# Patient Record
Sex: FEMALE | Race: WHITE | NOT HISPANIC OR LATINO | Employment: FULL TIME | URBAN - METROPOLITAN AREA
[De-identification: names, ages, dates, MRNs, and addresses within clinical notes are randomized per-mention and may not be internally consistent; named-entity substitution may affect disease eponyms.]

---

## 2016-11-10 LAB
ALBUMIN/GLOB SERPL: 1.5 {RATIO}
ALBUMIN: 4
ALP ISOS SERPL LEV INH-CCNC: 60 U/L
ALT SERPL-CCNC: 28 UNIT/L
ANION GAP SERPL CALC-SCNC: NORMAL MMOL/L
AST: 28
BILIRUB SERPL-MCNC: 0.3 MG/DL (ref 0.1–1.4)
BILIRUBIN DIRECT+TOT PNL SERPL-MCNC: NORMAL
BUN SERPL-MCNC: NORMAL MG/DL
BUN SERPL-MCNC: NORMAL MG/DL
BUN/CREAT SERPL: NORMAL
CALCIUM SERPL-MCNC: NORMAL MG/DL
CHLORIDE SERPL-SCNC: NORMAL MMOL/L
CK-BB: NORMAL
CO2 SERPL-SCNC: NORMAL MMOL/L
CO2 SERPL-SCNC: NORMAL MMOL/L
CREAT SERPL-MCNC: NORMAL MG/DL
EGFR: 103
GLOBULIN SER-MCNC: NORMAL G/DL
GLUCOSE SERPL-MCNC: NORMAL MG/DL
IRON: NORMAL
MAGNESIUM SERPL-MCNC: NORMAL MG/DL
PHOSPHATE FLD-MCNC: NORMAL MG/DL
POTASSIUM SERPL-SCNC: NORMAL MMOL/L
PROTEIN TOTAL: 6.6
SODIUM BLD-SCNC: NORMAL MMOL/L
URATE SERPL-MCNC: NORMAL MG/DL

## 2017-11-16 DIAGNOSIS — N63.0 LUMP OR MASS IN BREAST: Primary | ICD-10-CM

## 2017-11-20 ENCOUNTER — HOSPITAL ENCOUNTER (OUTPATIENT)
Dept: RADIOLOGY | Facility: HOSPITAL | Age: 27
Discharge: HOME | End: 2017-11-20
Attending: SURGERY
Payer: COMMERCIAL

## 2017-11-20 VITALS — BODY MASS INDEX: 26.58 KG/M2 | WEIGHT: 150 LBS | HEIGHT: 63 IN

## 2017-11-20 DIAGNOSIS — N63.0 LUMP OR MASS IN BREAST: ICD-10-CM

## 2017-11-20 PROCEDURE — 76641 ULTRASOUND BREAST COMPLETE: CPT | Mod: 26,LT,, | Performed by: RADIOLOGY

## 2017-11-20 PROCEDURE — 76641 ULTRASOUND BREAST COMPLETE: CPT | Mod: TC,50

## 2017-11-20 PROCEDURE — 76641 ULTRASOUND BREAST COMPLETE: CPT | Mod: 26,RT,, | Performed by: RADIOLOGY

## 2018-01-19 ENCOUNTER — OFFICE VISIT (OUTPATIENT)
Dept: DERMATOLOGY | Facility: CLINIC | Age: 28
End: 2018-01-19
Payer: COMMERCIAL

## 2018-01-19 VITALS — WEIGHT: 150 LBS | BODY MASS INDEX: 26.57 KG/M2

## 2018-01-19 DIAGNOSIS — D22.9 NEVUS OF MULTIPLE SITES: Primary | ICD-10-CM

## 2018-01-19 PROCEDURE — 99202 OFFICE O/P NEW SF 15 MIN: CPT | Mod: S$GLB,,, | Performed by: DERMATOLOGY

## 2018-01-19 PROCEDURE — 99999 PR PBB SHADOW E&M-EST. PATIENT-LVL II: CPT | Mod: PBBFAC,,, | Performed by: DERMATOLOGY

## 2018-01-19 NOTE — PROGRESS NOTES
Subjective:       Patient ID:  Bob Ortiz is a 27 y.o. female who presents for   Chief Complaint   Patient presents with    Mole     chest     History of Present Illness: The patient presents with chief complaint of mole.  Location: chest  Duration: years  Signs/Symptoms: none    Prior treatments: none          Review of Systems   Constitutional: Negative for fever.   Skin: Negative for itching and rash.   Hematologic/Lymphatic: Does not bruise/bleed easily.        Objective:    Physical Exam   Constitutional: She appears well-developed and well-nourished. No distress.   Neurological: She is alert and oriented to person, place, and time. She is not disoriented.   Psychiatric: She has a normal mood and affect.   Skin:   Areas Examined (abnormalities noted in diagram):   Head / Face Inspection Performed  Neck Inspection Performed  Chest / Axilla Inspection Performed  Back Inspection Performed  RUE Inspected  LUE Inspection Performed              Diagram Legend     Erythematous scaling macule/papule c/w actinic keratosis       Vascular papule c/w angioma      Pigmented verrucoid papule/plaque c/w seborrheic keratosis      Yellow umbilicated papule c/w sebaceous hyperplasia      Irregularly shaped tan macule c/w lentigo     1-2 mm smooth white papules consistent with Milia      Movable subcutaneous cyst with punctum c/w epidermal inclusion cyst      Subcutaneous movable cyst c/w pilar cyst      Firm pink to brown papule c/w dermatofibroma      Pedunculated fleshy papule(s) c/w skin tag(s)      Evenly pigmented macule c/w junctional nevus     Mildly variegated pigmented, slightly irregular-bordered macule c/w mildly atypical nevus      Flesh colored to evenly pigmented papule c/w intradermal nevus       Pink pearly papule/plaque c/w basal cell carcinoma      Erythematous hyperkeratotic cursted plaque c/w SCC      Surgical scar with no sign of skin cancer recurrence      Open and closed comedones      Inflammatory  "papules and pustules      Verrucoid papule consistent consistent with wart     Erythematous eczematous patches and plaques     Dystrophic onycholytic nail with subungual debris c/w onychomycosis     Umbilicated papule    Erythematous-base heme-crusted tan verrucoid plaque consistent with inflamed seborrheic keratosis     Erythematous Silvery Scaling Plaque c/w Psoriasis     See annotation      Assessment / Plan:        Nevus of multiple sites  The "ABCD" rules to observe pigmented lesions were reviewed.      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  "

## 2018-01-22 ENCOUNTER — TELEPHONE (OUTPATIENT)
Dept: DERMATOLOGY | Facility: CLINIC | Age: 28
End: 2018-01-22

## 2018-01-23 ENCOUNTER — TELEPHONE (OUTPATIENT)
Dept: DERMATOLOGY | Facility: CLINIC | Age: 28
End: 2018-01-23

## 2018-01-23 NOTE — TELEPHONE ENCOUNTER
----- Message from Carla Correa LPN sent at 1/19/2018  2:46 PM CST -----  Schedule pt for surgery per MCR 1cm x 6mm mole chest

## 2018-06-26 ENCOUNTER — TELEPHONE (OUTPATIENT)
Dept: SURGERY | Facility: CLINIC | Age: 28
End: 2018-06-26

## 2018-06-26 RX ORDER — PANTOPRAZOLE SODIUM 40 MG/1
TABLET, DELAYED RELEASE ORAL
COMMUNITY
End: 2018-06-28 | Stop reason: SDUPTHER

## 2018-06-26 NOTE — TELEPHONE ENCOUNTER
LVM for pt to return call concerning Rx for pantoprazole. Pt last seen in office on 01/25/18. Rx discontinued on 01/25/18.

## 2018-06-26 NOTE — TELEPHONE ENCOUNTER
----- Message from Courtney Terry sent at 6/26/2018  8:41 AM CDT -----  Pt request a refill for Pantoprazole ... Change to [facility] on [address] , [place]  [phone]  / call pt at  [phone]

## 2018-06-28 DIAGNOSIS — K21.9 GASTROESOPHAGEAL REFLUX DISEASE WITHOUT ESOPHAGITIS: ICD-10-CM

## 2018-06-28 RX ORDER — PANTOPRAZOLE SODIUM 40 MG/1
40 TABLET, DELAYED RELEASE ORAL DAILY
Qty: 90 TABLET | Refills: 3 | Status: SHIPPED | OUTPATIENT
Start: 2018-06-28 | End: 2019-06-24 | Stop reason: SDUPTHER

## 2018-06-29 ENCOUNTER — TELEPHONE (OUTPATIENT)
Dept: SURGERY | Facility: CLINIC | Age: 28
End: 2018-06-29

## 2018-06-29 NOTE — TELEPHONE ENCOUNTER
----- Message from Jim Prabhakar MD sent at 6/28/2018  7:16 PM CDT -----  Done    ----- Message -----  From: Liz Donovan LPN  Sent: 6/26/2018  10:09 AM  To: MD Dr Aki Michele  Pt called for a refill on her Rx Pantoprazole 40 mg. Rx last filled on 08/21/17, last o/v for chronic pancreatitis on 01/25/18. Pt requesting #30 refill and schedule o/v if needed. Please advise.

## 2018-08-17 ENCOUNTER — HOSPITAL ENCOUNTER (EMERGENCY)
Facility: HOSPITAL | Age: 28
Discharge: HOME | End: 2018-08-17
Attending: INTERNAL MEDICINE

## 2018-08-17 VITALS
BODY MASS INDEX: 32.6 KG/M2 | OXYGEN SATURATION: 99 % | RESPIRATION RATE: 20 BRPM | HEART RATE: 78 BPM | WEIGHT: 184 LBS | SYSTOLIC BLOOD PRESSURE: 122 MMHG | DIASTOLIC BLOOD PRESSURE: 79 MMHG | TEMPERATURE: 98 F | HEIGHT: 63 IN

## 2018-08-17 DIAGNOSIS — S29.019A THORACIC MYOFASCIAL STRAIN, INITIAL ENCOUNTER: ICD-10-CM

## 2018-08-17 DIAGNOSIS — S16.1XXA CERVICAL STRAIN, ACUTE, INITIAL ENCOUNTER: Primary | ICD-10-CM

## 2018-08-17 PROCEDURE — 99282 EMERGENCY DEPT VISIT SF MDM: CPT
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
## 2018-08-22 ENCOUNTER — HOSPITAL ENCOUNTER (EMERGENCY)
Facility: HOSPITAL | Age: 28
Discharge: HOME | End: 2018-08-22
Attending: EMERGENCY MEDICINE
Payer: COMMERCIAL

## 2018-08-22 ENCOUNTER — TELEPHONE (OUTPATIENT)
Dept: SURGERY | Facility: CLINIC | Age: 28
End: 2018-08-22

## 2018-08-22 VITALS
BODY MASS INDEX: 32.77 KG/M2 | WEIGHT: 185 LBS | SYSTOLIC BLOOD PRESSURE: 122 MMHG | TEMPERATURE: 98 F | RESPIRATION RATE: 18 BRPM | HEART RATE: 75 BPM | OXYGEN SATURATION: 100 % | DIASTOLIC BLOOD PRESSURE: 65 MMHG

## 2018-08-22 DIAGNOSIS — S39.012A BACK STRAIN, INITIAL ENCOUNTER: Primary | ICD-10-CM

## 2018-08-22 DIAGNOSIS — M54.9 BACK PAIN: ICD-10-CM

## 2018-08-22 LAB
B-HCG UR QL: NEGATIVE
CTP QC/QA: YES

## 2018-08-22 PROCEDURE — 81025 URINE PREGNANCY TEST: CPT | Performed by: NURSE PRACTITIONER

## 2018-08-22 PROCEDURE — 99284 EMERGENCY DEPT VISIT MOD MDM: CPT

## 2018-08-22 RX ORDER — METHOCARBAMOL 750 MG/1
1500 TABLET, FILM COATED ORAL EVERY 8 HOURS PRN
Qty: 30 TABLET | Refills: 0 | Status: SHIPPED | OUTPATIENT
Start: 2018-08-22 | End: 2018-08-28

## 2018-08-22 RX ORDER — DICLOFENAC SODIUM 50 MG/1
50 TABLET, DELAYED RELEASE ORAL 3 TIMES DAILY PRN
Qty: 30 TABLET | Refills: 0 | Status: SHIPPED | OUTPATIENT
Start: 2018-08-22 | End: 2018-08-28

## 2018-08-22 NOTE — DISCHARGE INSTRUCTIONS
Follow up with your primary care doctor if back pain continues. You may need a MRI in the future if your pain does not improve.
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
## 2018-08-28 ENCOUNTER — OFFICE VISIT (OUTPATIENT)
Dept: SURGERY | Facility: CLINIC | Age: 28
End: 2018-08-28

## 2018-08-28 VITALS
OXYGEN SATURATION: 97 % | WEIGHT: 187 LBS | SYSTOLIC BLOOD PRESSURE: 118 MMHG | HEIGHT: 63 IN | BODY MASS INDEX: 33.13 KG/M2 | HEART RATE: 84 BPM | TEMPERATURE: 97 F | DIASTOLIC BLOOD PRESSURE: 58 MMHG

## 2018-08-28 DIAGNOSIS — S20.229A CONTUSION OF BACK, UNSPECIFIED LATERALITY, INITIAL ENCOUNTER: Primary | ICD-10-CM

## 2018-08-28 PROCEDURE — 99203 OFFICE O/P NEW LOW 30 MIN: CPT | Mod: S$GLB,,, | Performed by: SURGERY

## 2018-08-28 RX ORDER — ESTRADIOL 1 MG/1
2 TABLET ORAL DAILY
COMMUNITY
End: 2019-10-15 | Stop reason: SDUPTHER
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
## 2018-09-19 ENCOUNTER — TELEPHONE (OUTPATIENT)
Dept: DERMATOLOGY | Facility: CLINIC | Age: 28
End: 2018-09-19

## 2018-11-28 ENCOUNTER — OFFICE VISIT (OUTPATIENT)
Dept: FAMILY MEDICINE | Facility: CLINIC | Age: 28
End: 2018-11-28
Payer: COMMERCIAL

## 2018-11-28 VITALS
WEIGHT: 198 LBS | HEART RATE: 73 BPM | SYSTOLIC BLOOD PRESSURE: 126 MMHG | BODY MASS INDEX: 32.99 KG/M2 | HEIGHT: 65 IN | RESPIRATION RATE: 18 BRPM | DIASTOLIC BLOOD PRESSURE: 65 MMHG | OXYGEN SATURATION: 98 %

## 2018-11-28 DIAGNOSIS — Z23 NEED FOR VACCINATION: ICD-10-CM

## 2018-11-28 DIAGNOSIS — S13.4XXA WHIPLASH INJURY TO NECK, INITIAL ENCOUNTER: Primary | ICD-10-CM

## 2018-11-28 PROCEDURE — 90471 IMMUNIZATION ADMIN: CPT | Mod: S$GLB,,, | Performed by: FAMILY MEDICINE

## 2018-11-28 PROCEDURE — 99999 PR PBB SHADOW E&M-EST. PATIENT-LVL III: CPT | Mod: PBBFAC,,, | Performed by: FAMILY MEDICINE

## 2018-11-28 PROCEDURE — 99203 OFFICE O/P NEW LOW 30 MIN: CPT | Mod: 25,S$GLB,, | Performed by: FAMILY MEDICINE

## 2018-11-28 PROCEDURE — 90686 IIV4 VACC NO PRSV 0.5 ML IM: CPT | Mod: S$GLB,,, | Performed by: FAMILY MEDICINE

## 2018-11-28 PROCEDURE — 3008F BODY MASS INDEX DOCD: CPT | Mod: S$GLB,,, | Performed by: FAMILY MEDICINE

## 2018-11-28 RX ORDER — TIZANIDINE 4 MG/1
4 TABLET ORAL EVERY 8 HOURS
Qty: 90 TABLET | Refills: 2 | Status: SHIPPED | OUTPATIENT
Start: 2018-11-28 | End: 2019-03-14

## 2018-11-28 RX ORDER — CELECOXIB 200 MG/1
200 CAPSULE ORAL 2 TIMES DAILY
Qty: 60 CAPSULE | Refills: 11 | Status: SHIPPED | OUTPATIENT
Start: 2018-11-28 | End: 2019-03-14
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
## 2019-03-14 ENCOUNTER — LAB VISIT (OUTPATIENT)
Dept: LAB | Facility: HOSPITAL | Age: 29
End: 2019-03-14
Attending: SURGERY
Payer: COMMERCIAL

## 2019-03-14 ENCOUNTER — OFFICE VISIT (OUTPATIENT)
Dept: SURGERY | Facility: CLINIC | Age: 29
End: 2019-03-14
Payer: COMMERCIAL

## 2019-03-14 VITALS
TEMPERATURE: 97 F | HEIGHT: 63 IN | SYSTOLIC BLOOD PRESSURE: 113 MMHG | OXYGEN SATURATION: 100 % | RESPIRATION RATE: 20 BRPM | BODY MASS INDEX: 35.08 KG/M2 | DIASTOLIC BLOOD PRESSURE: 83 MMHG | WEIGHT: 198 LBS | HEART RATE: 101 BPM

## 2019-03-14 DIAGNOSIS — K86.1 CHRONIC BILIARY PANCREATITIS: Primary | ICD-10-CM

## 2019-03-14 DIAGNOSIS — K86.1 CHRONIC BILIARY PANCREATITIS: ICD-10-CM

## 2019-03-14 LAB
ALBUMIN SERPL BCP-MCNC: 4.1 G/DL
ALP SERPL-CCNC: 82 U/L
ALT SERPL W/O P-5'-P-CCNC: 35 U/L
AMYLASE SERPL-CCNC: 74 U/L
ANION GAP SERPL CALC-SCNC: 9 MMOL/L
AST SERPL-CCNC: 37 U/L
BASOPHILS # BLD AUTO: 0.05 K/UL
BASOPHILS NFR BLD: 0.5 %
BILIRUB SERPL-MCNC: 0.5 MG/DL
BUN SERPL-MCNC: 7 MG/DL
CALCIUM SERPL-MCNC: 9.7 MG/DL
CHLORIDE SERPL-SCNC: 103 MMOL/L
CO2 SERPL-SCNC: 25 MMOL/L
CREAT SERPL-MCNC: 0.7 MG/DL
DIFFERENTIAL METHOD: ABNORMAL
EOSINOPHIL # BLD AUTO: 0.6 K/UL
EOSINOPHIL NFR BLD: 5.9 %
ERYTHROCYTE [DISTWIDTH] IN BLOOD BY AUTOMATED COUNT: 12.6 %
EST. GFR  (AFRICAN AMERICAN): >60 ML/MIN/1.73 M^2
EST. GFR  (NON AFRICAN AMERICAN): >60 ML/MIN/1.73 M^2
GLUCOSE SERPL-MCNC: 112 MG/DL
HCT VFR BLD AUTO: 41 %
HGB BLD-MCNC: 13.3 G/DL
IMM GRANULOCYTES # BLD AUTO: 0.02 K/UL
IMM GRANULOCYTES NFR BLD AUTO: 0.2 %
LIPASE SERPL-CCNC: 85 U/L
LYMPHOCYTES # BLD AUTO: 3.7 K/UL
LYMPHOCYTES NFR BLD: 36.4 %
MCH RBC QN AUTO: 26.6 PG
MCHC RBC AUTO-ENTMCNC: 32.4 G/DL
MCV RBC AUTO: 82 FL
MONOCYTES # BLD AUTO: 0.5 K/UL
MONOCYTES NFR BLD: 5 %
NEUTROPHILS # BLD AUTO: 5.3 K/UL
NEUTROPHILS NFR BLD: 52 %
NRBC BLD-RTO: 0 /100 WBC
PLATELET # BLD AUTO: 329 K/UL
PMV BLD AUTO: 9.7 FL
POTASSIUM SERPL-SCNC: 4.3 MMOL/L
PROT SERPL-MCNC: 8 G/DL
RBC # BLD AUTO: 5 M/UL
SODIUM SERPL-SCNC: 137 MMOL/L
WBC # BLD AUTO: 10.16 K/UL

## 2019-03-14 PROCEDURE — 85025 COMPLETE CBC W/AUTO DIFF WBC: CPT

## 2019-03-14 PROCEDURE — 82150 ASSAY OF AMYLASE: CPT

## 2019-03-14 PROCEDURE — 3008F PR BODY MASS INDEX (BMI) DOCUMENTED: ICD-10-PCS | Mod: S$GLB,,, | Performed by: SURGERY

## 2019-03-14 PROCEDURE — 36415 COLL VENOUS BLD VENIPUNCTURE: CPT

## 2019-03-14 PROCEDURE — 83690 ASSAY OF LIPASE: CPT

## 2019-03-14 PROCEDURE — 80053 COMPREHEN METABOLIC PANEL: CPT

## 2019-03-14 PROCEDURE — 3008F BODY MASS INDEX DOCD: CPT | Mod: S$GLB,,, | Performed by: SURGERY

## 2019-03-14 PROCEDURE — 99213 PR OFFICE/OUTPT VISIT, EST, LEVL III, 20-29 MIN: ICD-10-PCS | Mod: S$GLB,,, | Performed by: SURGERY

## 2019-03-14 PROCEDURE — 99213 OFFICE O/P EST LOW 20 MIN: CPT | Mod: S$GLB,,, | Performed by: SURGERY

## 2019-03-14 RX ORDER — TRAMADOL HYDROCHLORIDE 50 MG/1
100 TABLET ORAL EVERY 6 HOURS PRN
Qty: 50 TABLET | Refills: 0 | Status: SHIPPED | OUTPATIENT
Start: 2019-03-14 | End: 2020-10-20

## 2019-03-14 RX ORDER — DIAZEPAM 5 MG/1
5 TABLET ORAL EVERY 12 HOURS PRN
Qty: 50 TABLET | Refills: 0 | Status: SHIPPED | OUTPATIENT
Start: 2019-03-14 | End: 2021-01-04 | Stop reason: SDUPTHER

## 2019-03-14 NOTE — PROGRESS NOTES
Subjective:       Patient ID: Bob Jean Baptiste is a 28 y.o. female.    Chief Complaint: Consult and Pancreatitis      HPI:  Ms. Jean Baptiste presents today with acute onset of epigastric abdominal pain and steatorrhea.  Symptoms began this morning.  Pain is described as a severe sharp stabbing nonradiating pain. Nausea present but no vomiting.  No constipation.  No melena, hematochezia, hematemesis, fevers, chills, jaundice, biliuria, acholia, etc.  She has a history of chronic biliary induced pancreatitis.  She has had a cholecystectomy.  Her last episode of symptomatic pancreatitis was approximately 8 months ago.        Allergies & Meds:  Review of patient's allergies indicates:   Allergen Reactions    Ceftin [cefuroxime axetil]     Iodine and iodide containing products     Pcn [penicillins]        Current Outpatient Medications   Medication Sig Dispense Refill    estradiol (ESTRACE) 1 MG tablet Take 1 mg by mouth once daily.      pantoprazole (PROTONIX) 40 MG tablet Take 1 tablet (40 mg total) by mouth once daily. 90 tablet 3    PROGESTERONE IU       diazePAM (VALIUM) 5 MG tablet Take 1 tablet (5 mg total) by mouth every 12 (twelve) hours as needed (abdominal spasms). 50 tablet 0    traMADol (ULTRAM) 50 mg tablet Take 2 tablets (100 mg total) by mouth every 6 (six) hours as needed for Pain. 50 tablet 0     No current facility-administered medications for this visit.        PMFSHx:  Past Medical History:   Diagnosis Date    Abdominal pain, epigastric     GERD (gastroesophageal reflux disease)     Nausea alone     Pancreatitis     Pancreatitis, acute        Past Surgical History:   Procedure Laterality Date    CHOLECYSTECTOMY      ESOPHAGOGASTRODUODENOSCOPY      EYE SURGERY      ULTRASOUND-ENDOSCOPIC-UPPER N/A 5/29/2015    Performed by Rosendo Barnes MD at [facility] (2ND FLR)       Family History   Problem Relation Age of Onset    Diabetes Maternal Grandmother        Social History     Tobacco Use     Smoking status: Never Smoker    Smokeless tobacco: Never Used   Substance Use Topics    Alcohol use: No     Frequency: Never    Drug use: No       Review of Systems   Constitutional: Negative for appetite change, chills, fatigue, fever and unexpected weight change.   HENT: Negative for congestion, dental problem, ear pain, mouth sores, postnasal drip, rhinorrhea, sore throat, tinnitus, trouble swallowing and voice change.    Eyes: Negative for photophobia, pain, discharge and visual disturbance.   Respiratory: Negative for cough, chest tightness, shortness of breath and wheezing.    Cardiovascular: Negative for chest pain, palpitations and leg swelling.   Gastrointestinal: Negative for abdominal pain, blood in stool, constipation, diarrhea, nausea and vomiting.   Endocrine: Negative for cold intolerance, heat intolerance, polydipsia, polyphagia and polyuria.   Genitourinary: Negative for difficulty urinating, dysuria, flank pain, frequency, hematuria and urgency.   Musculoskeletal: Negative for arthralgias, joint swelling and myalgias.   Skin: Negative for color change and rash.   Allergic/Immunologic: Negative for immunocompromised state.   Neurological: Negative for dizziness, tremors, seizures, syncope, speech difficulty, weakness, numbness and headaches.   Hematological: Negative for adenopathy. Does not bruise/bleed easily.   Psychiatric/Behavioral: Negative for agitation, confusion, hallucinations, self-injury and suicidal ideas. The patient is not nervous/anxious.        Objective:      Physical Exam   Constitutional: She is oriented to person, place, and time. She appears well-developed and well-nourished. She is active.  Non-toxic appearance. No distress.   Body mass index is 35.07 kg/m².     HENT:   Head: Normocephalic and atraumatic.   Right Ear: Hearing and external ear normal. No drainage or tenderness.   Left Ear: Hearing and external ear normal. No drainage or tenderness.   Nose: Nose normal. No  rhinorrhea. No epistaxis.   Mouth/Throat: Uvula is midline, oropharynx is clear and moist and mucous membranes are normal. Mucous membranes are not pale, not dry and not cyanotic. No oropharyngeal exudate.   Eyes: Conjunctivae and lids are normal. Pupils are equal, round, and reactive to light. Right eye exhibits no discharge and no exudate. Left eye exhibits no discharge and no exudate. Right conjunctiva is not injected. Right eye exhibits no nystagmus. Left eye exhibits no nystagmus.   Neck: Trachea normal, full passive range of motion without pain and phonation normal. No JVD present. Carotid bruit is not present. No tracheal deviation present. No thyroid mass and no thyromegaly present.   Cardiovascular: Normal rate, regular rhythm, S1 normal, S2 normal, normal heart sounds and intact distal pulses. PMI is not displaced. Exam reveals no gallop and no friction rub.   No murmur heard.  Pulmonary/Chest: Effort normal and breath sounds normal. No accessory muscle usage. No respiratory distress. She exhibits no mass, no tenderness and no crepitus. Right breast exhibits no inverted nipple, no mass, no nipple discharge, no skin change and no tenderness. Left breast exhibits no inverted nipple, no mass, no nipple discharge, no skin change and no tenderness. Breasts are symmetrical.   Abdominal: Soft. Normal appearance and bowel sounds are normal. She exhibits no distension, no fluid wave, no abdominal bruit and no mass. There is no hepatosplenomegaly. There is no tenderness. There is no rebound, no guarding and negative Lepe's sign. No hernia. Hernia confirmed negative in the right inguinal area and confirmed negative in the left inguinal area.   Genitourinary: Rectum normal and vagina normal. There is no rash or lesion on the right labia. There is no rash or lesion on the left labia. Right adnexum displays no mass. Left adnexum displays no mass. No vaginal discharge found.   Musculoskeletal:        Cervical back:  Normal.        Thoracic back: Normal.        Lumbar back: Normal.        Right upper arm: Normal.        Left upper arm: Normal.        Right forearm: Normal.        Left forearm: Normal.        Right hand: Normal.        Left hand: Normal.        Right upper leg: Normal.        Left upper leg: Normal.        Right lower leg: Normal.        Left lower leg: Normal.        Right foot: Normal.        Left foot: Normal.   Lymphadenopathy:        Head (right side): No submental, no submandibular and no posterior auricular adenopathy present.        Head (left side): No submental, no submandibular and no posterior auricular adenopathy present.     She has no cervical adenopathy.     She has no axillary adenopathy.        Right: No inguinal and no supraclavicular adenopathy present.        Left: No inguinal and no supraclavicular adenopathy present.   Neurological: She is alert and oriented to person, place, and time. She has normal strength. No cranial nerve deficit or sensory deficit. Coordination and gait normal. GCS eye subscore is 4. GCS verbal subscore is 5. GCS motor subscore is 6.   Skin: Skin is warm, dry and intact. No rash noted. No cyanosis. Nails show no clubbing.   Psychiatric: She has a normal mood and affect. Her speech is normal. Judgment and thought content normal. Her mood appears not anxious. Her affect is not inappropriate. She is not actively hallucinating. She does not exhibit a depressed mood.         Test Results:  Pending    Assessment:         1. Chronic biliary pancreatitis        Plan:   Chronic biliary pancreatitis  -     CBC auto differential; Future; Expected date: 03/14/2019  -     Comprehensive metabolic panel; Future; Expected date: 03/14/2019  -     Amylase; Future; Expected date: 03/14/2019  -     Lipase; Future; Expected date: 03/14/2019    Other orders  -     traMADol (ULTRAM) 50 mg tablet; Take 2 tablets (100 mg total) by mouth every 6 (six) hours as needed for Pain.  Dispense: 50  tablet; Refill: 0  -     diazePAM (VALIUM) 5 MG tablet; Take 1 tablet (5 mg total) by mouth every 12 (twelve) hours as needed (abdominal spasms).  Dispense: 50 tablet; Refill: 0     Repeat laboratory studies today.  Initiate symptomatic treatment today.  Tolerating liquids.  Continue liquids.  Further evaluation and management will depend upon clinical course. May require reinstitution of therapy with Creon.  May require additional stool studies and or repeat imaging studies.    Follow-up in about 1 week (around 3/21/2019) for results.

## 2019-03-21 ENCOUNTER — OFFICE VISIT (OUTPATIENT)
Dept: SURGERY | Facility: CLINIC | Age: 29
End: 2019-03-21
Payer: COMMERCIAL

## 2019-03-21 VITALS
DIASTOLIC BLOOD PRESSURE: 79 MMHG | HEIGHT: 63 IN | BODY MASS INDEX: 35.26 KG/M2 | OXYGEN SATURATION: 98 % | RESPIRATION RATE: 18 BRPM | HEART RATE: 102 BPM | WEIGHT: 199 LBS | TEMPERATURE: 97 F | SYSTOLIC BLOOD PRESSURE: 106 MMHG

## 2019-03-21 DIAGNOSIS — K86.1 CHRONIC BILIARY PANCREATITIS: Primary | ICD-10-CM

## 2019-03-21 PROCEDURE — 99213 PR OFFICE/OUTPT VISIT, EST, LEVL III, 20-29 MIN: ICD-10-PCS | Mod: S$GLB,,, | Performed by: SURGERY

## 2019-03-21 PROCEDURE — 99213 OFFICE O/P EST LOW 20 MIN: CPT | Mod: S$GLB,,, | Performed by: SURGERY

## 2019-03-21 PROCEDURE — 3008F PR BODY MASS INDEX (BMI) DOCUMENTED: ICD-10-PCS | Mod: S$GLB,,, | Performed by: SURGERY

## 2019-03-21 PROCEDURE — 3008F BODY MASS INDEX DOCD: CPT | Mod: S$GLB,,, | Performed by: SURGERY

## 2019-04-03 ENCOUNTER — TELEPHONE (OUTPATIENT)
Dept: SURGERY | Facility: CLINIC | Age: 29
End: 2019-04-03

## 2019-04-03 NOTE — TELEPHONE ENCOUNTER
----- Message from Rosalina Parikh sent at 4/3/2019  7:57 AM CDT -----   Type: Needs Medical Advice    Who Called:  pt  Best Call Back Number:[phone]  Additional Information:   Pt is calling to  Speak to the  Nurse about  Some  Test  Results  // please call  Needs  advice

## 2019-04-03 NOTE — TELEPHONE ENCOUNTER
Writer spoke to pt and pt stated that she had her yearly lab work and o/v with her PCP in [place], [place] and wanted Dr Prabhakar to look it over. Writer expressed verbal understanding and gave patient fax #.

## 2019-04-11 NOTE — PROGRESS NOTES
Subjective:       Patient ID: Bob Jean Baptiste is a 28 y.o. female.    Chief Complaint: Follow-up (Lab Results)      HPI:  Ms. Jean Baptiste returns today with no complaints.  Abdominal pain has resolved.  No nausea or vomiting.  Tolerating her diet.  No back pain.  No diarrhea or constipation.  No fevers, chills, jaundice, biliuria, acholia, diarrhea, constipation, melena, hematochezia, hematemesis, etc.  Overall she feels great.  Appetite is excellent.  No food intolerance.  Activity level was returned to normal.    Lab results reviewed from 3/14/2019.  CBC showed no evidence of leukocytosis, anemia, or thrombocytopenia.  Electrolytes revealed no abnormalities.  BUN and creatinine showed no evidence of renal dysfunction.  Glucose was minimally elevated but without gross suspicion diabetes.  Liver profile showed no evidence of hepatocellular disease or biliary obstruction.  Amylase was in the range of normal.  Lipase was minimally elevated consistent with a mild exacerbation of chronic pancreatitis.      Allergies & Meds:  Review of patient's allergies indicates:   Allergen Reactions    Ceftin [cefuroxime axetil]     Iodine and iodide containing products     Pcn [penicillins]        Current Outpatient Medications   Medication Sig Dispense Refill    diazePAM (VALIUM) 5 MG tablet Take 1 tablet (5 mg total) by mouth every 12 (twelve) hours as needed (abdominal spasms). 50 tablet 0    estradiol (ESTRACE) 1 MG tablet Take 1 mg by mouth once daily.      pantoprazole (PROTONIX) 40 MG tablet Take 1 tablet (40 mg total) by mouth once daily. 90 tablet 3    PROGESTERONE IU       PROGESTERONE MISC Take 200 mg by mouth.      traMADol (ULTRAM) 50 mg tablet Take 2 tablets (100 mg total) by mouth every 6 (six) hours as needed for Pain. 50 tablet 0     No current facility-administered medications for this visit.        PMFSHx:  Past medical history, surgical history, family history, social history reviewed and no changes  noted.        Review of Systems   Constitutional: Negative for appetite change, chills, fatigue, fever and unexpected weight change.   HENT: Negative for congestion, dental problem, ear pain, mouth sores, postnasal drip, rhinorrhea, sore throat, tinnitus, trouble swallowing and voice change.    Eyes: Negative for photophobia, pain, discharge and visual disturbance.   Respiratory: Negative for cough, chest tightness, shortness of breath and wheezing.    Cardiovascular: Negative for chest pain, palpitations and leg swelling.   Gastrointestinal: Negative for abdominal pain, blood in stool, constipation, diarrhea, nausea and vomiting.   Endocrine: Negative for cold intolerance, heat intolerance, polydipsia, polyphagia and polyuria.   Genitourinary: Negative for difficulty urinating, dysuria, flank pain, frequency, hematuria and urgency.   Musculoskeletal: Negative for arthralgias, joint swelling and myalgias.   Skin: Negative for color change and rash.   Allergic/Immunologic: Negative for immunocompromised state.   Neurological: Negative for dizziness, tremors, seizures, syncope, speech difficulty, weakness, numbness and headaches.   Hematological: Negative for adenopathy. Does not bruise/bleed easily.   Psychiatric/Behavioral: Negative for agitation, confusion, hallucinations, self-injury and suicidal ideas. The patient is not nervous/anxious.        Objective:      Physical Exam   Constitutional: She appears well-developed and well-nourished.  Non-toxic appearance. She does not appear ill. No distress.   Cardiovascular: Normal rate, regular rhythm and normal heart sounds. PMI is not displaced. Exam reveals no gallop.   No murmur heard.  Pulmonary/Chest: Effort normal and breath sounds normal. No accessory muscle usage. No tachypnea. No respiratory distress.   Abdominal: Soft. Normal appearance and bowel sounds are normal. There is no tenderness. No hernia.   Skin: Skin is warm, dry and intact. No rash noted.          Test Results:  See above    Assessment:       Recurrent chronic biliary pancreatitis.  Current episode now resolved.    1. Chronic biliary pancreatitis        Plan:   Chronic biliary pancreatitis        Follow up for any concerns or questions as needed, resume care with PCP.

## 2019-05-09 LAB — HIV: NON REACTIVE

## 2019-05-14 ENCOUNTER — OFFICE VISIT (OUTPATIENT)
Dept: OTOLARYNGOLOGY | Facility: CLINIC | Age: 29
End: 2019-05-14
Payer: COMMERCIAL

## 2019-05-14 VITALS — HEIGHT: 65 IN | BODY MASS INDEX: 29.82 KG/M2 | WEIGHT: 179 LBS

## 2019-05-14 DIAGNOSIS — J30.89 NON-SEASONAL ALLERGIC RHINITIS, UNSPECIFIED TRIGGER: Primary | ICD-10-CM

## 2019-05-14 PROCEDURE — 99203 PR OFFICE/OUTPT VISIT, NEW, LEVL III, 30-44 MIN: ICD-10-PCS | Mod: S$GLB,,, | Performed by: OTOLARYNGOLOGY

## 2019-05-14 PROCEDURE — 3008F PR BODY MASS INDEX (BMI) DOCUMENTED: ICD-10-PCS | Mod: CPTII,S$GLB,, | Performed by: OTOLARYNGOLOGY

## 2019-05-14 PROCEDURE — 99203 OFFICE O/P NEW LOW 30 MIN: CPT | Mod: S$GLB,,, | Performed by: OTOLARYNGOLOGY

## 2019-05-14 PROCEDURE — 99999 PR PBB SHADOW E&M-EST. PATIENT-LVL III: ICD-10-PCS | Mod: PBBFAC,,, | Performed by: OTOLARYNGOLOGY

## 2019-05-14 PROCEDURE — 3008F BODY MASS INDEX DOCD: CPT | Mod: CPTII,S$GLB,, | Performed by: OTOLARYNGOLOGY

## 2019-05-14 PROCEDURE — 99999 PR PBB SHADOW E&M-EST. PATIENT-LVL III: CPT | Mod: PBBFAC,,, | Performed by: OTOLARYNGOLOGY

## 2019-05-14 RX ORDER — FLUTICASONE PROPIONATE 50 MCG
2 SPRAY, SUSPENSION (ML) NASAL DAILY
Qty: 1 BOTTLE | Refills: 12 | Status: SHIPPED | OUTPATIENT
Start: 2019-05-14 | End: 2020-06-02

## 2019-05-14 RX ORDER — PHENTERMINE HYDROCHLORIDE 37.5 MG/1
TABLET ORAL
Refills: 0 | COMMUNITY
Start: 2019-04-30 | End: 2019-09-03

## 2019-05-14 RX ORDER — AZELASTINE 1 MG/ML
2 SPRAY, METERED NASAL 2 TIMES DAILY
Qty: 30 ML | Refills: 6 | Status: SHIPPED | OUTPATIENT
Start: 2019-05-14 | End: 2020-05-26

## 2019-05-14 NOTE — PROGRESS NOTES
Subjective:       Patient ID: Bob Jean Baptiste is a 28 y.o. female.    Chief Complaint: Nasal Congestion    Bob is here for nasal concerns.   Length of symptoms: 6 months, has been increasing over this time.   Main complaints are nasal congestion (L>R), sneezing, no itching, clear rhinorrhea, + ocular symptoms.   Has incr ethmoidal and frontal pressure periodically during this time.   Therapies tried include: oral AH, INS, oral steroids, IM steroid. IM steroid has provided the most relief. No antibiotics.      No allergy testing  No carpets. She does have pets. No major dietary changes.   Pertinent medical issues: Chronic pancreatitis, premature ovarian failure  Previous surgery:  No nasal or sinus surgery    Social History     Tobacco Use   Smoking Status Never Smoker   Smokeless Tobacco Never Used     Social History     Substance and Sexual Activity   Alcohol Use No    Frequency: Never          Review of Systems   Constitutional: Negative for activity change and appetite change.   Eyes: Negative for discharge.   Respiratory: Negative for difficulty breathing and wheezing   Cardiovascular: Negative for chest pain.   Gastrointestinal: Negative for abdominal distention and abdominal pain.   Endocrine: Negative for cold intolerance and heat intolerance.   Genitourinary: Negative for dysuria.   Musculoskeletal: Negative for gait problem and joint swelling.   Skin: Negative for color change and pallor.   Neurological: Negative for syncope and weakness.   Psychiatric/Behavioral: Negative for agitation and confusion.     Objective:        Constitutional:   She is oriented to person, place, and time. She appears well-developed and well-nourished. She appears alert. She is active. Normal speech.      Head:  Normocephalic and atraumatic. Head is without TMJ tenderness. No scars. Salivary glands normal.  Facial strength is normal.      Ears:    Right Ear: No drainage or swelling. No middle ear effusion.   Left Ear: No  drainage or swelling.  No middle ear effusion.     Nose:  No mucosal edema, rhinorrhea or sinus tenderness. Turbinate hypertrophy (Mild).      Mouth/Throat  Oropharynx clear and moist without lesions or asymmetry, normal uvula midline and mirror exam normal. Normal dentition. No uvula swelling, lacerations or trismus. No oropharyngeal exudate. Tonsillar erythema, tonsillar exudate.      Neck:  Full range of motion with neck supple and no adenopathy. Thyroid tenderness is present. No tracheal deviation, no edema, no erythema, normal range of motion, no stridor, no crepitus and no neck rigidity present. No thyroid mass present.     Cardiovascular:   Intact distal pulses and normal pulses.      Pulmonary/Chest:   Effort normal and breath sounds normal. No stridor.     Psychiatric:   Her speech is normal and behavior is normal. Her mood appears not anxious. Her affect is not labile.     Neurological:   She is alert and oriented to person, place, and time. No sensory deficit.     Skin:   No abrasions, lacerations, lesions, or rashes. No abrasion and no bruising noted.         Tests / Results:  None    Assessment:       1. Non-seasonal allergic rhinitis, unspecified trigger          Plan:         Continue nasal steroid regularly.  Add Astelin.  Discussed regular nasal saline irrigations as well  Suspect allergic rhinitis based on symptoms.  Referral for allergy testing.  Will follow up as needed with her depending on findings of allergy testing.

## 2019-05-14 NOTE — PATIENT INSTRUCTIONS
Nasal Steroid Spray    You have been prescribed or instructed to take a nasal steroid spray. Examples of this medication include Flonase (fluticasone), Nasacort (triamcinolone), and Rhinocort (budeosnide). Some symptoms will experience relief within 1-2 days; however, it may take other side effects 2-3 weeks to begin to see improvement. This medication needs to be taken consistently to see results.    Use as directed, spraying 1-2 times in each nostril per day.     Helpful hints for maximizing medication into the nose  - Use the opposite hand to spray the nostril (example: right hand for left nostril). This will help avoid spraying the medication onto the septum (the area that divides the left and right nasal cavity.)  - Tilt the bottle so that it is facing at a slight angle up or straight back, but avoid pointing the bottle straight up while spraying.   - Sniff in while you are spraying.    Side effects:  Overall, this is a well tolerated medication with low side effects. The benefit of nasal steroids as opposed to oral steroids is that the nasal steroid works primarily in the nose.  Common side effects: headache, nasal dryness, minor nose bleeds,   Rare side effects: septal perforation, elevated eye pressures, dry eyes, change in smell, allergic reaction.  Notify your doctor if you have any concerns or experience these symptoms.  Astelin (Azelastine)    This is a nasal spray that primarily treats nasal drainage (rhinorrhea) and nasal itching.    You may use this medication 2 times per day depending on leakage. This works fairly quickly after onset and can be used as needed (does not have to be used as a scheduled medication)    Use as directed, up to 2 times daily     Helpful hints for maximizing medication into the nose  - Use the opposite hand to spray the nostril (example: right hand for left nostril). This will help avoid spraying the medication onto the septum (the area that divides the left and right nasal  cavity.)  - Tilt the bottle so that it is facing at a slight angle up or straight back, but avoid pointing the bottle straight up while spraying.   - Sniff in while you are spraying.    Please use caution when spraying nose if on anticoagulants (coumadin, aspirin, plavix) as a common side effect is nasal dryness and possible nosebleed  Nasal Irrigations  This will help remove the allergens, debris, and mucous from your nose to help you breathe. It will also clear it in preparation for other nasal medications.    To perform, purchase an over the counter sinus irrigation kit such as the [employer] Sinus Rinse Kit. Use as directed on the box. You should use distilled water or water that was previously boiled and left to cool. If you wish, you may make your own solution. However, salt packets are available in the nasal section in your  drug store.     A rough estimate for making salt solution is:  8oz water  2 teaspoons salt (pickling, tamara or Kosher salt)  1 teaspoon baking soda    After each use, rinse the bottle with small amount of rubbing alcohol and clean with soap.  Replace the irrigation bottle if it becomes visibly soiled or every few weeks.

## 2019-05-29 ENCOUNTER — OFFICE VISIT (OUTPATIENT)
Dept: DERMATOLOGY | Facility: CLINIC | Age: 29
End: 2019-05-29
Payer: COMMERCIAL

## 2019-05-29 VITALS — WEIGHT: 179 LBS | HEIGHT: 65 IN | BODY MASS INDEX: 29.82 KG/M2

## 2019-05-29 DIAGNOSIS — D48.5 NEOPLASM OF UNCERTAIN BEHAVIOR OF SKIN: Primary | ICD-10-CM

## 2019-05-29 DIAGNOSIS — D22.9 MULTIPLE BENIGN NEVI: ICD-10-CM

## 2019-05-29 DIAGNOSIS — Q82.5 CONGENITAL NEVUS: ICD-10-CM

## 2019-05-29 PROCEDURE — 88342 IMHCHEM/IMCYTCHM 1ST ANTB: CPT | Mod: 26,,, | Performed by: PATHOLOGY

## 2019-05-29 PROCEDURE — 11102 PR TANGENTIAL BIOPSY, SKIN, SINGLE LESION: ICD-10-PCS | Mod: S$GLB,,, | Performed by: DERMATOLOGY

## 2019-05-29 PROCEDURE — 88305 TISSUE SPECIMEN TO PATHOLOGY, DERMATOLOGY: ICD-10-PCS | Mod: 26,,, | Performed by: PATHOLOGY

## 2019-05-29 PROCEDURE — 99213 PR OFFICE/OUTPT VISIT, EST, LEVL III, 20-29 MIN: ICD-10-PCS | Mod: 25,S$GLB,, | Performed by: DERMATOLOGY

## 2019-05-29 PROCEDURE — 88305 TISSUE EXAM BY PATHOLOGIST: CPT | Mod: 26,,, | Performed by: PATHOLOGY

## 2019-05-29 PROCEDURE — 11102 TANGNTL BX SKIN SINGLE LES: CPT | Mod: S$GLB,,, | Performed by: DERMATOLOGY

## 2019-05-29 PROCEDURE — 3008F BODY MASS INDEX DOCD: CPT | Mod: CPTII,S$GLB,, | Performed by: DERMATOLOGY

## 2019-05-29 PROCEDURE — 88305 TISSUE EXAM BY PATHOLOGIST: CPT | Performed by: PATHOLOGY

## 2019-05-29 PROCEDURE — 3008F PR BODY MASS INDEX (BMI) DOCUMENTED: ICD-10-PCS | Mod: CPTII,S$GLB,, | Performed by: DERMATOLOGY

## 2019-05-29 PROCEDURE — 99999 PR PBB SHADOW E&M-EST. PATIENT-LVL III: ICD-10-PCS | Mod: PBBFAC,,, | Performed by: DERMATOLOGY

## 2019-05-29 PROCEDURE — 99213 OFFICE O/P EST LOW 20 MIN: CPT | Mod: 25,S$GLB,, | Performed by: DERMATOLOGY

## 2019-05-29 PROCEDURE — 99999 PR PBB SHADOW E&M-EST. PATIENT-LVL III: CPT | Mod: PBBFAC,,, | Performed by: DERMATOLOGY

## 2019-05-29 PROCEDURE — 88342 TISSUE SPECIMEN TO PATHOLOGY, DERMATOLOGY: ICD-10-PCS | Mod: 26,,, | Performed by: PATHOLOGY

## 2019-05-29 NOTE — PROGRESS NOTES
Subjective:       Patient ID:  Bob Jean Baptiste is a 28 y.o. female who presents for   Chief Complaint   Patient presents with    Spot    Skin Check     UBSE     Previous Derm: Dr. Terry    Pt here today for skin check, UBSE    C/o spot lt breast and lt abdomen x years, asx, no recent change    No PH skin cancer    No FH NMSC      Review of Systems   Constitutional: Negative for fever, chills and fatigue.   Skin: Positive for activity-related sunscreen use and wears hat. Negative for itching, rash and daily sunscreen use.   Hematologic/Lymphatic: Does not bruise/bleed easily.        Objective:    Physical Exam   Skin:   Areas Examined (abnormalities noted in diagram):   Head / Face Inspection Performed  Neck Inspection Performed  Chest / Axilla Inspection Performed  Abdomen Inspection Performed  Back Inspection Performed  RUE Inspected  LUE Inspection Performed                   Diagram Legend     Erythematous scaling macule/papule c/w actinic keratosis       Vascular papule c/w angioma      Pigmented verrucoid papule/plaque c/w seborrheic keratosis      Yellow umbilicated papule c/w sebaceous hyperplasia      Irregularly shaped tan macule c/w lentigo     1-2 mm smooth white papules consistent with Milia      Movable subcutaneous cyst with punctum c/w epidermal inclusion cyst      Subcutaneous movable cyst c/w pilar cyst      Firm pink to brown papule c/w dermatofibroma      Pedunculated fleshy papule(s) c/w skin tag(s)      Evenly pigmented macule c/w junctional nevus     Mildly variegated pigmented, slightly irregular-bordered macule c/w mildly atypical nevus      Flesh colored to evenly pigmented papule c/w intradermal nevus       Pink pearly papule/plaque c/w basal cell carcinoma      Erythematous hyperkeratotic cursted plaque c/w SCC      Surgical scar with no sign of skin cancer recurrence      Open and closed comedones      Inflammatory papules and pustules      Verrucoid papule consistent consistent with  wart     Erythematous eczematous patches and plaques     Dystrophic onycholytic nail with subungual debris c/w onychomycosis     Umbilicated papule    Erythematous-base heme-crusted tan verrucoid plaque consistent with inflamed seborrheic keratosis     Erythematous Silvery Scaling Plaque c/w Psoriasis     See annotation      Assessment / Plan:      Pathology Orders:     Normal Orders This Visit    Tissue Specimen To Pathology, Dermatology     Questions:    Directional Terms:  Other(comment)    Clinical Information:  nevus r/o atypia    Specific Site:  left back        Neoplasm of uncertain behavior of skin  -     Tissue Specimen To Pathology, Dermatology    Shave biopsy procedure note:    Shave biopsy performed after verbal consent including risk of infection, scar, recurrence, need for additional treatment of site. Area prepped with alcohol, anesthetized with approximately 1.0cc of 1% lidocaine with epinephrine. Lesional tissue shaved with razor blade. Hemostasis achieved with application of aluminum chloride followed by hyfrecation. No complications. Dressing applied. Wound care explained.        Multiple benign nevi  Discussed ABCDE's of nevi.  Monitor for new mole or moles that are becoming bigger, darker, irritated, or developing irregular borders.       Congenital nevus, chest and abdomen  Discussed ABCDE's of nevi.  Monitor for new mole or moles that are becoming bigger, darker, irritated, or developing irregular borders.                No follow-ups on file.

## 2019-06-04 ENCOUNTER — PATIENT MESSAGE (OUTPATIENT)
Dept: DERMATOLOGY | Facility: CLINIC | Age: 29
End: 2019-06-04

## 2019-06-24 RX ORDER — PANTOPRAZOLE SODIUM 40 MG/1
TABLET, DELAYED RELEASE ORAL
Qty: 90 TABLET | Refills: 0 | Status: SHIPPED | OUTPATIENT
Start: 2019-06-24 | End: 2019-12-03 | Stop reason: SDUPTHER

## 2019-06-25 ENCOUNTER — OFFICE VISIT (OUTPATIENT)
Dept: ALLERGY | Facility: CLINIC | Age: 29
End: 2019-06-25
Payer: COMMERCIAL

## 2019-06-25 VITALS — BODY MASS INDEX: 28.19 KG/M2 | HEART RATE: 53 BPM | OXYGEN SATURATION: 96 % | HEIGHT: 65 IN | WEIGHT: 169.19 LBS

## 2019-06-25 DIAGNOSIS — J31.0 CHRONIC RHINITIS: Primary | ICD-10-CM

## 2019-06-25 DIAGNOSIS — E28.39 PREMATURE OVARIAN INSUFFICIENCY: ICD-10-CM

## 2019-06-25 DIAGNOSIS — K86.1 CHRONIC PANCREATITIS, UNSPECIFIED PANCREATITIS TYPE: ICD-10-CM

## 2019-06-25 DIAGNOSIS — K21.00 REFLUX ESOPHAGITIS: ICD-10-CM

## 2019-06-25 PROCEDURE — 99999 PR PBB SHADOW E&M-EST. PATIENT-LVL III: CPT | Mod: PBBFAC,,, | Performed by: ALLERGY & IMMUNOLOGY

## 2019-06-25 PROCEDURE — 99999 PR PBB SHADOW E&M-EST. PATIENT-LVL III: ICD-10-PCS | Mod: PBBFAC,,, | Performed by: ALLERGY & IMMUNOLOGY

## 2019-06-25 PROCEDURE — 99204 PR OFFICE/OUTPT VISIT, NEW, LEVL IV, 45-59 MIN: ICD-10-PCS | Mod: S$GLB,,, | Performed by: ALLERGY & IMMUNOLOGY

## 2019-06-25 PROCEDURE — 3008F BODY MASS INDEX DOCD: CPT | Mod: CPTII,S$GLB,, | Performed by: ALLERGY & IMMUNOLOGY

## 2019-06-25 PROCEDURE — 3008F PR BODY MASS INDEX (BMI) DOCUMENTED: ICD-10-PCS | Mod: CPTII,S$GLB,, | Performed by: ALLERGY & IMMUNOLOGY

## 2019-06-25 PROCEDURE — 99204 OFFICE O/P NEW MOD 45 MIN: CPT | Mod: S$GLB,,, | Performed by: ALLERGY & IMMUNOLOGY

## 2019-06-25 NOTE — LETTER
June 25, 2019      Supa Payne MD  [address] [facility] [address] [place] [address]           [place] [facility]  [address]  [place] [address]  Phone: [phone]          Patient: Bob Jean Baptiste   MR Number: [id number]   YOB: 1990   Date of Visit: 6/25/2019       Dear Dr. Supa Payne:    Thank you for referring Bob Jean Baptiste to me for evaluation. Attached you will find relevant portions of my assessment and plan of care.    If you have questions, please do not hesitate to call me. I look forward to following Bob Jean Baptiste along with you.    Sincerely,    Majo Scales MD    Enclosure  CC:  No Recipients    If you would like to receive this communication electronically, please contact [email or web address] or [phone] to request more information on [facility] access.    For providers and/or their staff who would like to refer a patient to [facility], please contact us through our one-stop-shop provider referral line, [facility], at [phone].    If you feel you have received this communication in error or would no longer like to receive these types of communications, please e-mail [email or web address]

## 2019-06-25 NOTE — PROGRESS NOTES
"ALLERGY & IMMUNOLOGY CLINIC - INITIAL CONSULTATION     HISTORY OF PRESENT ILLNESS     Patient ID: Bob Jean Baptiste is a 29 y.o. female    CC: query allergies    HPI: 30 yo woman referred by Dr. Payne (ENT) for possible allergic rhinitis is here for her initial visit.     Symptoms include: nasal congestion that is worse at night, post-nasal drip, sneezing.    History of dry eyes s/p PRK surgery.     Onset of symptoms: November 2018, although she does get "colds" every spring and fall that have been going on for years.    Triggers include: lying flat at night.     Current therapies include: neti pot, nasal sprays - flonase and astelin, zyrtec, claritin D, humidifier.     Never had allergy testing done before.     Has a history of reflux, takes PPI.      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   Seafood - has had generalized pruritus after eating tuna, another time she had lip swelling after eating crabs. She has eaten both crabs and tuna since these reactions without issue.   Medications: MAR reviewed  Vaccines: UTD    H/o Asthma: denies  H/o Eczema: denies  H/o Rhinitis: see HPI  Oral Allergy:  denies  Food Allergy: see above, inconsistent  Venom Allergy: denies  Latex Allergy: denies  Other " "Allergies: denies  Env/Occ: denies any environmental or occupational exposures     PHYSICAL EXAM     VS: Pulse (!) 53   Ht 5' 5" (1.651 m)   Wt 76.8 kg (169 lb 3.3 oz)   SpO2 96%   BMI 28.16 kg/m²   GENERAL: alert, NAD, well-appearing, cooperative  EYES: PERRL, EOMI, no conjunctival injection, no discharge, no infraorbital shiners  EARS: external auditory canals normal B/L, TM normal B/L  NOSE: NT 2-3+ and pale pink B/L, deviated septum, no stringing mucous, no polyps  ORAL: MMM, no ulcers, no thrush, no cobblestoning  NECK: supple, trachea midline, no thyromegaly, no LAD  LUNGS: CTAB, no w/r/c, no increased WOB  HEART: RRR, normal S1/S2, no m/g/r  EXTREMITIES: +2 distal pulses, no c/c/e  LYMPHATICS: no cervical/submandibular LAD  DERM: no rashes, no skin breaks, no dystrophic fingernails  NEURO: normal gait, no facial asymmetry     LABORATORY STUDIES     3/2019:       ALLERGEN TESTING     Never done     ASSESSMENT & PLAN     Bob Jean Baptiste is a 29 y.o. female with     Chronic rhinitis, possibly allergic  Reflux  History of chronic pancreatitis and premature ovarian failure    Return to clinic on 7/9 @ 3;30 pm for skin prick testing to aeroallergens, patient should be off all antihistamines for 5 days, including claritin, zyrtec, astelin.   Continue flonase, continue PPI.   Will ask colleagues about any links between pancreatitis and premature ovarian failure - propionic acidemia is the only thing that pops up on a pubmed search.     Follow up: 7/9    Majo Scales MD  Allergy/Immunology    "

## 2019-07-09 ENCOUNTER — OFFICE VISIT (OUTPATIENT)
Dept: ALLERGY | Facility: CLINIC | Age: 29
End: 2019-07-09
Payer: COMMERCIAL

## 2019-07-09 VITALS — HEIGHT: 65 IN | WEIGHT: 169.31 LBS | OXYGEN SATURATION: 96 % | BODY MASS INDEX: 28.21 KG/M2 | HEART RATE: 75 BPM

## 2019-07-09 DIAGNOSIS — J31.0 CHRONIC RHINITIS: Primary | ICD-10-CM

## 2019-07-09 DIAGNOSIS — K21.00 REFLUX ESOPHAGITIS: ICD-10-CM

## 2019-07-09 DIAGNOSIS — K86.1 CHRONIC PANCREATITIS, UNSPECIFIED PANCREATITIS TYPE: ICD-10-CM

## 2019-07-09 DIAGNOSIS — Z86.19 FREQUENT INFECTIONS: ICD-10-CM

## 2019-07-09 PROCEDURE — 95004 PR ALLERGY SKIN TESTS,ALLERGENS: ICD-10-PCS | Mod: S$GLB,,, | Performed by: ALLERGY & IMMUNOLOGY

## 2019-07-09 PROCEDURE — 3008F BODY MASS INDEX DOCD: CPT | Mod: CPTII,S$GLB,, | Performed by: ALLERGY & IMMUNOLOGY

## 2019-07-09 PROCEDURE — 95004 PERQ TESTS W/ALRGNC XTRCS: CPT | Mod: S$GLB,,, | Performed by: ALLERGY & IMMUNOLOGY

## 2019-07-09 PROCEDURE — 99999 PR PBB SHADOW E&M-EST. PATIENT-LVL III: CPT | Mod: PBBFAC,,, | Performed by: ALLERGY & IMMUNOLOGY

## 2019-07-09 PROCEDURE — 3008F PR BODY MASS INDEX (BMI) DOCUMENTED: ICD-10-PCS | Mod: CPTII,S$GLB,, | Performed by: ALLERGY & IMMUNOLOGY

## 2019-07-09 PROCEDURE — 99214 PR OFFICE/OUTPT VISIT, EST, LEVL IV, 30-39 MIN: ICD-10-PCS | Mod: 25,S$GLB,, | Performed by: ALLERGY & IMMUNOLOGY

## 2019-07-09 PROCEDURE — 99214 OFFICE O/P EST MOD 30 MIN: CPT | Mod: 25,S$GLB,, | Performed by: ALLERGY & IMMUNOLOGY

## 2019-07-09 PROCEDURE — 99999 PR PBB SHADOW E&M-EST. PATIENT-LVL III: ICD-10-PCS | Mod: PBBFAC,,, | Performed by: ALLERGY & IMMUNOLOGY

## 2019-07-09 NOTE — PROGRESS NOTES
"ALLERGY & IMMUNOLOGY CLINIC - FOLLOW UP     HISTORY OF PRESENT ILLNESS     Patient ID: Bob Jean Baptiste is a 29 y.o. female    CC: skin prick test    HPI: 28 yo woman presents for follow up for skin prick testing. She has a history of chronic rhinitis and GERD, plus idiopathic chronic pancreatitis and premature ovarian failure. She has been off antihistamines x 1 week. She is feeling well today.      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   Seafood - has had generalized pruritus after eating tuna, another time she had lip swelling after eating crabs. She has eaten both crabs and tuna since these reactions without issue.   Medications: MAR reviewed  Vaccines: UTD     H/o Asthma: denies  H/o Eczema: denies  H/o Rhinitis: see HPI  Oral Allergy:  denies  Food Allergy: see above, inconsistent  Venom Allergy: denies  Latex Allergy: denies  Other Allergies: denies  Env/Occ: denies any environmental or occupational exposures     PHYSICAL EXAM     VS: Pulse 75   Ht 5' 5" (1.651 m)   Wt 76.8 kg (169 lb 5 oz)   SpO2 96%   BMI 28.18 kg/m²   GENERAL: alert, NAD, well-appearing, cooperative  EYES: EOMI, no conjunctival injection, no discharge, no infraorbital shiners  NECK: supple  LUNGS: no increased " WOB  EXTREMITIES: no c/c/e  DERM: no rashes, no skin breaks, no dystrophic fingernails  NEURO: normal gait, no facial asymmetry     LABORATORY STUDIES     3/2019:       ALLERGEN TESTING     Skin Prick: done today, negative to all aeroallergens tested. Adequate neg and pos controls.      ASSESSMENT & PLAN     Bob Jean Baptiste is a 29 y.o. female with     Chronic nonallergic rhinitis  History of frequent URIs and sinus infections  Reflux  History of chronic pancreatitis and premature ovarian failure    Plan:   Humoral immunodeficiency workup.   Continue flonase and astelin 1 SEN BID.  Continue PPI daily on an empty stomach.  Can use nasal spray with capsaicin or vicks prn congestion.   Can use sinus rinse prn congestion - use with boiled then cooled or distilled water    Follow up: Will review results via patient portal when available.     Majo Scales MD  Allergy/Immunology

## 2019-07-10 ENCOUNTER — PATIENT MESSAGE (OUTPATIENT)
Dept: SURGERY | Facility: CLINIC | Age: 29
End: 2019-07-10

## 2019-07-10 ENCOUNTER — TELEPHONE (OUTPATIENT)
Dept: SURGERY | Facility: CLINIC | Age: 29
End: 2019-07-10

## 2019-07-10 ENCOUNTER — HOSPITAL ENCOUNTER (OUTPATIENT)
Dept: RADIOLOGY | Facility: HOSPITAL | Age: 29
Discharge: HOME | End: 2019-07-10
Attending: OBSTETRICS & GYNECOLOGY
Payer: COMMERCIAL

## 2019-07-10 DIAGNOSIS — E28.39 PREMATURE PRIMARY OVARIAN FAILURE: ICD-10-CM

## 2019-07-10 DIAGNOSIS — E28.310 SYMPTOMATIC PREMATURE MENOPAUSE: Primary | ICD-10-CM

## 2019-07-10 DIAGNOSIS — K86.1 CHRONIC BILIARY PANCREATITIS: Primary | ICD-10-CM

## 2019-07-10 DIAGNOSIS — E28.39 PREMATURE PRIMARY OVARIAN FAILURE: Primary | ICD-10-CM

## 2019-07-10 PROCEDURE — 77080 DEXA BONE DENSITY SPINE HIP: ICD-10-PCS | Mod: 26,,, | Performed by: RADIOLOGY

## 2019-07-10 PROCEDURE — 77080 DXA BONE DENSITY AXIAL: CPT | Mod: TC

## 2019-07-10 PROCEDURE — 77080 DXA BONE DENSITY AXIAL: CPT | Mod: 26,,, | Performed by: RADIOLOGY

## 2019-07-10 NOTE — PROGRESS NOTES
E mailed clinic today with complaints of recurrent epigastric abdominal pain. Laboratory studies ordered.  Follow-up arranged.

## 2019-07-10 NOTE — TELEPHONE ENCOUNTER
----- Message from Teresa Bradley sent at 7/10/2019  8:39 AM CDT -----  Contact: self  Patient requesting to speak to nurse regarding she having a pancreas attack ,and want to know if Dr. Prabhakar want to call in labs      Please call to advice [phone] (home)         Patient disconnected without giving more detail

## 2019-07-10 NOTE — TELEPHONE ENCOUNTER
Phoned pt. Pt states she had a pancreatic attack last night, but she is starting to feel better now and has been drinking plenty of fluids.  Labs were completed as ordered.  No further concerns at this time. Pt instructed to call office for any other needs. She verbalizes understanding.

## 2019-07-10 NOTE — TELEPHONE ENCOUNTER
----- Message from Luis Alberto Avilez sent at 7/10/2019  1:09 PM CDT -----  Contact: pt  Type:  Patient Returning Call    Who Called:  pt  Who Left Message for Patient:  unknown  Does the patient know what this is regarding?:    Best Call Back Number:  [phone]  Additional Information:

## 2019-07-11 ENCOUNTER — PATIENT MESSAGE (OUTPATIENT)
Dept: SURGERY | Facility: CLINIC | Age: 29
End: 2019-07-11

## 2019-07-15 ENCOUNTER — PATIENT MESSAGE (OUTPATIENT)
Dept: SURGERY | Facility: CLINIC | Age: 29
End: 2019-07-15

## 2019-07-18 ENCOUNTER — TELEPHONE (OUTPATIENT)
Dept: ALLERGY | Facility: CLINIC | Age: 29
End: 2019-07-18

## 2019-07-18 DIAGNOSIS — Z86.19 FREQUENT INFECTIONS: Primary | ICD-10-CM

## 2019-07-18 NOTE — TELEPHONE ENCOUNTER
Called Bob to discuss recent immunology labs.   Quantitative immunoglobulins are normal.   Pneumococcal titers are not protective.   Recommend pneumovax and then repeat titers 4+ weeks later.   She will come to clinic on Tues for pneumovax.   Orders for pneumovax and repeat titers entered today.

## 2019-07-23 ENCOUNTER — CLINICAL SUPPORT (OUTPATIENT)
Dept: ALLERGY | Facility: CLINIC | Age: 29
End: 2019-07-23
Payer: COMMERCIAL

## 2019-07-23 DIAGNOSIS — Z23 NEED FOR 23-POLYVALENT PNEUMOCOCCAL POLYSACCHARIDE VACCINE: ICD-10-CM

## 2019-07-23 PROCEDURE — 99499 NO LOS: ICD-10-PCS | Mod: S$GLB,,, | Performed by: ALLERGY & IMMUNOLOGY

## 2019-07-23 PROCEDURE — 90732 PNEUMOCOCCAL POLYSACCHARIDE VACCINE 23-VALENT =>2YO SQ IM: ICD-10-PCS | Mod: S$GLB,,, | Performed by: ALLERGY & IMMUNOLOGY

## 2019-07-23 PROCEDURE — 90471 IMMUNIZATION ADMIN: CPT | Mod: S$GLB,,, | Performed by: ALLERGY & IMMUNOLOGY

## 2019-07-23 PROCEDURE — 90471 PNEUMOCOCCAL POLYSACCHARIDE VACCINE 23-VALENT =>2YO SQ IM: ICD-10-PCS | Mod: S$GLB,,, | Performed by: ALLERGY & IMMUNOLOGY

## 2019-07-23 PROCEDURE — 99499 UNLISTED E&M SERVICE: CPT | Mod: S$GLB,,, | Performed by: ALLERGY & IMMUNOLOGY

## 2019-07-23 PROCEDURE — 90732 PPSV23 VACC 2 YRS+ SUBQ/IM: CPT | Mod: S$GLB,,, | Performed by: ALLERGY & IMMUNOLOGY

## 2019-07-28 ENCOUNTER — PATIENT MESSAGE (OUTPATIENT)
Dept: OTOLARYNGOLOGY | Facility: CLINIC | Age: 29
End: 2019-07-28

## 2019-08-02 LAB — C TRACH DNA SPEC QL NAA+PROBE: NEGATIVE

## 2019-08-27 ENCOUNTER — LAB VISIT (OUTPATIENT)
Dept: LAB | Facility: HOSPITAL | Age: 29
End: 2019-08-27
Attending: ALLERGY & IMMUNOLOGY
Payer: COMMERCIAL

## 2019-08-27 DIAGNOSIS — Z86.19 FREQUENT INFECTIONS: ICD-10-CM

## 2019-08-27 PROCEDURE — 86774 TETANUS ANTIBODY: CPT

## 2019-08-27 PROCEDURE — 36415 COLL VENOUS BLD VENIPUNCTURE: CPT | Mod: PO

## 2019-09-03 ENCOUNTER — OFFICE VISIT (OUTPATIENT)
Dept: SURGERY | Facility: CLINIC | Age: 29
End: 2019-09-03
Payer: COMMERCIAL

## 2019-09-03 ENCOUNTER — PATIENT MESSAGE (OUTPATIENT)
Dept: SURGERY | Facility: CLINIC | Age: 29
End: 2019-09-03

## 2019-09-03 ENCOUNTER — LAB VISIT (OUTPATIENT)
Dept: LAB | Facility: HOSPITAL | Age: 29
End: 2019-09-03
Attending: SURGERY
Payer: COMMERCIAL

## 2019-09-03 VITALS
HEIGHT: 65 IN | WEIGHT: 165 LBS | TEMPERATURE: 98 F | HEART RATE: 94 BPM | RESPIRATION RATE: 18 BRPM | SYSTOLIC BLOOD PRESSURE: 122 MMHG | BODY MASS INDEX: 27.49 KG/M2 | OXYGEN SATURATION: 97 % | DIASTOLIC BLOOD PRESSURE: 78 MMHG

## 2019-09-03 DIAGNOSIS — K86.1 CHRONIC BILIARY PANCREATITIS: ICD-10-CM

## 2019-09-03 DIAGNOSIS — R53.82 CHRONIC FATIGUE: ICD-10-CM

## 2019-09-03 DIAGNOSIS — K86.1 CHRONIC BILIARY PANCREATITIS: Primary | ICD-10-CM

## 2019-09-03 DIAGNOSIS — R73.9 HYPERGLYCEMIA: ICD-10-CM

## 2019-09-03 LAB
ALBUMIN SERPL BCP-MCNC: 4.1 G/DL (ref 3.5–5.2)
ALP SERPL-CCNC: 60 U/L (ref 55–135)
ALT SERPL W/O P-5'-P-CCNC: 21 U/L (ref 10–44)
AMYLASE SERPL-CCNC: 30 U/L (ref 20–110)
ANION GAP SERPL CALC-SCNC: 13 MMOL/L (ref 8–16)
AST SERPL-CCNC: 24 U/L (ref 10–40)
BASOPHILS # BLD AUTO: 0.03 K/UL (ref 0–0.2)
BASOPHILS NFR BLD: 0.4 % (ref 0–1.9)
BILIRUB SERPL-MCNC: 0.5 MG/DL (ref 0.1–1)
BUN SERPL-MCNC: 5 MG/DL (ref 6–20)
C PEPTIDE SERPL-MCNC: 1.77 NG/ML (ref 0.78–5.19)
CALCIUM SERPL-MCNC: 9.2 MG/DL (ref 8.7–10.5)
CHLORIDE SERPL-SCNC: 101 MMOL/L (ref 95–110)
CHOLEST SERPL-MCNC: 225 MG/DL (ref 120–199)
CHOLEST/HDLC SERPL: 3.3 {RATIO} (ref 2–5)
CO2 SERPL-SCNC: 24 MMOL/L (ref 23–29)
CREAT SERPL-MCNC: 0.6 MG/DL (ref 0.5–1.4)
CRP SERPL-MCNC: 0.32 MG/DL (ref 0–0.75)
DIFFERENTIAL METHOD: ABNORMAL
EOSINOPHIL # BLD AUTO: 0.4 K/UL (ref 0–0.5)
EOSINOPHIL NFR BLD: 4.1 % (ref 0–8)
ERYTHROCYTE [DISTWIDTH] IN BLOOD BY AUTOMATED COUNT: 13.4 % (ref 11.5–14.5)
ERYTHROCYTE [SEDIMENTATION RATE] IN BLOOD BY WESTERGREN METHOD: 23 MM/HR (ref 0–20)
EST. GFR  (AFRICAN AMERICAN): >60 ML/MIN/1.73 M^2
EST. GFR  (NON AFRICAN AMERICAN): >60 ML/MIN/1.73 M^2
ESTIMATED AVG GLUCOSE: 114 MG/DL (ref 68–131)
GLUCOSE SERPL-MCNC: 98 MG/DL (ref 70–110)
HBA1C MFR BLD HPLC: 5.6 % (ref 4.5–6.2)
HCT VFR BLD AUTO: 39.8 % (ref 37–48.5)
HDLC SERPL-MCNC: 69 MG/DL (ref 40–75)
HDLC SERPL: 30.7 % (ref 20–50)
HGB BLD-MCNC: 12.5 G/DL (ref 12–16)
IMM GRANULOCYTES # BLD AUTO: 0.02 K/UL (ref 0–0.04)
IMM GRANULOCYTES NFR BLD AUTO: 0.2 % (ref 0–0.5)
LDLC SERPL CALC-MCNC: 132.4 MG/DL (ref 63–159)
LIPASE SERPL-CCNC: 19 U/L (ref 4–60)
LYMPHOCYTES # BLD AUTO: 2.7 K/UL (ref 1–4.8)
LYMPHOCYTES NFR BLD: 32.1 % (ref 18–48)
MCH RBC QN AUTO: 26.2 PG (ref 27–31)
MCHC RBC AUTO-ENTMCNC: 31.4 G/DL (ref 32–36)
MCV RBC AUTO: 83 FL (ref 82–98)
MONOCYTES # BLD AUTO: 0.4 K/UL (ref 0.3–1)
MONOCYTES NFR BLD: 4.1 % (ref 4–15)
NEUTROPHILS # BLD AUTO: 5 K/UL (ref 1.8–7.7)
NEUTROPHILS NFR BLD: 59.1 % (ref 38–73)
NONHDLC SERPL-MCNC: 156 MG/DL
NRBC BLD-RTO: 0 /100 WBC
PLATELET # BLD AUTO: 344 K/UL (ref 150–350)
PMV BLD AUTO: 10.5 FL (ref 9.2–12.9)
POTASSIUM SERPL-SCNC: 3.8 MMOL/L (ref 3.5–5.1)
PROT SERPL-MCNC: 7.5 G/DL (ref 6–8.4)
RBC # BLD AUTO: 4.77 M/UL (ref 4–5.4)
SODIUM SERPL-SCNC: 138 MMOL/L (ref 136–145)
T4 FREE SERPL-MCNC: 0.93 NG/DL (ref 0.71–1.51)
TRIGL SERPL-MCNC: 118 MG/DL (ref 30–150)
TSH SERPL DL<=0.005 MIU/L-ACNC: 1.09 UIU/ML (ref 0.34–5.6)
WBC # BLD AUTO: 8.48 K/UL (ref 3.9–12.7)

## 2019-09-03 PROCEDURE — 36415 COLL VENOUS BLD VENIPUNCTURE: CPT

## 2019-09-03 PROCEDURE — 80053 COMPREHEN METABOLIC PANEL: CPT

## 2019-09-03 PROCEDURE — 99213 OFFICE O/P EST LOW 20 MIN: CPT | Mod: S$GLB,,, | Performed by: SURGERY

## 2019-09-03 PROCEDURE — 85651 RBC SED RATE NONAUTOMATED: CPT

## 2019-09-03 PROCEDURE — 85025 COMPLETE CBC W/AUTO DIFF WBC: CPT

## 2019-09-03 PROCEDURE — 83690 ASSAY OF LIPASE: CPT

## 2019-09-03 PROCEDURE — 83036 HEMOGLOBIN GLYCOSYLATED A1C: CPT

## 2019-09-03 PROCEDURE — 99213 PR OFFICE/OUTPT VISIT, EST, LEVL III, 20-29 MIN: ICD-10-PCS | Mod: S$GLB,,, | Performed by: SURGERY

## 2019-09-03 PROCEDURE — 84439 ASSAY OF FREE THYROXINE: CPT

## 2019-09-03 PROCEDURE — 3008F PR BODY MASS INDEX (BMI) DOCUMENTED: ICD-10-PCS | Mod: S$GLB,,, | Performed by: SURGERY

## 2019-09-03 PROCEDURE — 82150 ASSAY OF AMYLASE: CPT

## 2019-09-03 PROCEDURE — 3008F BODY MASS INDEX DOCD: CPT | Mod: S$GLB,,, | Performed by: SURGERY

## 2019-09-03 PROCEDURE — 86140 C-REACTIVE PROTEIN: CPT

## 2019-09-03 PROCEDURE — 84481 FREE ASSAY (FT-3): CPT

## 2019-09-03 PROCEDURE — 80061 LIPID PANEL: CPT

## 2019-09-03 PROCEDURE — 84681 ASSAY OF C-PEPTIDE: CPT

## 2019-09-03 PROCEDURE — 84443 ASSAY THYROID STIM HORMONE: CPT

## 2019-09-03 RX ORDER — ERGOCALCIFEROL 1.25 MG/1
CAPSULE ORAL
Refills: 11 | COMMUNITY
Start: 2019-08-29 | End: 2019-10-15

## 2019-09-03 RX ORDER — PROGESTERONE 200 MG/1
CAPSULE ORAL
Refills: 8 | COMMUNITY
Start: 2019-08-17 | End: 2019-10-15 | Stop reason: SDUPTHER

## 2019-09-03 NOTE — PROGRESS NOTES
Subjective:       Patient ID: Bob Jean Baptiste is a 29 y.o. female.    Chief Complaint: Follow-up (Dizziness/Fatigue)      HPI:  Ms. Jean Baptiste presents today with complaints of fatigue.  She also had an episode of dizziness recently, at that time she had a low blood pressure and was treated with intravenous fluids.  Dizziness resolved and has not returned.  Antihypertensive have been adjusted.  She is currently not happy with her PCP in desires to establish a relationship with a new provider.  She is followed in the surgery clinic for chronic biliary pancreatitis.  She has occasional epigastric pain with nausea but no vomiting.  No fever or chills.  No jaundice, biliuria, acholia, melena, hematochezia, hematemesis, etc.  Her PCP recently started empiric therapy with Metformin, she has discontinued the medication due to side effects.  She is not experiencing any polyuria, polydipsia, polyphagia, weight loss, etc.  No steatorrhea.      Allergies & Meds:  Review of patient's allergies indicates:   Allergen Reactions    Ceftin [cefuroxime axetil]     Pcn [penicillins]        Current Outpatient Medications   Medication Sig Dispense Refill    azelastine (ASTELIN) 137 mcg (0.1 %) nasal spray 2 sprays (274 mcg total) by Nasal route 2 (two) times daily. 30 mL 6    ergocalciferol (ERGOCALCIFEROL) 50,000 unit Cap TK 1 C PO WEEKLY  11    estradiol (ESTRACE) 1 MG tablet Take 1 mg by mouth once daily.      fluticasone propionate (FLONASE) 50 mcg/actuation nasal spray 2 sprays (100 mcg total) by Each Nare route once daily. 1 Bottle 12    lorcaserin (BELVIQ XR) 20 mg Tb24 Take 20 mg by mouth once daily.      pantoprazole (PROTONIX) 40 MG tablet TAKE ONE TABLET BY MOUTH EVERY DAY 90 tablet 0    progesterone (PROMETRIUM) 200 MG capsule   8    traMADol (ULTRAM) 50 mg tablet Take 2 tablets (100 mg total) by mouth every 6 (six) hours as needed for Pain. 50 tablet 0    diazePAM (VALIUM) 5 MG tablet Take 1 tablet (5 mg total) by  mouth every 12 (twelve) hours as needed (abdominal spasms). 50 tablet 0     No current facility-administered medications for this visit.        PMFSHx:  Past Medical History:   Diagnosis Date    Abdominal pain, epigastric     GERD (gastroesophageal reflux disease)     Nausea alone     Pancreatitis     Pancreatitis, acute        Past Surgical History:   Procedure Laterality Date    CHOLECYSTECTOMY      ESOPHAGOGASTRODUODENOSCOPY      EYE SURGERY      ULTRASOUND-ENDOSCOPIC-UPPER N/A 5/29/2015    Performed by Rosendo Barnes MD at [facility] ([facility])       Family History   Problem Relation Age of Onset    Diabetes Maternal Grandmother     Melanoma Neg Hx     Psoriasis Neg Hx     Lupus Neg Hx     Eczema Neg Hx        Social History     Tobacco Use    Smoking status: Never Smoker    Smokeless tobacco: Never Used   Substance Use Topics    Alcohol use: No     Frequency: Never    Drug use: No       Review of Systems   Constitutional: Negative for appetite change, chills, fever and unexpected weight change.   HENT: Negative for congestion, dental problem, ear pain, mouth sores, postnasal drip, rhinorrhea, sore throat, tinnitus, trouble swallowing and voice change.    Eyes: Negative for photophobia, pain, discharge and visual disturbance.   Respiratory: Negative for cough, chest tightness, shortness of breath and wheezing.    Cardiovascular: Negative for chest pain, palpitations and leg swelling.   Gastrointestinal: Negative for abdominal pain, blood in stool, constipation, diarrhea, nausea and vomiting.   Endocrine: Negative for cold intolerance, heat intolerance, polydipsia, polyphagia and polyuria.   Genitourinary: Negative for difficulty urinating, dysuria, flank pain, frequency, hematuria and urgency.   Musculoskeletal: Negative for arthralgias, joint swelling and myalgias.   Skin: Negative for color change and rash.   Allergic/Immunologic: Negative for immunocompromised state.   Neurological: Negative  for tremors, seizures, syncope, speech difficulty, weakness, numbness and headaches.   Hematological: Negative for adenopathy. Does not bruise/bleed easily.   Psychiatric/Behavioral: Negative for agitation, confusion, hallucinations, self-injury and suicidal ideas. The patient is not nervous/anxious.        Objective:      Physical Exam   Constitutional: She is oriented to person, place, and time. She appears well-developed and well-nourished.  Non-toxic appearance. She does not appear ill. No distress.   HENT:   Head: Normocephalic and atraumatic.   Eyes: Conjunctivae, EOM and lids are normal. Right eye exhibits no discharge. Left eye exhibits no discharge. No scleral icterus.   Neck: Normal range of motion and phonation normal. Neck supple. No JVD present. Carotid bruit is not present. No thyroid mass and no thyromegaly present.   Cardiovascular: Normal rate, regular rhythm and normal heart sounds. PMI is not displaced. Exam reveals no gallop.   No murmur heard.  Pulmonary/Chest: Effort normal and breath sounds normal. No accessory muscle usage. No tachypnea. No respiratory distress.   Abdominal: Soft. Normal appearance and bowel sounds are normal. There is no tenderness. No hernia.   Lymphadenopathy:     She has no cervical adenopathy.     She has no axillary adenopathy.        Right: No inguinal adenopathy present.        Left: No inguinal adenopathy present.   Neurological: She is alert and oriented to person, place, and time. She has normal strength. No cranial nerve deficit or sensory deficit. GCS eye subscore is 4. GCS verbal subscore is 5. GCS motor subscore is 6.   Skin: Skin is warm, dry and intact. No rash noted. She is not diaphoretic. No cyanosis. No pallor. Nails show no clubbing.   Psychiatric: She has a normal mood and affect. Her speech is normal and behavior is normal. Thought content normal.         Test Results:  Lab results reviewed from 7/10/2019.  CBC showed no evidence of leukocytosis,  anemia, or thrombocytopenia.  Electrolytes were all in the range of normal. BUN and creatinine shows no evidence of renal dysfunction.  Glucose shows no suspicion diabetes.  Liver profile shows no evidence of hepatocellular disease or biliary obstruction. ESR was in the range of normal. CRP was minimally elevated. Amylase and lipase showed no evidence of pancreatitis.    Medical Records Review:  Medical records were reviewed, there is no recent CT scan of the abdomen pelvis.  No additional information was obtained after extensive records review.  No endoscopy reports are available for review.    Assessment:       Chronic biliary pancreatitis.  CT scan for baseline assessment is warranted.  Additional laboratory studies are also warranted.  Chronic fatigue possibly related to hypothyroidism.  No evidence of diabetes clinically however this should be excluded definitively with a hemoglobin A1c and a C-peptide level. Lipid profile should be obtained as well to assess for possible hyperlipidemia as an etiology for the pancreatitis.    1. Chronic biliary pancreatitis    2. Chronic fatigue    3. Hyperglycemia        Plan:   Chronic biliary pancreatitis  -     CBC auto differential; Future; Expected date: 09/03/2019  -     Comprehensive metabolic panel; Future; Expected date: 09/03/2019  -     Amylase; Future; Expected date: 09/03/2019  -     Lipase; Future; Expected date: 09/03/2019  -     Sedimentation rate; Future; Expected date: 09/03/2019  -     C-reactive protein; Future; Expected date: 09/03/2019  -     Hemoglobin A1c; Future; Expected date: 09/03/2019  -     C-peptide; Future; Expected date: 09/03/2019  -     Lipid panel; Future; Expected date: 09/03/2019  -     CT Abdomen Pelvis With Contrast; Future; Expected date: 09/03/2019    Chronic fatigue  -     TSH; Future; Expected date: 09/03/2019  -     T3, free; Future; Expected date: 09/03/2019  -     T4, free; Future; Expected date: 09/03/2019    Hyperglycemia  -      Ambulatory Referral to Family Practice        No follow-ups on file.    Counseling/Medical Decision Making:  Ms. Jean Baptiste was counseled regarding her current condition and symptoms.  Recommendations were expressed that she do not resume the metformin until laboratory studies are completed and indicate therapy is justified.  Risks and benefits of CT scan were also discussed.  Arrangements were initiated for establishing new PCP relationship here at [facility].  Questions solicited and answered.  Entire conversation was held in layman's terms.  She voiced complete understanding of all we discussed, satisfaction that all questions were answered, and stated that she felt fully informed    Total face-to-face encounter time was 25 minutes, 15 minutes spent counseling as outlined/summarized above.      Coding Summary: History - 4+.  ROS 10+.  PMHx, FHx, SocHx.  Exam 8+ systems.  MDM 3 established problem or 2 est problems with one worsening with Planned Work-up, Orders placed, Prescription(s)

## 2019-09-04 LAB
C DIPHTHERIAE AB SER IA-ACNC: 0.05 IU/ML
C TETANI AB SER-ACNC: 0.33 IU/ML
DEPRECATED S PNEUM 1 IGG SER-MCNC: 56.3 MCG/ML
DEPRECATED S PNEUM12 IGG SER-MCNC: >30.3 MCG/ML
DEPRECATED S PNEUM14 IGG SER-MCNC: 32.4 MCG/ML
DEPRECATED S PNEUM19 IGG SER-MCNC: >139 MCG/ML
DEPRECATED S PNEUM23 IGG SER-MCNC: 3.5 MCG/ML
DEPRECATED S PNEUM3 IGG SER-MCNC: 3.2 MCG/ML
DEPRECATED S PNEUM4 IGG SER-MCNC: 2.6 MCG/ML
DEPRECATED S PNEUM5 IGG SER-MCNC: 2.7 MCG/ML
DEPRECATED S PNEUM8 IGG SER-MCNC: 5 MCG/ML
DEPRECATED S PNEUM9 IGG SER-MCNC: 0.6 MCG/ML
HAEM INFLU B IGG SER-MCNC: 2.58 MCG/ML
S PNEUM DA 18C IGG SER-MCNC: 2.1 MCG/ML
S PNEUM DA 6B IGG SER-MCNC: 2.7 MCG/ML
S PNEUM DA 7F IGG SER-MCNC: 1 MCG/ML
S PNEUM DA 9V IGG SER-MCNC: 2 MCG/ML
T3FREE SERPL-MCNC: 3 PG/ML (ref 2.3–4.2)

## 2019-09-05 ENCOUNTER — TRANSFERRED RECORDS (OUTPATIENT)
Dept: HEALTH INFORMATION MANAGEMENT | Facility: CLINIC | Age: 29
End: 2019-09-05

## 2019-09-05 ENCOUNTER — TELEPHONE (OUTPATIENT)
Dept: SURGERY | Facility: CLINIC | Age: 29
End: 2019-09-05

## 2019-09-05 ENCOUNTER — HOSPITAL ENCOUNTER (OUTPATIENT)
Dept: RADIOLOGY | Facility: HOSPITAL | Age: 29
Discharge: HOME | End: 2019-09-05
Attending: SURGERY
Payer: COMMERCIAL

## 2019-09-05 DIAGNOSIS — K86.1 CHRONIC BILIARY PANCREATITIS: Primary | ICD-10-CM

## 2019-09-05 DIAGNOSIS — K86.1 CHRONIC BILIARY PANCREATITIS: ICD-10-CM

## 2019-09-05 PROCEDURE — 25500020 PHARM REV CODE 255: Performed by: SURGERY

## 2019-09-05 PROCEDURE — 74178 CT ABDOMEN PELVIS W WO CONTRAST: ICD-10-PCS | Mod: 26,,, | Performed by: RADIOLOGY

## 2019-09-05 PROCEDURE — 74178 CT ABD&PLV WO CNTR FLWD CNTR: CPT | Mod: TC

## 2019-09-05 PROCEDURE — 74178 CT ABD&PLV WO CNTR FLWD CNTR: CPT | Mod: 26,,, | Performed by: RADIOLOGY

## 2019-09-05 RX ADMIN — IOHEXOL 15 ML: 300 INJECTION, SOLUTION INTRAVENOUS at 09:09

## 2019-09-05 RX ADMIN — IOHEXOL 75 ML: 350 INJECTION, SOLUTION INTRAVENOUS at 10:09

## 2019-09-11 ENCOUNTER — PATIENT OUTREACH (OUTPATIENT)
Dept: ADMINISTRATIVE | Facility: HOSPITAL | Age: 29
End: 2019-09-11

## 2019-09-14 ENCOUNTER — PATIENT MESSAGE (OUTPATIENT)
Dept: SURGERY | Facility: CLINIC | Age: 29
End: 2019-09-14

## 2019-09-17 ENCOUNTER — OFFICE VISIT (OUTPATIENT)
Dept: FAMILY MEDICINE | Facility: CLINIC | Age: 29
End: 2019-09-17
Payer: COMMERCIAL

## 2019-09-17 ENCOUNTER — LAB VISIT (OUTPATIENT)
Dept: LAB | Facility: HOSPITAL | Age: 29
End: 2019-09-17
Attending: FAMILY MEDICINE
Payer: COMMERCIAL

## 2019-09-17 VITALS
HEIGHT: 65 IN | SYSTOLIC BLOOD PRESSURE: 117 MMHG | WEIGHT: 167.38 LBS | RESPIRATION RATE: 15 BRPM | DIASTOLIC BLOOD PRESSURE: 80 MMHG | BODY MASS INDEX: 27.89 KG/M2 | TEMPERATURE: 98 F | HEART RATE: 83 BPM | OXYGEN SATURATION: 99 %

## 2019-09-17 DIAGNOSIS — Z86.39 HISTORY OF VITAMIN D DEFICIENCY: ICD-10-CM

## 2019-09-17 DIAGNOSIS — Z76.89 ENCOUNTER TO ESTABLISH CARE: Primary | ICD-10-CM

## 2019-09-17 DIAGNOSIS — Z23 INFLUENZA VACCINATION ADMINISTERED AT CURRENT VISIT: ICD-10-CM

## 2019-09-17 DIAGNOSIS — E28.39 PREMATURE OVARIAN FAILURE: ICD-10-CM

## 2019-09-17 LAB — 25(OH)D3+25(OH)D2 SERPL-MCNC: 44 NG/ML (ref 30–96)

## 2019-09-17 PROCEDURE — 83002 ASSAY OF GONADOTROPIN (LH): CPT

## 2019-09-17 PROCEDURE — 82306 VITAMIN D 25 HYDROXY: CPT

## 2019-09-17 PROCEDURE — 83001 ASSAY OF GONADOTROPIN (FSH): CPT

## 2019-09-17 PROCEDURE — 3008F PR BODY MASS INDEX (BMI) DOCUMENTED: ICD-10-PCS | Mod: S$GLB,,, | Performed by: FAMILY MEDICINE

## 2019-09-17 PROCEDURE — 99214 OFFICE O/P EST MOD 30 MIN: CPT | Mod: S$GLB,,, | Performed by: FAMILY MEDICINE

## 2019-09-17 PROCEDURE — 99214 PR OFFICE/OUTPT VISIT, EST, LEVL IV, 30-39 MIN: ICD-10-PCS | Mod: S$GLB,,, | Performed by: FAMILY MEDICINE

## 2019-09-17 PROCEDURE — 36415 COLL VENOUS BLD VENIPUNCTURE: CPT

## 2019-09-17 PROCEDURE — 3008F BODY MASS INDEX DOCD: CPT | Mod: S$GLB,,, | Performed by: FAMILY MEDICINE

## 2019-09-17 NOTE — PROGRESS NOTES
[facility] - Clinic Note    Subjective      Ms. Jean Baptiste is a 29 y.o. female who presents to clinic to establish care.     Patient reports that her PCP is Dr. Steve Zamora in [place] but would like to have a PCP closer to home.   Her concerns today is to go over her medications and have a second opinion of what is needed.   She reports that she has a h/o chronic pancreatitis. Treated by Dr. Prabhakar. Has had a cholecystectomy with an improvement of symptoms. Lab work has been obtained in the past with no identifying cause as the etiology of her pancreatitis. She is prescribed valium and tramadol prn pain during a flare. States that she rarely uses the medication.     Has a h/o of GERD that is controlled with protonix    History of vitamin D deficiency diagnosed in April 2019. Has been taking prescribed vitamin D since then.     H/o premature ovarian failure. Currently seeing a fertility doctor. On estrace and progesterone. Reports that she has scheduled an appt with gyn at [facility] in October. Last LMP was this past weekend.      Reports that she was on belviq for weight loss. Previously on adipex. Lost about 30 pounds. On belviq for 1 month and not noticed a change. Was started on weight loss medication and metformin for prediabetes. Diagnosed by Dr. Zamora. Patient was 190s and now 160lbs. States that she is unsure if she still needs to be on metformin or belviq. States that she does not want to take too many medications.      Reports her last pap smear was in 2018 and was normal.   Had a Tdap vaccine in 2015.  Would like the flu vaccine today.    [facility] Bob has a past medical history of Abdominal pain, epigastric, GERD (gastroesophageal reflux disease), Nausea alone, Pancreatitis, and Pancreatitis, acute.   PSXH Bob has a past surgical history that includes Esophagogastroduodenoscopy; Cholecystectomy; and Eye surgery.    Bob's family history includes Diabetes in her maternal grandmother.   SH  "Bob reports that she has never smoked. She has never used smokeless tobacco. She reports that she does not drink alcohol or use drugs.   ALG Bob is allergic to ceftin [cefuroxime axetil] and pcn [penicillins].   MARÍA Rojas has a current medication list which includes the following prescription(s): azelastine, ergocalciferol, estradiol, fluticasone propionate, lorcaserin, pantoprazole, progesterone, tramadol, and diazepam.     Review of Systems   Constitutional: Negative for fever.   HENT: Negative for congestion.    Eyes: Negative for visual disturbance.   Respiratory: Negative for shortness of breath.    Cardiovascular: Negative for chest pain.   Genitourinary: Negative for difficulty urinating.   Neurological: Negative for dizziness, syncope and headaches.   Psychiatric/Behavioral: Negative for confusion.     Objective     /80 (BP Location: Left arm, Patient Position: Sitting, BP Method: Large (Automatic))   Pulse 83   Temp 98.1 °F (36.7 °C) (Oral)   Resp 15   Ht 5' 5" (1.651 m)   Wt 75.9 kg (167 lb 6.4 oz)   SpO2 99%   BMI 27.86 kg/m²     Physical Exam   Constitutional: She is well-developed, well-nourished, and in no distress. No distress.   HENT:   Head: Normocephalic and atraumatic.   Eyes: Right eye exhibits no discharge. Left eye exhibits no discharge.   Neck: No thyromegaly present.   Cardiovascular: Normal rate, regular rhythm, normal heart sounds and intact distal pulses. Exam reveals no gallop and no friction rub.   No murmur heard.  Pulmonary/Chest: Effort normal and breath sounds normal. No respiratory distress. She has no wheezes. She has no rales.   Musculoskeletal: She exhibits no edema.   Lymphadenopathy:     She has no cervical adenopathy.   Neurological: She is alert.   Skin: Skin is warm and dry. She is not diaphoretic.   Psychiatric: Mood and affect normal.   Vitals reviewed.    Assessment/Plan     Bob was seen today for establish care.    Diagnoses and all orders for this " visit:    Encounter to establish care    History of vitamin D deficiency  -     Vitamin D; Future    Premature ovarian failure  -     Luteinizing hormone; Future  -     Follicle stimulating hormone; Future    Influenza vaccination administered at current visit  -     influenza (AFLURIA, FLULAVAL, FLUARIX QUADRIVALENT) vaccine 0.5 mL    -Will obtain records from previous PCP and review.   -Will obtain a vitamin D level. If within normal limits that will stop prescribed vitamin D.   -reviewed lab work obtained last week from Dr. Prabhakar. A1C WNL at 5.6. No need for metformin at this time. C peptide normal. Discussed managing weight with lifestyle modifications such has exercising, food portion, and calorie counting.   -patient agreed with the above plan.     Follow up if symptoms worsen or fail to improve.    Future Appointments   Date Time Provider Department Center   9/17/2019  6:05 PM [facility], LABORATORY [facility] LAB [facility]   9/26/2019  9:15 AM Jim Prabhakar MD [facility] GENSUR[facility]   10/15/2019  2:15 PM Jeana Wang MD [facility]   12/12/2019 11:00 AM Angela Mathias MD [facility] GASTRO [facility]       Poncho Atkinson MD

## 2019-09-17 NOTE — LETTER
September 18, 2019      Jim Prabhakar MD  [address] [facility] [place] [address]           [facility] - [facility]  [address] [place] [address]  Phone: [phone]  Fax: [phone]          Patient: Bob Jean Baptiste   MR Number: [id number]   YOB: 1990   Date of Visit: 9/17/2019       Dear Dr. Jim Prabhakar:    Thank you for referring Bob Jean Baptiste to me for evaluation. Attached you will find relevant portions of my assessment and plan of care.    If you have questions, please do not hesitate to call me. I look forward to following Bob Jean Baptiste along with you.    Sincerely,    Poncho Atkinson MD    Enclosure  CC:  No Recipients    If you would like to receive this communication electronically, please contact [email or web address] or [phone] to request more information on [employer] Link access.    For providers and/or their staff who would like to refer a patient to [facility], please contact us through our one-stop-shop provider referral line, [facility] [place], at [phone].    If you feel you have received this communication in error or would no longer like to receive these types of communications, please e-mail [email or web address]

## 2019-09-18 LAB
FSH SERPL-ACNC: 20.2 MIU/ML
LH SERPL-ACNC: 15.4 MIU/ML

## 2019-09-20 ENCOUNTER — PATIENT MESSAGE (OUTPATIENT)
Dept: ALLERGY | Facility: CLINIC | Age: 29
End: 2019-09-20

## 2019-10-01 ENCOUNTER — PATIENT MESSAGE (OUTPATIENT)
Dept: ALLERGY | Facility: CLINIC | Age: 29
End: 2019-10-01

## 2019-10-01 ENCOUNTER — PATIENT MESSAGE (OUTPATIENT)
Dept: FAMILY MEDICINE | Facility: CLINIC | Age: 29
End: 2019-10-01

## 2019-10-02 ENCOUNTER — PATIENT MESSAGE (OUTPATIENT)
Dept: DERMATOLOGY | Facility: CLINIC | Age: 29
End: 2019-10-02

## 2019-10-02 ENCOUNTER — TELEPHONE (OUTPATIENT)
Dept: DERMATOLOGY | Facility: CLINIC | Age: 29
End: 2019-10-02

## 2019-10-13 ENCOUNTER — PATIENT OUTREACH (OUTPATIENT)
Dept: ADMINISTRATIVE | Facility: OTHER | Age: 29
End: 2019-10-13

## 2019-10-15 ENCOUNTER — OFFICE VISIT (OUTPATIENT)
Dept: OBSTETRICS AND GYNECOLOGY | Facility: CLINIC | Age: 29
End: 2019-10-15
Attending: OBSTETRICS & GYNECOLOGY
Payer: COMMERCIAL

## 2019-10-15 VITALS
BODY MASS INDEX: 28.95 KG/M2 | DIASTOLIC BLOOD PRESSURE: 68 MMHG | SYSTOLIC BLOOD PRESSURE: 112 MMHG | WEIGHT: 173.75 LBS | HEIGHT: 65 IN

## 2019-10-15 DIAGNOSIS — Z12.4 ENCOUNTER FOR PAPANICOLAOU SMEAR FOR CERVICAL CANCER SCREENING: ICD-10-CM

## 2019-10-15 DIAGNOSIS — Z01.419 ENCOUNTER FOR GYNECOLOGICAL EXAMINATION: Primary | ICD-10-CM

## 2019-10-15 PROCEDURE — 99999 PR PBB SHADOW E&M-EST. PATIENT-LVL III: ICD-10-PCS | Mod: PBBFAC,,, | Performed by: OBSTETRICS & GYNECOLOGY

## 2019-10-15 PROCEDURE — 99999 PR PBB SHADOW E&M-EST. PATIENT-LVL III: CPT | Mod: PBBFAC,,, | Performed by: OBSTETRICS & GYNECOLOGY

## 2019-10-15 PROCEDURE — 99385 PREV VISIT NEW AGE 18-39: CPT | Mod: S$GLB,,, | Performed by: OBSTETRICS & GYNECOLOGY

## 2019-10-15 PROCEDURE — 88175 CYTOPATH C/V AUTO FLUID REDO: CPT

## 2019-10-15 PROCEDURE — 99385 PR PREVENTIVE VISIT,NEW,18-39: ICD-10-PCS | Mod: S$GLB,,, | Performed by: OBSTETRICS & GYNECOLOGY

## 2019-10-15 RX ORDER — ESTRADIOL 1 MG/1
2 TABLET ORAL DAILY
Qty: 180 TABLET | Refills: 3 | Status: SHIPPED | OUTPATIENT
Start: 2019-10-15 | End: 2020-06-16 | Stop reason: SDUPTHER

## 2019-10-15 RX ORDER — PROGESTERONE 200 MG/1
200 CAPSULE ORAL NIGHTLY
Qty: 90 CAPSULE | Refills: 3 | Status: SHIPPED | OUTPATIENT
Start: 2019-10-15 | End: 2020-11-11 | Stop reason: SDUPTHER

## 2019-10-15 NOTE — PROGRESS NOTES
"Subjective:       Patient ID: Bob Jean Baptiste is a 29 y.o. female.    Chief Complaint:  Well Woman (pap nl 2015)      History of Present Illness  - here for annual. Has h/o premature ovarian insufficiency. Takes estradiol 2 mg daily and progesterone 200 mg for first 12 days of the month. Reports that was previously on 100 mg daily but started having mood irritability. Mood improved with increasing progesterone but now feels like "little things are bothering her again." Periods range from very light and only 2 days to a little heavier and lasting 5 days.  - patient reports that due to chronic pancreatitis, she drastically changed her diet to a low sugar one in April. Is now eating a more balanced diet.  - she noticed clumps of hair falling out this summer. Has seen Derm who recommended Rogaine. Reports that it makes her hair too oily.   - patient's cousin recently had a baby diagnosed with SMA. Patient reports that her chances of achieving spontaneously pregnancy are only 2% but she's concerned that she could also have a genetic mutation. Would like testing.    Past Medical History:   Diagnosis Date    Abdominal pain, epigastric     GERD (gastroesophageal reflux disease)     Nausea alone     Pancreatitis     Pancreatitis, acute     Premature ovarian insufficiency 08/2018       Past Surgical History:   Procedure Laterality Date    CHOLECYSTECTOMY      ESOPHAGOGASTRODUODENOSCOPY      EYE SURGERY           Current Outpatient Medications:     azelastine (ASTELIN) 137 mcg (0.1 %) nasal spray, 2 sprays (274 mcg total) by Nasal route 2 (two) times daily., Disp: 30 mL, Rfl: 6    estradiol (ESTRACE) 1 MG tablet, Take 2 tablets (2 mg total) by mouth once daily., Disp: 180 tablet, Rfl: 3    fluticasone propionate (FLONASE) 50 mcg/actuation nasal spray, 2 sprays (100 mcg total) by Each Nare route once daily., Disp: 1 Bottle, Rfl: 12    pantoprazole (PROTONIX) 40 MG tablet, TAKE ONE TABLET BY MOUTH EVERY DAY, Disp: " 90 tablet, Rfl: 0    progesterone (PROMETRIUM) 200 MG capsule, Take 1 capsule (200 mg total) by mouth every evening., Disp: 90 capsule, Rfl: 3    traMADol (ULTRAM) 50 mg tablet, Take 2 tablets (100 mg total) by mouth every 6 (six) hours as needed for Pain., Disp: 50 tablet, Rfl: 0    diazePAM (VALIUM) 5 MG tablet, Take 1 tablet (5 mg total) by mouth every 12 (twelve) hours as needed (abdominal spasms)., Disp: 50 tablet, Rfl: 0    Current Facility-Administered Medications:     influenza (AFLURIA, FLULAVAL, FLUARIX QUADRIVALENT) vaccine 0.5 mL, 0.5 mL, Intramuscular, vaccine x 1 dose, Poncho Atkinson MD    Review of patient's allergies indicates:   Allergen Reactions    Ceftin [cefuroxime axetil]     Pcn [penicillins]        GYN & OB History  Patient's last menstrual period was 10/10/2019.   Date of Last Pap: No result found    OB History    Para Term  AB Living   0 0 0 0 0 0   SAB TAB Ectopic Multiple Live Births   0 0 0 0 0       Social History     Socioeconomic History    Marital status:      Spouse name: Not on file    Number of children: Not on file    Years of education: Not on file    Highest education level: Not on file   Occupational History    Not on file   Social Needs    Financial resource strain: Not on file    Food insecurity:     Worry: Not on file     Inability: Not on file    Transportation needs:     Medical: Not on file     Non-medical: Not on file   Tobacco Use    Smoking status: Never Smoker    Smokeless tobacco: Never Used   Substance and Sexual Activity    Alcohol use: No     Frequency: Never     Comment: rare    Drug use: No    Sexual activity: Yes     Partners: Male     Birth control/protection: None   Lifestyle    Physical activity:     Days per week: Not on file     Minutes per session: Not on file    Stress: Not on file   Relationships    Social connections:     Talks on phone: Not on file     Gets together: Not on file     Attends Oriental orthodox  "service: Not on file     Active member of club or organization: Not on file     Attends meetings of clubs or organizations: Not on file     Relationship status: Not on file   Other Topics Concern    Are you pregnant or think you may be? Not Asked    Breast-feeding Not Asked   Social History Narrative    Not on file       Family History   Problem Relation Age of Onset    Diabetes Maternal Grandmother     Melanoma Neg Hx     Psoriasis Neg Hx     Lupus Neg Hx     Eczema Neg Hx     Breast cancer Neg Hx     Colon cancer Neg Hx     Ovarian cancer Neg Hx        Review of Systems  Review of Systems   Respiratory: Negative for shortness of breath.    Cardiovascular: Negative for chest pain and palpitations.   Gastrointestinal: Negative for blood in stool, nausea and vomiting.   Genitourinary:        - see HPI   Skin: Negative for rash and wound.   Allergic/Immunologic: Negative for immunocompromised state.   Neurological: Negative for dizziness and syncope.   Hematological: Negative for adenopathy.   Psychiatric/Behavioral: Negative for behavioral problems.        Objective:     Vitals:    10/15/19 1424   BP: 112/68   Weight: 78.8 kg (173 lb 11.6 oz)   Height: 5' 5" (1.651 m)       Physical Exam:   Constitutional: She is oriented to person, place, and time. She appears well-developed and well-nourished.        Pulmonary/Chest: Right breast exhibits no mass, no nipple discharge, no skin change, no tenderness and no swelling. Left breast exhibits no mass, no nipple discharge, no skin change, no tenderness and no swelling. Breasts are symmetrical.        Abdominal: Soft. She exhibits no distension. There is no tenderness.     Genitourinary: Vagina normal and uterus normal. There is no tenderness or lesion on the right labia. There is no tenderness or lesion on the left labia. Cervix is normal. Right adnexum displays no mass, no tenderness and no fullness. Left adnexum displays no mass, no tenderness and no fullness. " No vaginal discharge found. Additional cervical findings: pap smear done          Musculoskeletal: Moves all extremeties.       Neurological: She is alert and oriented to person, place, and time.     Psychiatric: She has a normal mood and affect.        Assessment/ Plan:     Orders Placed This Encounter    Liquid-based pap smear, screening    progesterone (PROMETRIUM) 200 MG capsule    estradiol (ESTRACE) 1 MG tablet       Bob was seen today for well woman.    Diagnoses and all orders for this visit:    Encounter for gynecological examination    Encounter for Papanicolaou smear for cervical cancer screening  -     Liquid-based pap smear, screening    Other orders  -     progesterone (PROMETRIUM) 200 MG capsule; Take 1 capsule (200 mg total) by mouth every evening.  -     estradiol (ESTRACE) 1 MG tablet; Take 2 tablets (2 mg total) by mouth once daily.    - will try continuous prometrium with estradiol to eliminate hormonal fluctuations that could be exacerbating her mood issues. Advised that she must take each med daily to prevent bleeding.  - advised that it's possible her strict dietary change may have synchronized her hair to cause it to fall all at once. Should desynchronize over time but may take a while to notice a difference.  - gave [employer]her[employer] pamphlet and phone number. Patient will call when is ready to have it drawn.    Follow up in about 1 year (around 10/15/2020) for annual exam.

## 2019-11-01 ENCOUNTER — PATIENT MESSAGE (OUTPATIENT)
Dept: FAMILY MEDICINE | Facility: CLINIC | Age: 29
End: 2019-11-01

## 2019-11-02 ENCOUNTER — PATIENT MESSAGE (OUTPATIENT)
Dept: OBSTETRICS AND GYNECOLOGY | Facility: CLINIC | Age: 29
End: 2019-11-02

## 2019-11-07 NOTE — TELEPHONE ENCOUNTER
Spoke with patient. Light bleeding started when she started the prometrium. Has had a period for last 3 days that's heavy.    Has been taking the progesterone nightly but has varied when she takes the estradiol.    Advised her to take both at same time before bed.    She will message me Monday unless bleeding increases, then she'll call prior to then. Verbalized understanding and agrees with plan.

## 2019-11-11 ENCOUNTER — PATIENT MESSAGE (OUTPATIENT)
Dept: OBSTETRICS AND GYNECOLOGY | Facility: CLINIC | Age: 29
End: 2019-11-11

## 2019-11-15 ENCOUNTER — PATIENT MESSAGE (OUTPATIENT)
Dept: ALLERGY | Facility: CLINIC | Age: 29
End: 2019-11-15

## 2019-11-19 ENCOUNTER — OFFICE VISIT (OUTPATIENT)
Dept: FAMILY MEDICINE | Facility: CLINIC | Age: 29
End: 2019-11-19
Payer: COMMERCIAL

## 2019-11-19 VITALS
WEIGHT: 173.75 LBS | HEIGHT: 65 IN | BODY MASS INDEX: 28.95 KG/M2 | DIASTOLIC BLOOD PRESSURE: 75 MMHG | RESPIRATION RATE: 17 BRPM | OXYGEN SATURATION: 97 % | HEART RATE: 103 BPM | SYSTOLIC BLOOD PRESSURE: 118 MMHG

## 2019-11-19 DIAGNOSIS — R09.82 POST-NASAL DRIP: ICD-10-CM

## 2019-11-19 DIAGNOSIS — J02.8 SORE THROAT (VIRAL): Primary | ICD-10-CM

## 2019-11-19 DIAGNOSIS — B97.89 SORE THROAT (VIRAL): Primary | ICD-10-CM

## 2019-11-19 PROCEDURE — 99214 PR OFFICE/OUTPT VISIT, EST, LEVL IV, 30-39 MIN: ICD-10-PCS | Mod: S$GLB,,, | Performed by: FAMILY MEDICINE

## 2019-11-19 PROCEDURE — 3008F PR BODY MASS INDEX (BMI) DOCUMENTED: ICD-10-PCS | Mod: S$GLB,,, | Performed by: FAMILY MEDICINE

## 2019-11-19 PROCEDURE — 3008F BODY MASS INDEX DOCD: CPT | Mod: S$GLB,,, | Performed by: FAMILY MEDICINE

## 2019-11-19 PROCEDURE — 99214 OFFICE O/P EST MOD 30 MIN: CPT | Mod: S$GLB,,, | Performed by: FAMILY MEDICINE

## 2019-11-19 RX ORDER — IPRATROPIUM BROMIDE 21 UG/1
2 SPRAY, METERED NASAL 2 TIMES DAILY
Qty: 30 ML | Refills: 0 | Status: SHIPPED | OUTPATIENT
Start: 2019-11-19 | End: 2020-01-05

## 2019-11-19 NOTE — PROGRESS NOTES
[facility] - Clinic Note    Subjective      Ms. Jean Baptiste is a 29 y.o. female who presents to clinic with complaints of a sore throat.     Has been present for the past 4 days.   Associated with rhinorrhea, post-nasal drip, and nasal congestion.   Denies any pain with swallowing but reports she has throat pain when she sneezes.   Denies fevers, cough, shortness of breath, or emesis.   Takes claritin-D and Astelin daily.   No known sick contacts but was at the hospital visiting her dad who was admitted.       PMH Bob has a past medical history of Abdominal pain, epigastric, GERD (gastroesophageal reflux disease), Nausea alone, Pancreatitis, Pancreatitis, acute, and Premature ovarian insufficiency (08/2018).   PSXH Bob has a past surgical history that includes Esophagogastroduodenoscopy; Cholecystectomy; and Eye surgery.    Bob's family history includes Diabetes in her maternal grandmother.    Bob reports that she has never smoked. She has never used smokeless tobacco. She reports that she does not drink alcohol or use drugs.   ALG Bob is allergic to ceftin [cefuroxime axetil] and pcn [penicillins].   MED Bob has a current medication list which includes the following prescription(s): azelastine, estradiol, fluticasone propionate, pantoprazole, progesterone, tramadol, diazepam, and ipratropium, and the following Facility-Administered Medications: influenza.     Review of Systems   Constitutional: Negative for chills and fever.   HENT: Positive for congestion, postnasal drip, sinus pressure, sneezing and sore throat. Negative for ear discharge, ear pain, trouble swallowing and voice change.    Eyes: Negative for discharge, itching and visual disturbance.   Respiratory: Negative for shortness of breath.    Cardiovascular: Negative for chest pain.   Gastrointestinal: Negative for abdominal pain, nausea and vomiting.   Musculoskeletal: Negative for myalgias.   Neurological: Negative for headaches.  "    Objective     /75 (BP Location: Right arm, Patient Position: Sitting, BP Method: Medium (Automatic))   Pulse 103   Resp 17   Ht 5' 5" (1.651 m)   Wt 78.8 kg (173 lb 11.6 oz)   SpO2 97%   BMI 28.91 kg/m²     Physical Exam   Constitutional: She is well-developed, well-nourished, and in no distress. No distress.   HENT:   Head: Normocephalic and atraumatic.   Right Ear: External ear normal.   Left Ear: External ear normal.   Mouth/Throat: Oropharynx is clear and moist. No oropharyngeal exudate.   +nasal mucosal edema and nasal discharge   Eyes: Right eye exhibits no discharge. Left eye exhibits no discharge.   Neck: No thyromegaly present.   Cardiovascular: Normal rate, regular rhythm, normal heart sounds and intact distal pulses. Exam reveals no gallop and no friction rub.   No murmur heard.  Pulmonary/Chest: Effort normal and breath sounds normal. No respiratory distress. She has no wheezes. She has no rales.   Musculoskeletal: She exhibits no edema.   Lymphadenopathy:     She has no cervical adenopathy.   Neurological: She is alert.   Skin: Skin is warm and dry. She is not diaphoretic.   Psychiatric: Mood and affect normal.   Vitals reviewed.    Assessment/Plan     Bob was seen today for sore throat.    Diagnoses and all orders for this visit:    Sore throat (viral)    Post-nasal drip  -     ipratropium (ATROVENT) 0.03 % nasal spray; 2 sprays by Nasal route 2 (two) times daily.    -Continue claritin-D and will try atrovent. Stop astelin at this time. Recommended warm tea and honey to soothe the throat.     Follow up if symptoms worsen or fail to improve.    Future Appointments   Date Time Provider Department Center   12/12/2019 11:00 AM Angela Mathias MD [facility]       Poncho Atkinson MD  Family Medicine  [facility]            "

## 2019-11-25 ENCOUNTER — PATIENT MESSAGE (OUTPATIENT)
Dept: ALLERGY | Facility: CLINIC | Age: 29
End: 2019-11-25

## 2019-12-03 ENCOUNTER — PATIENT MESSAGE (OUTPATIENT)
Dept: SURGERY | Facility: CLINIC | Age: 29
End: 2019-12-03

## 2019-12-03 RX ORDER — PANTOPRAZOLE SODIUM 40 MG/1
40 TABLET, DELAYED RELEASE ORAL DAILY
Qty: 90 TABLET | Refills: 0 | Status: SHIPPED | OUTPATIENT
Start: 2019-12-03 | End: 2019-12-09 | Stop reason: ALTCHOICE

## 2019-12-09 ENCOUNTER — PATIENT OUTREACH (OUTPATIENT)
Dept: ADMINISTRATIVE | Facility: OTHER | Age: 29
End: 2019-12-09

## 2019-12-09 ENCOUNTER — LAB VISIT (OUTPATIENT)
Dept: LAB | Facility: HOSPITAL | Age: 29
End: 2019-12-09
Attending: INTERNAL MEDICINE
Payer: COMMERCIAL

## 2019-12-09 ENCOUNTER — OFFICE VISIT (OUTPATIENT)
Dept: GASTROENTEROLOGY | Facility: CLINIC | Age: 29
End: 2019-12-09
Payer: COMMERCIAL

## 2019-12-09 VITALS
SYSTOLIC BLOOD PRESSURE: 111 MMHG | DIASTOLIC BLOOD PRESSURE: 80 MMHG | BODY MASS INDEX: 29.66 KG/M2 | WEIGHT: 178 LBS | HEIGHT: 65 IN

## 2019-12-09 DIAGNOSIS — K86.1 IDIOPATHIC CHRONIC PANCREATITIS: Primary | ICD-10-CM

## 2019-12-09 DIAGNOSIS — K86.1 IDIOPATHIC CHRONIC PANCREATITIS: ICD-10-CM

## 2019-12-09 PROCEDURE — 3008F PR BODY MASS INDEX (BMI) DOCUMENTED: ICD-10-PCS | Mod: CPTII,S$GLB,, | Performed by: INTERNAL MEDICINE

## 2019-12-09 PROCEDURE — 82787 IGG 1 2 3 OR 4 EACH: CPT

## 2019-12-09 PROCEDURE — 99204 OFFICE O/P NEW MOD 45 MIN: CPT | Mod: S$GLB,,, | Performed by: INTERNAL MEDICINE

## 2019-12-09 PROCEDURE — 36415 COLL VENOUS BLD VENIPUNCTURE: CPT

## 2019-12-09 PROCEDURE — 86038 ANTINUCLEAR ANTIBODIES: CPT

## 2019-12-09 PROCEDURE — 3008F BODY MASS INDEX DOCD: CPT | Mod: CPTII,S$GLB,, | Performed by: INTERNAL MEDICINE

## 2019-12-09 PROCEDURE — 99204 PR OFFICE/OUTPT VISIT, NEW, LEVL IV, 45-59 MIN: ICD-10-PCS | Mod: S$GLB,,, | Performed by: INTERNAL MEDICINE

## 2019-12-09 NOTE — PROGRESS NOTES
Subjective:       Patient ID: Bob Jean Baptiste is a 29 y.o. female.    Chief Complaint: Other (Chronic Pancreatitis); Abdominal Pain; Nausea; and Emesis    HPI this is a 29-year-old female with a history of chronic idiopathic pancreatitis diagnosed in 2015.  And also diagnosed with primary ovarian failure in 2015 at the age of 25.  She presents here to the wanting to establish contact wall ongoing care for her chronic pancreatitis.  She reports having about 3-4 episodes of epigastric pain per year.  Over the years of having chronic prancreatitis she knows how to manage the symptoms.  She places herself on nothing per oral and takes pain medication.  Has symptoms resolves after a day bowel rest.  Over the years part of the workup that she has had include a HIDA scan, CT scan, abdominal ultrasound, endoscopic ultrasound, endoscopic retrograde cholangiopancreatogram with stent placement and sphincterotomy.  She also had a cholecystectomy and was diagnosed with microlithiasis.  Today she denies abdominal pain.  There is no history of weight loss.  She has been diagnosed with early onset diabetes which is diet controlled.  Her hemoglobin A1c had reduced from 6.2 to normal.  She denies any diarrhea.  Patient has had a rough time with this diagnosis of chronic pancreatitis and her very early onset of menopause.  She desires to have a child.  He is undergoing consultation  with a fertility specialist.    Review of Systems   Constitutional: Negative for appetite change and fever.   HENT: Negative for sore throat and trouble swallowing.    Eyes: Negative for photophobia and visual disturbance.   Respiratory: Negative for wheezing.    Cardiovascular: Negative for chest pain and palpitations.   Gastrointestinal:        See HPI for details   Musculoskeletal: Negative for arthralgias, joint swelling and neck pain.   Skin: Negative for rash and wound.   Neurological: Negative for dizziness, tremors and weakness.    Psychiatric/Behavioral: Negative for behavioral problems and suicidal ideas.       Objective:       Vitals:    12/09/19 1337   BP: 111/80       Physical Exam   Constitutional: She is oriented to person, place, and time. She appears well-nourished.   HENT:   Mouth/Throat: Oropharynx is clear and moist.   Eyes: Pupils are equal, round, and reactive to light.   Neck: Neck supple.   Cardiovascular: Normal heart sounds.   Pulmonary/Chest: Effort normal and breath sounds normal.   Abdominal: Soft. She exhibits no mass. There is no tenderness. There is no guarding.   Musculoskeletal: Normal range of motion.   Lymphadenopathy:     She has no cervical adenopathy.   Neurological: She is alert and oriented to person, place, and time.   Skin: Skin is warm. No rash noted.   Psychiatric: She has a normal mood and affect.       Assessment:       1. Idiopathic chronic pancreatitis        Plan:       Bob was seen today for other, abdominal pain, nausea and emesis.    Diagnoses and all orders for this visit:    Idiopathic chronic pancreatitis  -     IgG 4  -     ALEXANDER Screen w/Reflex; Future    Other orders  -    Lipase-protease-amylase 12,000-38,000-60,000 units (CREON) CpDR; Take 2 capsules by mouth 3 (three) times daily with meals.  -   Pepcid 20 mg po bid  -   Discontinue Protonix    RTC in 6 months

## 2019-12-09 NOTE — LETTER
December 10, 2019      Poncho Atkinson MD  [address] [facility] [place] [address]           [facility]  [address] [place] [address]  Phone: [phone]  Fax: [phone]          Patient: Bob Jean Baptiste   MR Number: [id number]   YOB: 1990   Date of Visit: 12/9/2019       Dear Dr. Poncho Atkinson:    Thank you for referring Bob Jean Baptiste to me for evaluation. Attached you will find relevant portions of my assessment and plan of care.    If you have questions, please do not hesitate to call me. I look forward to following Bob Jean Baptiste along with you.    Sincerely,    Angela Mathias MD    Enclosure  CC:  No Recipients    If you would like to receive this communication electronically, please contact [email or web address] or [phone] to request more information on [employer] Link access.    For providers and/or their staff who would like to refer a patient to [facility], please contact us through our one-stop-shop provider referral line, [facility] at [phone].    If you feel you have received this communication in error or would no longer like to receive these types of communications, please e-mail [email or web address]

## 2019-12-10 ENCOUNTER — PATIENT MESSAGE (OUTPATIENT)
Dept: SURGERY | Facility: CLINIC | Age: 29
End: 2019-12-10

## 2019-12-10 LAB — ANA SER QL IF: NORMAL

## 2019-12-11 ENCOUNTER — OFFICE VISIT (OUTPATIENT)
Dept: FAMILY MEDICINE | Facility: CLINIC | Age: 29
End: 2019-12-11
Payer: COMMERCIAL

## 2019-12-11 VITALS
DIASTOLIC BLOOD PRESSURE: 81 MMHG | SYSTOLIC BLOOD PRESSURE: 124 MMHG | BODY MASS INDEX: 30.18 KG/M2 | HEART RATE: 94 BPM | WEIGHT: 181.13 LBS | RESPIRATION RATE: 17 BRPM | OXYGEN SATURATION: 98 % | HEIGHT: 65 IN

## 2019-12-11 DIAGNOSIS — E66.09 CLASS 1 OBESITY DUE TO EXCESS CALORIES WITH SERIOUS COMORBIDITY AND BODY MASS INDEX (BMI) OF 30.0 TO 30.9 IN ADULT: Primary | ICD-10-CM

## 2019-12-11 DIAGNOSIS — M67.431 GANGLION CYST OF WRIST, RIGHT: ICD-10-CM

## 2019-12-11 LAB — IGG4 SER-MCNC: 45 MG/DL (ref 4–86)

## 2019-12-11 PROCEDURE — 3008F PR BODY MASS INDEX (BMI) DOCUMENTED: ICD-10-PCS | Mod: S$GLB,,, | Performed by: FAMILY MEDICINE

## 2019-12-11 PROCEDURE — 99214 OFFICE O/P EST MOD 30 MIN: CPT | Mod: S$GLB,,, | Performed by: FAMILY MEDICINE

## 2019-12-11 PROCEDURE — 3008F BODY MASS INDEX DOCD: CPT | Mod: S$GLB,,, | Performed by: FAMILY MEDICINE

## 2019-12-11 PROCEDURE — 99214 PR OFFICE/OUTPT VISIT, EST, LEVL IV, 30-39 MIN: ICD-10-PCS | Mod: S$GLB,,, | Performed by: FAMILY MEDICINE

## 2019-12-11 RX ORDER — PHENTERMINE HYDROCHLORIDE 37.5 MG/1
37.5 TABLET ORAL
Qty: 30 TABLET | Refills: 0 | Status: SHIPPED | OUTPATIENT
Start: 2019-12-11 | End: 2020-01-10

## 2019-12-12 PROBLEM — M67.431 GANGLION CYST OF WRIST, RIGHT: Status: ACTIVE | Noted: 2019-12-12

## 2019-12-12 PROBLEM — E66.811 CLASS 1 OBESITY DUE TO EXCESS CALORIES WITH SERIOUS COMORBIDITY AND BODY MASS INDEX (BMI) OF 30.0 TO 30.9 IN ADULT: Status: ACTIVE | Noted: 2019-12-12

## 2019-12-12 PROBLEM — E66.09 CLASS 1 OBESITY DUE TO EXCESS CALORIES WITH SERIOUS COMORBIDITY AND BODY MASS INDEX (BMI) OF 30.0 TO 30.9 IN ADULT: Status: ACTIVE | Noted: 2019-12-12

## 2019-12-12 NOTE — PROGRESS NOTES
[facility] - Clinic Note    Subjective      Ms. Jean Baptiste is a 29 y.o. female who presents to clinic with concerns about weight gain and mood.     She reports that she was seen by her OB/GYN in 10/2019.   States at that visit her medications were adjusted. Prometrium was changed from daily for 3 weeks to everday with increase in dose from 100mg to 200mg. She is currently on estrace 2mg daily as well.   States that since the increase in her medications she has noticed an increase in her weight. Since then she has gained about 8-9 pounds. She states that she has noticed mood instability as well. Reports that she is more irritable and bother by small things.  Denies any stressors in life or anxiety that possible be contributing her mood other than medication.     C/o of a lesion on her right wrist.   Has been there for about a year. Not grown in size.   No change in color.   No significant pain.   Only painful when pressure is applied to the area.       [facility] Bob has a past medical history of Abdominal pain, epigastric, GERD (gastroesophageal reflux disease), Nausea alone, Pancreatitis, Pancreatitis, acute, and Premature ovarian insufficiency (08/2018).   PSXH Bob has a past surgical history that includes Esophagogastroduodenoscopy; Cholecystectomy; and Eye surgery.    Bob's family history includes Diabetes in her maternal grandmother.    Bob reports that she has never smoked. She has never used smokeless tobacco. She reports that she does not drink alcohol or use drugs.   ALG Bob is allergic to ceftin [cefuroxime axetil] and pcn [penicillins].   MED Bob has a current medication list which includes the following prescription(s): azelastine, estradiol, fluticasone propionate, ipratropium, lipase-protease-amylase 12,000-38,000-60,000 units, progesterone, tramadol, diazepam, and phentermine, and the following Facility-Administered Medications: influenza.     Review of Systems   Constitutional: Negative  "for chills and fever.   Eyes: Negative for visual disturbance.   Respiratory: Negative for cough and shortness of breath.    Cardiovascular: Negative for chest pain and leg swelling.   Gastrointestinal: Negative for abdominal pain.   Neurological: Negative for dizziness, syncope and headaches.   Psychiatric/Behavioral: Positive for agitation. Negative for confusion, decreased concentration and suicidal ideas. The patient is not nervous/anxious and is not hyperactive.      Objective     /81 (BP Location: Right arm, Patient Position: Sitting, BP Method: Medium (Automatic))   Pulse 94   Resp 17   Ht 5' 5" (1.651 m)   Wt 82.2 kg (181 lb 2 oz)   SpO2 98%   BMI 30.14 kg/m²     Physical Exam   Constitutional: She is well-developed, well-nourished, and in no distress. No distress.   HENT:   Head: Normocephalic and atraumatic.   Eyes: Right eye exhibits no discharge. Left eye exhibits no discharge.   Neck: No thyromegaly present.   Cardiovascular: Normal rate, regular rhythm, normal heart sounds and intact distal pulses. Exam reveals no gallop and no friction rub.   No murmur heard.  Pulmonary/Chest: Effort normal and breath sounds normal. No respiratory distress. She has no wheezes. She has no rales.   Musculoskeletal: She exhibits no edema.   +lesion about 1.5cm in diameter noted ib the tony aspect of the right wrist. Lesion is tender with deep palpation. Lesion is small, soft, and slightly mobile.    Lymphadenopathy:     She has no cervical adenopathy.   Neurological: She is alert.   Skin: Skin is warm and dry. She is not diaphoretic.   Psychiatric: Mood and affect normal.   Vitals reviewed.    Assessment/Plan     Bob was seen today for follow-up.    Diagnoses and all orders for this visit:    Class 1 obesity due to excess calories with serious comorbidity and body mass index (BMI) of 30.0 to 30.9 in adult  -     phentermine (ADIPEX-P) 37.5 mg tablet; Take 1 tablet (37.5 mg total) by mouth before " breakfast.    Ganglion cyst of wrist, right         - New to me         - Continue to watch. No invasive treatment at this time.     -Will start adipex to kick start her weight loss. Counseled patient on exercise and dietary changes such and caloria counting and portion control.   -will message her OB/GYN and discuss medication adjustments due to mood changes as there seems to be no other contributing factors.   -Patient verbalized understanding and agreed to the plan above.      Follow up in about 1 month (around 1/11/2020).    Future Appointments   Date Time Provider Department Center   1/8/2020  2:20 PM Poncho Atkinson MD [facility]       Poncho Atkinson MD  Family Medicine  [facility]

## 2019-12-13 ENCOUNTER — PATIENT MESSAGE (OUTPATIENT)
Dept: FAMILY MEDICINE | Facility: CLINIC | Age: 29
End: 2019-12-13

## 2019-12-19 ENCOUNTER — PATIENT MESSAGE (OUTPATIENT)
Dept: GASTROENTEROLOGY | Facility: CLINIC | Age: 29
End: 2019-12-19

## 2019-12-26 ENCOUNTER — PATIENT OUTREACH (OUTPATIENT)
Dept: ADMINISTRATIVE | Facility: HOSPITAL | Age: 29
End: 2019-12-26

## 2019-12-28 ENCOUNTER — PATIENT MESSAGE (OUTPATIENT)
Dept: GASTROENTEROLOGY | Facility: CLINIC | Age: 29
End: 2019-12-28

## 2020-01-02 ENCOUNTER — TELEPHONE (OUTPATIENT)
Dept: SURGERY | Facility: CLINIC | Age: 30
End: 2020-01-02

## 2020-01-02 NOTE — TELEPHONE ENCOUNTER
Pt notified I spoke with Dr. Mathias re:meds for nausea.  Zofran ODT 4mg one po q 6 hrs prn #30 called to [facility] [phone]

## 2020-01-05 DIAGNOSIS — R09.82 POST-NASAL DRIP: ICD-10-CM

## 2020-01-05 RX ORDER — IPRATROPIUM BROMIDE 21 UG/1
SPRAY, METERED NASAL
Qty: 30 ML | Refills: 0 | Status: SHIPPED | OUTPATIENT
Start: 2020-01-05 | End: 2020-06-02

## 2020-01-15 ENCOUNTER — PATIENT MESSAGE (OUTPATIENT)
Dept: FAMILY MEDICINE | Facility: CLINIC | Age: 30
End: 2020-01-15

## 2020-01-15 ENCOUNTER — TELEPHONE (OUTPATIENT)
Dept: FAMILY MEDICINE | Facility: CLINIC | Age: 30
End: 2020-01-15

## 2020-01-15 ENCOUNTER — PATIENT MESSAGE (OUTPATIENT)
Dept: OBSTETRICS AND GYNECOLOGY | Facility: CLINIC | Age: 30
End: 2020-01-15

## 2020-01-15 DIAGNOSIS — F32.A DEPRESSION, UNSPECIFIED DEPRESSION TYPE: Primary | ICD-10-CM

## 2020-01-16 ENCOUNTER — TELEPHONE (OUTPATIENT)
Dept: FAMILY MEDICINE | Facility: CLINIC | Age: 30
End: 2020-01-16

## 2020-01-16 DIAGNOSIS — K21.9 GASTROESOPHAGEAL REFLUX DISEASE WITHOUT ESOPHAGITIS: Primary | ICD-10-CM

## 2020-01-20 RX ORDER — ACYCLOVIR 400 MG/1
400 TABLET ORAL 3 TIMES DAILY
Qty: 15 TABLET | Refills: 2 | Status: SHIPPED | OUTPATIENT
Start: 2020-01-20 | End: 2021-10-27 | Stop reason: SDUPTHER

## 2020-01-21 ENCOUNTER — TELEPHONE (OUTPATIENT)
Dept: FAMILY MEDICINE | Facility: CLINIC | Age: 30
End: 2020-01-21

## 2020-01-21 ENCOUNTER — OFFICE VISIT (OUTPATIENT)
Dept: FAMILY MEDICINE | Facility: CLINIC | Age: 30
End: 2020-01-21
Payer: COMMERCIAL

## 2020-01-21 VITALS
DIASTOLIC BLOOD PRESSURE: 85 MMHG | BODY MASS INDEX: 29.2 KG/M2 | RESPIRATION RATE: 16 BRPM | SYSTOLIC BLOOD PRESSURE: 121 MMHG | HEART RATE: 106 BPM | HEIGHT: 65 IN | WEIGHT: 175.25 LBS | OXYGEN SATURATION: 98 %

## 2020-01-21 DIAGNOSIS — Z76.0 MEDICATION REFILL: ICD-10-CM

## 2020-01-21 DIAGNOSIS — E66.09 CLASS 1 OBESITY DUE TO EXCESS CALORIES WITH SERIOUS COMORBIDITY AND BODY MASS INDEX (BMI) OF 30.0 TO 30.9 IN ADULT: Primary | ICD-10-CM

## 2020-01-21 PROCEDURE — 99214 OFFICE O/P EST MOD 30 MIN: CPT | Mod: S$GLB,,, | Performed by: FAMILY MEDICINE

## 2020-01-21 PROCEDURE — 3008F PR BODY MASS INDEX (BMI) DOCUMENTED: ICD-10-PCS | Mod: S$GLB,,, | Performed by: FAMILY MEDICINE

## 2020-01-21 PROCEDURE — 3008F BODY MASS INDEX DOCD: CPT | Mod: S$GLB,,, | Performed by: FAMILY MEDICINE

## 2020-01-21 PROCEDURE — 99214 PR OFFICE/OUTPT VISIT, EST, LEVL IV, 30-39 MIN: ICD-10-PCS | Mod: S$GLB,,, | Performed by: FAMILY MEDICINE

## 2020-01-21 RX ORDER — ONDANSETRON 4 MG/1
TABLET, ORALLY DISINTEGRATING ORAL
COMMUNITY
Start: 2020-01-02 | End: 2021-12-22 | Stop reason: SDUPTHER

## 2020-01-21 RX ORDER — PHENTERMINE HYDROCHLORIDE 37.5 MG/1
37.5 TABLET ORAL
Qty: 30 TABLET | Refills: 1 | Status: SHIPPED | OUTPATIENT
Start: 2020-01-21 | End: 2020-02-20

## 2020-01-21 NOTE — TELEPHONE ENCOUNTER
Inform pt medication sent to the pharmacy.        ----- Message from Poncho Atkinson MD sent at 1/20/2020  7:17 PM CST -----  Contact: Patient  Please let patient know I have sent in an anti-viral medication for cold sore.    ----- Message -----  From: Ada Centeno LPN  Sent: 1/20/2020   2:49 PM CST  To: Poncho Atkinson MD    Pt requesting something for a cold sore.  Please advise  ----- Message -----  From: Francheska Adams  Sent: 1/20/2020  11:22 AM CST  To: Demetrio GARCÍA Staff    Type: Needs Medical Advice    Who Called:  Patient  Pharmacy name and phone #:    [facility] #[id number] [address] & [address] [address]  Phone: [phone] Fax: [phone]    Best Call Back Number: [phone]  Additional Information: Patient is calling to speak with a nurse in regards to a new script.Please call back and advise.

## 2020-01-22 NOTE — PROGRESS NOTES
"[facility] - Clinic Note    Subjective      Ms. Jean Baptiste is a 29 y.o. female who presents to clinic for a refill of weight loss medication.     Patient is currently on Adipex 37.5mg daily for obesity.   She has been on the medication for about 3 weeks now.   Doing well.   Reports her weight this morning was 172lbs.   Weight in clinic 175lb. Starting weight was around 181-182lbs.  BP well controlled. .   Denies chest pain or palpitations.     PMH Bob has a past medical history of Abdominal pain, epigastric, GERD (gastroesophageal reflux disease), Nausea alone, Pancreatitis, Pancreatitis, acute, and Premature ovarian insufficiency (08/2018).   PSXH Bob has a past surgical history that includes Esophagogastroduodenoscopy; Cholecystectomy; and Eye surgery.    Bob's family history includes Diabetes in her maternal grandmother.    Bob reports that she has never smoked. She has never used smokeless tobacco. She reports that she does not drink alcohol or use drugs.   ALG Bob is allergic to ceftin [cefuroxime axetil] and pcn [penicillins].   MED Bob has a current medication list which includes the following prescription(s): acyclovir, azelastine, estradiol, fluticasone propionate, ipratropium, lipase-protease-amylase 12,000-38,000-60,000 units, ondansetron, progesterone, tramadol, diazepam, and phentermine, and the following Facility-Administered Medications: influenza.     Review of Systems   Constitutional: Negative for chills, fatigue and fever.   Eyes: Negative for visual disturbance.   Respiratory: Negative for cough and shortness of breath.    Cardiovascular: Negative for chest pain and leg swelling.   Neurological: Negative for dizziness, syncope and headaches.   Psychiatric/Behavioral: Negative for confusion.     Objective     /85 (BP Location: Right arm, Patient Position: Sitting, BP Method: Medium (Automatic))   Pulse 106   Resp 16   Ht 5' 5" (1.651 m)   Wt 79.5 kg (175 lb 4 oz)  "  SpO2 98%   BMI 29.16 kg/m²     Physical Exam   Constitutional: She appears well-developed and well-nourished. No distress.   HENT:   Head: Normocephalic and atraumatic.   Eyes: Right eye exhibits no discharge. Left eye exhibits no discharge.   Cardiovascular: Normal rate, regular rhythm, normal heart sounds and intact distal pulses. Exam reveals no gallop and no friction rub.   No murmur heard.  Pulmonary/Chest: Effort normal and breath sounds normal. No respiratory distress. She has no wheezes. She has no rales.   Neurological: She is alert.   Skin: Skin is warm and dry. Capillary refill takes less than 2 seconds. She is not diaphoretic.   Psychiatric: She has a normal mood and affect. Her behavior is normal.   Vitals reviewed.     Assessment/Plan     Bob was seen today for follow-up of obesity.     Diagnoses and all orders for this visit:    Class 1 obesity due to excess calories with serious comorbidity and body mass index (BMI) of 30.0 to 30.9 in adult    Medication refill  -     phentermine (ADIPEX-P) 37.5 mg tablet; Take 1 tablet (37.5 mg total) by mouth before breakfast.    -refill adipex for 2 more months.     Follow up in about 2 months (around 3/21/2020).    Future Appointments   Date Time Provider Department Center   1/29/2020  9:30 AM Marilee Nelson MD [facility] [place]   3/23/2020  5:20 PM Poncho Atkinson MD [facility]       Poncho Atkinson MD  Family Medicine  [facility]

## 2020-01-29 ENCOUNTER — TELEPHONE (OUTPATIENT)
Dept: ALLERGY | Facility: CLINIC | Age: 30
End: 2020-01-29

## 2020-01-29 ENCOUNTER — OFFICE VISIT (OUTPATIENT)
Dept: ALLERGY | Facility: CLINIC | Age: 30
End: 2020-01-29
Payer: COMMERCIAL

## 2020-01-29 VITALS
OXYGEN SATURATION: 99 % | SYSTOLIC BLOOD PRESSURE: 126 MMHG | HEIGHT: 64 IN | HEART RATE: 84 BPM | BODY MASS INDEX: 28.96 KG/M2 | WEIGHT: 169.63 LBS | DIASTOLIC BLOOD PRESSURE: 80 MMHG

## 2020-01-29 DIAGNOSIS — J32.9 RECURRENT SINUSITIS: ICD-10-CM

## 2020-01-29 DIAGNOSIS — J32.9 RHINOSINUSITIS: Primary | ICD-10-CM

## 2020-01-29 DIAGNOSIS — E28.39 PREMATURE OVARIAN FAILURE: ICD-10-CM

## 2020-01-29 DIAGNOSIS — K86.1 IDIOPATHIC CHRONIC PANCREATITIS: ICD-10-CM

## 2020-01-29 PROCEDURE — 99205 PR OFFICE/OUTPT VISIT, NEW, LEVL V, 60-74 MIN: ICD-10-PCS | Mod: S$GLB,,, | Performed by: ALLERGY & IMMUNOLOGY

## 2020-01-29 PROCEDURE — 99205 OFFICE O/P NEW HI 60 MIN: CPT | Mod: S$GLB,,, | Performed by: ALLERGY & IMMUNOLOGY

## 2020-01-29 PROCEDURE — 3008F BODY MASS INDEX DOCD: CPT | Mod: S$GLB,,, | Performed by: ALLERGY & IMMUNOLOGY

## 2020-01-29 PROCEDURE — 3008F PR BODY MASS INDEX (BMI) DOCUMENTED: ICD-10-PCS | Mod: S$GLB,,, | Performed by: ALLERGY & IMMUNOLOGY

## 2020-01-29 RX ORDER — BUDESONIDE 0.5 MG/2ML
0.5 INHALANT ORAL DAILY
Qty: 120 ML | Refills: 3 | Status: SHIPPED | OUTPATIENT
Start: 2020-01-29 | End: 2021-09-01

## 2020-01-29 NOTE — PROGRESS NOTES
Subjective:       Patient ID: Bob Jean Baptiste is a 29 y.o. female.    Chief Complaint: Allergic Rhinitis  (nasal congestion and sneezing is constant. Been to allergist before-had scratch testing and labs. Gave some nasal sprays to no avail. So would like second opinion/clearer treatment plan)    HPI     Pt presents today for an allergy evaluation.     Rhinitis: Non- Allergic Rhinitis   2 years onset  Sx: congestion is worst, RN, PND, and sneezing.   Season: worse in fall and spring.   Frequ: > 4 days per week   Tx: fluticasone, ipratropium, azelastine - comes out of the nose. claritin D.   Allergy testing: skin prick testing 7/2019 negative.   Ct scan sinus: not done.     Has seen ENT Supa Payne for an ENT eval.     Immune Eval:  History of recurrent sinusitis   Requiring abx x 1 that was a zpack.   She does not have a chronic infection history.   Typically has issue for abx 1-2 times per year.   Denies any otitis or pna.     Component      Latest Ref Rng & Units 8/27/2019 7/10/2019   H influenza B Ab      >=1.00 mcg/mL 2.58 2.17   Diphtheria Toxoid Ab      >=0.10 IU/mL 0.050 (A) 0.090 (A)   Tetanus Ab      >0.10 IU/mL 0.330 0.190   S.pneumoniae Type 1      mcg/mL 56.3 1.0   S.pneumoniae Type 3      mcg/mL 3.2 <0.3   Strep pneumo Type 4      mcg/mL 2.6 <0.3   S.pneumoniae Type 5      mcg/mL 2.7 0.5   S.pneumoniae Type 8      mcg/mL 5.0 <0.3   S.pneumoniae Type 9N      mcg/mL 0.6 <0.3   S.pneumoniae Type 12F      mcg/mL >30.3 0.3   Strep pneumo Type 14      mcg/mL 32.4 0.3   S.pneumoniae Type 19F      mcg/mL >139.0 1.8   S.pneumoniae Type 23F      mcg/mL 3.5 0.6   S.pneumoniae Type 6B      mcg/mL 2.7 <0.3   S.pneumoniae Type 7F      mcg/mL 1.0 <0.3   S.pneumoniae Type 18C      mcg/mL 2.1 <0.3   S.pneumoniae Type 9V Abs      mcg/mL 2.0 <0.3     13/14 response  Needs 6 month eval for memory defect     Review of Systems        General: neg unexpected weight changes, fevers, chills, night sweats, malaise  HEENT: see  "hpi, Neg eye pain, vision changes, ear drainage, nose bleeds, throat tightness, sores in the mouth  CV: Neg chest pain, palpitations, swelling  Resp: see hpi, neg shortness of breath, hemoptysis, cough  GI: see hpi, neg dysphagia, night abdominal pain, reflux, chronic diarrhea, chronic constipation  Derm: See Hpi, neg new rash, neg flushing  Mu/sk: Neg joint pain, joint swelling   Psych: Neg anxiety  neuro: neg chronic headaches, muscle weakness  Endo: neg heat/cold intolerance, chronic fatigue    Objective:     Vitals:    01/29/20 0909   BP: 126/80   Pulse: 84   SpO2: 99%   Weight: 76.9 kg (169 lb 9.6 oz)   Height: 5' 4" (1.626 m)        Physical Exam      General: no acute distress, well developed well nourished   HEENT:   Head:normocephalic atraumatic  Eyes: SAADIA, EOMI, Neg injection, scleral icterus, or conjunctival papillary hypertrophy.  Ears: tm clear bilaterally, normal canal  Nose:3-4+ inferior turbinates pink, neg nasal polyps            Mucosa: moist            Septal irritation: none   OP: mucus membranes moist, - cobblestoning, - PND, neg erythema or lesions  Neck: supple, Full range of motion, neg lymphadenopathy  Chest: full respiratory excursion no abnormal chest abnormality  Resp: clear to ascultation bilaterally  CV: RRR, neg MRG, brisk capillary refill  Abdomen: BS+, non tender, non distended  Ext:  Neg clubbing, cyanosis, pitting edema  Skin: Neg rashes or lesions  Lymph: neg supraclavicular, axillary         Assessment:       1. Rhinosinusitis    2. Idiopathic chronic pancreatitis    3. Premature ovarian failure    4. Recurrent sinusitis        Plan:       Rhinosinusitis  -     CT Sinuses without Contrast; Future  -     budesonide (PULMICORT) 0.5 mg/2 mL nebulizer solution; Take 2 mLs (0.5 mg total) by nebulization once daily. Controller  Dispense: 120 mL; Refill: 3  -     Lymphocyte Profile II; Future; Expected date: 01/29/2020  -     S.pneumoniae IgG Serotypes; Future; Expected date: " 01/29/2020    Idiopathic chronic pancreatitis    Premature ovarian failure    Recurrent sinusitis  -     Lymphocyte Profile II; Future; Expected date: 01/29/2020  -     S.pneumoniae IgG Serotypes; Future; Expected date: 01/29/2020      Rhinitis  - start saline and budesonide respules  Start azelastine prn   Consider montelukast   Inhalant testing done     IMmune:  Repeat labs in feb 2020     Consider genetics for idiopathic and recurrent failures.       F/u in 6 weeks    Discussed in detail about management.         Trisha Shook M.D.  Allergy/Immunology  [facility]   [phone] phone  [phone] fax

## 2020-01-29 NOTE — PATIENT INSTRUCTIONS
For 3 days only use afrin 2 sprays twice per day first. Then wait 10-15 mins and use saline.     Johnie med sinus rinse with xlear and budesonide  Twice per day   Then use azelastine 1 spray per nare twice per day as needed for congestion and post nasal drip  Stop ipratropium     Ct sinus at [facility] imaging      Nasal spray technique:  Head down  Aim up and out   Spray don't sniff  Lift head and pat dry     Labs:  [facility] - non fasting anytime you like in Feb. - anytime.     Consider genetics     Stop using D, it is increasing your Blood pressure.     Follow up in 6 weeks .

## 2020-01-30 ENCOUNTER — HOSPITAL ENCOUNTER (OUTPATIENT)
Dept: RADIOLOGY | Facility: HOSPITAL | Age: 30
Discharge: HOME | End: 2020-01-30
Attending: ALLERGY & IMMUNOLOGY
Payer: COMMERCIAL

## 2020-01-30 DIAGNOSIS — J32.9 RHINOSINUSITIS: ICD-10-CM

## 2020-01-30 PROCEDURE — 70486 CT MAXILLOFACIAL W/O DYE: CPT | Mod: TC,PO

## 2020-01-31 ENCOUNTER — PATIENT MESSAGE (OUTPATIENT)
Dept: FAMILY MEDICINE | Facility: CLINIC | Age: 30
End: 2020-01-31

## 2020-01-31 DIAGNOSIS — J32.0 CHRONIC MAXILLARY SINUSITIS: Primary | ICD-10-CM

## 2020-01-31 RX ORDER — MOXIFLOXACIN HYDROCHLORIDE 400 MG/1
400 TABLET ORAL DAILY
Qty: 21 TABLET | Refills: 0 | Status: SHIPPED | OUTPATIENT
Start: 2020-01-31 | End: 2020-06-02

## 2020-01-31 RX ORDER — PREDNISONE 20 MG/1
TABLET ORAL
Qty: 15 TABLET | Refills: 0 | Status: SHIPPED | OUTPATIENT
Start: 2020-01-31 | End: 2020-06-02

## 2020-02-01 ENCOUNTER — PATIENT MESSAGE (OUTPATIENT)
Dept: ALLERGY | Facility: CLINIC | Age: 30
End: 2020-02-01

## 2020-02-03 ENCOUNTER — TELEPHONE (OUTPATIENT)
Dept: ALLERGY | Facility: CLINIC | Age: 30
End: 2020-02-03

## 2020-02-03 DIAGNOSIS — J32.9 RHINOSINUSITIS: Primary | ICD-10-CM

## 2020-02-18 ENCOUNTER — PATIENT MESSAGE (OUTPATIENT)
Dept: FAMILY MEDICINE | Facility: CLINIC | Age: 30
End: 2020-02-18

## 2020-02-18 ENCOUNTER — TELEPHONE (OUTPATIENT)
Dept: FAMILY MEDICINE | Facility: CLINIC | Age: 30
End: 2020-02-18

## 2020-02-18 NOTE — TELEPHONE ENCOUNTER
Spoke to patient about her message of needing IV fluids. Concerned about a pancreatitis flare. Informed that our outpatient infusion services is currently closed at this time. Will call first this in the morning. Denies nausea or vomiting at this time.

## 2020-02-19 RX ORDER — SODIUM CHLORIDE 0.9 % (FLUSH) 0.9 %
10 SYRINGE (ML) INJECTION
Status: CANCELLED | OUTPATIENT
Start: 2020-02-19

## 2020-02-19 RX ORDER — HEPARIN 100 UNIT/ML
500 SYRINGE INTRAVENOUS
Status: CANCELLED | OUTPATIENT
Start: 2020-02-19

## 2020-02-21 ENCOUNTER — INFUSION (OUTPATIENT)
Dept: INFUSION THERAPY | Facility: HOSPITAL | Age: 30
End: 2020-02-21
Payer: COMMERCIAL

## 2020-02-21 VITALS
OXYGEN SATURATION: 100 % | DIASTOLIC BLOOD PRESSURE: 57 MMHG | TEMPERATURE: 98 F | HEART RATE: 91 BPM | SYSTOLIC BLOOD PRESSURE: 111 MMHG | RESPIRATION RATE: 16 BRPM

## 2020-02-21 DIAGNOSIS — K86.1 IDIOPATHIC CHRONIC PANCREATITIS: Primary | ICD-10-CM

## 2020-02-21 DIAGNOSIS — K86.1 CHRONIC BILIARY PANCREATITIS: ICD-10-CM

## 2020-02-21 PROCEDURE — 96360 HYDRATION IV INFUSION INIT: CPT

## 2020-02-21 PROCEDURE — 63600175 PHARM REV CODE 636 W HCPCS: Performed by: FAMILY MEDICINE

## 2020-02-21 RX ORDER — SODIUM CHLORIDE 0.9 % (FLUSH) 0.9 %
10 SYRINGE (ML) INJECTION
Status: CANCELLED | OUTPATIENT
Start: 2020-02-21

## 2020-02-21 RX ORDER — HEPARIN 100 UNIT/ML
500 SYRINGE INTRAVENOUS
Status: CANCELLED | OUTPATIENT
Start: 2020-02-21

## 2020-02-21 RX ADMIN — SODIUM CHLORIDE 1000 ML: 0.9 INJECTION, SOLUTION INTRAVENOUS at 01:02

## 2020-02-21 NOTE — PLAN OF CARE
Problem: Adult Inpatient Plan of Care  Goal: Plan of Care Review  Outcome: Ongoing, Progressing  Flowsheets (Taken 2/21/2020 1330)  Plan of Care Reviewed With: patient    BP (!) 111/57   Pulse 91   Temp 97.8 °F (36.6 °C)   Resp 16   SpO2 100%   Patient here today for fluids. VSS. PIV placed to left forearm. Refreshments and blanket offered.  Patient tolerated treatment well. VSS. PIV flushed and removed. Will f/u with Dr Anthony. Ambulates per self.

## 2020-03-10 ENCOUNTER — OFFICE VISIT (OUTPATIENT)
Dept: ALLERGY | Facility: CLINIC | Age: 30
End: 2020-03-10
Payer: COMMERCIAL

## 2020-03-10 VITALS
OXYGEN SATURATION: 99 % | HEIGHT: 64 IN | DIASTOLIC BLOOD PRESSURE: 82 MMHG | WEIGHT: 172 LBS | SYSTOLIC BLOOD PRESSURE: 123 MMHG | HEART RATE: 106 BPM | BODY MASS INDEX: 29.37 KG/M2

## 2020-03-10 DIAGNOSIS — B37.31 YEAST VAGINITIS: ICD-10-CM

## 2020-03-10 DIAGNOSIS — J32.9 RHINOSINUSITIS: Primary | ICD-10-CM

## 2020-03-10 DIAGNOSIS — J32.9 RECURRENT SINUSITIS: ICD-10-CM

## 2020-03-10 PROCEDURE — 3008F PR BODY MASS INDEX (BMI) DOCUMENTED: ICD-10-PCS | Mod: S$GLB,,, | Performed by: ALLERGY & IMMUNOLOGY

## 2020-03-10 PROCEDURE — 99214 OFFICE O/P EST MOD 30 MIN: CPT | Mod: S$GLB,,, | Performed by: ALLERGY & IMMUNOLOGY

## 2020-03-10 PROCEDURE — 99214 PR OFFICE/OUTPT VISIT, EST, LEVL IV, 30-39 MIN: ICD-10-PCS | Mod: S$GLB,,, | Performed by: ALLERGY & IMMUNOLOGY

## 2020-03-10 PROCEDURE — 3008F BODY MASS INDEX DOCD: CPT | Mod: S$GLB,,, | Performed by: ALLERGY & IMMUNOLOGY

## 2020-03-10 RX ORDER — FLUCONAZOLE 150 MG/1
150 TABLET ORAL DAILY
Qty: 1 TABLET | Refills: 0 | Status: SHIPPED | OUTPATIENT
Start: 2020-03-10 | End: 2020-03-11

## 2020-03-10 NOTE — PROGRESS NOTES
Subjective:       Patient ID: Bob Jean Baptiste is a 29 y.o. female.    Chief Complaint: Recurrent Sinusitis (follow up 1/29/2020-had CT sinus. Was feeling fine after all abx and steroids until Friday felt relapse. Also candida from abx needs meds. )    HPI     Pt presents today for sinusitis.      Rhinitis: Non- Allergic Rhinitis   Was better until 5 days ago.   2 years onset  Sx: congestion is worst, RN, PND, and sneezing.   Season: worse in fall and spring.   Frequ: > 4 days per week   Tx: budesonide into saline rinse- not started yet, ipratropium, azelastine - helps- daily, claritin D- able to stop.    nasoneb bought. Not used.   Allergy testing: skin prick testing 7/2019 negative.   Ct scan sinus: showed significant sinusitis.   Tolerated 21 day of steroids and abx.      Has seen ENT Supa Payne for an ENT eval.     Immune Eval:  History of recurrent sinusitis   Requiring abx x 1 that was a zpack.   She does not have a chronic infection history.   Typically has issue for abx 1-2 times per year.   Denies any otitis or pna.     Component      Latest Ref Rng & Units 8/27/2019 7/10/2019   H influenza B Ab      >=1.00 mcg/mL 2.58 2.17   Diphtheria Toxoid Ab      >=0.10 IU/mL 0.050 (A) 0.090 (A)   Tetanus Ab      >0.10 IU/mL 0.330 0.190   S.pneumoniae Type 1      mcg/mL 56.3 1.0   S.pneumoniae Type 3      mcg/mL 3.2 <0.3   Strep pneumo Type 4      mcg/mL 2.6 <0.3   S.pneumoniae Type 5      mcg/mL 2.7 0.5   S.pneumoniae Type 8      mcg/mL 5.0 <0.3   S.pneumoniae Type 9N      mcg/mL 0.6 <0.3   S.pneumoniae Type 12F      mcg/mL >30.3 0.3   Strep pneumo Type 14      mcg/mL 32.4 0.3   S.pneumoniae Type 19F      mcg/mL >139.0 1.8   S.pneumoniae Type 23F      mcg/mL 3.5 0.6   S.pneumoniae Type 6B      mcg/mL 2.7 <0.3   S.pneumoniae Type 7F      mcg/mL 1.0 <0.3   S.pneumoniae Type 18C      mcg/mL 2.1 <0.3   S.pneumoniae Type 9V Abs      mcg/mL 2.0 <0.3     13/14 response  Needs 6 month eval for memory defect     Review of  "Systems        General: neg unexpected weight changes, fevers, chills, night sweats, malaise  HEENT: see hpi, Neg eye pain, vision changes, ear drainage, nose bleeds, throat tightness, sores in the mouth  CV: Neg chest pain, palpitations, swelling  Resp: see hpi, neg shortness of breath, hemoptysis, cough  GI: see hpi, neg dysphagia, night abdominal pain, reflux, chronic diarrhea, chronic constipation  Derm: See Hpi, neg new rash, neg flushing  Mu/sk: Neg joint pain, joint swelling   Psych: Neg anxiety  neuro: neg chronic headaches, muscle weakness  Endo: neg heat/cold intolerance, chronic fatigue    Objective:     Vitals:    03/10/20 1133   BP: 123/82   Pulse: 106   SpO2: 99%   Weight: 78 kg (172 lb)   Height: 5' 4" (1.626 m)        Physical Exam      General: no acute distress, well developed well nourished         Assessment:       1. Rhinosinusitis    2. Recurrent sinusitis    3. Yeast vaginitis        Plan:       Rhinosinusitis    Recurrent sinusitis    Yeast vaginitis  -     fluconazole (DIFLUCAN) 150 MG Tab; Take 1 tablet (150 mg total) by mouth once daily. for 1 day  Dispense: 1 tablet; Refill: 0      Rhinitis  - start saline and budesonide respules  azelastine prn   Inhalant testing done - non allergic     IMmune:  Repeat labs in feb 2020 - pt forgot, will do.     Consider genetics.      > 50% > 35 mins discussing management.     F/u in 3 months.           Trisha Shook M.D.  Allergy/Immunology  [facility]   [phone] phone  [phone] fax            "

## 2020-03-10 NOTE — PATIENT INSTRUCTIONS
Saline with distilled water and xlear.   Use 2 budesonide respules for the next week.   Then see how you do with with once per day.       May try this first then saline.     Azelastine can be used as needed up to 4 times per day.     nasonebulizer can be helpful in a pinch but not necessary.     Labs at any [facility] is fine.

## 2020-04-06 ENCOUNTER — PATIENT OUTREACH (OUTPATIENT)
Dept: ADMINISTRATIVE | Facility: HOSPITAL | Age: 30
End: 2020-04-06

## 2020-05-08 ENCOUNTER — PATIENT MESSAGE (OUTPATIENT)
Dept: FAMILY MEDICINE | Facility: CLINIC | Age: 30
End: 2020-05-08

## 2020-05-08 DIAGNOSIS — K86.1 CHRONIC PANCREATITIS, UNSPECIFIED PANCREATITIS TYPE: Primary | ICD-10-CM

## 2020-05-20 ENCOUNTER — PATIENT MESSAGE (OUTPATIENT)
Dept: ADMINISTRATIVE | Facility: HOSPITAL | Age: 30
End: 2020-05-20

## 2020-05-26 RX ORDER — AZELASTINE 1 MG/ML
SPRAY, METERED NASAL
Qty: 30 ML | Refills: 6 | Status: SHIPPED | OUTPATIENT
Start: 2020-05-26 | End: 2021-05-06

## 2020-06-02 ENCOUNTER — OFFICE VISIT (OUTPATIENT)
Dept: FAMILY MEDICINE | Facility: CLINIC | Age: 30
End: 2020-06-02
Payer: COMMERCIAL

## 2020-06-02 VITALS
RESPIRATION RATE: 16 BRPM | HEIGHT: 64 IN | OXYGEN SATURATION: 97 % | BODY MASS INDEX: 32.84 KG/M2 | TEMPERATURE: 98 F | HEART RATE: 88 BPM | DIASTOLIC BLOOD PRESSURE: 71 MMHG | WEIGHT: 192.38 LBS | SYSTOLIC BLOOD PRESSURE: 110 MMHG

## 2020-06-02 DIAGNOSIS — R25.2 MUSCLE CRAMPS: Primary | ICD-10-CM

## 2020-06-02 DIAGNOSIS — K86.1 IDIOPATHIC CHRONIC PANCREATITIS: ICD-10-CM

## 2020-06-02 PROCEDURE — 3008F BODY MASS INDEX DOCD: CPT | Mod: S$GLB,,, | Performed by: FAMILY MEDICINE

## 2020-06-02 PROCEDURE — 3008F PR BODY MASS INDEX (BMI) DOCUMENTED: ICD-10-PCS | Mod: S$GLB,,, | Performed by: FAMILY MEDICINE

## 2020-06-02 PROCEDURE — 99214 PR OFFICE/OUTPT VISIT, EST, LEVL IV, 30-39 MIN: ICD-10-PCS | Mod: S$GLB,,, | Performed by: FAMILY MEDICINE

## 2020-06-02 PROCEDURE — 99214 OFFICE O/P EST MOD 30 MIN: CPT | Mod: S$GLB,,, | Performed by: FAMILY MEDICINE

## 2020-06-03 ENCOUNTER — LAB VISIT (OUTPATIENT)
Dept: LAB | Facility: HOSPITAL | Age: 30
End: 2020-06-03
Attending: FAMILY MEDICINE
Payer: COMMERCIAL

## 2020-06-03 DIAGNOSIS — R25.2 MUSCLE CRAMPS: ICD-10-CM

## 2020-06-03 LAB
ALBUMIN SERPL BCP-MCNC: 4 G/DL (ref 3.5–5.2)
ALP SERPL-CCNC: 68 U/L (ref 55–135)
ALT SERPL W/O P-5'-P-CCNC: 23 U/L (ref 10–44)
ANION GAP SERPL CALC-SCNC: 12 MMOL/L (ref 8–16)
AST SERPL-CCNC: 28 U/L (ref 10–40)
BASOPHILS # BLD AUTO: 0.03 K/UL (ref 0–0.2)
BASOPHILS NFR BLD: 0.4 % (ref 0–1.9)
BILIRUB SERPL-MCNC: 0.7 MG/DL (ref 0.1–1)
BUN SERPL-MCNC: 9 MG/DL (ref 6–20)
CALCIUM SERPL-MCNC: 9 MG/DL (ref 8.7–10.5)
CHLORIDE SERPL-SCNC: 100 MMOL/L (ref 95–110)
CO2 SERPL-SCNC: 26 MMOL/L (ref 23–29)
CREAT SERPL-MCNC: 0.6 MG/DL (ref 0.5–1.4)
DIFFERENTIAL METHOD: ABNORMAL
EOSINOPHIL # BLD AUTO: 0.4 K/UL (ref 0–0.5)
EOSINOPHIL NFR BLD: 4.8 % (ref 0–8)
ERYTHROCYTE [DISTWIDTH] IN BLOOD BY AUTOMATED COUNT: 12.9 % (ref 11.5–14.5)
EST. GFR  (AFRICAN AMERICAN): >60 ML/MIN/1.73 M^2
EST. GFR  (NON AFRICAN AMERICAN): >60 ML/MIN/1.73 M^2
GLUCOSE SERPL-MCNC: 189 MG/DL (ref 70–110)
HCT VFR BLD AUTO: 37.9 % (ref 37–48.5)
HGB BLD-MCNC: 12.1 G/DL (ref 12–16)
IMM GRANULOCYTES # BLD AUTO: 0.02 K/UL (ref 0–0.04)
IMM GRANULOCYTES NFR BLD AUTO: 0.3 % (ref 0–0.5)
LYMPHOCYTES # BLD AUTO: 2.3 K/UL (ref 1–4.8)
LYMPHOCYTES NFR BLD: 29.7 % (ref 18–48)
MCH RBC QN AUTO: 27.5 PG (ref 27–31)
MCHC RBC AUTO-ENTMCNC: 31.9 G/DL (ref 32–36)
MCV RBC AUTO: 86 FL (ref 82–98)
MONOCYTES # BLD AUTO: 0.4 K/UL (ref 0.3–1)
MONOCYTES NFR BLD: 5.2 % (ref 4–15)
NEUTROPHILS # BLD AUTO: 4.6 K/UL (ref 1.8–7.7)
NEUTROPHILS NFR BLD: 59.6 % (ref 38–73)
NRBC BLD-RTO: 0 /100 WBC
PLATELET # BLD AUTO: 348 K/UL (ref 150–350)
PMV BLD AUTO: 9.8 FL (ref 9.2–12.9)
POTASSIUM SERPL-SCNC: 3.8 MMOL/L (ref 3.5–5.1)
PROT SERPL-MCNC: 7.6 G/DL (ref 6–8.4)
RBC # BLD AUTO: 4.4 M/UL (ref 4–5.4)
SODIUM SERPL-SCNC: 138 MMOL/L (ref 136–145)
WBC # BLD AUTO: 7.68 K/UL (ref 3.9–12.7)

## 2020-06-03 PROCEDURE — 80053 COMPREHEN METABOLIC PANEL: CPT

## 2020-06-03 PROCEDURE — 85025 COMPLETE CBC W/AUTO DIFF WBC: CPT

## 2020-06-03 PROCEDURE — 36415 COLL VENOUS BLD VENIPUNCTURE: CPT

## 2020-06-04 ENCOUNTER — INFUSION (OUTPATIENT)
Dept: INFUSION THERAPY | Facility: HOSPITAL | Age: 30
End: 2020-06-04
Payer: COMMERCIAL

## 2020-06-04 VITALS
HEART RATE: 81 BPM | TEMPERATURE: 98 F | DIASTOLIC BLOOD PRESSURE: 72 MMHG | RESPIRATION RATE: 18 BRPM | OXYGEN SATURATION: 97 % | SYSTOLIC BLOOD PRESSURE: 126 MMHG

## 2020-06-04 DIAGNOSIS — K86.1 CHRONIC BILIARY PANCREATITIS: ICD-10-CM

## 2020-06-04 DIAGNOSIS — K86.1 IDIOPATHIC CHRONIC PANCREATITIS: Primary | ICD-10-CM

## 2020-06-04 PROCEDURE — 96360 HYDRATION IV INFUSION INIT: CPT

## 2020-06-04 PROCEDURE — 25000003 PHARM REV CODE 250: Performed by: FAMILY MEDICINE

## 2020-06-04 RX ORDER — SODIUM CHLORIDE 0.9 % (FLUSH) 0.9 %
10 SYRINGE (ML) INJECTION
Status: CANCELLED | OUTPATIENT
Start: 2020-06-04

## 2020-06-04 RX ORDER — HEPARIN 100 UNIT/ML
500 SYRINGE INTRAVENOUS
Status: CANCELLED | OUTPATIENT
Start: 2020-06-04

## 2020-06-04 RX ADMIN — SODIUM CHLORIDE 1000 ML: 0.9 INJECTION, SOLUTION INTRAVENOUS at 02:06

## 2020-06-04 NOTE — PLAN OF CARE
Problem: Adult Inpatient Plan of Care  Goal: Plan of Care Review  Outcome: Ongoing, Progressing  Flowsheets (Taken 6/4/2020 1451)  Plan of Care Reviewed With: patient    /70 (BP Location: Left arm, Patient Position: Sitting)   Pulse 79   Temp 98 °F (36.7 °C) (Oral)   Resp 18   SpO2 97%   Patient here today for 1L NS bolus infusion. VSS. PIV placed to right forearm on second try. Orders reviewed and released. Refreshments and blanket offered. AVS printed for review.    /72   Pulse 81   Temp 98 °F (36.7 °C) (Oral)   Resp 18   SpO2 97%   Patient tolerated treatment well. VSS. PIV flushed and removed. Next appt scheduled when MD orders such. Ambulates per self.

## 2020-06-05 DIAGNOSIS — R73.9 HYPERGLYCEMIA: Primary | ICD-10-CM

## 2020-06-30 ENCOUNTER — LAB VISIT (OUTPATIENT)
Dept: LAB | Facility: HOSPITAL | Age: 30
End: 2020-06-30
Attending: FAMILY MEDICINE
Payer: COMMERCIAL

## 2020-06-30 DIAGNOSIS — R73.9 HYPERGLYCEMIA: ICD-10-CM

## 2020-06-30 LAB
ESTIMATED AVG GLUCOSE: 123 MG/DL (ref 68–131)
HBA1C MFR BLD HPLC: 5.9 % (ref 4.5–6.2)

## 2020-06-30 PROCEDURE — 36415 COLL VENOUS BLD VENIPUNCTURE: CPT

## 2020-06-30 PROCEDURE — 83036 HEMOGLOBIN GLYCOSYLATED A1C: CPT

## 2020-07-14 ENCOUNTER — LAB VISIT (OUTPATIENT)
Dept: LAB | Facility: HOSPITAL | Age: 30
End: 2020-07-14
Attending: FAMILY MEDICINE
Payer: COMMERCIAL

## 2020-07-14 ENCOUNTER — PATIENT MESSAGE (OUTPATIENT)
Dept: FAMILY MEDICINE | Facility: CLINIC | Age: 30
End: 2020-07-14

## 2020-07-14 DIAGNOSIS — K86.1 CHRONIC PANCREATITIS, UNSPECIFIED PANCREATITIS TYPE: ICD-10-CM

## 2020-07-14 DIAGNOSIS — K86.1 CHRONIC PANCREATITIS, UNSPECIFIED PANCREATITIS TYPE: Primary | ICD-10-CM

## 2020-07-14 LAB
AMYLASE SERPL-CCNC: 41 U/L (ref 20–110)
LIPASE SERPL-CCNC: 25 U/L (ref 4–60)

## 2020-07-14 PROCEDURE — 36415 COLL VENOUS BLD VENIPUNCTURE: CPT

## 2020-07-14 PROCEDURE — 82150 ASSAY OF AMYLASE: CPT

## 2020-07-14 PROCEDURE — 83690 ASSAY OF LIPASE: CPT

## 2020-07-15 ENCOUNTER — PATIENT MESSAGE (OUTPATIENT)
Dept: FAMILY MEDICINE | Facility: CLINIC | Age: 30
End: 2020-07-15

## 2020-07-15 ENCOUNTER — OFFICE VISIT (OUTPATIENT)
Dept: FAMILY MEDICINE | Facility: CLINIC | Age: 30
End: 2020-07-15
Payer: COMMERCIAL

## 2020-07-15 DIAGNOSIS — R09.1 PLEURITIS: Primary | ICD-10-CM

## 2020-07-15 PROCEDURE — 99214 OFFICE O/P EST MOD 30 MIN: CPT | Mod: 95,,, | Performed by: FAMILY MEDICINE

## 2020-07-15 PROCEDURE — 99214 PR OFFICE/OUTPT VISIT, EST, LEVL IV, 30-39 MIN: ICD-10-PCS | Mod: 95,,, | Performed by: FAMILY MEDICINE

## 2020-07-15 RX ORDER — METHYLPREDNISOLONE 4 MG/1
TABLET ORAL
Qty: 1 PACKAGE | Refills: 0 | Status: SHIPPED | OUTPATIENT
Start: 2020-07-15 | End: 2020-08-05

## 2020-07-15 NOTE — PROGRESS NOTES
[facility] - Clinic Note    Subjective    The patient location is: home  The chief complaint leading to consultation is: chest pain    Visit type: audiovisual    Face to Face time with patient: 10 minutes of total time spent on the encounter, which includes face to face time and non-face to face time preparing to see the patient (eg, review of tests), Obtaining and/or reviewing separately obtained history, Documenting clinical information in the electronic or other health record, Independently interpreting results (not separately reported) and communicating results to the patient/family/caregiver, or Care coordination (not separately reported).     Each patient to whom he or she provides medical services by telemedicine is:  (1) informed of the relationship between the physician and patient and the respective role of any other health care provider with respect to management of the patient; and (2) notified that he or she may decline to receive medical services by telemedicine and may withdraw from such care at any time.    Notes:     Ms. Jean Baptiste is a 30 y.o. female who presents for a VV for chest pain.     Patient reports that she thought she was having a pancreatic attack over the weekend. She went to the  and reports that she was told it was her pancreas.   She has a h/o of chronic pancreatitis.   Labs were obtain which were normal.   She complains of chest pain in the left lower chest wall.  Present over the past week.   Reports that it start all of a sudden and has not gotten worse.   Sharp aching pain  Worse with coughing and taking a deep breath.   Denies fevers, shortness of breath, or wheezing.  Had tried tylenol with no relief.   Laying down can also worsen the pain.        Answers for HPI/ROS submitted by the patient on 7/15/2020   Abdominal pain  Chronicity: new  Onset: in the past 7 days  Onset quality: sudden  Frequency: constantly  Episode duration: 4 days  Progression since onset: gradually  worsening  Pain location: LUQ  Pain - numeric: 6/10  Pain quality: aching, cramping  anorexia: No  arthralgias: No  belching: Yes  constipation: No  diarrhea: No  dysuria: No  fever: No  flatus: Yes  frequency: No  headaches: No  hematochezia: No  hematuria: No  melena: No  myalgias: Yes  nausea: No  weight loss: No  vomiting: No  Aggravated by: certain positions, coughing, deep breathing, movement  Relieved by: being still  Pain severity: moderate  Treatments tried: acetaminophen, antacids  Improvement on treatment: mild  abdominal surgery: No  colon cancer: No  Crohn's disease: No  gallstones: Yes  GERD: Yes  irritable bowel syndrome: No  kidney stones: No  pancreatitis: Yes  PUD: No  ulcerative colitis: No  UTI: No    PMH Bob has a past medical history of Abdominal pain, epigastric, GERD (gastroesophageal reflux disease), Nausea alone, Pancreatitis, Pancreatitis, acute, and Premature ovarian insufficiency (08/2018).   PSXH Bob has a past surgical history that includes Esophagogastroduodenoscopy; Cholecystectomy; and Eye surgery.    Bob's family history includes Diabetes in her maternal grandmother.   LESLEY Rojas reports that she has never smoked. She has never used smokeless tobacco. She reports that she does not drink alcohol or use drugs.   ALG Bob is allergic to ceftin [cefuroxime axetil] and pcn [penicillins].   MED Bob has a current medication list which includes the following prescription(s): acyclovir, azelastine, budesonide, diazepam, estradiol, lipase-protease-amylase 12,000-38,000-60,000 units, ondansetron, phentermine, progesterone, and tramadol, and the following Facility-Administered Medications: influenza.     Review of Systems   Constitutional: Negative for activity change, appetite change, chills, fatigue and fever.   Eyes: Negative for visual disturbance.   Respiratory: Positive for chest tightness. Negative for cough, shortness of breath and wheezing.    Cardiovascular: Positive for  chest pain. Negative for palpitations and leg swelling.   Gastrointestinal: Negative for constipation, diarrhea, nausea and vomiting.   Genitourinary: Negative for dysuria, frequency and hematuria.   Musculoskeletal: Positive for myalgias. Negative for arthralgias.   Neurological: Negative for headaches.   Psychiatric/Behavioral: Negative for confusion.     Objective     There were no vitals taken for this visit.    Physical Exam   Constitutional:  Non-toxic appearance. She does not appear ill. No distress.   HENT:   Head: Normocephalic and atraumatic.   Right Ear: External ear normal.   Left Ear: External ear normal.   Eyes: Right eye exhibits no discharge. Left eye exhibits no discharge.   Pulmonary/Chest: Effort normal. No respiratory distress.   Speaks in complete sentences without notable SOB. No tachypnea.    Abdominal: Normal appearance.   Neurological: She is alert.   Skin: She is not diaphoretic.   Psychiatric: Her behavior is normal. Mood, judgment and thought content normal.      Assessment/Plan     Diagnoses and all orders for this visit:    Pleuritis  -     methylPREDNISolone (MEDROL DOSEPACK) 4 mg tablet; use as directed      Follow up in about 1 week (around 7/22/2020).    Future Appointments   Date Time Provider Department Center   9/30/2020 12:00 PM [facility], LABORATORY [facility]   10/7/2020  9:20 AM Poncho Atkinson MD [facility]       Poncho Atkinson MD  Family Medicine  [facility]

## 2020-07-23 ENCOUNTER — OFFICE VISIT (OUTPATIENT)
Dept: FAMILY MEDICINE | Facility: CLINIC | Age: 30
End: 2020-07-23
Payer: COMMERCIAL

## 2020-07-23 DIAGNOSIS — R09.1 PLEURITIS: Primary | ICD-10-CM

## 2020-07-23 PROCEDURE — 99212 OFFICE O/P EST SF 10 MIN: CPT | Mod: 95,,, | Performed by: FAMILY MEDICINE

## 2020-07-23 PROCEDURE — 99212 PR OFFICE/OUTPT VISIT, EST, LEVL II, 10-19 MIN: ICD-10-PCS | Mod: 95,,, | Performed by: FAMILY MEDICINE

## 2020-07-23 NOTE — PROGRESS NOTES
[facility] - Clinic Note    Subjective    The patient location is: home  The chief complaint leading to consultation is: follow up    Visit type: audiovisual    Face to Face time with patient: 4 minutes of total time spent on the encounter, which includes face to face time and non-face to face time preparing to see the patient (eg, review of tests), Obtaining and/or reviewing separately obtained history, Documenting clinical information in the electronic or other health record, Independently interpreting results (not separately reported) and communicating results to the patient/family/caregiver, or Care coordination (not separately reported).     Each patient to whom he or she provides medical services by telemedicine is:  (1) informed of the relationship between the physician and patient and the respective role of any other health care provider with respect to management of the patient; and (2) notified that he or she may decline to receive medical services by telemedicine and may withdraw from such care at any time.    Notes:     Ms. Jean Baptiste is a 30 y.o. female who presents for a VV for a follow up.     Patient was seen about 1 week ago for symptoms consistent with pleuritis.   She was treated with a medrol dose pack.   Today, she reports that her symptoms have resolved about a couple of days after taking the medication.   Doing much better.    PMH Bob has a past medical history of Abdominal pain, epigastric, GERD (gastroesophageal reflux disease), Nausea alone, Pancreatitis, Pancreatitis, acute, and Premature ovarian insufficiency (08/2018).   PSXH Bob has a past surgical history that includes Esophagogastroduodenoscopy; Cholecystectomy; and Eye surgery.    Bob's family history includes Diabetes in her maternal grandmother.   SH Bob reports that she has never smoked. She has never used smokeless tobacco. She reports that she does not drink alcohol or use drugs.   ALG Bob is allergic to ceftin  [cefuroxime axetil] and pcn [penicillins].   MARÍA Rojas has a current medication list which includes the following prescription(s): acyclovir, azelastine, budesonide, diazepam, estradiol, lipase-protease-amylase 12,000-38,000-60,000 units, ondansetron, phentermine, progesterone, and tramadol, and the following Facility-Administered Medications: influenza.     Review of Systems   Constitutional: Negative for activity change and unexpected weight change.   HENT: Negative for hearing loss, rhinorrhea and trouble swallowing.    Eyes: Negative for discharge and visual disturbance.   Respiratory: Positive for chest tightness. Negative for wheezing.    Cardiovascular: Positive for chest pain. Negative for palpitations.   Gastrointestinal: Negative for blood in stool, constipation, diarrhea and vomiting.   Endocrine: Negative for polydipsia and polyuria.   Genitourinary: Negative for difficulty urinating, dysuria, hematuria and menstrual problem.   Musculoskeletal: Negative for arthralgias, joint swelling and neck pain.   Neurological: Negative for weakness and headaches.   Psychiatric/Behavioral: Negative for confusion and dysphoric mood.     Objective     There were no vitals taken for this visit.    Physical Exam   Constitutional: She appears well-developed and well-nourished.  Non-toxic appearance. She does not appear ill. No distress.   HENT:   Head: Normocephalic and atraumatic.   Right Ear: External ear normal.   Left Ear: External ear normal.   Eyes: Right eye exhibits no discharge. Left eye exhibits no discharge.   Cardiovascular: Exam reveals no friction rub.   Pulmonary/Chest: Effort normal. No respiratory distress.   Speaks in complete sentences without notable SOB. No tachypnea.    Abdominal: Normal appearance.   Lymphadenopathy:     She has no cervical adenopathy.   Neurological: She is alert.   Skin: She is not diaphoretic.   Psychiatric: Her behavior is normal. Mood, judgment and thought content normal.       Assessment/Plan     Bob was seen today for follow-up.    Diagnoses and all orders for this visit:    Pleuritis  -resolved      Poncho Atkinson MD  Family Medicine  [facility]

## 2020-07-25 NOTE — PROGRESS NOTES
[facility] - Clinic Note    Subjective      Ms. Jean Baptiste is a 30 y.o. female who presents to clinic for a follow up.    Patient has a history of idiopathic chronic pancreatitis.  She had a flare a couple weeks ago and was ordered outpatient IV fluids.   She states that the fluids helped. She is doing much better.   She does complain of muscle cramps that occurs in her lower legs.   Common at night.  Not restlessness. Denies trauma or color change.     PMH Bob has a past medical history of Abdominal pain, epigastric, GERD (gastroesophageal reflux disease), Nausea alone, Pancreatitis, Pancreatitis, acute, and Premature ovarian insufficiency (08/2018).   PSXH Bob has a past surgical history that includes Esophagogastroduodenoscopy; Cholecystectomy; and Eye surgery.    Bob's family history includes Diabetes in her maternal grandmother.    Bob reports that she has never smoked. She has never used smokeless tobacco. She reports that she does not drink alcohol or use drugs.   ALG Bob is allergic to ceftin [cefuroxime axetil] and pcn [penicillins].   MED Bob has a current medication list which includes the following prescription(s): azelastine, budesonide, lipase-protease-amylase 12,000-38,000-60,000 units, ondansetron, progesterone, tramadol, acyclovir, diazepam, estradiol, and methylprednisolone, and the following Facility-Administered Medications: influenza.     Review of Systems   Constitutional: Negative for chills, fatigue and fever.   Eyes: Negative for visual disturbance.   Respiratory: Negative for cough and shortness of breath.    Cardiovascular: Negative for chest pain, palpitations and leg swelling.   Gastrointestinal: Negative for abdominal pain, nausea and vomiting.   Skin: Negative for color change.   Neurological: Negative for dizziness and headaches.   Psychiatric/Behavioral: Negative for confusion.     Objective     /71 (BP Location: Right arm, Patient Position: Sitting, BP  "Method: X-Large (Automatic))   Pulse 88   Temp 98.4 °F (36.9 °C) (Oral)   Resp 16   Ht 5' 4" (1.626 m)   Wt 87.3 kg (192 lb 6.4 oz)   SpO2 97%   BMI 33.03 kg/m²     Physical Exam   Constitutional: She appears well-developed and well-nourished.  Non-toxic appearance. She does not appear ill. No distress.   HENT:   Head: Normocephalic and atraumatic.   Eyes: Right eye exhibits no discharge. Left eye exhibits no discharge.   Neck: Neck supple.   Cardiovascular: Normal rate, regular rhythm, normal heart sounds and normal pulses. Exam reveals no gallop and no friction rub.   No murmur heard.  Pulmonary/Chest: Effort normal and breath sounds normal. No respiratory distress. She has no wheezes. She has no rhonchi. She has no rales.   Abdominal: Normal appearance.   Musculoskeletal:      Right lower leg: No edema.      Left lower leg: No edema.   Lymphadenopathy:     She has no cervical adenopathy.   Neurological: She is alert.   Skin: Skin is warm and dry. Capillary refill takes less than 2 seconds. She is not diaphoretic.   Psychiatric: Her behavior is normal. Mood, judgment and thought content normal.   Vitals reviewed.     Assessment/Plan     Bob was seen today for follow-up.    Diagnoses and all orders for this visit:    Muscle cramps  -     Comprehensive metabolic panel; Future  -     CBC auto differential; Future    Idiopathic chronic pancreatitis  -stable    Follow up in about 3 months (around 9/2/2020).    Future Appointments   Date Time Provider Department Center   9/2/2020  9:00 AM Poncoh Atkinson MD [facility]       Poncho Atkinson MD  Family Medicine  [facility]          "

## 2020-08-16 ENCOUNTER — PATIENT MESSAGE (OUTPATIENT)
Dept: FAMILY MEDICINE | Facility: CLINIC | Age: 30
End: 2020-08-16

## 2020-08-16 DIAGNOSIS — R10.13 MIDEPIGASTRIC PAIN: Primary | ICD-10-CM

## 2020-08-16 DIAGNOSIS — K86.1 CHRONIC PANCREATITIS, UNSPECIFIED PANCREATITIS TYPE: ICD-10-CM

## 2020-08-17 ENCOUNTER — LAB VISIT (OUTPATIENT)
Dept: LAB | Facility: HOSPITAL | Age: 30
End: 2020-08-17
Attending: FAMILY MEDICINE
Payer: COMMERCIAL

## 2020-08-17 DIAGNOSIS — K86.1 CHRONIC PANCREATITIS, UNSPECIFIED PANCREATITIS TYPE: ICD-10-CM

## 2020-08-17 DIAGNOSIS — R10.13 MIDEPIGASTRIC PAIN: ICD-10-CM

## 2020-08-17 LAB
AMYLASE SERPL-CCNC: 120 U/L (ref 20–110)
LIPASE SERPL-CCNC: 147 U/L (ref 4–60)

## 2020-08-17 PROCEDURE — 82150 ASSAY OF AMYLASE: CPT

## 2020-08-17 PROCEDURE — 83690 ASSAY OF LIPASE: CPT

## 2020-08-17 PROCEDURE — 36415 COLL VENOUS BLD VENIPUNCTURE: CPT

## 2020-08-17 NOTE — TELEPHONE ENCOUNTER
Patient sent portal message asking for labs. I sent portal message asking if she needs to be seen because you won't be in the office till tomorrow 08/15/2020. Waiting on reply

## 2020-08-17 NOTE — TELEPHONE ENCOUNTER
Patient requesting to have amylase and lipase labs done today due to possible pancreatitis.  Please advise.

## 2020-08-18 ENCOUNTER — TELEPHONE (OUTPATIENT)
Dept: FAMILY MEDICINE | Facility: CLINIC | Age: 30
End: 2020-08-18

## 2020-08-18 RX ORDER — SODIUM CHLORIDE 0.9 % (FLUSH) 0.9 %
10 SYRINGE (ML) INJECTION
Status: CANCELLED | OUTPATIENT
Start: 2020-08-18

## 2020-08-18 RX ORDER — HEPARIN 100 UNIT/ML
500 SYRINGE INTRAVENOUS
Status: CANCELLED | OUTPATIENT
Start: 2020-08-18

## 2020-08-18 NOTE — TELEPHONE ENCOUNTER
Spoke to patient about labs. Feels miserable, having a flare of chronic pancreatitis. Is drinking fluids but does not feel well. Will order IV fluids.

## 2020-08-19 ENCOUNTER — INFUSION (OUTPATIENT)
Dept: INFUSION THERAPY | Facility: HOSPITAL | Age: 30
End: 2020-08-19
Attending: FAMILY MEDICINE
Payer: COMMERCIAL

## 2020-08-19 VITALS
HEART RATE: 70 BPM | OXYGEN SATURATION: 97 % | RESPIRATION RATE: 18 BRPM | SYSTOLIC BLOOD PRESSURE: 106 MMHG | DIASTOLIC BLOOD PRESSURE: 65 MMHG

## 2020-08-19 DIAGNOSIS — K86.1 IDIOPATHIC CHRONIC PANCREATITIS: Primary | ICD-10-CM

## 2020-08-19 DIAGNOSIS — K86.1 CHRONIC BILIARY PANCREATITIS: ICD-10-CM

## 2020-08-19 PROCEDURE — 25000003 PHARM REV CODE 250: Performed by: FAMILY MEDICINE

## 2020-08-19 PROCEDURE — 96360 HYDRATION IV INFUSION INIT: CPT

## 2020-08-19 RX ORDER — HEPARIN 100 UNIT/ML
500 SYRINGE INTRAVENOUS
Status: CANCELLED | OUTPATIENT
Start: 2020-08-19

## 2020-08-19 RX ORDER — SODIUM CHLORIDE 0.9 % (FLUSH) 0.9 %
10 SYRINGE (ML) INJECTION
Status: CANCELLED | OUTPATIENT
Start: 2020-08-19

## 2020-08-19 RX ADMIN — SODIUM CHLORIDE 1000 ML: 0.9 INJECTION, SOLUTION INTRAVENOUS at 01:08

## 2020-08-19 NOTE — PLAN OF CARE
Problem: Adult Inpatient Plan of Care  Goal: Plan of Care Review  Outcome: Ongoing, Progressing  Flowsheets (Taken 8/19/2020 1522)  Plan of Care Reviewed With: patient    /65   Pulse 70   Resp 18   SpO2 97%   Patient here today for normal saline 1L infusion. VSS. PIV placed to right hand. Orders reviewed and released. Refreshments and blanket offered.    Patient tolerated treatment well. VSS. PIV flushed and removed. No future appt scheduled at this time with [facility]. Will f/u with Dr Atkinson. Ambulates per self.

## 2020-09-02 ENCOUNTER — OFFICE VISIT (OUTPATIENT)
Dept: FAMILY MEDICINE | Facility: CLINIC | Age: 30
End: 2020-09-02
Payer: COMMERCIAL

## 2020-09-02 ENCOUNTER — LAB VISIT (OUTPATIENT)
Dept: LAB | Facility: HOSPITAL | Age: 30
End: 2020-09-02
Attending: FAMILY MEDICINE
Payer: COMMERCIAL

## 2020-09-02 VITALS
BODY MASS INDEX: 32.16 KG/M2 | DIASTOLIC BLOOD PRESSURE: 87 MMHG | HEART RATE: 87 BPM | SYSTOLIC BLOOD PRESSURE: 133 MMHG | TEMPERATURE: 97 F | WEIGHT: 188.38 LBS | OXYGEN SATURATION: 98 % | HEIGHT: 64 IN | RESPIRATION RATE: 15 BRPM

## 2020-09-02 DIAGNOSIS — Z23 NEED FOR TETANUS BOOSTER: ICD-10-CM

## 2020-09-02 DIAGNOSIS — G25.81 RESTLESS LEG SYNDROME: Primary | ICD-10-CM

## 2020-09-02 DIAGNOSIS — G25.81 RESTLESS LEG SYNDROME: ICD-10-CM

## 2020-09-02 DIAGNOSIS — E66.09 CLASS 1 OBESITY DUE TO EXCESS CALORIES WITH SERIOUS COMORBIDITY AND BODY MASS INDEX (BMI) OF 32.0 TO 32.9 IN ADULT: ICD-10-CM

## 2020-09-02 DIAGNOSIS — R73.03 PREDIABETES: ICD-10-CM

## 2020-09-02 LAB
FERRITIN SERPL-MCNC: 29 NG/ML (ref 20–300)
IRON SERPL-MCNC: 63 UG/DL (ref 30–160)
SATURATED IRON: 12 % (ref 20–50)
TOTAL IRON BINDING CAPACITY: 518 UG/DL (ref 250–450)
TRANSFERRIN SERPL-MCNC: 350 MG/DL (ref 200–375)

## 2020-09-02 PROCEDURE — 99214 PR OFFICE/OUTPT VISIT, EST, LEVL IV, 30-39 MIN: ICD-10-PCS | Mod: S$GLB,25,, | Performed by: FAMILY MEDICINE

## 2020-09-02 PROCEDURE — 3008F PR BODY MASS INDEX (BMI) DOCUMENTED: ICD-10-PCS | Mod: S$GLB,,, | Performed by: FAMILY MEDICINE

## 2020-09-02 PROCEDURE — 90714 TD VACCINE GREATER THAN OR EQUAL TO 7YO WITH PRESERVATIVE IM: ICD-10-PCS | Mod: S$GLB,,, | Performed by: FAMILY MEDICINE

## 2020-09-02 PROCEDURE — 36415 COLL VENOUS BLD VENIPUNCTURE: CPT

## 2020-09-02 PROCEDURE — 3008F BODY MASS INDEX DOCD: CPT | Mod: S$GLB,,, | Performed by: FAMILY MEDICINE

## 2020-09-02 PROCEDURE — 82728 ASSAY OF FERRITIN: CPT

## 2020-09-02 PROCEDURE — 83540 ASSAY OF IRON: CPT

## 2020-09-02 PROCEDURE — 99214 OFFICE O/P EST MOD 30 MIN: CPT | Mod: S$GLB,25,, | Performed by: FAMILY MEDICINE

## 2020-09-02 PROCEDURE — 90471 IMMUNIZATION ADMIN: CPT | Mod: S$GLB,,, | Performed by: FAMILY MEDICINE

## 2020-09-02 PROCEDURE — 90714 TD VACC NO PRESV 7 YRS+ IM: CPT | Mod: S$GLB,,, | Performed by: FAMILY MEDICINE

## 2020-09-02 PROCEDURE — 90471 TD VACCINE GREATER THAN OR EQUAL TO 7YO WITH PRESERVATIVE IM: ICD-10-PCS | Mod: S$GLB,,, | Performed by: FAMILY MEDICINE

## 2020-09-02 RX ORDER — PHENTERMINE HYDROCHLORIDE 37.5 MG/1
37.5 TABLET ORAL
Qty: 30 TABLET | Refills: 0 | Status: SHIPPED | OUTPATIENT
Start: 2020-09-02 | End: 2020-10-02

## 2020-09-02 NOTE — PATIENT INSTRUCTIONS
Understanding Restless Legs Syndrome    Are you ever annoyed by a creeping or itching feeling in your legs? Do you often feel an urge to move your legs while sitting or lying in bed? This can keep you from falling asleep at night. You may then feel tired during the day. If you have these problems, talk to your health care provider. He or she can suggest a treatment plan and help you find ways to sleep better.  Restless legs syndrome (RLS)  RLS is a creeping, crawly, or jumpy feeling in the legs with an urge to move them. Symptoms of RLS often occur during periods of inactivity, such as when you sit or lie down at night. This discomfort can keep you from falling asleep. RLS is more common in older people and tends to run in families. Overuse of caffeine or alcohol may make symptoms worse. Iron deficiency, diabetes, or kidney problems can contribute to RLS.  Periodic limb movement syndrome (PLMS)  PLMS is sudden, repetitive leg jerking during sleep. The person you sleep with is often the one who notices it. Your legs may jerk many times during the night. You and your partner may both have trouble sleeping and feel tired in the morning. PLMS shouldnt be confused with the normal leg or body twitching many people have when first falling asleep.  Treating these problems  If these problems are causing disrupted sleep and daytime symptoms, treatment may be needed. Possible treatments may include:  · Avoiding medications like antidepressants, antinausea medications, and antipsychotic medications.   · Prescribed medications.  · Lifestyle changes, such as controlling caffeine intake, alcohol, and smoking.  Date Last Reviewed: 7/18/2015  © 0698-3040 [employer]. [address] [place] [address], [place] [address]. All rights reserved. This information is not intended as a substitute for professional medical care. Always follow your healthcare professional's instructions.

## 2020-09-16 ENCOUNTER — PATIENT MESSAGE (OUTPATIENT)
Dept: FAMILY MEDICINE | Facility: CLINIC | Age: 30
End: 2020-09-16

## 2020-09-16 DIAGNOSIS — G25.81 RESTLESS LEG SYNDROME: Primary | ICD-10-CM

## 2020-09-18 ENCOUNTER — PATIENT MESSAGE (OUTPATIENT)
Dept: FAMILY MEDICINE | Facility: CLINIC | Age: 30
End: 2020-09-18

## 2020-09-18 RX ORDER — ROPINIROLE 0.5 MG/1
0.5 TABLET, FILM COATED ORAL NIGHTLY
Qty: 30 TABLET | Refills: 1 | Status: SHIPPED | OUTPATIENT
Start: 2020-09-18 | End: 2020-11-10 | Stop reason: SDUPTHER

## 2020-10-12 NOTE — PROGRESS NOTES
[facility] - Clinic Note    Subjective      Ms. Jean Baptiste is a 30 y.o. female who presents to clinic for a follow up of chronic conditions.     History of prediabetes. Was previously on metformin but taken of last year as her A1C improved.   Last A1C was 2 months ago and was 5.9.  Does admit to increase in weight gain.   States that she is trying to work out and eating well.   Has been eating less or around 1,000 calories a day and low carb.  Admits to feeling like a brain fog and not herself. Unsure if her sugar is low.  Would like to try adipex again. Was on this last year and did well. No adverse effects with medication use.    Complains of leg pain. Bilaterally  Worse at night  Feels restless   Unable to relax   Keeps her up at night  Labs obtained at last visit were normal.    [facility] Bob has a past medical history of Abdominal pain, epigastric, GERD (gastroesophageal reflux disease), Nausea alone, Pancreatitis, Pancreatitis, acute, and Premature ovarian insufficiency (08/2018).   PSX Bob has a past surgical history that includes Esophagogastroduodenoscopy; Cholecystectomy; and Eye surgery.    Bob's family history includes Diabetes in her maternal grandmother.    Bob reports that she has never smoked. She has never used smokeless tobacco. She reports that she does not drink alcohol or use drugs.   ALG Bob is allergic to ceftin [cefuroxime axetil] and pcn [penicillins].   MED Bob has a current medication list which includes the following prescription(s): azelastine, budesonide, estradiol, lipase-protease-amylase 12,000-38,000-60,000 units, ondansetron, progesterone, tramadol, acyclovir, diazepam, and ropinirole, and the following Facility-Administered Medications: influenza.     Review of Systems   Constitutional: Negative for activity change, appetite change, chills, fatigue and fever.   Eyes: Negative for visual disturbance.   Respiratory: Negative for cough and shortness of breath.   "  Cardiovascular: Negative for chest pain, palpitations and leg swelling.   Gastrointestinal: Negative for abdominal pain, nausea and vomiting.   Musculoskeletal:        Leg pain bilaterally   Skin: Negative for wound.   Neurological: Negative for dizziness, weakness, numbness and headaches.   Psychiatric/Behavioral: Negative for confusion.     Objective     /87   Pulse 87   Temp 97.3 °F (36.3 °C) (Temporal)   Resp 15   Ht 5' 4" (1.626 m)   Wt 85.4 kg (188 lb 6 oz)   SpO2 98%   BMI 32.33 kg/m²     Physical Exam   Constitutional: She appears well-developed, well-nourished and obese.  Non-toxic appearance. She does not appear ill. No distress.   HENT:   Head: Normocephalic and atraumatic.   Eyes: Right eye exhibits no discharge. Left eye exhibits no discharge.   Neck: Neck supple.   Cardiovascular: Normal rate, regular rhythm, normal heart sounds and normal pulses. Exam reveals no gallop and no friction rub.   No murmur heard.  Pulmonary/Chest: Effort normal and breath sounds normal. No respiratory distress. She has no wheezes. She has no rhonchi. She has no rales.   Abdominal: Normal appearance.   Musculoskeletal:      Right lower leg: No edema.      Left lower leg: No edema.   Lymphadenopathy:     She has no cervical adenopathy.   Neurological: She is alert.   Skin: Skin is warm and dry. Capillary refill takes less than 2 seconds. She is not diaphoretic.   Psychiatric: Her behavior is normal. Mood and thought content normal.   Vitals reviewed.     Assessment/Plan     Bob was seen today for follow-up of chronic conditions.    Diagnoses and all orders for this visit:    Restless leg syndrome  -     Iron and TIBC; Future  -     Ferritin; Future  - Recommended over the counter magnesium and monitoring for improvement. Discussed increasing hydration. Will obtain iron studies for further eval. Discussed requip if the above does not help.    Prediabetes  -     Hemoglobin A1C; Future  -     POCT Glucose, " Hand-Held Device    Class 1 obesity due to excess calories with serious comorbidity and body mass index (BMI) of 32.0 to 32.9 in adult  -     phentermine (ADIPEX-P) 37.5 mg tablet; Take 1 tablet (37.5 mg total) by mouth before breakfast.    Need for tetanus booster  -     (In Office Administered) Td Vaccine    -discussed in depth about her dietary changes. Increase calorie count daily, food portioning, healthier carbs, and continue to exercise.  -POCT glucose was normal. Her symptoms of brain fog and not herself I feel are due to her dietary changes.       Follow up in about 1 month (around 10/2/2020).    Future Appointments   Date Time Provider Department Center   10/19/2020 11:40 AM Poncho Atkinson MD [facility]       Poncho Atkinson MD  Family Medicine  [facility]

## 2020-10-20 ENCOUNTER — PATIENT MESSAGE (OUTPATIENT)
Dept: FAMILY MEDICINE | Facility: CLINIC | Age: 30
End: 2020-10-20

## 2020-10-20 ENCOUNTER — LAB VISIT (OUTPATIENT)
Dept: LAB | Facility: HOSPITAL | Age: 30
End: 2020-10-20
Attending: FAMILY MEDICINE
Payer: COMMERCIAL

## 2020-10-20 ENCOUNTER — TELEPHONE (OUTPATIENT)
Dept: FAMILY MEDICINE | Facility: CLINIC | Age: 30
End: 2020-10-20

## 2020-10-20 DIAGNOSIS — K86.1 IDIOPATHIC CHRONIC PANCREATITIS: Primary | ICD-10-CM

## 2020-10-20 DIAGNOSIS — K86.1 IDIOPATHIC CHRONIC PANCREATITIS: ICD-10-CM

## 2020-10-20 LAB
AMYLASE SERPL-CCNC: 89 U/L (ref 20–110)
LIPASE SERPL-CCNC: 127 U/L (ref 4–60)

## 2020-10-20 PROCEDURE — 83690 ASSAY OF LIPASE: CPT

## 2020-10-20 PROCEDURE — 82150 ASSAY OF AMYLASE: CPT

## 2020-10-20 PROCEDURE — 36415 COLL VENOUS BLD VENIPUNCTURE: CPT

## 2020-10-20 RX ORDER — TRAMADOL HYDROCHLORIDE 50 MG/1
TABLET ORAL
Qty: 30 TABLET | Refills: 0 | Status: SHIPPED | OUTPATIENT
Start: 2020-10-20 | End: 2020-11-10 | Stop reason: SDUPTHER

## 2020-10-20 NOTE — TELEPHONE ENCOUNTER
Appointment today @ 11:00am with the lab. Pt is aware.    ----- Message from John Bradley sent at 10/20/2020  8:36 AM CDT -----  Contact: pt at [phone]  Type: Needs Medical Advice  Who Called:  pt  Best Call Back Number: [phone]  Additional Information: pt is calling to get labs scheduled. Please call back and advise         Detail Level: Detailed Add 60814 Cpt? (Important Note: In 2017 The Use Of 63904 Is Being Tracked By Cms To Determine Future Global Period Reimbursement For Global Periods): yes Wound Diameter In Cm(Optional): 0 Wound Crusting?: clean Wound Color?: pink Any New Or Residual Neoplasm?: No

## 2020-10-21 ENCOUNTER — TELEPHONE (OUTPATIENT)
Dept: FAMILY MEDICINE | Facility: CLINIC | Age: 30
End: 2020-10-21

## 2020-10-21 NOTE — TELEPHONE ENCOUNTER
Spoke with patient in regards to lab results. Patient will try increasing her hydration at home, will contact the office with any other questions or concerns.

## 2020-11-04 ENCOUNTER — OFFICE VISIT (OUTPATIENT)
Dept: FAMILY MEDICINE | Facility: CLINIC | Age: 30
End: 2020-11-04
Payer: COMMERCIAL

## 2020-11-04 VITALS
OXYGEN SATURATION: 97 % | BODY MASS INDEX: 32.58 KG/M2 | HEART RATE: 88 BPM | TEMPERATURE: 98 F | DIASTOLIC BLOOD PRESSURE: 74 MMHG | HEIGHT: 64 IN | RESPIRATION RATE: 16 BRPM | WEIGHT: 190.81 LBS | SYSTOLIC BLOOD PRESSURE: 106 MMHG

## 2020-11-04 DIAGNOSIS — E66.09 CLASS 1 OBESITY DUE TO EXCESS CALORIES WITH SERIOUS COMORBIDITY AND BODY MASS INDEX (BMI) OF 30.0 TO 30.9 IN ADULT: Primary | ICD-10-CM

## 2020-11-04 DIAGNOSIS — F41.9 ANXIETY: ICD-10-CM

## 2020-11-04 PROCEDURE — 1126F AMNT PAIN NOTED NONE PRSNT: CPT | Mod: S$GLB,,, | Performed by: FAMILY MEDICINE

## 2020-11-04 PROCEDURE — 99214 PR OFFICE/OUTPT VISIT, EST, LEVL IV, 30-39 MIN: ICD-10-PCS | Mod: S$GLB,,, | Performed by: FAMILY MEDICINE

## 2020-11-04 PROCEDURE — 3008F BODY MASS INDEX DOCD: CPT | Mod: S$GLB,,, | Performed by: FAMILY MEDICINE

## 2020-11-04 PROCEDURE — 1126F PR PAIN SEVERITY QUANTIFIED, NO PAIN PRESENT: ICD-10-PCS | Mod: S$GLB,,, | Performed by: FAMILY MEDICINE

## 2020-11-04 PROCEDURE — 3008F PR BODY MASS INDEX (BMI) DOCUMENTED: ICD-10-PCS | Mod: S$GLB,,, | Performed by: FAMILY MEDICINE

## 2020-11-04 PROCEDURE — 99214 OFFICE O/P EST MOD 30 MIN: CPT | Mod: S$GLB,,, | Performed by: FAMILY MEDICINE

## 2020-11-04 RX ORDER — BUSPIRONE HYDROCHLORIDE 10 MG/1
10 TABLET ORAL 3 TIMES DAILY PRN
Qty: 90 TABLET | Refills: 3 | Status: SHIPPED | OUTPATIENT
Start: 2020-11-04 | End: 2020-11-30
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
## 2020-11-06 RX ORDER — HEPARIN 100 UNIT/ML
500 SYRINGE INTRAVENOUS
Status: CANCELLED | OUTPATIENT
Start: 2020-11-06

## 2020-11-06 RX ORDER — SODIUM CHLORIDE 0.9 % (FLUSH) 0.9 %
10 SYRINGE (ML) INJECTION
Status: CANCELLED | OUTPATIENT
Start: 2020-11-06

## 2020-11-10 ENCOUNTER — PATIENT MESSAGE (OUTPATIENT)
Dept: FAMILY MEDICINE | Facility: CLINIC | Age: 30
End: 2020-11-10

## 2020-11-10 ENCOUNTER — INFUSION (OUTPATIENT)
Dept: INFUSION THERAPY | Facility: HOSPITAL | Age: 30
End: 2020-11-10
Attending: FAMILY MEDICINE
Payer: COMMERCIAL

## 2020-11-10 VITALS
RESPIRATION RATE: 18 BRPM | SYSTOLIC BLOOD PRESSURE: 116 MMHG | HEART RATE: 82 BPM | DIASTOLIC BLOOD PRESSURE: 66 MMHG | TEMPERATURE: 98 F

## 2020-11-10 DIAGNOSIS — K86.1 IDIOPATHIC CHRONIC PANCREATITIS: ICD-10-CM

## 2020-11-10 DIAGNOSIS — K86.1 IDIOPATHIC CHRONIC PANCREATITIS: Primary | ICD-10-CM

## 2020-11-10 DIAGNOSIS — K86.1 CHRONIC BILIARY PANCREATITIS: ICD-10-CM

## 2020-11-10 DIAGNOSIS — G25.81 RESTLESS LEG SYNDROME: ICD-10-CM

## 2020-11-10 PROCEDURE — 25000003 PHARM REV CODE 250: Performed by: FAMILY MEDICINE

## 2020-11-10 PROCEDURE — 96360 HYDRATION IV INFUSION INIT: CPT

## 2020-11-10 PROCEDURE — 96361 HYDRATE IV INFUSION ADD-ON: CPT

## 2020-11-10 RX ORDER — TRAMADOL HYDROCHLORIDE 50 MG/1
TABLET ORAL
Qty: 30 TABLET | Refills: 0 | Status: SHIPPED | OUTPATIENT
Start: 2020-11-10 | End: 2021-02-14

## 2020-11-10 RX ORDER — HEPARIN 100 UNIT/ML
500 SYRINGE INTRAVENOUS
Status: CANCELLED | OUTPATIENT
Start: 2020-11-10

## 2020-11-10 RX ORDER — SODIUM CHLORIDE 0.9 % (FLUSH) 0.9 %
10 SYRINGE (ML) INJECTION
Status: CANCELLED | OUTPATIENT
Start: 2020-11-10

## 2020-11-10 RX ORDER — ROPINIROLE 0.5 MG/1
0.5 TABLET, FILM COATED ORAL NIGHTLY
Qty: 30 TABLET | Refills: 1 | Status: SHIPPED | OUTPATIENT
Start: 2020-11-10 | End: 2021-04-07

## 2020-11-10 RX ADMIN — SODIUM CHLORIDE 1000 ML: 0.9 INJECTION, SOLUTION INTRAVENOUS at 10:11

## 2020-11-10 NOTE — PLAN OF CARE
Problem: Adult Inpatient Plan of Care  Goal: Plan of Care Review  Outcome: Ongoing, Progressing  Flowsheets (Taken 11/10/2020 1132)  Plan of Care Reviewed With: patient    /74   Pulse 78   Temp 97.9 °F (36.6 °C)   Resp 18   Patient here today for fluids. VSS. PIV placed to LAC. Orders reviewed and released. Refreshments and blanket offered.    /66   Pulse 82   Temp 97.9 °F (36.6 °C)   Resp 18   Patient tolerated treatment well. VSS. PIV flushed and removed. Next appt will be determined by provider. Ambulates per self.

## 2020-11-10 NOTE — TELEPHONE ENCOUNTER
"11/10/2020-- Patient sent portal message stating   "Please refill tramadol and ropinirole. I didn't pick it up in time and it timed out at [facility]"        Chart Reviewed for Duplicate Request:  Yes    RX Requested: Tramadol 50mg and Ropinirol 0.5 mg    Rx Strength:  See above    Refill or New:  Refil    30 day or 90 day:30    Preferred Pharmacy: [facility]    Last Office Visit / Provider:  11/04/2020 / Dr Goss     Next Office Visit / Provider:  None    Additional Information: Please advise Thanks  "

## 2020-11-11 NOTE — TELEPHONE ENCOUNTER
Mimi Hills S Staff 39 minutes ago (8:34 AM)     Received RX refill request from Friday for progesterone 200mg capsules, quantity of 90.    Routing comment       [id number] - [place], [place] - [address] [facility] [address] [phone]  Mimi Ricardo 40 minutes ago (8:33 AM)     Pt last annual was 10/15/19 with no current appt scheduled. Please review

## 2020-11-12 RX ORDER — PROGESTERONE 200 MG/1
200 CAPSULE ORAL NIGHTLY
Qty: 90 CAPSULE | Refills: 0 | Status: SHIPPED | OUTPATIENT
Start: 2020-11-12 | End: 2020-12-21 | Stop reason: SDUPTHER

## 2020-11-25 ENCOUNTER — PATIENT MESSAGE (OUTPATIENT)
Dept: FAMILY MEDICINE | Facility: CLINIC | Age: 30
End: 2020-11-25

## 2020-11-25 ENCOUNTER — TELEPHONE (OUTPATIENT)
Dept: FAMILY MEDICINE | Facility: CLINIC | Age: 30
End: 2020-11-25

## 2020-11-25 DIAGNOSIS — F41.9 ANXIETY: Primary | ICD-10-CM

## 2020-11-25 NOTE — TELEPHONE ENCOUNTER
Pt. Requesting something else other than Buspar, states it is making her feel worst, head cold nausea. Try valium 5 mg before but would try anything you suggest. Last office visit 11-4-2020

## 2020-11-27 NOTE — TELEPHONE ENCOUNTER
Spoke with patient, she states she was feeling like her head was heavy after taking 1 tablet of 10mg Buspar. She was unsure of if this was normal or not. Patient had a old prescription of Valium from Dr. Prabhakar, and took that. She is willing to try something else, if needed. Advised patient to try 1/2 tablet of Buspar. But in the interim I would send a message to Dr. Atkinson for her recommendation. Patient v/u. Patient inquiring for something as needed, so she can take when she starts to have an attack.

## 2020-11-30 RX ORDER — HYDROXYZINE HYDROCHLORIDE 25 MG/1
TABLET, FILM COATED ORAL
Qty: 90 TABLET | Refills: 1 | Status: SHIPPED | OUTPATIENT
Start: 2020-11-30 | End: 2021-02-04

## 2020-12-18 ENCOUNTER — PATIENT OUTREACH (OUTPATIENT)
Dept: ADMINISTRATIVE | Facility: OTHER | Age: 30
End: 2020-12-18

## 2020-12-21 ENCOUNTER — OFFICE VISIT (OUTPATIENT)
Dept: OBSTETRICS AND GYNECOLOGY | Facility: CLINIC | Age: 30
End: 2020-12-21
Attending: OBSTETRICS & GYNECOLOGY
Payer: COMMERCIAL

## 2020-12-21 VITALS
SYSTOLIC BLOOD PRESSURE: 118 MMHG | HEIGHT: 64 IN | BODY MASS INDEX: 32.74 KG/M2 | WEIGHT: 191.81 LBS | DIASTOLIC BLOOD PRESSURE: 74 MMHG

## 2020-12-21 DIAGNOSIS — E28.39 PREMATURE OVARIAN FAILURE: ICD-10-CM

## 2020-12-21 DIAGNOSIS — Z01.419 ENCOUNTER FOR GYNECOLOGICAL EXAMINATION: Primary | ICD-10-CM

## 2020-12-21 DIAGNOSIS — Z12.4 ENCOUNTER FOR SCREENING FOR CERVICAL CANCER: ICD-10-CM

## 2020-12-21 DIAGNOSIS — Z11.51 ENCOUNTER FOR SCREENING FOR HUMAN PAPILLOMAVIRUS (HPV): ICD-10-CM

## 2020-12-21 PROCEDURE — 3008F PR BODY MASS INDEX (BMI) DOCUMENTED: ICD-10-PCS | Mod: CPTII,S$GLB,, | Performed by: OBSTETRICS & GYNECOLOGY

## 2020-12-21 PROCEDURE — 99395 PREV VISIT EST AGE 18-39: CPT | Mod: S$GLB,,, | Performed by: OBSTETRICS & GYNECOLOGY

## 2020-12-21 PROCEDURE — 99999 PR PBB SHADOW E&M-EST. PATIENT-LVL III: ICD-10-PCS | Mod: PBBFAC,,, | Performed by: OBSTETRICS & GYNECOLOGY

## 2020-12-21 PROCEDURE — 3008F BODY MASS INDEX DOCD: CPT | Mod: CPTII,S$GLB,, | Performed by: OBSTETRICS & GYNECOLOGY

## 2020-12-21 PROCEDURE — 1126F PR PAIN SEVERITY QUANTIFIED, NO PAIN PRESENT: ICD-10-PCS | Mod: S$GLB,,, | Performed by: OBSTETRICS & GYNECOLOGY

## 2020-12-21 PROCEDURE — 87624 HPV HI-RISK TYP POOLED RSLT: CPT

## 2020-12-21 PROCEDURE — 99999 PR PBB SHADOW E&M-EST. PATIENT-LVL III: CPT | Mod: PBBFAC,,, | Performed by: OBSTETRICS & GYNECOLOGY

## 2020-12-21 PROCEDURE — 1126F AMNT PAIN NOTED NONE PRSNT: CPT | Mod: S$GLB,,, | Performed by: OBSTETRICS & GYNECOLOGY

## 2020-12-21 PROCEDURE — 99395 PR PREVENTIVE VISIT,EST,18-39: ICD-10-PCS | Mod: S$GLB,,, | Performed by: OBSTETRICS & GYNECOLOGY

## 2020-12-21 PROCEDURE — 88175 CYTOPATH C/V AUTO FLUID REDO: CPT

## 2020-12-21 RX ORDER — ESTRADIOL 2 MG/1
2 TABLET ORAL DAILY
Qty: 90 TABLET | Refills: 3 | Status: SHIPPED | OUTPATIENT
Start: 2020-12-21 | End: 2021-08-18

## 2020-12-21 RX ORDER — PROGESTERONE 200 MG/1
200 CAPSULE ORAL NIGHTLY
Qty: 90 CAPSULE | Refills: 3 | Status: SHIPPED | OUTPATIENT
Start: 2020-12-21 | End: 2022-01-10

## 2020-12-21 NOTE — PROGRESS NOTES
"Subjective:       Patient ID: Bob Jean Baptiste is a 30 y.o. female.    Chief Complaint:  Well Woman (last pap negative 10/15/19, last dexa wnl 7/10/19)      History of Present Illness  - here for annual. Amenorrheic on current hormones. Mood swings have resolved. Reports that she's "always tired" even though sleeps 12 hours nightly. Requests hormone labs.    Past Medical History:   Diagnosis Date    Abdominal pain, epigastric     GERD (gastroesophageal reflux disease)     Nausea alone     Pancreatitis     Pancreatitis, acute     Premature ovarian insufficiency 08/2018       Past Surgical History:   Procedure Laterality Date    CHOLECYSTECTOMY      ESOPHAGOGASTRODUODENOSCOPY      EYE SURGERY           Current Outpatient Medications:     azelastine (ASTELIN) 137 mcg (0.1 %) nasal spray, USE 2 SPRAYS NASALLY TWICE DAILY, Disp: 30 mL, Rfl: 6    budesonide (PULMICORT) 0.5 mg/2 mL nebulizer solution, Take 2 mLs (0.5 mg total) by nebulization once daily. Controller, Disp: 120 mL, Rfl: 3    estradioL (ESTRACE) 1 MG tablet, Take 2 tablets (2 mg total) by mouth once daily. Please schedule annual exam for additional refills, Disp: 180 tablet, Rfl: 1    hydrOXYzine HCL (ATARAX) 25 MG tablet, Take half a tablet to 1 tablet PO every 8 hours prn anxiety, Disp: 90 tablet, Rfl: 1    lipase-protease-amylase 12,000-38,000-60,000 units (CREON) CpDR, Take 2 capsules by mouth 3 (three) times daily with meals., Disp: 180 capsule, Rfl: 11    ondansetron (ZOFRAN-ODT) 4 MG TbDL, , Disp: , Rfl:     progesterone (PROMETRIUM) 200 MG capsule, Take 1 capsule (200 mg total) by mouth every evening. Please schedule annual exam for additional refills, Disp: 90 capsule, Rfl: 0    rOPINIRole (REQUIP) 0.5 MG tablet, Take 1 tablet (0.5 mg total) by mouth every evening., Disp: 30 tablet, Rfl: 1    traMADoL (ULTRAM) 50 mg tablet, Take 1 to 2 tablets by mouth every 6 hours prn abdominal pain, Disp: 30 tablet, Rfl: 0    acyclovir (ZOVIRAX) " 400 MG tablet, Take 1 tablet (400 mg total) by mouth 3 (three) times daily. for 5 days, Disp: 15 tablet, Rfl: 2    diazePAM (VALIUM) 5 MG tablet, Take 1 tablet (5 mg total) by mouth every 12 (twelve) hours as needed (abdominal spasms)., Disp: 50 tablet, Rfl: 0  No current facility-administered medications for this visit.     Review of patient's allergies indicates:   Allergen Reactions    Ceftin [cefuroxime axetil]     Pcn [penicillins]        GYN & OB History  No LMP recorded. (Menstrual status: Other).   Date of Last Pap: No result found    OB History    Para Term  AB Living   0 0 0 0 0 0   SAB TAB Ectopic Multiple Live Births   0 0 0 0 0       Social History     Socioeconomic History    Marital status:      Spouse name: Not on file    Number of children: Not on file    Years of education: Not on file    Highest education level: Not on file   Occupational History    Not on file   Social Needs    Financial resource strain: Not hard at all    Food insecurity     Worry: Never true     Inability: Never true    Transportation needs     Medical: No     Non-medical: No   Tobacco Use    Smoking status: Never Smoker    Smokeless tobacco: Never Used   Substance and Sexual Activity    Alcohol use: No     Frequency: Never     Drinks per session: 1 or 2     Binge frequency: Never     Comment: rare    Drug use: No    Sexual activity: Yes     Partners: Male     Birth control/protection: None   Lifestyle    Physical activity     Days per week: 0 days     Minutes per session: Not on file    Stress: Very much   Relationships    Social connections     Talks on phone: More than three times a week     Gets together: Three times a week     Attends Holiness service: Not on file     Active member of club or organization: Yes     Attends meetings of clubs or organizations: More than 4 times per year     Relationship status:    Other Topics Concern    Are you pregnant or think you may be?  "Not Asked    Breast-feeding Not Asked   Social History Narrative    Not on file       Family History   Problem Relation Age of Onset    Diabetes Maternal Grandmother     Melanoma Neg Hx     Psoriasis Neg Hx     Lupus Neg Hx     Eczema Neg Hx     Breast cancer Neg Hx     Colon cancer Neg Hx     Ovarian cancer Neg Hx        Review of Systems  Review of Systems   Respiratory: Negative for shortness of breath.    Cardiovascular: Negative for chest pain and palpitations.   Gastrointestinal: Negative for blood in stool, nausea and vomiting.   Genitourinary:        - see HPI   Skin: Negative for rash and wound.   Allergic/Immunologic: Negative for immunocompromised state.   Neurological: Negative for dizziness and syncope.   Hematological: Negative for adenopathy.   Psychiatric/Behavioral: Negative for behavioral problems.        Objective:     Vitals:    12/21/20 0918   BP: 118/74   Weight: 87 kg (191 lb 12.8 oz)   Height: 5' 4" (1.626 m)       Physical Exam:   Constitutional: She is oriented to person, place, and time. She appears well-developed and well-nourished.        Pulmonary/Chest: Right breast exhibits no mass, no nipple discharge, no skin change, no tenderness and no swelling. Left breast exhibits no mass, no nipple discharge, no skin change, no tenderness and no swelling. Breasts are symmetrical.        Abdominal: Soft. She exhibits no distension. There is no abdominal tenderness.     Genitourinary:    Vagina and uterus normal.   There is no tenderness or lesion on the right labia. There is no tenderness or lesion on the left labia. Cervix is normal. Right adnexum displays no mass, no tenderness and no fullness. Left adnexum displays no mass, no tenderness and no fullness. Additional cervical findings: pap smear donenegative for vaginal discharge          Musculoskeletal: Moves all extremeties.       Neurological: She is alert and oriented to person, place, and time.     Psychiatric: She has a normal " mood and affect.        Assessment/ Plan:     Orders Placed This Encounter    HPV High Risk Genotypes, PCR    Liquid-Based Pap Smear, Screening       Bob was seen today for well woman.    Diagnoses and all orders for this visit:    Encounter for gynecological examination    Encounter for screening for cervical cancer   -     Liquid-Based Pap Smear, Screening    Encounter for screening for human papillomavirus (HPV)  -     HPV High Risk Genotypes, PCR    Other orders  -     progesterone (PROMETRIUM) 200 MG capsule; Take 1 capsule (200 mg total) by mouth every evening.  -     Antimullerian hormone (AMH); Future  -     Follicle Stimulating Hormone; Future  -     estradioL (ESTRACE) 1 MG tablet; Take 2 tablets (2 mg total) by mouth once daily.        Follow up in about 1 year (around 12/21/2021) for annual exam.

## 2020-12-28 ENCOUNTER — PATIENT MESSAGE (OUTPATIENT)
Dept: FAMILY MEDICINE | Facility: CLINIC | Age: 30
End: 2020-12-28

## 2020-12-28 LAB
HPV HR 12 DNA SPEC QL NAA+PROBE: NEGATIVE
HPV16 AG SPEC QL: NEGATIVE
HPV18 DNA SPEC QL NAA+PROBE: NEGATIVE

## 2021-01-04 ENCOUNTER — INFUSION (OUTPATIENT)
Dept: INFUSION THERAPY | Facility: HOSPITAL | Age: 31
End: 2021-01-04
Attending: FAMILY MEDICINE
Payer: COMMERCIAL

## 2021-01-04 VITALS
HEART RATE: 82 BPM | OXYGEN SATURATION: 98 % | DIASTOLIC BLOOD PRESSURE: 73 MMHG | TEMPERATURE: 96 F | SYSTOLIC BLOOD PRESSURE: 110 MMHG | RESPIRATION RATE: 18 BRPM

## 2021-01-04 DIAGNOSIS — K86.1 IDIOPATHIC CHRONIC PANCREATITIS: ICD-10-CM

## 2021-01-04 DIAGNOSIS — F41.9 ANXIETY: Primary | ICD-10-CM

## 2021-01-04 DIAGNOSIS — F41.9 ANXIETY: ICD-10-CM

## 2021-01-04 DIAGNOSIS — K86.1 CHRONIC BILIARY PANCREATITIS: ICD-10-CM

## 2021-01-04 DIAGNOSIS — K86.1 IDIOPATHIC CHRONIC PANCREATITIS: Primary | ICD-10-CM

## 2021-01-04 PROCEDURE — 25000003 PHARM REV CODE 250: Performed by: FAMILY MEDICINE

## 2021-01-04 PROCEDURE — 96360 HYDRATION IV INFUSION INIT: CPT

## 2021-01-04 PROCEDURE — 96361 HYDRATE IV INFUSION ADD-ON: CPT

## 2021-01-04 RX ORDER — HEPARIN 100 UNIT/ML
500 SYRINGE INTRAVENOUS
Status: CANCELLED | OUTPATIENT
Start: 2021-01-04

## 2021-01-04 RX ORDER — DIAZEPAM 5 MG/1
5 TABLET ORAL EVERY 12 HOURS PRN
Qty: 30 TABLET | Refills: 0 | Status: SHIPPED | OUTPATIENT
Start: 2021-01-04 | End: 2021-05-24 | Stop reason: SDUPTHER

## 2021-01-04 RX ORDER — SODIUM CHLORIDE 0.9 % (FLUSH) 0.9 %
10 SYRINGE (ML) INJECTION
Status: CANCELLED | OUTPATIENT
Start: 2021-01-04

## 2021-01-04 RX ADMIN — SODIUM CHLORIDE 1000 ML: 0.9 INJECTION, SOLUTION INTRAVENOUS at 01:01

## 2021-01-05 ENCOUNTER — TELEPHONE (OUTPATIENT)
Dept: GASTROENTEROLOGY | Facility: CLINIC | Age: 31
End: 2021-01-05

## 2021-01-05 ENCOUNTER — TRANSCRIBE ORDERS (OUTPATIENT)
Dept: OTHER | Age: 31
End: 2021-01-05

## 2021-01-05 DIAGNOSIS — K86.1 IDIOPATHIC CHRONIC PANCREATITIS (H): Primary | ICD-10-CM

## 2021-01-05 NOTE — TELEPHONE ENCOUNTER
[facility] Intake form:    Referring/Requesting Provider: Stanton Crespo MD     Referral Received via: [facility] hospital [facility]: [facility]    Phone Number: [phone]    Fax Number: [phone]    Address: [address] [place] [address]    Requested provider (if specified): No Preference    Urgency: No      Indication/Diagnosis for consultation: Idiopathic chronic pancreatitis    Has patient been evaluated by another Gastroenterologist? Unknown    [facility] - [facility]   Attn:  Felix Hunt  [address]   [address]nd Floor, [id number]   [place] [address]     [facility] - Surgery Clinic  [facility]   Surgery Clinic: [facility]  Attn:  Chris Douglas or Leann  4th Floor, [id number]  [address] [place] [address]    Is patient aware of request for clinc consultation and ok to be contacted to schedule? Yes.     Inform referring clinic of the following and provided fax number to: [facility] - [phone]

## 2021-01-06 ENCOUNTER — LAB VISIT (OUTPATIENT)
Dept: LAB | Facility: HOSPITAL | Age: 31
End: 2021-01-06
Attending: FAMILY MEDICINE
Payer: COMMERCIAL

## 2021-01-06 DIAGNOSIS — K86.1 IDIOPATHIC CHRONIC PANCREATITIS: Primary | ICD-10-CM

## 2021-01-06 DIAGNOSIS — K86.1 IDIOPATHIC CHRONIC PANCREATITIS: ICD-10-CM

## 2021-01-06 LAB
AMYLASE SERPL-CCNC: 174 U/L (ref 20–110)
LIPASE SERPL-CCNC: 281 U/L (ref 4–60)

## 2021-01-06 PROCEDURE — 82150 ASSAY OF AMYLASE: CPT

## 2021-01-06 PROCEDURE — 36415 COLL VENOUS BLD VENIPUNCTURE: CPT

## 2021-01-06 PROCEDURE — 83690 ASSAY OF LIPASE: CPT
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
## 2021-01-07 ENCOUNTER — TELEPHONE (OUTPATIENT)
Dept: FAMILY MEDICINE | Facility: CLINIC | Age: 31
End: 2021-01-07

## 2021-01-07 NOTE — TELEPHONE ENCOUNTER
Called to obtain any recent images and reports. Pt has appt on 2/15/21 with Dr. Perez. Left a VM for the Medical Records department to call me back.    [facility] [address] [place] [id number] [phone] -Ask for medical records     Jewel Moore LPN

## 2021-01-07 NOTE — TELEPHONE ENCOUNTER
Per Dr. Perez  Clinic    Out of state OK as there are no pancreas providers in [place] and possible TPIAT candidate.    Clinic scheduled 2/15    ML

## 2021-01-12 NOTE — TELEPHONE ENCOUNTER
Medical records from [facility] called and left VM stating they would need a written request for records. Please fax request to [phone].    Jewel Moore LPN

## 2021-01-15 ENCOUNTER — HEALTH MAINTENANCE LETTER (OUTPATIENT)
Age: 31
End: 2021-01-15

## 2021-01-19 ENCOUNTER — PATIENT MESSAGE (OUTPATIENT)
Dept: OBSTETRICS AND GYNECOLOGY | Facility: CLINIC | Age: 31
End: 2021-01-19

## 2021-01-19 ENCOUNTER — TELEPHONE (OUTPATIENT)
Dept: GASTROENTEROLOGY | Facility: CLINIC | Age: 31
End: 2021-01-19

## 2021-01-19 DIAGNOSIS — N95.0 POSTMENOPAUSAL BLEEDING: Primary | ICD-10-CM

## 2021-01-19 NOTE — TELEPHONE ENCOUNTER
Records Received 01/19/21    Facility  [facility]  [address] [facility] [place] [address]  Phone: [phone]   Outcome 9/5/2019 CT Abdomen Pelvis report and image received, report sent to scan. Images sent to [facility] upload station to upload to PACS.     1/20/2021 9:16AM received a message from upload station, disc is damaged and unable to load to PACS. Sent a message to clinic to notify - Amay     Action 02/01/21 1:09 PM - Lynn   Action Taken  Imaging disc received from [facility] and sent to [facility] to be uploaded into PACs.

## 2021-01-20 ENCOUNTER — DOCUMENTATION ONLY (OUTPATIENT)
Dept: GASTROENTEROLOGY | Facility: CLINIC | Age: 31
End: 2021-01-20

## 2021-01-20 ENCOUNTER — PATIENT MESSAGE (OUTPATIENT)
Dept: OBSTETRICS AND GYNECOLOGY | Facility: CLINIC | Age: 31
End: 2021-01-20

## 2021-01-20 ENCOUNTER — PATIENT OUTREACH (OUTPATIENT)
Dept: ADMINISTRATIVE | Facility: OTHER | Age: 31
End: 2021-01-20

## 2021-01-20 NOTE — PROGRESS NOTES
CD from [facility] was received but damaged and unable to upload. Written request/letter refaxed to [facility] at [phone] for a new CD.    Jewel Moore LPN

## 2021-01-21 ENCOUNTER — TELEPHONE (OUTPATIENT)
Dept: OBSTETRICS AND GYNECOLOGY | Facility: CLINIC | Age: 31
End: 2021-01-21

## 2021-01-23 ENCOUNTER — PATIENT MESSAGE (OUTPATIENT)
Dept: OBSTETRICS AND GYNECOLOGY | Facility: CLINIC | Age: 31
End: 2021-01-23

## 2021-01-28 ENCOUNTER — PATIENT MESSAGE (OUTPATIENT)
Dept: OBSTETRICS AND GYNECOLOGY | Facility: CLINIC | Age: 31
End: 2021-01-28

## 2021-01-28 LAB
FINAL PATHOLOGIC DIAGNOSIS: NORMAL
Lab: NORMAL

## 2021-02-01 ENCOUNTER — DOCUMENTATION ONLY (OUTPATIENT)
Dept: GASTROENTEROLOGY | Facility: CLINIC | Age: 31
End: 2021-02-01

## 2021-02-01 NOTE — PROGRESS NOTES
Called to follow up on request for images. Per Sue with medical records, another CD was sent on 1/20/21.    [facility] [address] [place] [address] [phone] -Ask for medical records      Will follow up again if nothing received by end of week.    Jewel Moore LPN

## 2021-02-04 ENCOUNTER — PATIENT MESSAGE (OUTPATIENT)
Dept: OBSTETRICS AND GYNECOLOGY | Facility: CLINIC | Age: 31
End: 2021-02-04

## 2021-02-04 ENCOUNTER — OFFICE VISIT (OUTPATIENT)
Dept: OBSTETRICS AND GYNECOLOGY | Facility: CLINIC | Age: 31
End: 2021-02-04
Attending: OBSTETRICS & GYNECOLOGY
Payer: COMMERCIAL

## 2021-02-04 VITALS
SYSTOLIC BLOOD PRESSURE: 128 MMHG | WEIGHT: 192.13 LBS | DIASTOLIC BLOOD PRESSURE: 82 MMHG | HEIGHT: 64 IN | BODY MASS INDEX: 32.8 KG/M2

## 2021-02-04 DIAGNOSIS — N95.0 POSTMENOPAUSAL BLEEDING: Primary | ICD-10-CM

## 2021-02-04 PROCEDURE — 1126F PR PAIN SEVERITY QUANTIFIED, NO PAIN PRESENT: ICD-10-PCS | Mod: S$GLB,,, | Performed by: OBSTETRICS & GYNECOLOGY

## 2021-02-04 PROCEDURE — 99213 PR OFFICE/OUTPT VISIT, EST, LEVL III, 20-29 MIN: ICD-10-PCS | Mod: S$GLB,,, | Performed by: OBSTETRICS & GYNECOLOGY

## 2021-02-04 PROCEDURE — 1126F AMNT PAIN NOTED NONE PRSNT: CPT | Mod: S$GLB,,, | Performed by: OBSTETRICS & GYNECOLOGY

## 2021-02-04 PROCEDURE — 3008F PR BODY MASS INDEX (BMI) DOCUMENTED: ICD-10-PCS | Mod: CPTII,S$GLB,, | Performed by: OBSTETRICS & GYNECOLOGY

## 2021-02-04 PROCEDURE — 99999 PR PBB SHADOW E&M-EST. PATIENT-LVL III: ICD-10-PCS | Mod: PBBFAC,,, | Performed by: OBSTETRICS & GYNECOLOGY

## 2021-02-04 PROCEDURE — 99999 PR PBB SHADOW E&M-EST. PATIENT-LVL III: CPT | Mod: PBBFAC,,, | Performed by: OBSTETRICS & GYNECOLOGY

## 2021-02-04 PROCEDURE — 99213 OFFICE O/P EST LOW 20 MIN: CPT | Mod: S$GLB,,, | Performed by: OBSTETRICS & GYNECOLOGY

## 2021-02-04 PROCEDURE — 3008F BODY MASS INDEX DOCD: CPT | Mod: CPTII,S$GLB,, | Performed by: OBSTETRICS & GYNECOLOGY

## 2021-02-15 ENCOUNTER — VIRTUAL VISIT (OUTPATIENT)
Dept: GASTROENTEROLOGY | Facility: CLINIC | Age: 31
End: 2021-02-15
Payer: COMMERCIAL

## 2021-02-15 VITALS — HEIGHT: 65 IN | BODY MASS INDEX: 31.16 KG/M2 | WEIGHT: 187 LBS

## 2021-02-15 DIAGNOSIS — K86.1 IDIOPATHIC CHRONIC PANCREATITIS (H): Primary | ICD-10-CM

## 2021-02-15 PROCEDURE — 99205 OFFICE O/P NEW HI 60 MIN: CPT | Mod: 95 | Performed by: INTERNAL MEDICINE

## 2021-02-15 RX ORDER — DIAZEPAM 5 MG
5 TABLET ORAL EVERY 12 HOURS PRN
COMMUNITY
Start: 2021-01-01

## 2021-02-15 RX ORDER — ESTRADIOL 2 MG/1
2 TABLET ORAL DAILY
COMMUNITY
Start: 2019-06-24

## 2021-02-15 RX ORDER — BUDESONIDE 0.5 MG/2ML
INHALANT ORAL
Status: ON HOLD | COMMUNITY
Start: 2020-01-29 | End: 2021-04-13

## 2021-02-15 RX ORDER — TRAMADOL HYDROCHLORIDE 50 MG/1
TABLET ORAL
COMMUNITY
Start: 2021-01-01

## 2021-02-15 SDOH — HEALTH STABILITY: MENTAL HEALTH: HOW MANY STANDARD DRINKS CONTAINING ALCOHOL DO YOU HAVE ON A TYPICAL DAY?: NOT ASKED

## 2021-02-15 SDOH — HEALTH STABILITY: MENTAL HEALTH: HOW OFTEN DO YOU HAVE 6 OR MORE DRINKS ON ONE OCCASION?: NEVER

## 2021-02-15 SDOH — HEALTH STABILITY: MENTAL HEALTH: HOW OFTEN DO YOU HAVE A DRINK CONTAINING ALCOHOL?: NEVER

## 2021-02-15 ASSESSMENT — MIFFLIN-ST. JEOR: SCORE: 1569.11

## 2021-02-15 ASSESSMENT — PAIN SCALES - GENERAL: PAINLEVEL: NO PAIN (0)

## 2021-02-15 NOTE — LETTER
"    2/15/2021         RE: Andriy Yoon  [address] [place] [address]      Andriy is a 30 year old who is being evaluated via a billable video visit.      How would you like to obtain your AVS? MyChart  If the video visit is dropped, the invitation should be resent by: Text to cell phone: [phone]  Will anyone else be joining your video visit? No         BMI:   Estimated body mass index is 31.12 kg/m  as calculated from the following:    Height as of this encounter: 1.651 m (5' 5\").    Weight as of this encounter: 84.8 kg (187 lb).   Weight management plan: Discussed healthy diet and exercise guidelines    FUTURE APPOINTMENTS:       - Follow-up visit in 6 weeks      Ayden Perez MD  [facility] DANIE Ashraf is a 30 year old who presents for the following health issues : recurrent acute and chronic calcific pancreatitis x 8 years, clinical onset age 22.   She is managed by an excellent primary care doctor Cherie in the [facility] in [place].  She has seen multiple gastroenterologists including at her network but sought another opinion with us after communicating with Facebook chronic pancreatitis support group    In some she has generally been healthy although she was diagnosed with primary ovarian failure several years ago.  She has been placed on estrogen and progesterone although that clearly developed after her onset of clinical pancreatitis.  That has been associated with significant weight gain of about 80 pounds.  Also an elevated hemoglobin A1c of 5.9    She has never been a smoker or drinker but developed clinical pancreatitis at age 22.  This is been associated with a number of hospitalizations that she estimates at 6 or 7 over her last decade  CT scans have shown fairly extensive calcification mostly in the uncinate and head of pancreas.  Her pattern of pain is intermittent with acute attacks completely subsiding after " brief hospitalization or management with fluids and tramadol.  She has undergone a substantial work-up including multiple CTs, and endoscopic ultrasound which reported pancreatic calcifications but findings of moderate chronic pancreatitis.  She did undergo a cholecystectomy I believe 2 years ago for suspected biliary component and she felt that this reduced the frequency of her clinical attacks.  She does feel that clinical attacks of pain has accelerated in the recent months so that she now has 1 about every 7 to 8 weeks that she manages mostly at home with fluids, tramadol and it once a week as needed normal saline outpatient infusion ordered by her primary care doctor.    She has never smoked or drank significant amounts of alcohol.  She has no family history of pancreatitis including her parents or siblings.  She has no children due to primary ovarian failure and is confident that the pancreatitis was present before she was started on estrogen and progesterone as currently taken.      She is undergone several ERCPs at [facility] with clearance of what was described as sludge from her main pancreatic duct but findings of no significant main pancreatic duct stones.  This is corroborated by her most recent CT.  We only have the 2019 CT uploaded and it does show a minimally dilated main pancreatic duct which can be followed from the tail to the major papilla and with primarily sidebranch calcifications in the head and uncinate process     She still has rises in her lipase and amylase with pain flares and most recently in January are listed below with a lipase of 281 and her upper limit of normal being 60    Surprisingly, she has never had genetic testing.    She is coming to us now as she feels her symptoms are accelerating and needs another opinion about her chronic pancreatitis.    She is on 12,000 units of lipase pancreatic enzyme replacement once with each meal although she takes it intermittently.      Objective     Vitals - Patient Reported  Pain Score: No Pain (0)         Video-Visit Details    Type of service:  Video Visit    Start: 02/15/2021 10:06 am  Stop: 02/15/2021 11:14 am    Originating Location (pt. Location): Home    Distant Location (provider location):  [facility] [place]     Platform used for Video Visit: PublicBeta        Amylase.Lipase.CRP top to bottom  Jan 2021   174 (H) 89 120 (H) 41   30 33 74   281 (H) 127 (H) 147 (H) 25   19 22 85 (H)             0.32 0.84 (H)       IMPRESSION:    We spent well over 60 minutes in video consultation and reviewing her outside images and care everywhere records.  She has idiopathic early onset calcific chronic pancreatitis with acute on chronic flares.  Onset of clinical pancreatitis was in her early 20s.  She has no family history or provoking toxic or metabolic etiologies.  Etiology is it is very likely genetically driven and it is even possible that she has a homozygous cystic fibrosis, 1 or 2CFTR mutations, [place] 1 or possibly even PRS S1 mutation.    She did have some relief with a cholecystectomy but this is clearly not biliary pancreatitis and ERCPs with clearance of main pancreatic duct sludge have afforded her some decrease in acute flares although she did develop post ERCP pancreatitis.    We spent the majority of our time talking about etiology, supportive care for chronic pancreatitis and ultimate intervention.  She clearly needs genetic evaluation and will see if she can be evaluated by Lexi olivo genetic counselor and Eliu Evoz genetics evaluation, hopefully within the next month as her insurance changes to a lesser coverage per her .    As far as supportive care she needs Creon increased to 72,000 units with each meal as she is on a currently minimal and trivial dose of enzyme replacement.  Commend I believe she is on an outpatient IV infusion protocol but I suggest that that be increased to up to 3 times a week  as needed as needed and include 1 to 2 L of lactated Ringer's rather than normal saline and IV Zofran.  She would benefit greatly from dietitian consult with our pancreas dietitian Ling and include nutritional assessment.    As far as intervention we discussed that it would be worth visiting us here after the rest of the supportive and genetic evaluation are done for a ERCP to clear her main duct of any further sludge and/or intraductal stones but also be fully evaluated for total pancreatectomy with islet autotransplant.  We did discuss that with her early onset accelerating pace and lack of likely meaningful long-term endoscopic therapy such as removal of obstructing main pancreatic duct stones.  She is heading in the direction of TP IAT at some point.  We did discuss that that would involve significant weight loss as her BMI is 31 and she has had significant recent weight gain.    The patient is aware of TPI AT as an option since she is on the Facebook support group for chronic pancreatitis and is accepting of the idea that eventually this may be required if her symptoms continue to accelerate.  Fortunately she is on no long-term narcotics and has no pain in between her increasingly frequent clinical attacks.  She does have terrific family support with a very supportive .  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    For the time being recommend below and when convenient in the next months have the patient visit us in person for ERCP and TPI AT evaluation.  I will put her in touch with Renuka and discuss her at our multidisciplinary weekly conference    Plan  1) Increase Creon to 72 K w ea meal  2) Stool elastase  3) Dietitian + nutritional assessment (vit D, fat soluble, etc)   4) Increase OP infusion to three times a week, use LR instead of NS, and  Zofran  5) Genetic counseling - try for Lexi Sam   6) Follow-up w Dr Perez after above 6 weeks  7) eventually, ERCP , Full TPIAT eval at [facility]  8) Hgb A1c      PREVIOUS NOTES FROM [facility]:    Ms. Yoon presents today with acute onset of epigastric abdominal pain and steatorrhea. Symptoms began this morning. Pain is described as a severe sharp stabbing nonradiating pain. Nausea present but no vomiting. No constipation. No melena, hematochezia, hematemesis, fevers, chills, jaundice, biliuria, acholia, etc. She has a history of chronic biliary induced pancreatitis. She has had a cholecystectomy. Her last episode of symptomatic pancreatitis was approximately 8 months ago.    Allergies & Meds:  Review of patient's allergies indicates:   Allergen Reactions     Ceftin [cefuroxime axetil]     Iodine and iodide containing products     Pcn [penicillins]     Current Outpatient Medications   Medication Sig Dispense Refill     estradiol (ESTRACE) 1 MG tablet Take 1 mg by mouth once daily.     pantoprazole (PROTONIX) 40 MG tablet Take 1 tablet (40 mg total) by mouth once daily. 90 tablet 3     PROGESTERONE IU     diazePAM (VALIUM) 5 MG tablet Take 1 tablet (5 mg total) by mouth every 12 (twelve) hours as needed (abdominal spasms). 50 tablet 0     traMADol (ULTRAM) 50 mg tablet Take 2 tablets (100 mg total) by mouth every 6 (six) hours as needed for Pain. 50 tablet 0     No current facility-administered medications for this visit.     PMFSHx:  Past Medical History:   Diagnosis Date     Abdominal pain, epigastric     GERD (gastroesophageal reflux disease)     Nausea alone     Pancreatitis     Pancreatitis, acute     Past Surgical History:   Procedure Laterality Date     CHOLECYSTECTOMY     ESOPHAGOGASTRODUODENOSCOPY     EYE SURGERY     ULTRASOUND-ENDOSCOPIC-UPPER N/A 5/29/2015   Performed by Juvencio Robertson MD at [facility] (2ND FLR)     Family History   Problem Relation Age of Onset     Diabetes Maternal  Grandmother     Social History     Tobacco Use     Smoking status: Never Smoker     Smokeless tobacco: Never Used   Substance Use Topics     Alcohol use: No   Frequency: Never     Drug use: No    Stanton Crespo MD - 09/02/2020 9:00 AM CDT  Formatting of this note is different from the original.  Images from the original note were not included.  [facility] - Clinic Note    Subjective     Ms. Yoon is a 30 y.o. female who presents to clinic for a follow up of chronic conditions.     History of prediabetes. Was previously on metformin but taken of last year as her A1C improved.   Last A1C was 2 months ago and was 5.9.  Does admit to increase in weight gain.   States that she is trying to work out and eating well.   Has been eating less or around 1,000 calories a day and low carb.  Admits to feeling like a brain fog and not herself. Unsure if her sugar is low.  Would like to try adipex again. Was on this last year and did well. No adverse effects with medication use.    Complains of leg pain. Bilaterally  Worse at night  Feels restless   Unable to relax   Keeps her up at night  Labs obtained at last visit were normal.    PMH Andriy has a past medical history of Abdominal pain, epigastric, GERD (gastroesophageal reflux disease), Nausea alone, Pancreatitis, Pancreatitis, acute, and Premature ovarian insufficiency (08/2018).   PSXH Andriy has a past surgical history that includes Esophagogastroduodenoscopy; Cholecystectomy; and Eye surgery.    Andriy's family history includes Diabetes in her maternal grandmother.   SH Andriy reports that she has never smoked. She has never used smokeless tobacco. She reports that she does not drink alcohol or use drugs.   ALG Andriy is allergic to ceftin [cefuroxime axetil] and pcn [penicillins].   MED Andriy has a current medication list which includes the following prescription(s): azelastine, budesonide, estradiol, lipase-protease-amylase 12,000-38,000-60,000 units,  "ondansetron, progesterone, tramadol, acyclovir, diazepam, and ropinirole, and the following Facility-Administered Medications: influenza.     Review of Systems   Constitutional: Negative for activity change, appetite change, chills, fatigue and fever.   Eyes: Negative for visual disturbance.   Respiratory: Negative for cough and shortness of breath.   Cardiovascular: Negative for chest pain, palpitations and leg swelling.   Gastrointestinal: Negative for abdominal pain, nausea and vomiting.   Musculoskeletal:   Leg pain bilaterally   Skin: Negative for wound.   Neurological: Negative for dizziness, weakness, numbness and headaches.   Psychiatric/Behavioral: Negative for confusion.     Objective     /87  Pulse 87  Temp 97.3  F (36.3  C) (Temporal)  Resp 15  Ht 5' 4\" (1.626 m)  Wt 85.4 kg (188 lb 6 oz)  SpO2 98%  BMI 32.33 kg/m      Physical Exam   Constitutional: She appears well-developed, well-nourished and obese. Non-toxic appearance. She does not appear ill. No distress.   HENT:   Head: Normocephalic and atraumatic.   Eyes: Right eye exhibits no discharge. Left eye exhibits no discharge.   Neck: Neck supple.   Cardiovascular: Normal rate, regular rhythm, normal heart sounds and normal pulses. Exam reveals no gallop and no friction rub.   No murmur heard.  Pulmonary/Chest: Effort normal and breath sounds normal. No respiratory distress. She has no wheezes. She has no rhonchi. She has no rales.   Abdominal: Normal appearance.   Musculoskeletal:   Right lower leg: No edema.   Left lower leg: No edema.   Lymphadenopathy:   She has no cervical adenopathy.   Neurological: She is alert.   Skin: Skin is warm and dry. Capillary refill takes less than 2 seconds. She is not diaphoretic.   Psychiatric: Her behavior is normal. Mood and thought content normal.   Vitals reviewed.    Assessment/Plan     Andriy was seen today for follow-up of chronic conditions.    Diagnoses and all orders for this visit:    Restless " leg syndrome  - Iron and TIBC; Future  - Ferritin; Future  - Recommended over the counter magnesium and monitoring for improvement. Discussed increasing hydration. Will obtain iron studies for further eval. Discussed requip if the above does not help.    Prediabetes  - Hemoglobin A1C; Future  - POCT Glucose, Hand-Held Device    Class 1 obesity due to excess calories with serious comorbidity and body mass index (BMI) of 32.0 to 32.9 in adult  - phentermine (ADIPEX-P) 37.5 mg tablet; Take 1 tablet (37.5 mg total) by mouth before breakfast.    Need for tetanus booster  - (In Office Administered) Td Vaccine    -discussed in depth about her dietary changes. Increase calorie count daily, food portioning, healthier carbs, and continue to exercise.  -POCT glucose was normal. Her symptoms of brain fog and not herself I feel are due to her dietary changes.     Follow up in about 1 month (around 10/2/2020).    Future Appointments   Date Time Provider Department Center   10/19/2020 11:40 AM Stanton Crespo MD [facility]     Stanton Crespo MD  Family Medicine  [facility]        Pancreatic calcifications consistent calcifications of chronic pancreatitis.    Additional findings as detailed above including prior cholecystectomy, mildly prominent lymph nodes more so right lower quadrant of the abdomen not appearing significantly changed compared to prior study 4/30/2016.      Electronically signed by: Hiral Bermudez MD  Date:    09/05/2019  Time:    13:41   Result Narrative   EXAMINATION:  CT ABDOMEN PELVIS W WO CONTRAST    CLINICAL HISTORY:  Epigastric Pain.  Chronic pancreatitis;Other chronic pancreatitis    TECHNIQUE:  Low dose axial images, sagittal and coronal reformations were obtained from the lung bases to the pubic symphysis before and following the IV administration of 75 mL of Omnipaque 350.  30 mL of oral Omnipaque 350 was also  administered.    COMPARISON:  4/30/2015    FINDINGS:  There is mild atelectatic stranding left lung base    Liver, gallbladder, bile ducts; unremarkable appearance of the liver.  No masses.  Cholecystectomy clips.  No biliary duct dilatation    Spleen not enlarged.  Small accessory spleen noted    Adrenal glands unremarkable in appearance    Pancreas coarse calcification head and tail of the pancreas consistent with calcifications of chronic pancreatitis.  No acute findings    Abdominal aorta; no aneurysm    Stomach, bowel, mesentery; normal appearance of the appendix.  Mildly prominent mesenteric lymph nodes for example coronal series 6, image 70 more so right lower quadrant of the abdomen appearing similar to the prior exam.  No free intraperitoneal air or fluid.  No appreciable bowel wall thickening or inflammatory change    Small fat containing umbilical hernia    Reproductive organs; unremarkable in appearance; kidneys unremarkable appearance without hydronephrosis opaque renal stone.  No opaque ureteral stone noted although note is made this is not renal stone protocol CT.  Prompt and symmetrical renal enhancement.  No renal masses.  Urinary bladder mildly distended at time of the exam and unremarkable in appearance    Osseous structures; mild degenerative changes.    Kidneys, ureters, bladder;   Other Result Information   Interface, Rad Results In - 09/05/2019  1:44 PM CDT  EXAMINATION:  CT ABDOMEN PELVIS W WO CONTRAST    CLINICAL HISTORY:  Epigastric Pain.  Chronic pancreatitis;Other chronic pancreatitis  TECHNIQUE:  Low dose axial images, sagittal and coronal reformations were obtained from the lung bases to the pubic symphysis before and following the IV administration of 75 mL of Omnipaque 350.  30 mL of oral Omnipaque 350 was also administered.  COMPARISON:  4/30/2015  FINDINGS:  There is mild atelectatic stranding left lung base  Liver, gallbladder, bile ducts; unremarkable appearance of the liver.  No  masses.  Cholecystectomy clips.  No biliary duct dilatation  Spleen not enlarged.  Small accessory spleen noted  Adrenal glands unremarkable in appearance  Pancreas coarse calcification head and tail of the pancreas consistent with calcifications of chronic pancreatitis.  No acute findings  Abdominal aorta; no aneurysm  Stomach, bowel, mesentery; normal appearance of the appendix.  Mildly prominent mesenteric lymph nodes for example coronal series 6, image 70 more so right lower quadrant of the abdomen appearing similar to the prior exam.  No free intraperitoneal air or fluid.  No appreciable bowel wall thickening or inflammatory change  Small fat containing umbilical hernia  Reproductive organs; unremarkable in appearance; kidneys unremarkable appearance without hydronephrosis opaque renal stone.  No opaque ureteral stone noted although note is made this is not renal stone protocol CT.  Prompt and symmetrical renal enhancement.  No renal masses.  Urinary bladder mildly distended at time of the exam and unremarkable in appearance  Osseous structures; mild degenerative changes.  Kidneys, ureters, bladder;  Impression:  Pancreatic calcifications consistent calcifications of chronic pancreatitis.  Additional findings as detailed above including prior cholecystectomy, mildly prominent lymph nodes more so right lower quadrant of the abdomen not appearing significantly changed compared to prior study 4/30/2016.  Electronically signed by:Hiral Bermudez MD  Date:09/05/2019  Time:13:41   Status                     Ayden Perez MD

## 2021-02-15 NOTE — LETTER
"  2/15/2021       RE: Andriy Yoon  [address] [place] [address]      Dear Colleague,    Thank you for referring your patient, Andriy Yoon, to the [facility] [place]. Please see a copy of my visit note below.    Andriy is a 30 year old who is being evaluated via a billable video visit.      How would you like to obtain your AVS? MyChart  If the video visit is dropped, the invitation should be resent by: Text to cell phone: [phone]  Will anyone else be joining your video visit? No       BMI:   Estimated body mass index is 31.12 kg/m  as calculated from the following:    Height as of this encounter: 1.651 m (5' 5\").    Weight as of this encounter: 84.8 kg (187 lb).   Weight management plan: Discussed healthy diet and exercise guidelines    FUTURE APPOINTMENTS:       - Follow-up visit in 6 weeks    Ayden Perez MD  [facility] [place]      Magalis Ashraf is a 30 year old who presents for the following health issues : recurrent acute and chronic calcific pancreatitis x 8 years, clinical onset age 22.   She is managed by an excellent primary care doctor Cherie in the [facility] in [place].  She has seen multiple gastroenterologists including at her network but sought another opinion with us after communicating with Facebook chronic pancreatitis support group    In some she has generally been healthy although she was diagnosed with primary ovarian failure several years ago.  She has been placed on estrogen and progesterone although that clearly developed after her onset of clinical pancreatitis.  That has been associated with significant weight gain of about 80 pounds.  Also an elevated hemoglobin A1c of 5.9    She has never been a smoker or drinker but developed clinical pancreatitis at age 22.  This is been associated with a number of hospitalizations that she estimates at 6 or 7 over her last decade  CT " scans have shown fairly extensive calcification mostly in the uncinate and head of pancreas.  Her pattern of pain is intermittent with acute attacks completely subsiding after brief hospitalization or management with fluids and tramadol.  She has undergone a substantial work-up including multiple CTs, and endoscopic ultrasound which reported pancreatic calcifications but findings of moderate chronic pancreatitis.  She did undergo a cholecystectomy I believe 2 years ago for suspected biliary component and she felt that this reduced the frequency of her clinical attacks.  She does feel that clinical attacks of pain has accelerated in the recent months so that she now has 1 about every 7 to 8 weeks that she manages mostly at home with fluids, tramadol and it once a week as needed normal saline outpatient infusion ordered by her primary care doctor.    She has never smoked or drank significant amounts of alcohol.  She has no family history of pancreatitis including her parents or siblings.  She has no children due to primary ovarian failure and is confident that the pancreatitis was present before she was started on estrogen and progesterone as currently taken.      She is undergone several ERCPs at [facility] with clearance of what was described as sludge from her main pancreatic duct but findings of no significant main pancreatic duct stones.  This is corroborated by her most recent CT.  We only have the 2019 CT uploaded and it does show a minimally dilated main pancreatic duct which can be followed from the tail to the major papilla and with primarily sidebranch calcifications in the head and uncinate process     She still has rises in her lipase and amylase with pain flares and most recently in January are listed below with a lipase of 281 and her upper limit of normal being 60    Surprisingly, she has never had genetic testing.    She is coming to us now as she feels her symptoms are accelerating and needs another  opinion about her chronic pancreatitis.    She is on 12,000 units of lipase pancreatic enzyme replacement once with each meal although she takes it intermittently.      Objective    Vitals - Patient Reported  Pain Score: No Pain (0)         Video-Visit Details    Type of service:  Video Visit    Start: 02/15/2021 10:06 am  Stop: 02/15/2021 11:14 am    Originating Location (pt. Location): Home    Distant Location (provider location):  [facility] [place]     Platform used for Video Visit: [email or web address]        Amylase.Lipase.CRP top to bottom  Jan 2021   174 (H) 89 120 (H) 41   30 33 74   281 (H) 127 (H) 147 (H) 25   19 22 85 (H)             0.32 0.84 (H)       IMPRESSION:    We spent well over 60 minutes in video consultation and reviewing her outside images and care everywhere records.  She has idiopathic early onset calcific chronic pancreatitis with acute on chronic flares.  Onset of clinical pancreatitis was in her early 20s.  She has no family history or provoking toxic or metabolic etiologies.  Etiology is it is very likely genetically driven and it is even possible that she has a homozygous cystic fibrosis, 1 or 2CFTR mutations, [place] 1 or possibly even PRS S1 mutation.    She did have some relief with a cholecystectomy but this is clearly not biliary pancreatitis and ERCPs with clearance of main pancreatic duct sludge have afforded her some decrease in acute flares although she did develop post ERCP pancreatitis.    We spent the majority of our time talking about etiology, supportive care for chronic pancreatitis and ultimate intervention.  She clearly needs genetic evaluation and will see if she can be evaluated by Lexi olivo genetic counselor and [facility] [employer] genetics evaluation, hopefully within the next month as her insurance changes to a lesser coverage per her .    As far as supportive care she needs Creon increased to 72,000 units with each meal as she is on a  currently minimal and trivial dose of enzyme replacement.  Commend I believe she is on an outpatient IV infusion protocol but I suggest that that be increased to up to 3 times a week as needed as needed and include 1 to 2 L of lactated Ringer's rather than normal saline and IV Zofran.  She would benefit greatly from dietitian consult with our pancreas dietitian Ling and include nutritional assessment.    As far as intervention we discussed that it would be worth visiting us here after the rest of the supportive and genetic evaluation are done for a ERCP to clear her main duct of any further sludge and/or intraductal stones but also be fully evaluated for total pancreatectomy with islet autotransplant.  We did discuss that with her early onset accelerating pace and lack of likely meaningful long-term endoscopic therapy such as removal of obstructing main pancreatic duct stones.  She is heading in the direction of TP IAT at some point.  We did discuss that that would involve significant weight loss as her BMI is 31 and she has had significant recent weight gain.    The patient is aware of TPI AT as an option since she is on the Facebook support group for chronic pancreatitis and is accepting of the idea that eventually this may be required if her symptoms continue to accelerate.  Fortunately she is on no long-term narcotics and has no pain in between her increasingly frequent clinical attacks.  She does have terrific family support with a very supportive .  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    For the time being recommend below and when convenient in the next months have the patient visit us in person for ERCP and TPI AT evaluation.  I will put her in touch with Renuka and discuss her at our multidisciplinary weekly conference    Plan  1) Brit Goel  to 72 K w ea meal  2) Stool elastase  3) Dietitian + nutritional assessment (vit D, fat soluble, etc)   4) Increase OP infusion to three times a week, use LR instead of NS, and Zofran  5) Genetic counseling - try for Lexi Lopezgosiasridevi   6) Follow-up w Dr Perez after above 6 weeks  7) eventually, ERCP , Full [facility]AT eval [facility]  8) Hgb A1c      PREVIOUS NOTES FROM [facility]:    Ms. Yoon presents today with acute onset of epigastric abdominal pain and steatorrhea. Symptoms began this morning. Pain is described as a severe sharp stabbing nonradiating pain. Nausea present but no vomiting. No constipation. No melena, hematochezia, hematemesis, fevers, chills, jaundice, biliuria, acholia, etc. She has a history of chronic biliary induced pancreatitis. She has had a cholecystectomy. Her last episode of symptomatic pancreatitis was approximately 8 months ago.    Allergies & Meds:  Review of patient's allergies indicates:   Allergen Reactions     Ceftin [cefuroxime axetil]     Iodine and iodide containing products     Pcn [penicillins]     Current Outpatient Medications   Medication Sig Dispense Refill     estradiol (ESTRACE) 1 MG tablet Take 1 mg by mouth once daily.     pantoprazole (PROTONIX) 40 MG tablet Take 1 tablet (40 mg total) by mouth once daily. 90 tablet 3     PROGESTERONE IU     diazePAM (VALIUM) 5 MG tablet Take 1 tablet (5 mg total) by mouth every 12 (twelve) hours as needed (abdominal spasms). 50 tablet 0     traMADol (ULTRAM) 50 mg tablet Take 2 tablets (100 mg total) by mouth every 6 (six) hours as needed for Pain. 50 tablet 0     No current facility-administered medications for this visit.     PMFSHx:  Past Medical History:   Diagnosis Date     Abdominal pain, epigastric     GERD (gastroesophageal reflux disease)     Nausea alone     Pancreatitis     Pancreatitis, acute     Past Surgical History:   Procedure Laterality Date     CHOLECYSTECTOMY     ESOPHAGOGASTRODUODENOSCOPY     EYE SURGERY      ULTRASOUND-ENDOSCOPIC-UPPER N/A 5/29/2015   Performed by Juvencio Robertson MD at [facility] (2ND FLR)     Family History   Problem Relation Age of Onset     Diabetes Maternal Grandmother     Social History     Tobacco Use     Smoking status: Never Smoker     Smokeless tobacco: Never Used   Substance Use Topics     Alcohol use: No   Frequency: Never     Drug use: No    Stanton Crespo MD - 09/02/2020 9:00 AM CDT  Formatting of this note is different from the original.  Images from the original note were not included.  [facility] - Clinic Note    Subjective     Ms. Yoon is a 30 y.o. female who presents to clinic for a follow up of chronic conditions.     History of prediabetes. Was previously on metformin but taken of last year as her A1C improved.   Last A1C was 2 months ago and was 5.9.  Does admit to increase in weight gain.   States that she is trying to work out and eating well.   Has been eating less or around 1,000 calories a day and low carb.  Admits to feeling like a brain fog and not herself. Unsure if her sugar is low.  Would like to try adipex again. Was on this last year and did well. No adverse effects with medication use.    Complains of leg pain. Bilaterally  Worse at night  Feels restless   Unable to relax   Keeps her up at night  Labs obtained at last visit were normal.    [facility] Andriy has a past medical history of Abdominal pain, epigastric, GERD (gastroesophageal reflux disease), Nausea alone, Pancreatitis, Pancreatitis, acute, and Premature ovarian insufficiency (08/2018).   PSXH Andriy has a past surgical history that includes Esophagogastroduodenoscopy; Cholecystectomy; and Eye surgery.    Andriy's family history includes Diabetes in her maternal grandmother.    Andriy reports that she has never smoked. She has never used smokeless tobacco. She reports that she does not drink alcohol or use drugs.   ALG Andriy is allergic to ceftin [cefuroxime axetil] and pcn [penicillins].   MED Andriy  "has a current medication list which includes the following prescription(s): azelastine, budesonide, estradiol, lipase-protease-amylase 12,000-38,000-60,000 units, ondansetron, progesterone, tramadol, acyclovir, diazepam, and ropinirole, and the following Facility-Administered Medications: influenza.     Review of Systems   Constitutional: Negative for activity change, appetite change, chills, fatigue and fever.   Eyes: Negative for visual disturbance.   Respiratory: Negative for cough and shortness of breath.   Cardiovascular: Negative for chest pain, palpitations and leg swelling.   Gastrointestinal: Negative for abdominal pain, nausea and vomiting.   Musculoskeletal:   Leg pain bilaterally   Skin: Negative for wound.   Neurological: Negative for dizziness, weakness, numbness and headaches.   Psychiatric/Behavioral: Negative for confusion.     Objective     /87  Pulse 87  Temp 97.3  F (36.3  C) (Temporal)  Resp 15  Ht 5' 4\" (1.626 m)  Wt 85.4 kg (188 lb 6 oz)  SpO2 98%  BMI 32.33 kg/m      Physical Exam   Constitutional: She appears well-developed, well-nourished and obese. Non-toxic appearance. She does not appear ill. No distress.   HENT:   Head: Normocephalic and atraumatic.   Eyes: Right eye exhibits no discharge. Left eye exhibits no discharge.   Neck: Neck supple.   Cardiovascular: Normal rate, regular rhythm, normal heart sounds and normal pulses. Exam reveals no gallop and no friction rub.   No murmur heard.  Pulmonary/Chest: Effort normal and breath sounds normal. No respiratory distress. She has no wheezes. She has no rhonchi. She has no rales.   Abdominal: Normal appearance.   Musculoskeletal:   Right lower leg: No edema.   Left lower leg: No edema.   Lymphadenopathy:   She has no cervical adenopathy.   Neurological: She is alert.   Skin: Skin is warm and dry. Capillary refill takes less than 2 seconds. She is not diaphoretic.   Psychiatric: Her behavior is normal. Mood and thought content " normal.   Vitals reviewed.    Assessment/Plan     Andriy was seen today for follow-up of chronic conditions.    Diagnoses and all orders for this visit:    Restless leg syndrome  - Iron and TIBC; Future  - Ferritin; Future  - Recommended over the counter magnesium and monitoring for improvement. Discussed increasing hydration. Will obtain iron studies for further eval. Discussed requip if the above does not help.    Prediabetes  - Hemoglobin A1C; Future  - POCT Glucose, Hand-Held Device    Class 1 obesity due to excess calories with serious comorbidity and body mass index (BMI) of 32.0 to 32.9 in adult  - phentermine (ADIPEX-P) 37.5 mg tablet; Take 1 tablet (37.5 mg total) by mouth before breakfast.    Need for tetanus booster  - (In Office Administered) Td Vaccine    -discussed in depth about her dietary changes. Increase calorie count daily, food portioning, healthier carbs, and continue to exercise.  -POCT glucose was normal. Her symptoms of brain fog and not herself I feel are due to her dietary changes.     Follow up in about 1 month (around 10/2/2020).    Future Appointments   Date Time Provider Department Center   10/19/2020 11:40 AM Stanton Crespo MD [facility]     Stanton Crespo MD  Family Medicine  [facility]        Pancreatic calcifications consistent calcifications of chronic pancreatitis.    Additional findings as detailed above including prior cholecystectomy, mildly prominent lymph nodes more so right lower quadrant of the abdomen not appearing significantly changed compared to prior study 4/30/2016.      Electronically signed by: Hiral Bermudez MD  Date:    09/05/2019  Time:    13:41   Result Narrative   EXAMINATION:  CT ABDOMEN PELVIS W WO CONTRAST    CLINICAL HISTORY:  Epigastric Pain.  Chronic pancreatitis;Other chronic pancreatitis    TECHNIQUE:  Low dose axial images, sagittal and coronal reformations were obtained from the lung bases to the pubic  symphysis before and following the IV administration of 75 mL of Omnipaque 350.  30 mL of oral Omnipaque 350 was also administered.    COMPARISON:  4/30/2015    FINDINGS:  There is mild atelectatic stranding left lung base    Liver, gallbladder, bile ducts; unremarkable appearance of the liver.  No masses.  Cholecystectomy clips.  No biliary duct dilatation    Spleen not enlarged.  Small accessory spleen noted    Adrenal glands unremarkable in appearance    Pancreas coarse calcification head and tail of the pancreas consistent with calcifications of chronic pancreatitis.  No acute findings    Abdominal aorta; no aneurysm    Stomach, bowel, mesentery; normal appearance of the appendix.  Mildly prominent mesenteric lymph nodes for example coronal series 6, image 70 more so right lower quadrant of the abdomen appearing similar to the prior exam.  No free intraperitoneal air or fluid.  No appreciable bowel wall thickening or inflammatory change    Small fat containing umbilical hernia    Reproductive organs; unremarkable in appearance; kidneys unremarkable appearance without hydronephrosis opaque renal stone.  No opaque ureteral stone noted although note is made this is not renal stone protocol CT.  Prompt and symmetrical renal enhancement.  No renal masses.  Urinary bladder mildly distended at time of the exam and unremarkable in appearance    Osseous structures; mild degenerative changes.    Kidneys, ureters, bladder;   Other Result Information   Interface, Rad Results In - 09/05/2019  1:44 PM CDT  EXAMINATION:  CT ABDOMEN PELVIS W WO CONTRAST    CLINICAL HISTORY:  Epigastric Pain.  Chronic pancreatitis;Other chronic pancreatitis  TECHNIQUE:  Low dose axial images, sagittal and coronal reformations were obtained from the lung bases to the pubic symphysis before and following the IV administration of 75 mL of Omnipaque 350.  30 mL of oral Omnipaque 350 was also administered.  COMPARISON:  4/30/2015  FINDINGS:  There is  mild atelectatic stranding left lung base  Liver, gallbladder, bile ducts; unremarkable appearance of the liver.  No masses.  Cholecystectomy clips.  No biliary duct dilatation  Spleen not enlarged.  Small accessory spleen noted  Adrenal glands unremarkable in appearance  Pancreas coarse calcification head and tail of the pancreas consistent with calcifications of chronic pancreatitis.  No acute findings  Abdominal aorta; no aneurysm  Stomach, bowel, mesentery; normal appearance of the appendix.  Mildly prominent mesenteric lymph nodes for example coronal series 6, image 70 more so right lower quadrant of the abdomen appearing similar to the prior exam.  No free intraperitoneal air or fluid.  No appreciable bowel wall thickening or inflammatory change  Small fat containing umbilical hernia  Reproductive organs; unremarkable in appearance; kidneys unremarkable appearance without hydronephrosis opaque renal stone.  No opaque ureteral stone noted although note is made this is not renal stone protocol CT.  Prompt and symmetrical renal enhancement.  No renal masses.  Urinary bladder mildly distended at time of the exam and unremarkable in appearance  Osseous structures; mild degenerative changes.  Kidneys, ureters, bladder;  Impression:  Pancreatic calcifications consistent calcifications of chronic pancreatitis.  Additional findings as detailed above including prior cholecystectomy, mildly prominent lymph nodes more so right lower quadrant of the abdomen not appearing significantly changed compared to prior study 4/30/2016.  Electronically signed by:Hiral Bermudez MD  Date:09/05/2019  Time:13:41   Status       Again, thank you for allowing me to participate in the care of your patient.      Sincerely,    Ayden Perez MD

## 2021-02-15 NOTE — PROGRESS NOTES
"Andriy is a 30 year old who is being evaluated via a billable video visit.      How would you like to obtain your AVS? MyChart  If the video visit is dropped, the invitation should be resent by: Text to cell phone: [phone]  Will anyone else be joining your video visit? No         BMI:   Estimated body mass index is 31.12 kg/m  as calculated from the following:    Height as of this encounter: 1.651 m (5' 5\").    Weight as of this encounter: 84.8 kg (187 lb).   Weight management plan: Discussed healthy diet and exercise guidelines    FUTURE APPOINTMENTS:       - Follow-up visit in 6 weeks      Ayden Perez MD  [facility] [place]    Magalis Ashraf is a 30 year old who presents for the following health issues : recurrent acute and chronic calcific pancreatitis x 8 years, clinical onset age 22.   She is managed by an excellent primary care doctor Cherie in the [facility] in [place].  She has seen multiple gastroenterologists including at her network but sought another opinion with us after communicating with [employer] chronic pancreatitis support group    In some she has generally been healthy although she was diagnosed with primary ovarian failure several years ago.  She has been placed on estrogen and progesterone although that clearly developed after her onset of clinical pancreatitis.  That has been associated with significant weight gain of about 80 pounds.  Also an elevated hemoglobin A1c of 5.9    She has never been a smoker or drinker but developed clinical pancreatitis at age 22.  This is been associated with a number of hospitalizations that she estimates at 6 or 7 over her last decade  CT scans have shown fairly extensive calcification mostly in the uncinate and head of pancreas.  Her pattern of pain is intermittent with acute attacks completely subsiding after brief hospitalization or management with fluids and tramadol.  She has " undergone a substantial work-up including multiple CTs, and endoscopic ultrasound which reported pancreatic calcifications but findings of moderate chronic pancreatitis.  She did undergo a cholecystectomy I believe 2 years ago for suspected biliary component and she felt that this reduced the frequency of her clinical attacks.  She does feel that clinical attacks of pain has accelerated in the recent months so that she now has 1 about every 7 to 8 weeks that she manages mostly at home with fluids, tramadol and it once a week as needed normal saline outpatient infusion ordered by her primary care doctor.    She has never smoked or drank significant amounts of alcohol.  She has no family history of pancreatitis including her parents or siblings.  She has no children due to primary ovarian failure and is confident that the pancreatitis was present before she was started on estrogen and progesterone as currently taken.      She is undergone several ERCPs at [facility] with clearance of what was described as sludge from her main pancreatic duct but findings of no significant main pancreatic duct stones.  This is corroborated by her most recent CT.  We only have the 2019 CT uploaded and it does show a minimally dilated main pancreatic duct which can be followed from the tail to the major papilla and with primarily sidebranch calcifications in the head and uncinate process     She still has rises in her lipase and amylase with pain flares and most recently in January are listed below with a lipase of 281 and her upper limit of normal being 60    Surprisingly, she has never had genetic testing.    She is coming to us now as she feels her symptoms are accelerating and needs another opinion about her chronic pancreatitis.    She is on 12,000 units of lipase pancreatic enzyme replacement once with each meal although she takes it intermittently.      Objective    Vitals - Patient Reported  Pain Score: No Pain (0)          Video-Visit Details    Type of service:  Video Visit    Start: 02/15/2021 10:06 am  Stop: 02/15/2021 11:14 am    Originating Location (pt. Location): Home    Distant Location (provider location):  [facility] [place]     Platform used for Video Visit: Coolio        Amylase.Lipase.CRP top to bottom  Jan 2021   174 (H) 89 120 (H) 41   30 33 74   281 (H) 127 (H) 147 (H) 25   19 22 85 (H)             0.32 0.84 (H)       IMPRESSION:    We spent well over 60 minutes in video consultation and reviewing her outside images and care everywhere records.  She has idiopathic early onset calcific chronic pancreatitis with acute on chronic flares.  Onset of clinical pancreatitis was in her early 20s.  She has no family history or provoking toxic or metabolic etiologies.  Etiology is it is very likely genetically driven and it is even possible that she has a homozygous cystic fibrosis, 1 or 2CFTR mutations, [place] 1 or possibly even PRS S1 mutation.    She did have some relief with a cholecystectomy but this is clearly not biliary pancreatitis and ERCPs with clearance of main pancreatic duct sludge have afforded her some decrease in acute flares although she did develop post ERCP pancreatitis.    We spent the majority of our time talking about etiology, supportive care for chronic pancreatitis and ultimate intervention.  She clearly needs genetic evaluation and will see if she can be evaluated by Lexi olivo genetic counselor and Eliu Booster genetics evaluation, hopefully within the next month as her insurance changes to a lesser coverage per her .    As far as supportive care she needs Creon increased to 72,000 units with each meal as she is on a currently minimal and trivial dose of enzyme replacement.  Commend I believe she is on an outpatient IV infusion protocol but I suggest that that be increased to up to 3 times a week as needed as needed and include 1 to 2 L of lactated  Ringer's rather than normal saline and IV Zofran.  She would benefit greatly from dietitian consult with our pancreas dietitian Ling and include nutritional assessment.    As far as intervention we discussed that it would be worth visiting us here after the rest of the supportive and genetic evaluation are done for a ERCP to clear her main duct of any further sludge and/or intraductal stones but also be fully evaluated for total pancreatectomy with islet autotransplant.  We did discuss that with her early onset accelerating pace and lack of likely meaningful long-term endoscopic therapy such as removal of obstructing main pancreatic duct stones.  She is heading in the direction of TP IAT at some point.  We did discuss that that would involve significant weight loss as her BMI is 31 and she has had significant recent weight gain.    The patient is aware of TPI AT as an option since she is on the Facebook support group for chronic pancreatitis and is accepting of the idea that eventually this may be required if her symptoms continue to accelerate.  Fortunately she is on no long-term narcotics and has no pain in between her increasingly frequent clinical attacks.  She does have terrific family support with a very supportive .  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    For the time being recommend below and when convenient in the next months have the patient visit us in person for ERCP and TPI AT evaluation.  I will put her in touch with Renuka and discuss her at our multidisciplinary weekly conference    Plan  1) Increase Creon to 72 K w ea meal  2) Stool elastase  3) Dietitian + nutritional assessment (vit D, fat soluble, etc)   4) Increase OP infusion to three times a week, use LR instead of NS, and Zofran  5) Genetic counseling - try for Lexi Sam   6)  Follow-up w Dr Perez after above 6 weeks  7) eventually, ERCP , Full TPIAT eval at [facility]  8) Hgb A1c      PREVIOUS NOTES FROM [facility]:    Ms. Yoon presents today with acute onset of epigastric abdominal pain and steatorrhea. Symptoms began this morning. Pain is described as a severe sharp stabbing nonradiating pain. Nausea present but no vomiting. No constipation. No melena, hematochezia, hematemesis, fevers, chills, jaundice, biliuria, acholia, etc. She has a history of chronic biliary induced pancreatitis. She has had a cholecystectomy. Her last episode of symptomatic pancreatitis was approximately 8 months ago.    Allergies & Meds:  Review of patient's allergies indicates:   Allergen Reactions     Ceftin [cefuroxime axetil]     Iodine and iodide containing products     Pcn [penicillins]     Current Outpatient Medications   Medication Sig Dispense Refill     estradiol (ESTRACE) 1 MG tablet Take 1 mg by mouth once daily.     pantoprazole (PROTONIX) 40 MG tablet Take 1 tablet (40 mg total) by mouth once daily. 90 tablet 3     PROGESTERONE IU     diazePAM (VALIUM) 5 MG tablet Take 1 tablet (5 mg total) by mouth every 12 (twelve) hours as needed (abdominal spasms). 50 tablet 0     traMADol (ULTRAM) 50 mg tablet Take 2 tablets (100 mg total) by mouth every 6 (six) hours as needed for Pain. 50 tablet 0     No current facility-administered medications for this visit.     PMFSHx:  Past Medical History:   Diagnosis Date     Abdominal pain, epigastric     GERD (gastroesophageal reflux disease)     Nausea alone     Pancreatitis     Pancreatitis, acute     Past Surgical History:   Procedure Laterality Date     CHOLECYSTECTOMY     ESOPHAGOGASTRODUODENOSCOPY     EYE SURGERY     ULTRASOUND-ENDOSCOPIC-UPPER N/A 5/29/2015   Performed by Juvencio Robertson MD at [facility] (2ND FLR)     Family History   Problem Relation Age of Onset     Diabetes Maternal Grandmother     Social History     Tobacco Use     Smoking status:  Never Smoker     Smokeless tobacco: Never Used   Substance Use Topics     Alcohol use: No   Frequency: Never     Drug use: No    Stanton Crespo MD - 09/02/2020 9:00 AM CDT  Formatting of this note is different from the original.  Images from the original note were not included.  [facility] - Clinic Note    Subjective     Ms. Yoon is a 30 y.o. female who presents to clinic for a follow up of chronic conditions.     History of prediabetes. Was previously on metformin but taken of last year as her A1C improved.   Last A1C was 2 months ago and was 5.9.  Does admit to increase in weight gain.   States that she is trying to work out and eating well.   Has been eating less or around 1,000 calories a day and low carb.  Admits to feeling like a brain fog and not herself. Unsure if her sugar is low.  Would like to try adipex again. Was on this last year and did well. No adverse effects with medication use.    Complains of leg pain. Bilaterally  Worse at night  Feels restless   Unable to relax   Keeps her up at night  Labs obtained at last visit were normal.    PMH Andriy has a past medical history of Abdominal pain, epigastric, GERD (gastroesophageal reflux disease), Nausea alone, Pancreatitis, Pancreatitis, acute, and Premature ovarian insufficiency (08/2018).   PSXH Andriy has a past surgical history that includes Esophagogastroduodenoscopy; Cholecystectomy; and Eye surgery.    Andriy's family history includes Diabetes in her maternal grandmother.    Andriy reports that she has never smoked. She has never used smokeless tobacco. She reports that she does not drink alcohol or use drugs.   ALG Andriy is allergic to ceftin [cefuroxime axetil] and pcn [penicillins].   MED Andriy has a current medication list which includes the following prescription(s): azelastine, budesonide, estradiol, lipase-protease-amylase 12,000-38,000-60,000 units, ondansetron, progesterone, tramadol, acyclovir, diazepam, and ropinirole, and  "the following Facility-Administered Medications: influenza.     Review of Systems   Constitutional: Negative for activity change, appetite change, chills, fatigue and fever.   Eyes: Negative for visual disturbance.   Respiratory: Negative for cough and shortness of breath.   Cardiovascular: Negative for chest pain, palpitations and leg swelling.   Gastrointestinal: Negative for abdominal pain, nausea and vomiting.   Musculoskeletal:   Leg pain bilaterally   Skin: Negative for wound.   Neurological: Negative for dizziness, weakness, numbness and headaches.   Psychiatric/Behavioral: Negative for confusion.     Objective     /87  Pulse 87  Temp 97.3  F (36.3  C) (Temporal)  Resp 15  Ht 5' 4\" (1.626 m)  Wt 85.4 kg (188 lb 6 oz)  SpO2 98%  BMI 32.33 kg/m      Physical Exam   Constitutional: She appears well-developed, well-nourished and obese. Non-toxic appearance. She does not appear ill. No distress.   HENT:   Head: Normocephalic and atraumatic.   Eyes: Right eye exhibits no discharge. Left eye exhibits no discharge.   Neck: Neck supple.   Cardiovascular: Normal rate, regular rhythm, normal heart sounds and normal pulses. Exam reveals no gallop and no friction rub.   No murmur heard.  Pulmonary/Chest: Effort normal and breath sounds normal. No respiratory distress. She has no wheezes. She has no rhonchi. She has no rales.   Abdominal: Normal appearance.   Musculoskeletal:   Right lower leg: No edema.   Left lower leg: No edema.   Lymphadenopathy:   She has no cervical adenopathy.   Neurological: She is alert.   Skin: Skin is warm and dry. Capillary refill takes less than 2 seconds. She is not diaphoretic.   Psychiatric: Her behavior is normal. Mood and thought content normal.   Vitals reviewed.    Assessment/Plan     Andriy was seen today for follow-up of chronic conditions.    Diagnoses and all orders for this visit:    Restless leg syndrome  - Iron and TIBC; Future  - Ferritin; Future  - Recommended over " the counter magnesium and monitoring for improvement. Discussed increasing hydration. Will obtain iron studies for further eval. Discussed requip if the above does not help.    Prediabetes  - Hemoglobin A1C; Future  - POCT Glucose, Hand-Held Device    Class 1 obesity due to excess calories with serious comorbidity and body mass index (BMI) of 32.0 to 32.9 in adult  - phentermine (ADIPEX-P) 37.5 mg tablet; Take 1 tablet (37.5 mg total) by mouth before breakfast.    Need for tetanus booster  - (In Office Administered) Td Vaccine    -discussed in depth about her dietary changes. Increase calorie count daily, food portioning, healthier carbs, and continue to exercise.  -POCT glucose was normal. Her symptoms of brain fog and not herself I feel are due to her dietary changes.     Follow up in about 1 month (around 10/2/2020).    Future Appointments   Date Time Provider Department Center   10/19/2020 11:40 AM Stanton Crespo MD [facility]     Stanton Crespo MD  Family Medicine  [facility]        Pancreatic calcifications consistent calcifications of chronic pancreatitis.    Additional findings as detailed above including prior cholecystectomy, mildly prominent lymph nodes more so right lower quadrant of the abdomen not appearing significantly changed compared to prior study 4/30/2016.      Electronically signed by: Hiral Bermudez MD  Date:    09/05/2019  Time:    13:41   Result Narrative   EXAMINATION:  CT ABDOMEN PELVIS W WO CONTRAST    CLINICAL HISTORY:  Epigastric Pain.  Chronic pancreatitis;Other chronic pancreatitis    TECHNIQUE:  Low dose axial images, sagittal and coronal reformations were obtained from the lung bases to the pubic symphysis before and following the IV administration of 75 mL of Omnipaque 350.  30 mL of oral Omnipaque 350 was also administered.    COMPARISON:  4/30/2015    FINDINGS:  There is mild atelectatic stranding left lung base    Liver, gallbladder,  bile ducts; unremarkable appearance of the liver.  No masses.  Cholecystectomy clips.  No biliary duct dilatation    Spleen not enlarged.  Small accessory spleen noted    Adrenal glands unremarkable in appearance    Pancreas coarse calcification head and tail of the pancreas consistent with calcifications of chronic pancreatitis.  No acute findings    Abdominal aorta; no aneurysm    Stomach, bowel, mesentery; normal appearance of the appendix.  Mildly prominent mesenteric lymph nodes for example coronal series 6, image 70 more so right lower quadrant of the abdomen appearing similar to the prior exam.  No free intraperitoneal air or fluid.  No appreciable bowel wall thickening or inflammatory change    Small fat containing umbilical hernia    Reproductive organs; unremarkable in appearance; kidneys unremarkable appearance without hydronephrosis opaque renal stone.  No opaque ureteral stone noted although note is made this is not renal stone protocol CT.  Prompt and symmetrical renal enhancement.  No renal masses.  Urinary bladder mildly distended at time of the exam and unremarkable in appearance    Osseous structures; mild degenerative changes.    Kidneys, ureters, bladder;   Other Result Information   Interface, Rad Results In - 09/05/2019  1:44 PM CDT  EXAMINATION:  CT ABDOMEN PELVIS W WO CONTRAST    CLINICAL HISTORY:  Epigastric Pain.  Chronic pancreatitis;Other chronic pancreatitis  TECHNIQUE:  Low dose axial images, sagittal and coronal reformations were obtained from the lung bases to the pubic symphysis before and following the IV administration of 75 mL of Omnipaque 350.  30 mL of oral Omnipaque 350 was also administered.  COMPARISON:  4/30/2015  FINDINGS:  There is mild atelectatic stranding left lung base  Liver, gallbladder, bile ducts; unremarkable appearance of the liver.  No masses.  Cholecystectomy clips.  No biliary duct dilatation  Spleen not enlarged.  Small accessory spleen noted  Adrenal glands  unremarkable in appearance  Pancreas coarse calcification head and tail of the pancreas consistent with calcifications of chronic pancreatitis.  No acute findings  Abdominal aorta; no aneurysm  Stomach, bowel, mesentery; normal appearance of the appendix.  Mildly prominent mesenteric lymph nodes for example coronal series 6, image 70 more so right lower quadrant of the abdomen appearing similar to the prior exam.  No free intraperitoneal air or fluid.  No appreciable bowel wall thickening or inflammatory change  Small fat containing umbilical hernia  Reproductive organs; unremarkable in appearance; kidneys unremarkable appearance without hydronephrosis opaque renal stone.  No opaque ureteral stone noted although note is made this is not renal stone protocol CT.  Prompt and symmetrical renal enhancement.  No renal masses.  Urinary bladder mildly distended at time of the exam and unremarkable in appearance  Osseous structures; mild degenerative changes.  Kidneys, ureters, bladder;  Impression:  Pancreatic calcifications consistent calcifications of chronic pancreatitis.  Additional findings as detailed above including prior cholecystectomy, mildly prominent lymph nodes more so right lower quadrant of the abdomen not appearing significantly changed compared to prior study 4/30/2016.  Electronically signed by:Hiral Bermudez MD  Date:09/05/2019  Time:13:41   Status

## 2021-02-15 NOTE — NURSING NOTE
"Chief Complaint   Patient presents with     Consult     New patient consult for chronic calcific pancreatitis.        Vitals:    02/15/21 0956   Weight: 84.8 kg (187 lb)   Height: 1.651 m (5' 5\")       Body mass index is 31.12 kg/m .    Analia Lance LPN      "

## 2021-02-16 ENCOUNTER — TELEPHONE (OUTPATIENT)
Dept: GASTROENTEROLOGY | Facility: CLINIC | Age: 31
End: 2021-02-16

## 2021-02-16 DIAGNOSIS — K86.1 IDIOPATHIC CHRONIC PANCREATITIS (H): Primary | ICD-10-CM

## 2021-02-16 RX ORDER — HEPARIN SODIUM,PORCINE 10 UNIT/ML
5 VIAL (ML) INTRAVENOUS
Status: CANCELLED | OUTPATIENT
Start: 2021-02-16

## 2021-02-16 RX ORDER — HEPARIN SODIUM (PORCINE) LOCK FLUSH IV SOLN 100 UNIT/ML 100 UNIT/ML
5 SOLUTION INTRAVENOUS
Status: CANCELLED | OUTPATIENT
Start: 2021-02-16

## 2021-02-16 NOTE — PATIENT INSTRUCTIONS
Follow up:    Dr. Perez has outlined the following steps after your recent clinic visit:    1) Increase Creon to 72 K with each meal  2) Your provider is recommending a fecal elastase test. This requires a stool sample. You can  a kit any [facility] lab location, take the kit home and return a sample to the lab.   3) Schedule for a consult with our Dietitian, Ling SLOAN  4) Have lab work for nutritional assessment   4) Increase OP infusion to three times a week, use Lactated Ringers instead of Normal Saline, and Zofran  5) Genetic counseling - try for Lexi Sam   6) Follow-up with Dr Perez after above 6 weeks  7) eventually, ERCP, Full TPIAT eval at [facility]  8) Also check Hgb A1c level      Please call with any questions or concerns regarding your clinic visit today.    It is a pleasure being involved in your health care.    Contacts post-consultation depending on your need:    Schedule Clinic Appointments            [phone] # 1   M-F 7:30 - 5 pm    Clarice Sun RN Care Coordinator  [phone]    Jewel Moore LPN    [phone]     OR Procedure Scheduling                             [phone]    My Chart is available 24 hours a day and is a secure way to access your records and communicate with your care team.  I strongly recommend signing up if you haven't already done so, if you are comfortable with computers.  If you would like to inquire about this or are having problems with My Chart access, you may call [phone] or go online at [email or web address].  Please allow at least 24 hours for a response and extra time on weekends and Holidays.

## 2021-02-17 ENCOUNTER — TELEPHONE (OUTPATIENT)
Dept: GASTROENTEROLOGY | Facility: CLINIC | Age: 31
End: 2021-02-17

## 2021-02-17 NOTE — TELEPHONE ENCOUNTER
Called Pt to confirm that Dr. Crespo is her PCP. Pt confirmed that is her PCP. Chart notes from OV with Dr. Perez, orders, and infusion protocol faxed to PCP at [phone].    Jewel Moore LPN

## 2021-02-18 ENCOUNTER — OFFICE VISIT (OUTPATIENT)
Dept: FAMILY MEDICINE | Facility: CLINIC | Age: 31
End: 2021-02-18
Payer: COMMERCIAL

## 2021-02-18 VITALS
WEIGHT: 190.38 LBS | HEIGHT: 64 IN | RESPIRATION RATE: 17 BRPM | TEMPERATURE: 98 F | SYSTOLIC BLOOD PRESSURE: 129 MMHG | HEART RATE: 85 BPM | DIASTOLIC BLOOD PRESSURE: 89 MMHG | BODY MASS INDEX: 32.5 KG/M2 | OXYGEN SATURATION: 98 %

## 2021-02-18 DIAGNOSIS — K86.1 IDIOPATHIC CHRONIC PANCREATITIS: Primary | ICD-10-CM

## 2021-02-18 PROCEDURE — 3008F PR BODY MASS INDEX (BMI) DOCUMENTED: ICD-10-PCS | Mod: S$GLB,,, | Performed by: FAMILY MEDICINE

## 2021-02-18 PROCEDURE — 99214 PR OFFICE/OUTPT VISIT, EST, LEVL IV, 30-39 MIN: ICD-10-PCS | Mod: S$GLB,,, | Performed by: FAMILY MEDICINE

## 2021-02-18 PROCEDURE — 3008F BODY MASS INDEX DOCD: CPT | Mod: S$GLB,,, | Performed by: FAMILY MEDICINE

## 2021-02-18 PROCEDURE — 1126F PR PAIN SEVERITY QUANTIFIED, NO PAIN PRESENT: ICD-10-PCS | Mod: S$GLB,,, | Performed by: FAMILY MEDICINE

## 2021-02-18 PROCEDURE — 1126F AMNT PAIN NOTED NONE PRSNT: CPT | Mod: S$GLB,,, | Performed by: FAMILY MEDICINE

## 2021-02-18 PROCEDURE — 99214 OFFICE O/P EST MOD 30 MIN: CPT | Mod: S$GLB,,, | Performed by: FAMILY MEDICINE

## 2021-02-19 ENCOUNTER — PREP FOR PROCEDURE (OUTPATIENT)
Dept: GASTROENTEROLOGY | Facility: CLINIC | Age: 31
End: 2021-02-19

## 2021-02-19 DIAGNOSIS — K86.1 CHRONIC PANCREATITIS (H): Primary | ICD-10-CM

## 2021-02-21 ENCOUNTER — PATIENT MESSAGE (OUTPATIENT)
Dept: OBSTETRICS AND GYNECOLOGY | Facility: CLINIC | Age: 31
End: 2021-02-21

## 2021-02-24 DIAGNOSIS — K86.1 CHRONIC PANCREATITIS (H): Primary | ICD-10-CM

## 2021-02-24 DIAGNOSIS — Z01.818 PRE-OP TESTING: ICD-10-CM

## 2021-02-25 PROBLEM — K86.1 CHRONIC PANCREATITIS (H): Status: ACTIVE | Noted: 2021-02-25

## 2021-03-02 NOTE — TELEPHONE ENCOUNTER
FUTURE VISIT INFORMATION      SURGERY INFORMATION:    Date: 5.28.21    Location: [facility] OR    Surgeon:  Chris    Anesthesia Type:  General    Procedure: ENDOSCOPIC RETROGRADE CHOLANGIOPANCREATOGRAPHY    Consult: 2.15.21    RECORDS REQUESTED FROM:       Primary Care Provider: Cherie

## 2021-03-04 DIAGNOSIS — Z11.59 NEED FOR HEPATITIS C SCREENING TEST: ICD-10-CM

## 2021-03-05 ENCOUNTER — PATIENT MESSAGE (OUTPATIENT)
Dept: FAMILY MEDICINE | Facility: CLINIC | Age: 31
End: 2021-03-05

## 2021-03-05 RX ORDER — HEPARIN 100 UNIT/ML
500 SYRINGE INTRAVENOUS
Status: CANCELLED | OUTPATIENT
Start: 2021-03-05

## 2021-03-05 RX ORDER — SODIUM CHLORIDE 0.9 % (FLUSH) 0.9 %
10 SYRINGE (ML) INJECTION
Status: CANCELLED | OUTPATIENT
Start: 2021-03-05

## 2021-03-10 ENCOUNTER — INFUSION (OUTPATIENT)
Dept: INFUSION THERAPY | Facility: HOSPITAL | Age: 31
End: 2021-03-10
Attending: FAMILY MEDICINE
Payer: COMMERCIAL

## 2021-03-10 VITALS
HEART RATE: 80 BPM | OXYGEN SATURATION: 99 % | SYSTOLIC BLOOD PRESSURE: 119 MMHG | RESPIRATION RATE: 18 BRPM | DIASTOLIC BLOOD PRESSURE: 75 MMHG

## 2021-03-10 DIAGNOSIS — K86.1 IDIOPATHIC CHRONIC PANCREATITIS: Primary | ICD-10-CM

## 2021-03-10 DIAGNOSIS — K86.1 CHRONIC BILIARY PANCREATITIS: ICD-10-CM

## 2021-03-10 DIAGNOSIS — K86.1 CHRONIC PANCREATITIS (H): Primary | ICD-10-CM

## 2021-03-10 PROCEDURE — 25000003 PHARM REV CODE 250: Performed by: FAMILY MEDICINE

## 2021-03-10 PROCEDURE — 96365 THER/PROPH/DIAG IV INF INIT: CPT

## 2021-03-10 RX ORDER — HEPARIN 100 UNIT/ML
500 SYRINGE INTRAVENOUS
Status: CANCELLED | OUTPATIENT
Start: 2021-03-10

## 2021-03-10 RX ORDER — SODIUM CHLORIDE 0.9 % (FLUSH) 0.9 %
10 SYRINGE (ML) INJECTION
Status: CANCELLED | OUTPATIENT
Start: 2021-03-10

## 2021-03-10 RX ADMIN — SODIUM CHLORIDE 1000 ML: 0.9 INJECTION, SOLUTION INTRAVENOUS at 12:03

## 2021-03-24 ENCOUNTER — PATIENT MESSAGE (OUTPATIENT)
Dept: OBSTETRICS AND GYNECOLOGY | Facility: CLINIC | Age: 31
End: 2021-03-24

## 2021-03-30 DIAGNOSIS — Z11.59 ENCOUNTER FOR SCREENING FOR OTHER VIRAL DISEASES: ICD-10-CM

## 2021-03-31 ENCOUNTER — PATIENT MESSAGE (OUTPATIENT)
Dept: FAMILY MEDICINE | Facility: CLINIC | Age: 31
End: 2021-03-31

## 2021-04-07 ENCOUNTER — OFFICE VISIT (OUTPATIENT)
Dept: FAMILY MEDICINE | Facility: CLINIC | Age: 31
End: 2021-04-07
Payer: COMMERCIAL

## 2021-04-07 ENCOUNTER — HOSPITAL ENCOUNTER (OUTPATIENT)
Dept: CARDIOLOGY | Facility: HOSPITAL | Age: 31
Discharge: HOME | End: 2021-04-07
Attending: FAMILY MEDICINE
Payer: COMMERCIAL

## 2021-04-07 VITALS
SYSTOLIC BLOOD PRESSURE: 113 MMHG | WEIGHT: 189.63 LBS | TEMPERATURE: 98 F | RESPIRATION RATE: 17 BRPM | HEIGHT: 64 IN | BODY MASS INDEX: 32.37 KG/M2 | DIASTOLIC BLOOD PRESSURE: 79 MMHG | HEART RATE: 83 BPM

## 2021-04-07 DIAGNOSIS — Z01.818 PREOP EXAMINATION: ICD-10-CM

## 2021-04-07 DIAGNOSIS — Z01.818 PREOP EXAMINATION: Primary | ICD-10-CM

## 2021-04-07 PROCEDURE — 1126F AMNT PAIN NOTED NONE PRSNT: CPT | Mod: S$GLB,,, | Performed by: FAMILY MEDICINE

## 2021-04-07 PROCEDURE — 3008F BODY MASS INDEX DOCD: CPT | Mod: S$GLB,,, | Performed by: FAMILY MEDICINE

## 2021-04-07 PROCEDURE — 1126F PR PAIN SEVERITY QUANTIFIED, NO PAIN PRESENT: ICD-10-PCS | Mod: S$GLB,,, | Performed by: FAMILY MEDICINE

## 2021-04-07 PROCEDURE — 3008F PR BODY MASS INDEX (BMI) DOCUMENTED: ICD-10-PCS | Mod: S$GLB,,, | Performed by: FAMILY MEDICINE

## 2021-04-07 PROCEDURE — 99214 PR OFFICE/OUTPT VISIT, EST, LEVL IV, 30-39 MIN: ICD-10-PCS | Mod: S$GLB,,, | Performed by: FAMILY MEDICINE

## 2021-04-07 PROCEDURE — 93010 ELECTROCARDIOGRAM REPORT: CPT | Mod: ,,, | Performed by: INTERNAL MEDICINE

## 2021-04-07 PROCEDURE — 99214 OFFICE O/P EST MOD 30 MIN: CPT | Mod: S$GLB,,, | Performed by: FAMILY MEDICINE

## 2021-04-07 PROCEDURE — 93010 EKG 12-LEAD: ICD-10-PCS | Mod: ,,, | Performed by: INTERNAL MEDICINE

## 2021-04-07 PROCEDURE — 93005 ELECTROCARDIOGRAM TRACING: CPT

## 2021-04-12 ENCOUNTER — ALLIED HEALTH/NURSE VISIT (OUTPATIENT)
Dept: TRANSPLANT | Facility: CLINIC | Age: 31
End: 2021-04-12
Attending: PEDIATRICS
Payer: COMMERCIAL

## 2021-04-12 ENCOUNTER — ANESTHESIA EVENT (OUTPATIENT)
Dept: SURGERY | Facility: CLINIC | Age: 31
End: 2021-04-12
Payer: COMMERCIAL

## 2021-04-12 DIAGNOSIS — K86.1 CHRONIC PANCREATITIS (H): Primary | ICD-10-CM

## 2021-04-12 DIAGNOSIS — K86.1 CHRONIC PANCREATITIS (H): ICD-10-CM

## 2021-04-12 DIAGNOSIS — Z11.59 ENCOUNTER FOR SCREENING FOR OTHER VIRAL DISEASES: ICD-10-CM

## 2021-04-12 DIAGNOSIS — K86.1 IDIOPATHIC CHRONIC PANCREATITIS (H): ICD-10-CM

## 2021-04-12 LAB
ALBUMIN SERPL-MCNC: 3.6 G/DL (ref 3.4–5)
ALP SERPL-CCNC: 73 U/L (ref 40–150)
ALT SERPL W P-5'-P-CCNC: 36 U/L (ref 0–50)
AMYLASE SERPL-CCNC: 32 U/L (ref 30–110)
ANION GAP SERPL CALCULATED.3IONS-SCNC: 4 MMOL/L (ref 3–14)
AST SERPL W P-5'-P-CCNC: 20 U/L (ref 0–45)
BASOPHILS # BLD AUTO: 0 10E9/L (ref 0–0.2)
BASOPHILS NFR BLD AUTO: 0.5 %
BILIRUB SERPL-MCNC: 0.4 MG/DL (ref 0.2–1.3)
BUN SERPL-MCNC: 9 MG/DL (ref 7–30)
C PEPTIDE SERPL-MCNC: 13 NG/ML (ref 0.9–6.9)
C PEPTIDE SERPL-MCNC: 4.3 NG/ML (ref 0.9–6.9)
CALCIUM SERPL-MCNC: 8.8 MG/DL (ref 8.5–10.1)
CEA SERPL-MCNC: <0.5 UG/L (ref 0–2.5)
CHLORIDE SERPL-SCNC: 107 MMOL/L (ref 94–109)
CHOLEST SERPL-MCNC: 218 MG/DL
CO2 SERPL-SCNC: 27 MMOL/L (ref 20–32)
CREAT SERPL-MCNC: 0.73 MG/DL (ref 0.52–1.04)
DIFFERENTIAL METHOD BLD: NORMAL
EOSINOPHIL # BLD AUTO: 0.4 10E9/L (ref 0–0.7)
EOSINOPHIL NFR BLD AUTO: 5.5 %
ERYTHROCYTE [DISTWIDTH] IN BLOOD BY AUTOMATED COUNT: 12 % (ref 10–15)
FERRITIN SERPL-MCNC: 51 NG/ML (ref 12–150)
GFR SERPL CREATININE-BSD FRML MDRD: >90 ML/MIN/{1.73_M2}
GLUCOSE SERPL-MCNC: 131 MG/DL (ref 70–99)
GLUCOSE SERPL-MCNC: 149 MG/DL (ref 70–99)
GLUCOSE SERPL-MCNC: 196 MG/DL (ref 70–99)
HBA1C MFR BLD: 6.5 % (ref 0–5.6)
HCT VFR BLD AUTO: 40.4 % (ref 35–47)
HCV AB SERPL QL IA: NONREACTIVE
HDLC SERPL-MCNC: 60 MG/DL
HGB BLD-MCNC: 13.4 G/DL (ref 11.7–15.7)
IMM GRANULOCYTES # BLD: 0 10E9/L (ref 0–0.4)
IMM GRANULOCYTES NFR BLD: 0.3 %
IRON SATN MFR SERPL: 19 % (ref 15–46)
IRON SERPL-MCNC: 76 UG/DL (ref 35–180)
LABORATORY COMMENT REPORT: NORMAL
LDLC SERPL CALC-MCNC: 131 MG/DL
LIPASE SERPL-CCNC: 63 U/L (ref 73–393)
LYMPHOCYTES # BLD AUTO: 2.7 10E9/L (ref 0.8–5.3)
LYMPHOCYTES NFR BLD AUTO: 41.6 %
MCH RBC QN AUTO: 28.5 PG (ref 26.5–33)
MCHC RBC AUTO-ENTMCNC: 33.2 G/DL (ref 31.5–36.5)
MCV RBC AUTO: 86 FL (ref 78–100)
MONOCYTES # BLD AUTO: 0.4 10E9/L (ref 0–1.3)
MONOCYTES NFR BLD AUTO: 5.5 %
NEUTROPHILS # BLD AUTO: 3.1 10E9/L (ref 1.6–8.3)
NEUTROPHILS NFR BLD AUTO: 46.6 %
NONHDLC SERPL-MCNC: 158 MG/DL
NRBC # BLD AUTO: 0 10*3/UL
NRBC BLD AUTO-RTO: 0 /100
PLATELET # BLD AUTO: 301 10E9/L (ref 150–450)
POTASSIUM SERPL-SCNC: 3.8 MMOL/L (ref 3.4–5.3)
PROT SERPL-MCNC: 7.4 G/DL (ref 6.8–8.8)
RBC # BLD AUTO: 4.7 10E12/L (ref 3.8–5.2)
SARS-COV-2 RNA RESP QL NAA+PROBE: NEGATIVE
SARS-COV-2 RNA RESP QL NAA+PROBE: NORMAL
SODIUM SERPL-SCNC: 138 MMOL/L (ref 133–144)
SPECIMEN SOURCE: NORMAL
SPECIMEN SOURCE: NORMAL
TIBC SERPL-MCNC: 411 UG/DL (ref 240–430)
TRIGL SERPL-MCNC: 132 MG/DL
WBC # BLD AUTO: 6.6 10E9/L (ref 4–11)

## 2021-04-12 PROCEDURE — 82542 COL CHROMOTOGRAPHY QUAL/QUAN: CPT | Performed by: PATHOLOGY

## 2021-04-12 PROCEDURE — 84446 ASSAY OF VITAMIN E: CPT | Performed by: PATHOLOGY

## 2021-04-12 PROCEDURE — 36415 COLL VENOUS BLD VENIPUNCTURE: CPT | Performed by: PATHOLOGY

## 2021-04-12 PROCEDURE — 83540 ASSAY OF IRON: CPT | Performed by: PATHOLOGY

## 2021-04-12 PROCEDURE — 86337 INSULIN ANTIBODIES: CPT | Performed by: PATHOLOGY

## 2021-04-12 PROCEDURE — U0003 INFECTIOUS AGENT DETECTION BY NUCLEIC ACID (DNA OR RNA); SEVERE ACUTE RESPIRATORY SYNDROME CORONAVIRUS 2 (SARS-COV-2) (CORONAVIRUS DISEASE [COVID-19]), AMPLIFIED PROBE TECHNIQUE, MAKING USE OF HIGH THROUGHPUT TECHNOLOGIES AS DESCRIBED BY CMS-2020-01-R: HCPCS | Mod: 90 | Performed by: PATHOLOGY

## 2021-04-12 PROCEDURE — 85025 COMPLETE CBC W/AUTO DIFF WBC: CPT | Performed by: PATHOLOGY

## 2021-04-12 PROCEDURE — 82950 GLUCOSE TEST: CPT | Performed by: PATHOLOGY

## 2021-04-12 PROCEDURE — U0005 INFEC AGEN DETEC AMPLI PROBE: HCPCS | Performed by: PATHOLOGY

## 2021-04-12 PROCEDURE — 84681 ASSAY OF C-PEPTIDE: CPT | Mod: 91 | Performed by: PATHOLOGY

## 2021-04-12 PROCEDURE — 86301 IMMUNOASSAY TUMOR CA 19-9: CPT | Performed by: PATHOLOGY

## 2021-04-12 PROCEDURE — 83036 HEMOGLOBIN GLYCOSYLATED A1C: CPT | Performed by: PATHOLOGY

## 2021-04-12 PROCEDURE — 82150 ASSAY OF AMYLASE: CPT | Performed by: PATHOLOGY

## 2021-04-12 PROCEDURE — 83550 IRON BINDING TEST: CPT | Performed by: PATHOLOGY

## 2021-04-12 PROCEDURE — 80053 COMPREHEN METABOLIC PANEL: CPT | Performed by: PATHOLOGY

## 2021-04-12 PROCEDURE — 82306 VITAMIN D 25 HYDROXY: CPT | Performed by: PATHOLOGY

## 2021-04-12 PROCEDURE — 82378 CARCINOEMBRYONIC ANTIGEN: CPT | Performed by: PATHOLOGY

## 2021-04-12 PROCEDURE — 83690 ASSAY OF LIPASE: CPT | Performed by: PATHOLOGY

## 2021-04-12 PROCEDURE — 86803 HEPATITIS C AB TEST: CPT | Performed by: PATHOLOGY

## 2021-04-12 PROCEDURE — 80061 LIPID PANEL: CPT | Performed by: PATHOLOGY

## 2021-04-12 PROCEDURE — 84590 ASSAY OF VITAMIN A: CPT | Performed by: PATHOLOGY

## 2021-04-12 PROCEDURE — 82728 ASSAY OF FERRITIN: CPT | Performed by: PATHOLOGY

## 2021-04-12 PROCEDURE — 82947 ASSAY GLUCOSE BLOOD QUANT: CPT | Mod: 91 | Performed by: PATHOLOGY

## 2021-04-12 PROCEDURE — 86341 ISLET CELL ANTIBODY: CPT | Performed by: PATHOLOGY

## 2021-04-12 PROCEDURE — 82656 EL-1 FECAL QUAL/SEMIQ: CPT | Performed by: PATHOLOGY

## 2021-04-12 NOTE — PROGRESS NOTES
Andriy CASTILLO Karrie arrived fasting for labs, vitals obtained, instructions provided for mixed meal test.  Patient stated understanding of the plan.  Fasting labs drawn, patient consumed 360 ml of BOOST per providers instruction.  Patient instructed to return to the lab at 1hr (11:06) and 2hr (12:06) post prandial and remain fasting until completion of the test with exception of enzymes needed or pain medication.

## 2021-04-13 ENCOUNTER — HOSPITAL ENCOUNTER (OUTPATIENT)
Facility: CLINIC | Age: 31
Setting detail: OBSERVATION
Discharge: HOME | End: 2021-04-13
Attending: INTERNAL MEDICINE | Admitting: INTERNAL MEDICINE
Payer: COMMERCIAL

## 2021-04-13 ENCOUNTER — PATIENT OUTREACH (OUTPATIENT)
Dept: CARE COORDINATION | Facility: CLINIC | Age: 31
End: 2021-04-13

## 2021-04-13 ENCOUNTER — APPOINTMENT (OUTPATIENT)
Dept: GENERAL RADIOLOGY | Facility: CLINIC | Age: 31
End: 2021-04-13
Attending: INTERNAL MEDICINE
Payer: COMMERCIAL

## 2021-04-13 ENCOUNTER — ANESTHESIA (OUTPATIENT)
Dept: SURGERY | Facility: CLINIC | Age: 31
End: 2021-04-13
Payer: COMMERCIAL

## 2021-04-13 ENCOUNTER — TELEPHONE (OUTPATIENT)
Dept: FAMILY MEDICINE | Facility: CLINIC | Age: 31
End: 2021-04-13

## 2021-04-13 VITALS
RESPIRATION RATE: 16 BRPM | DIASTOLIC BLOOD PRESSURE: 86 MMHG | OXYGEN SATURATION: 96 % | WEIGHT: 189.6 LBS | HEIGHT: 65 IN | BODY MASS INDEX: 31.59 KG/M2 | SYSTOLIC BLOOD PRESSURE: 124 MMHG | TEMPERATURE: 98.6 F | HEART RATE: 68 BPM

## 2021-04-13 DIAGNOSIS — K86.1 CHRONIC PANCREATITIS (H): ICD-10-CM

## 2021-04-13 LAB
ALBUMIN SERPL-MCNC: 3.7 G/DL (ref 3.4–5)
ALP SERPL-CCNC: 73 U/L (ref 40–150)
ALT SERPL W P-5'-P-CCNC: 33 U/L (ref 0–50)
AMYLASE SERPL-CCNC: 33 U/L (ref 30–110)
AMYLASE SERPL-CCNC: 440 U/L (ref 30–110)
ANION GAP SERPL CALCULATED.3IONS-SCNC: 6 MMOL/L (ref 3–14)
AST SERPL W P-5'-P-CCNC: 22 U/L (ref 0–45)
BILIRUB SERPL-MCNC: 0.3 MG/DL (ref 0.2–1.3)
BUN SERPL-MCNC: 11 MG/DL (ref 7–30)
C PEPTIDE SERPL-MCNC: 6.9 NG/ML (ref 0.9–6.9)
CALCIUM SERPL-MCNC: 9.4 MG/DL (ref 8.5–10.1)
CANCER AG19-9 SERPL-ACNC: 9 U/ML (ref 0–37)
CHLORIDE SERPL-SCNC: 108 MMOL/L (ref 94–109)
CO2 SERPL-SCNC: 25 MMOL/L (ref 20–32)
CREAT SERPL-MCNC: 0.67 MG/DL (ref 0.52–1.04)
ERYTHROCYTE [DISTWIDTH] IN BLOOD BY AUTOMATED COUNT: 12.1 % (ref 10–15)
GFR SERPL CREATININE-BSD FRML MDRD: >90 ML/MIN/{1.73_M2}
GLUCOSE BLDC GLUCOMTR-MCNC: 142 MG/DL (ref 70–99)
GLUCOSE BLDC GLUCOMTR-MCNC: 226 MG/DL (ref 70–99)
GLUCOSE SERPL-MCNC: 137 MG/DL (ref 70–99)
HCG UR QL: NEGATIVE
HCT VFR BLD AUTO: 40.7 % (ref 35–47)
HGB BLD-MCNC: 13.6 G/DL (ref 11.7–15.7)
INR PPP: 0.97 (ref 0.86–1.14)
LIPASE SERPL-CCNC: 60 U/L (ref 73–393)
LIPASE SERPL-CCNC: 6405 U/L (ref 73–393)
MCH RBC QN AUTO: 29.2 PG (ref 26.5–33)
MCHC RBC AUTO-ENTMCNC: 33.4 G/DL (ref 31.5–36.5)
MCV RBC AUTO: 87 FL (ref 78–100)
PLATELET # BLD AUTO: 308 10E9/L (ref 150–450)
POTASSIUM SERPL-SCNC: 4.2 MMOL/L (ref 3.4–5.3)
PROT SERPL-MCNC: 7.4 G/DL (ref 6.8–8.8)
RBC # BLD AUTO: 4.66 10E12/L (ref 3.8–5.2)
SODIUM SERPL-SCNC: 139 MMOL/L (ref 133–144)
UPPER EUS: NORMAL
WBC # BLD AUTO: 7.5 10E9/L (ref 4–11)

## 2021-04-13 PROCEDURE — 258N000003 HC RX IP 258 OP 636: Performed by: ANESTHESIOLOGY

## 2021-04-13 PROCEDURE — 83690 ASSAY OF LIPASE: CPT | Performed by: INTERNAL MEDICINE

## 2021-04-13 PROCEDURE — 81025 URINE PREGNANCY TEST: CPT | Performed by: ANESTHESIOLOGY

## 2021-04-13 PROCEDURE — 250N000011 HC RX IP 250 OP 636: Performed by: NURSE ANESTHETIST, CERTIFIED REGISTERED

## 2021-04-13 PROCEDURE — 250N000009 HC RX 250: Performed by: NURSE ANESTHETIST, CERTIFIED REGISTERED

## 2021-04-13 PROCEDURE — 999N001017 HC STATISTIC GLUCOSE BY METER IP

## 2021-04-13 PROCEDURE — 370N000017 HC ANESTHESIA TECHNICAL FEE, PER MIN: Performed by: INTERNAL MEDICINE

## 2021-04-13 PROCEDURE — 250N000009 HC RX 250: Performed by: STUDENT IN AN ORGANIZED HEALTH CARE EDUCATION/TRAINING PROGRAM

## 2021-04-13 PROCEDURE — 250N000024 HC ISOFLURANE, PER MIN: Performed by: INTERNAL MEDICINE

## 2021-04-13 PROCEDURE — 255N000002 HC RX 255 OP 636: Performed by: INTERNAL MEDICINE

## 2021-04-13 PROCEDURE — 83690 ASSAY OF LIPASE: CPT | Performed by: STUDENT IN AN ORGANIZED HEALTH CARE EDUCATION/TRAINING PROGRAM

## 2021-04-13 PROCEDURE — 360N000082 HC SURGERY LEVEL 2 W/ FLUORO, PER MIN: Performed by: INTERNAL MEDICINE

## 2021-04-13 PROCEDURE — 82150 ASSAY OF AMYLASE: CPT | Performed by: INTERNAL MEDICINE

## 2021-04-13 PROCEDURE — 85027 COMPLETE CBC AUTOMATED: CPT | Performed by: STUDENT IN AN ORGANIZED HEALTH CARE EDUCATION/TRAINING PROGRAM

## 2021-04-13 PROCEDURE — 250N000011 HC RX IP 250 OP 636: Performed by: ANESTHESIOLOGY

## 2021-04-13 PROCEDURE — 250N000009 HC RX 250: Performed by: INTERNAL MEDICINE

## 2021-04-13 PROCEDURE — 99207 PR APP CREDIT; MD BILLING SHARED VISIT: CPT | Performed by: PHYSICIAN ASSISTANT

## 2021-04-13 PROCEDURE — 85610 PROTHROMBIN TIME: CPT | Performed by: STUDENT IN AN ORGANIZED HEALTH CARE EDUCATION/TRAINING PROGRAM

## 2021-04-13 PROCEDURE — 36415 COLL VENOUS BLD VENIPUNCTURE: CPT | Performed by: STUDENT IN AN ORGANIZED HEALTH CARE EDUCATION/TRAINING PROGRAM

## 2021-04-13 PROCEDURE — 80053 COMPREHEN METABOLIC PANEL: CPT | Performed by: STUDENT IN AN ORGANIZED HEALTH CARE EDUCATION/TRAINING PROGRAM

## 2021-04-13 PROCEDURE — 272N000001 HC OR GENERAL SUPPLY STERILE: Performed by: INTERNAL MEDICINE

## 2021-04-13 PROCEDURE — 36415 COLL VENOUS BLD VENIPUNCTURE: CPT | Performed by: INTERNAL MEDICINE

## 2021-04-13 PROCEDURE — C1726 CATH, BAL DIL, NON-VASCULAR: HCPCS | Performed by: INTERNAL MEDICINE

## 2021-04-13 PROCEDURE — C1769 GUIDE WIRE: HCPCS | Performed by: INTERNAL MEDICINE

## 2021-04-13 PROCEDURE — 82150 ASSAY OF AMYLASE: CPT | Performed by: STUDENT IN AN ORGANIZED HEALTH CARE EDUCATION/TRAINING PROGRAM

## 2021-04-13 PROCEDURE — 999N000181 XR SURGERY CARM FLUORO GREATER THAN 5 MIN W STILLS: Mod: TC

## 2021-04-13 PROCEDURE — 710N000010 HC RECOVERY PHASE 1, LEVEL 2, PER MIN: Performed by: INTERNAL MEDICINE

## 2021-04-13 PROCEDURE — 999N000141 HC STATISTIC PRE-PROCEDURE NURSING ASSESSMENT: Performed by: INTERNAL MEDICINE

## 2021-04-13 PROCEDURE — C1877 STENT, NON-COAT/COV W/O DEL: HCPCS | Performed by: INTERNAL MEDICINE

## 2021-04-13 PROCEDURE — 250N000011 HC RX IP 250 OP 636: Performed by: INTERNAL MEDICINE

## 2021-04-13 PROCEDURE — 258N000003 HC RX IP 258 OP 636: Performed by: INTERNAL MEDICINE

## 2021-04-13 PROCEDURE — 710N000012 HC RECOVERY PHASE 2, PER MINUTE: Performed by: INTERNAL MEDICINE

## 2021-04-13 DEVICE — STENT FREEMAN PANCREA FLEX 4FRX11CM W/O FLANGE SGL PIGTAIL
Type: IMPLANTABLE DEVICE | Site: PANCREATIC DUCT | Status: NON-FUNCTIONAL
Removed: 2021-12-06

## 2021-04-13 RX ORDER — PROPOFOL 10 MG/ML
INJECTION, EMULSION INTRAVENOUS PRN
Status: DISCONTINUED | OUTPATIENT
Start: 2021-04-13 | End: 2021-04-13

## 2021-04-13 RX ORDER — HYDROMORPHONE HYDROCHLORIDE 1 MG/ML
.3-.5 INJECTION, SOLUTION INTRAMUSCULAR; INTRAVENOUS; SUBCUTANEOUS EVERY 10 MIN PRN
Status: DISCONTINUED | OUTPATIENT
Start: 2021-04-13 | End: 2021-04-13 | Stop reason: HOSPADM

## 2021-04-13 RX ORDER — DIAZEPAM 5 MG
5 TABLET ORAL EVERY 6 HOURS PRN
Status: CANCELLED | OUTPATIENT
Start: 2021-04-13

## 2021-04-13 RX ORDER — PROCHLORPERAZINE 25 MG
25 SUPPOSITORY, RECTAL RECTAL EVERY 12 HOURS PRN
Status: CANCELLED | OUTPATIENT
Start: 2021-04-13

## 2021-04-13 RX ORDER — DEXAMETHASONE SODIUM PHOSPHATE 4 MG/ML
INJECTION, SOLUTION INTRA-ARTICULAR; INTRALESIONAL; INTRAMUSCULAR; INTRAVENOUS; SOFT TISSUE PRN
Status: DISCONTINUED | OUTPATIENT
Start: 2021-04-13 | End: 2021-04-13

## 2021-04-13 RX ORDER — AMOXICILLIN 250 MG
1 CAPSULE ORAL 2 TIMES DAILY PRN
Status: CANCELLED | OUTPATIENT
Start: 2021-04-13

## 2021-04-13 RX ORDER — ONDANSETRON 2 MG/ML
4 INJECTION INTRAMUSCULAR; INTRAVENOUS EVERY 6 HOURS PRN
Status: CANCELLED | OUTPATIENT
Start: 2021-04-13

## 2021-04-13 RX ORDER — NALOXONE HYDROCHLORIDE 0.4 MG/ML
0.2 INJECTION, SOLUTION INTRAMUSCULAR; INTRAVENOUS; SUBCUTANEOUS
Status: DISCONTINUED | OUTPATIENT
Start: 2021-04-13 | End: 2021-04-13 | Stop reason: HOSPADM

## 2021-04-13 RX ORDER — ONDANSETRON 4 MG/1
4 TABLET, ORALLY DISINTEGRATING ORAL EVERY 30 MIN PRN
Status: DISCONTINUED | OUTPATIENT
Start: 2021-04-13 | End: 2021-04-13 | Stop reason: HOSPADM

## 2021-04-13 RX ORDER — SODIUM CHLORIDE, SODIUM LACTATE, POTASSIUM CHLORIDE, CALCIUM CHLORIDE 600; 310; 30; 20 MG/100ML; MG/100ML; MG/100ML; MG/100ML
INJECTION, SOLUTION INTRAVENOUS CONTINUOUS
Status: DISCONTINUED | OUTPATIENT
Start: 2021-04-13 | End: 2021-04-13 | Stop reason: HOSPADM

## 2021-04-13 RX ORDER — IOPAMIDOL 510 MG/ML
INJECTION, SOLUTION INTRAVASCULAR PRN
Status: DISCONTINUED | OUTPATIENT
Start: 2021-04-13 | End: 2021-04-13 | Stop reason: HOSPADM

## 2021-04-13 RX ORDER — ACETAMINOPHEN 325 MG/1
650 TABLET ORAL EVERY 4 HOURS PRN
Status: CANCELLED | OUTPATIENT
Start: 2021-04-13

## 2021-04-13 RX ORDER — NALOXONE HYDROCHLORIDE 0.4 MG/ML
0.4 INJECTION, SOLUTION INTRAMUSCULAR; INTRAVENOUS; SUBCUTANEOUS
Status: DISCONTINUED | OUTPATIENT
Start: 2021-04-13 | End: 2021-04-13 | Stop reason: HOSPADM

## 2021-04-13 RX ORDER — FENTANYL CITRATE 50 UG/ML
INJECTION, SOLUTION INTRAMUSCULAR; INTRAVENOUS PRN
Status: DISCONTINUED | OUTPATIENT
Start: 2021-04-13 | End: 2021-04-13

## 2021-04-13 RX ORDER — LIDOCAINE 40 MG/G
CREAM TOPICAL
Status: DISCONTINUED | OUTPATIENT
Start: 2021-04-13 | End: 2021-04-13 | Stop reason: HOSPADM

## 2021-04-13 RX ORDER — HYDROMORPHONE HYDROCHLORIDE 1 MG/ML
0.3 INJECTION, SOLUTION INTRAMUSCULAR; INTRAVENOUS; SUBCUTANEOUS
Status: CANCELLED | OUTPATIENT
Start: 2021-04-13

## 2021-04-13 RX ORDER — SODIUM CHLORIDE, SODIUM LACTATE, POTASSIUM CHLORIDE, CALCIUM CHLORIDE 600; 310; 30; 20 MG/100ML; MG/100ML; MG/100ML; MG/100ML
INJECTION, SOLUTION INTRAVENOUS CONTINUOUS
Status: CANCELLED | OUTPATIENT
Start: 2021-04-13

## 2021-04-13 RX ORDER — ONDANSETRON 2 MG/ML
INJECTION INTRAMUSCULAR; INTRAVENOUS PRN
Status: DISCONTINUED | OUTPATIENT
Start: 2021-04-13 | End: 2021-04-13

## 2021-04-13 RX ORDER — ONDANSETRON 2 MG/ML
4 INJECTION INTRAMUSCULAR; INTRAVENOUS EVERY 30 MIN PRN
Status: DISCONTINUED | OUTPATIENT
Start: 2021-04-13 | End: 2021-04-13 | Stop reason: HOSPADM

## 2021-04-13 RX ORDER — OXYCODONE HYDROCHLORIDE 5 MG/1
5 TABLET ORAL EVERY 4 HOURS PRN
Status: CANCELLED | OUTPATIENT
Start: 2021-04-13

## 2021-04-13 RX ORDER — FENTANYL CITRATE 50 UG/ML
25-50 INJECTION, SOLUTION INTRAMUSCULAR; INTRAVENOUS
Status: DISCONTINUED | OUTPATIENT
Start: 2021-04-13 | End: 2021-04-13 | Stop reason: HOSPADM

## 2021-04-13 RX ORDER — ACETAMINOPHEN 650 MG/1
650 SUPPOSITORY RECTAL EVERY 4 HOURS PRN
Status: CANCELLED | OUTPATIENT
Start: 2021-04-13

## 2021-04-13 RX ORDER — FENTANYL CITRATE 50 UG/ML
25-50 INJECTION, SOLUTION INTRAMUSCULAR; INTRAVENOUS EVERY 5 MIN PRN
Status: DISCONTINUED | OUTPATIENT
Start: 2021-04-13 | End: 2021-04-13 | Stop reason: HOSPADM

## 2021-04-13 RX ORDER — ONDANSETRON 4 MG/1
4 TABLET, ORALLY DISINTEGRATING ORAL EVERY 6 HOURS PRN
Status: CANCELLED | OUTPATIENT
Start: 2021-04-13

## 2021-04-13 RX ORDER — PROMETHAZINE HYDROCHLORIDE 25 MG/ML
12.5 INJECTION INTRAMUSCULAR; INTRAVENOUS
Status: DISCONTINUED | OUTPATIENT
Start: 2021-04-13 | End: 2021-04-13 | Stop reason: CLARIF

## 2021-04-13 RX ORDER — LIDOCAINE HYDROCHLORIDE 20 MG/ML
INJECTION, SOLUTION INFILTRATION; PERINEURAL PRN
Status: DISCONTINUED | OUTPATIENT
Start: 2021-04-13 | End: 2021-04-13

## 2021-04-13 RX ORDER — FLUMAZENIL 0.1 MG/ML
0.2 INJECTION, SOLUTION INTRAVENOUS
Status: DISCONTINUED | OUTPATIENT
Start: 2021-04-13 | End: 2021-04-13 | Stop reason: HOSPADM

## 2021-04-13 RX ORDER — AMOXICILLIN 250 MG
2 CAPSULE ORAL 2 TIMES DAILY PRN
Status: CANCELLED | OUTPATIENT
Start: 2021-04-13

## 2021-04-13 RX ORDER — PROCHLORPERAZINE MALEATE 5 MG
10 TABLET ORAL EVERY 6 HOURS PRN
Status: CANCELLED | OUTPATIENT
Start: 2021-04-13

## 2021-04-13 RX ORDER — INDOMETHACIN 50 MG/1
100 SUPPOSITORY RECTAL
Status: COMPLETED | OUTPATIENT
Start: 2021-04-13 | End: 2021-04-13

## 2021-04-13 RX ADMIN — PROPOFOL 50 MG: 10 INJECTION, EMULSION INTRAVENOUS at 10:32

## 2021-04-13 RX ADMIN — GLUCAGON HYDROCHLORIDE 0.4 MG: KIT at 12:28

## 2021-04-13 RX ADMIN — FENTANYL CITRATE 50 MCG: 50 INJECTION, SOLUTION INTRAMUSCULAR; INTRAVENOUS at 12:30

## 2021-04-13 RX ADMIN — ONDANSETRON 4 MG: 2 INJECTION INTRAMUSCULAR; INTRAVENOUS at 11:01

## 2021-04-13 RX ADMIN — GLUCAGON HYDROCHLORIDE 0.4 MG: KIT at 11:55

## 2021-04-13 RX ADMIN — ROCURONIUM BROMIDE 50 MG: 10 INJECTION INTRAVENOUS at 10:29

## 2021-04-13 RX ADMIN — PROPOFOL 200 MG: 10 INJECTION, EMULSION INTRAVENOUS at 10:29

## 2021-04-13 RX ADMIN — MIDAZOLAM 2 MG: 1 INJECTION INTRAMUSCULAR; INTRAVENOUS at 10:18

## 2021-04-13 RX ADMIN — HYDROMORPHONE HYDROCHLORIDE 0.5 MG: 1 INJECTION, SOLUTION INTRAMUSCULAR; INTRAVENOUS; SUBCUTANEOUS at 12:06

## 2021-04-13 RX ADMIN — SODIUM CHLORIDE, POTASSIUM CHLORIDE, SODIUM LACTATE AND CALCIUM CHLORIDE: 600; 310; 30; 20 INJECTION, SOLUTION INTRAVENOUS at 13:00

## 2021-04-13 RX ADMIN — ONDANSETRON 4 MG: 2 INJECTION INTRAMUSCULAR; INTRAVENOUS at 14:11

## 2021-04-13 RX ADMIN — HYDROMORPHONE HYDROCHLORIDE 0.5 MG: 1 INJECTION, SOLUTION INTRAMUSCULAR; INTRAVENOUS; SUBCUTANEOUS at 11:02

## 2021-04-13 RX ADMIN — PROMETHAZINE HYDROCHLORIDE 12.5 MG: 25 INJECTION INTRAMUSCULAR; INTRAVENOUS at 15:56

## 2021-04-13 RX ADMIN — FENTANYL CITRATE 100 MCG: 50 INJECTION, SOLUTION INTRAMUSCULAR; INTRAVENOUS at 10:57

## 2021-04-13 RX ADMIN — SODIUM CHLORIDE, POTASSIUM CHLORIDE, SODIUM LACTATE AND CALCIUM CHLORIDE: 600; 310; 30; 20 INJECTION, SOLUTION INTRAVENOUS at 10:23

## 2021-04-13 RX ADMIN — SUGAMMADEX 200 MG: 100 INJECTION, SOLUTION INTRAVENOUS at 13:00

## 2021-04-13 RX ADMIN — DEXAMETHASONE SODIUM PHOSPHATE 4 MG: 4 INJECTION, SOLUTION INTRA-ARTICULAR; INTRALESIONAL; INTRAMUSCULAR; INTRAVENOUS; SOFT TISSUE at 10:43

## 2021-04-13 RX ADMIN — PROPOFOL 50 MG: 10 INJECTION, EMULSION INTRAVENOUS at 12:06

## 2021-04-13 RX ADMIN — LIDOCAINE HYDROCHLORIDE 100 MG: 20 INJECTION, SOLUTION INFILTRATION; PERINEURAL at 10:29

## 2021-04-13 ASSESSMENT — MIFFLIN-ST. JEOR: SCORE: 1580.88

## 2021-04-13 NOTE — BRIEF OP NOTE
[facility]    Brief Operative Note    Pre-operative diagnosis: Chronic pancreatitis (H) [K86.1]  Post-operative diagnosis Same, obstructing stone in minor papilla  Procedure: Procedure(s):  ENDOSCOPIC RETROGRADE CHOLANGIOPANCREATOGRAPHY with minor papillotomy and pancreatic stent placement  Endoscopic ultrasound upper gastrointestinal tract (GI)  Surgeon: Surgeon(s) and Role:  Panel 1:     * Ayden Perez MD - Primary  Panel 2:     * Torey Douglas MD - Primary     * Mode Damico MD - Fellow - Assisting  Anesthesia: General   Estimated blood loss: None  Drains: None  Specimens: * No specimens in log *  Findings:   Major papilla patent, main duct patent to tail, reflux through minor around large impacted stone. Swept major duct, injected methylene blue to identify orifice of minor papilla, unable to cannulate despite multiple devices, did needle knife precut. Still nable to cannulate past stone, either antegrade through major papilla w papilltome and wire in minor, would not pass stone, or retrograde through opened minor papilla.   Complications: None.  Implants:   Implant Name Type Inv. Item Serial No.  Lot No. LRB No. Used Action   STENT PEREZ PANCREA FLEX [id number] W/O FLANGE SGL PIGTAIL Stent STENT PEREZ PANCREA FLEX [id number] W/O FLANGE SGL PIGTAIL  [place] [id number]- N/A 1 Implanted   STENT PEREZ PANCREA FLEX [id number] W/O FLANGE SGL PIGTAIL Stent STENT PEREZ PANCREA FLEX [id number] W/O FLANGE SGL PIGTAIL  [place] [id number]- N/A 1 Implanted     Watch for pancreatitis, pain, follow up w other appts.  Repeat visit w ESWL one day, followed by ERCP next. In one to two months  Xray at home to docuiment stent passage.

## 2021-04-13 NOTE — ANESTHESIA PROCEDURE NOTES
Airway       Patient location during procedure: OR  Staff -        CRNA: Jane Walton APRN CRNA       Performed By: CRNA  Consent for Airway        Urgency: elective  Indications and Patient Condition       Indications for airway management: valentín-procedural       Induction type:intravenous       Mask difficulty assessment: 1 - vent by mask    Final Airway Details       Final airway type: endotracheal airway       Successful airway: ETT - single  Endotracheal Airway Details        ETT size (mm): 7.0       Cuffed: yes       Successful intubation technique: direct laryngoscopy       DL Blade Type: MAC 4       Grade View of Cords: 1       Adjucts: stylet       Position: Right       Measured from: gums/teeth       Secured at (cm): 22       Bite block used: None    Post intubation assessment        Placement verified by: capnometry        Number of attempts at approach: 1       Secured with: pink tape       Ease of procedure: easy       Dentition: Intact and Unchanged    Medication(s) Administered   Medication Administration Time: 4/13/2021 10:33 AM

## 2021-04-13 NOTE — ANESTHESIA POSTPROCEDURE EVALUATION
Patient: Andriy Yoon    Procedure(s):  ENDOSCOPIC RETROGRADE CHOLANGIOPANCREATOGRAPHY with minor papillotomy and pancreatic stent placement  Endoscopic ultrasound upper gastrointestinal tract (GI)    Diagnosis:Chronic pancreatitis (H) [K86.1]  Diagnosis Additional Information: No value filed.    Anesthesia Type:  General    Note:  Disposition: Admission   Postop Pain Control: Uneventful            Sign Out: Well controlled pain   PONV: No   Neuro/Psych: Uneventful            Sign Out: Acceptable/Baseline neuro status   Airway/Respiratory: Uneventful            Sign Out: Acceptable/Baseline resp. status   CV/Hemodynamics: Uneventful            Sign Out: Acceptable CV status   Other NRE: NONE   DID A NON-ROUTINE EVENT OCCUR? No         Last vitals:  Vitals:    04/13/21 1345 04/13/21 1400 04/13/21 1415   BP: 129/82 126/79 119/76   Pulse: 94 89 84   Resp: 16 18 16   Temp:  36.9  C (98.4  F)    SpO2: 98% 96%        Last vitals prior to Anesthesia Care Transfer:  CRNA VITALS  4/13/2021 1244 - 4/13/2021 1344      4/13/2021             Pulse:  91    SpO2:  98 %          Electronically Signed By: Michelet Ibrahim MD  April 13, 2021  2:25 PM

## 2021-04-13 NOTE — OR NURSING
Paged Dr Perez about patients Amylase result at 440. Patient nauseous no pain. Waiting on lipase result.    1606: P[place] Dr Perez[email or web address] [phone] Lipase 6405. Still nauseous, staring to have some pancreatic pain. Isak [phone]    1620: Dr Perez at bedside, orders to give another liter bolus of LR. Patient will also need to drink more clear liquids. Will reassess for admission in 2 hours.

## 2021-04-13 NOTE — OP NOTE
Upper EUS 04/13/2021 10:23 AM [facility], [address] [place] [address] [phone]     Endoscopy Department   _______________________________________________________________________________   Patient Name: Andriy Yoon           Procedure Date: 4/13/2021 10:23 AM   MRN: [id number]                       Account Number: [id number]   YOB: 1990              Admit Type: Outpatient   Age: 30                               Room: OR   Gender: Female                        Note Status: Finalized   Attending MD: Torey Douglas MD   Total Sedation Time:   _______________________________________________________________________________       Procedure:           Upper EUS   Indications:         Chronic pancreatitis   Providers:           Torey Douglas MD, Mode Gambino MD   Patient Profile:     Ms Yoon is a 29yo woman with a history of chronic                        calcific pancreatitis who proceeds to evaluation by EUS                        to help guide tandem ERCP (query location of stones).   Referring MD:        Stanton Crespo   Medicines:           General Anesthesia   Complications:       No immediate complications.   _______________________________________________________________________________   Procedure:           Pre-Anesthesia Assessment:                        - Prior to the procedure, a History and Physical was                        performed, and patient medications and allergies were                        reviewed. The patient is competent. The risks and                        benefits of the procedure and the sedation options and                        risks were discussed with the patient. All questions                        were answered and informed consent was obtained. Patient                        identification and proposed procedure were verified by                        the nurse in the pre-procedure area. Mental Status                         Examination: alert and oriented. Airway Examination:                        Mallampati Class II (the uvula but not tonsillar pillars                        visualized). Respiratory Examination: clear to                        auscultation. CV Examination: normal. ASA Grade                        Assessment: II - A patient with mild systemic disease.                        After reviewing the risks and benefits, the patient was                        deemed in satisfactory condition to undergo the                        procedure. The anesthesia plan was to use general                        anesthesia. Immediately prior to administration of                        medications, the patient was re-assessed for adequacy to                        receive sedatives. The heart rate, respiratory rate,                        oxygen saturations, blood pressure, adequacy of                        pulmonary ventilation, and response to care were                        monitored throughout the procedure. The physical status                        of the patient was re-assessed after the procedure.                        After obtaining informed consent, the endoscope was                        passed under direct vision. Throughout the procedure,                        the patient's blood pressure, pulse, and oxygen                        saturations were monitored continuously. The linear                        echoendoscope was introduced through the mouth, and                        advanced to the second part of duodenum. The upper EUS                        was accomplished without difficulty. The patient                        tolerated the procedure well.                                                                                     Findings:        White light and endosonographic findings: :        A curved linear was utilized throughout. Limited white light imaging of        the foregut was  grossly unremarkable including tangential views of the        ampulla. In terms of endosonography, there was diffuse heterogeneity of        the pancreatic parenchyma with multifocal lobularity, strands and foci.        There were no solid or liquid lesions of the pancreatic parenchyma        though there were multifocal calcifications in the genu, head and        uncinate. Due to shadowing it was unclear if the stone involved the main        duct which was ventral dominant, however given there was only minimal        dilation of the duct in the genu to 4mm upstream of the stone in the        neck, the duct is thought to be at least partially patent. The main duct        then tapered to 2mm in the body and 1mm in the tail. There duct walls        were hyperechoic and there were numerous dilated side branches in the        body and tail. There was no overt atrophy of the gland. The gallbladder        was not appreciated. The common duct was decompressed measuring up to        4mm with minimal taper at the ampulla. There was no suggestion of        biliary stone, debris or mass. Limited views of the left lobe, right        lobe and caudate were without overt lesion, nor was there any concerning        lesion of the left kidney and spleen. The major vasculature of the        abdomen was grossly unremarkable and traditional including the splenics,        superior mesenterics, portal, gastroduodenal and celiac. There was a        single benign appearing (fuzzy border, triagnular, subcentimeter)        periportal node, though no other lymphadenopathy was demonstrated.                                                                                     Impression:          - Abundant evidence of diffuse chronic calcific                        pancreatitis including large stones within the neck,                        head and uncinate, though perhaps outside (or adjacent                        to) the main ventral dominant  duct given the minimal                        dilation to 4mm focally in the genu                        - Unremarkable postcholecystectomy extrahepatic biliary                        system                        - Single benign appearing lymph node                        - Grossly unremarkable abdominal vasculature   Recommendation:      - Patient to proceed to tandem ERCP with Dr Perez as                        scheduled (refer to tandem note for further                        recommendations)                        - The findings and recommendations were discussed with                        the patient and their family                                                                                       electronically signed by CROW Douglas

## 2021-04-13 NOTE — ANESTHESIA CARE TRANSFER NOTE
Patient: Andriy Yoon    Procedure(s):  ENDOSCOPIC RETROGRADE CHOLANGIOPANCREATOGRAPHY with minor papillotomy and pancreatic stent placement  Endoscopic ultrasound upper gastrointestinal tract (GI)    Diagnosis: Chronic pancreatitis (H) [K86.1]  Diagnosis Additional Information: No value filed.    Anesthesia Type:   General     Note:    Oropharynx: oropharynx clear of all foreign objects  Level of Consciousness: awake  Oxygen Supplementation: face mask  Level of Supplemental Oxygen (L/min / FiO2): 8  Independent Airway: airway patency satisfactory and stable  Dentition: dentition unchanged  Vital Signs Stable: post-procedure vital signs reviewed and stable  Report to RN Given: handoff report given  Patient transferred to: PACU    Handoff Report: Identifed the Patient, Identified the Reponsible Provider, Reviewed the pertinent medical history, Discussed the surgical course, Reviewed Intra-OP anesthesia mangement and issues during anesthesia, Set expectations for post-procedure period and Allowed opportunity for questions and acknowledgement of understanding      Vitals: (Last set prior to Anesthesia Care Transfer)  CRNA VITALS  4/13/2021 1245 - 4/13/2021 1315      4/13/2021             Pulse:  91    SpO2:  98 %        Electronically Signed By: SARAH Avila CRNA  April 13, 2021  1:15 PM

## 2021-04-13 NOTE — DISCHARGE INSTRUCTIONS
[facility], [place]  Same-Day Surgery ERCP Procedure   Adult Discharge Orders & Instructions     You had a procedure known as an Endoscopic Retrograde Cholangiopancreatography (ERCP). Your healthcare provider performed the ERCP to look at your bile or pancreatic ducts, and to locate and/or treat blockages (dilation, stenting, removal, etc.) in these ducts. Often biopsies, small samples of tissue, are taken to help diagnose and/or classify stages of disease growth. This procedure is used to diagnose diseases of the pancreas, bile ducts, pancreatic duct, liver, and gallbladder.     After your procedure   1. Make sure to clarify with your healthcare provider any diet restrictions (For example, clear liquid, low fat, no caffeine, etc.)   2. Do NOT take aspirin containing medications or any other blood-thinning medicines (anticoagulants) until your healthcare provider says it's OK.     For 24 hours after surgery  1. Get plenty of rest.  A responsible adult must stay with you for at least 24 hours after you leave the hospital.   2. Do not drive or use heavy equipment.  If you have weakness or tingling, don't drive or use heavy equipment until this feeling goes away.  3. Do not drink alcohol.  4. Avoid strenuous or risky activities (gym, yoga, cycling, etc.).  Ask for help when climbing stairs.   5. You may feel lightheaded.  IF so, sit for a few minutes before standing.  Have someone help you get up.   6. If you have nausea (feel sick to your stomach): Drink only clear liquids such as apple juice, ginger ale, broth or 7-Up.  Rest may also help.  Be sure to drink enough fluids.  Move to a regular diet as you feel able.  7. If you feel bloated or have too much gas, use a heating pad on your belly to help reduce the discomfort. This should help you feel better.   8. You may have a slight fever. This is normal for the first 24 hours.   9. You may have a dry mouth, a sore throat, muscle aches or  trouble sleeping.  These are normal and will go away after 24 hours. A sore throat is most common. Use lozenges or gargle with salt water to ease the discomfort.   10. Do not make important or legal decisions.      Call your doctor for any of the followin. Chest pain, and/or shortness of breath  2. Abdominal  pain, bloating or cramping that has not improved or does not respond to pain reliving medications (Tylenol or narcotics if prescribed)   3. Difficulty swallowing or feeling as though food or liquids are stuck in your throat   4. Sore throat lasting more than 2 days or pain that has worsened over time   5. Black or tarry stools   6. Nausea and/or vomiting that is not resolving or has not responded to anti-nausea medications prescribed to you   7. It has been over 8 to 10 hours since surgery and you are still not able to urinate (pass water)   8. Headache for over 24 hours   9. Fever over 100.5 F (38 C) lasting more than 24 hours after the procedure   10. Signs of jaundice or blockage (fever, chills, abdominal pain, yellowing of the whites of your eyes, yellowing of your skin, and/or passing darker than normal urine)     To contact a doctor, call:   [ ] Dr Douglas at [phone] (clinic) (Monday thru Friday 8:00am to 4:30pm)   [ ] [phone] and ask for the Gastroenterology resident on call (answered 24 hours a day)   [ ] Emergency Department: [facility]: [phone]     Take it easy when you get home.  Remember, same day surgery DOES NOT MEAN SAME DAY RECOVERY!  Healing is a gradual process.  You will need some time to recover - you may be more tired than you realize at first.  Rest and relax for at least the first 24 hours at home.  You'll feel better and heal faster if you take good care of yourself.

## 2021-04-13 NOTE — H&P
[facility]    History and Physical - Hospitalist Service       Date of Admission: 4/13/2021    Assessment & Plan   Andriy Yoon is a 30 year old female with history of chronic pancreatitis, prediabetes, RLS, and primary ovarian failure who was admitted to medicine observation 4/13/2021 following ERCP    Acute on chronic pancreatitis s/p EUS and ERCP 4/13: Per Dr. Perez with multiple pancreatic duct manipulation to access obstructing stone in dorsal duct. Minor papillotomy and pancreatic stent placed. Lipase 6,405, amylase 440.Pain stable, tolerating CLD  - GI following. Per d/w evening team, patient is undergoing TPIAT eval and they will coordinate her being seen this admission   - CLD overnight,  ml/hr  - Pain management: oxycodone with IV dilaudid for breakthrought. Hold PTA Tramadol and valium   - Antiemetics prn   - Check lipase in the am per GI  - Continue creon  - ESWL and ERCP in 1-2 months   - OP Xray for stent passage evaluation   - Follow up with Dr. Melissa 4/15 as planned  ** Addendum: Patient was discharged from the PACU. Per note, Dr. Perez was informed. Internal medicine was not. Thus, unable to assess the patient to determine if appropriate for discharge. Writer d/w PACU RN who said the patient is out of the building. Will need to defer discharge safety and planning to GI as this is who ok'd the discharge       Other Medical Issues:  Primary ovarian failure: Hold PTA estradiol and progersterone while obs  RLS: No PTA meds, monitor   Prediabetes: A1c 6.5 4/12. Not PTA meds. Glucose 137 with am labs         Diet: Clear Liquid Diet    DVT Prophylaxis: Pneumatic Compression Devices  West Catheter: not present  Code Status:   Full Code         Disposition Plan   Expected discharge: Tomorrow, recommended to hotel as planned once pain/nausea controlled, tolerating diet.  Entered: Michelle Killian PA-C 04/13/2021, 7:07 PM     The patient's care  was discussed with the patient and attending physician, Dr. Mirian Killian PA-C  [facility]  Contact information available via [facility] Paging/Directory  Please see sign in/sign out for up to date coverage information    ______________________________________________________________________    Chief Complaint   Pancreatitis     History is obtained from the patient and medical record    History of Present Illness   Andriy Yoon is a 30 year old female with history of chronic pancreatitis, prediabetes, RLS, and primary ovarian failure who was admitted to medicine observation 4/13/2021 following ERCP    Patient reports many years of issues with pancreatitis. Procedure went well today but she developed severe nausea with increased abdominal pain c/w prior acute pancreatitis attacks. She was given pain and antiemetic medication, IV fluids and is now feeling better. She continues to have pain but is able to tolerate clears. Denies fever or chills. No confusion. No chest pain or dyspnea.     Review of Systems    The 10 point Review of Systems is negative other than noted in the HPI or here.     Past Medical History    I have reviewed this patient's medical history and updated it with pertinent information if needed.   Past Medical History:   Diagnosis Date     Chronic pancreatitis (H)      Diabetes (H)        Past Surgical History   I have reviewed this patient's surgical history and updated it with pertinent information if needed.  Past Surgical History:   Procedure Laterality Date     CHOLECYSTECTOMY       ENDOSCOPIC RETROGRADE CHOLANGIOPANCREATOGRAM         Social History   I have reviewed this patient's social history and updated it with pertinent information if needed.  Social History     Tobacco Use     Smoking status: Never Smoker     Smokeless tobacco: Never Used   Substance Use Topics     Alcohol use: Never     Frequency: Never     Binge frequency:  Never     Drug use: Never       Family History      No family h/o pancreatitis       Prior to Admission Medications   Prior to Admission Medications   Prescriptions Last Dose Informant Patient Reported? Taking?   amylase-lipase-protease (CREON 12) 40841-688[address]-16686 units CPEP 4/13/2021  Yes Yes   amylase-lipase-protease (CREON) 61073-22712 units CPEP per EC capsule 4/12/2021 at 1800  No Yes   Sig: Take 2-3 with meals / 1-2 with snacks, up to 15 per day.   diazepam (VALIUM) 5 MG tablet More than a month at Unknown time  Yes No   Sig: Take 5 mg by mouth   estradiol (ESTRACE) 2 MG tablet 4/12/2021 at 1800  Yes Yes   Sig: Take 2 mg by mouth   progesterone (PROMETRIUM) 200 MG capsule 4/12/2021 at 1800  Yes Yes   Sig: Take 200 mg by mouth   traMADol (ULTRAM) 50 MG tablet More than a month at Unknown time  Yes No   Sig: TAKE 1 TO 2 TABLETS BY MOUTH EVERY 6 HOURS AS NEEDED FOR ABDOMINAL PAIN      Facility-Administered Medications: None     Allergies   Allergies   Allergen Reactions     Ceftin  [Cefuroxime]      Penicillins        Physical Exam   Vital Signs: Temp: 98.3  F (36.8  C) Temp src: Oral BP: 128/72 Pulse: 57   Resp: 16 SpO2: 94 % O2 Device: None (Room air)    Weight: 189 lbs 9.53 oz    General Appearance: Alert and oriented x3, pleasant  HEENT: Anicteric sclera, MMM   Respiratory: Breathing comfortably on room air, lungs CTAB without wheezing or crackles   Cardiovascular: RRR, S1, S2. No murmur noted  GI: Abdomen soft, with moderate LUQ and epigastric tenderness. No guarding or rebound. Bowel sounds present   Lymph/Hematologic: No peripheral edema, distal pulses palpable   Skin: No rash or jaundice   Musculoskeletal: Moves all extremities   Neurologic: No focal deficits, CN II-XII grossly intact      Data   Data reviewed today: I reviewed all medications, new labs and imaging results over the last 24 hours    Recent Labs   Lab 04/13/21  1517 04/13/21  0914 04/12/21  1210 04/12/21  1108 04/12/21  0935   WBC  --  7.5   --   --  6.6   HGB  --  13.6  --   --  13.4   MCV  --  87  --   --  86   PLT  --  308  --   --  301   INR  --  0.97  --   --   --    NA  --  139  --   --  138   POTASSIUM  --  4.2  --   --  3.8   CHLORIDE  --  108  --   --  107   CO2  --  25  --   --  27   BUN  --  11  --   --  9   CR  --  0.67  --   --  0.73   ANIONGAP  --  6  --   --  4   MARISA  --  9.4  --   --  8.8   GLC  --  137* 149* 196* 131*   ALBUMIN  --  3.7  --   --  3.6   PROTTOTAL  --  7.4  --   --  7.4   BILITOTAL  --  0.3  --   --  0.4   ALKPHOS  --  73  --   --  73   ALT  --  33  --   --  36   AST  --  22  --   --  20   LIPASE 6,405* 60*  --   --  63*     Recent Results (from the past 24 hour(s))   XR Surgery SOHAN G/T 5 Min Fluoro w Stills    Narrative    This exam was marked as non-reportable because it will not be read by a   radiologist or a [facility] non-radiologist provider.

## 2021-04-13 NOTE — PROGRESS NOTES
Pt had aggressive ERCP w minor papillotomy and multiple pancreatic duct manipulations to access obstructing stone in dorsal duct. Post procedure had mild pain, nausea, but lipase >6,000 at 2 hours. After further clears and observation mutual decision to obs pt. Received 3 L IV LR over day.   REC: clears only, pain control as needed (oral or IV), hydration at 125 or so per hour, but has already received bolus so would not exceed that.  Can recheck lipase/amylase in AM but clinical course should determine hospital stay.  PANC BILI SERVICE TO FOLLOW, DR LEBLANC ATTENDING

## 2021-04-13 NOTE — ANESTHESIA PREPROCEDURE EVALUATION
Anesthesia Pre-Procedure Evaluation    Patient: Andriy Yoon   MRN: [id number] : 1990        Preoperative Diagnosis: Chronic pancreatitis (H) [K86.1]   Procedure : Procedure(s):  ENDOSCOPIC RETROGRADE CHOLANGIOPANCREATOGRAPHY     Past Medical History:   Diagnosis Date     Diabetes (H)       Past Surgical History:   Procedure Laterality Date     CHOLECYSTECTOMY       ENDOSCOPIC RETROGRADE CHOLANGIOPANCREATOGRAM        Allergies   Allergen Reactions     Ceftin  [Cefuroxime]      Penicillins       Social History     Tobacco Use     Smoking status: Never Smoker     Smokeless tobacco: Never Used   Substance Use Topics     Alcohol use: Never     Frequency: Never     Binge frequency: Never      Wt Readings from Last 1 Encounters:   21 83.9 kg (185 lb)        Anesthesia Evaluation            ROS/MED HX  ENT/Pulmonary:  - neg pulmonary ROS     Neurologic:  - neg neurologic ROS     Cardiovascular:  - neg cardiovascular ROS     METS/Exercise Tolerance:     Hematologic:  - neg hematologic  ROS     Musculoskeletal:  - neg musculoskeletal ROS     GI/Hepatic: Comment: Chronic calcific pancreatitis   Pancreatic insufficiency       (+) GERD,     Renal/Genitourinary: Comment: Ovarian failure       Endo:       Psychiatric/Substance Use:       Infectious Disease:       Malignancy:       Other:               OUTSIDE LABS:  CBC:   Lab Results   Component Value Date    WBC 6.6 2021    HGB 13.4 2021    HCT 40.4 2021     2021     BMP:   Lab Results   Component Value Date     2021    POTASSIUM 3.8 2021    CHLORIDE 107 2021    CO2 27 2021    BUN 9 2021    CR 0.73 2021     (H) 2021     (H) 2021     COAGS: No results found for: PTT, INR, FIBR  POC: No results found for: BGM, HCG, HCGS  HEPATIC:   Lab Results   Component Value Date    ALBUMIN 3.6 2021    PROTTOTAL 7.4 2021    ALT 36 2021    AST 20 2021     ALKPHOS 73 04/12/2021    BILITOTAL 0.4 04/12/2021     OTHER:   Lab Results   Component Value Date    A1C 6.5 (H) 04/12/2021    MARISA 8.8 04/12/2021    LIPASE 63 (L) 04/12/2021    AMYLASE 32 04/12/2021       Anesthesia Plan    ASA Status:  2      Anesthesia Type: General.     - Airway: ETT   Induction: Intravenous.   Maintenance: Balanced.        Consents            Postoperative Care    Pain management: IV analgesics.   PONV prophylaxis: Ondansetron (or other 5HT-3), Dexamethasone or Solumedrol     Comments:                Michelet Ibrahim MD

## 2021-04-14 ENCOUNTER — MYC MEDICAL ADVICE (OUTPATIENT)
Dept: GASTROENTEROLOGY | Facility: CLINIC | Age: 31
End: 2021-04-14

## 2021-04-14 ENCOUNTER — VIRTUAL VISIT (OUTPATIENT)
Dept: TRANSPLANT | Facility: CLINIC | Age: 31
End: 2021-04-14
Attending: PEDIATRICS
Payer: COMMERCIAL

## 2021-04-14 ENCOUNTER — VIRTUAL VISIT (OUTPATIENT)
Dept: PULMONOLOGY | Facility: CLINIC | Age: 31
End: 2021-04-14
Attending: GENETIC COUNSELOR, MS
Payer: COMMERCIAL

## 2021-04-14 ENCOUNTER — PATIENT OUTREACH (OUTPATIENT)
Dept: SURGERY | Facility: CLINIC | Age: 31
End: 2021-04-14

## 2021-04-14 DIAGNOSIS — Z71.83 ENCOUNTER FOR NONPROCREATIVE GENETIC COUNSELING: ICD-10-CM

## 2021-04-14 DIAGNOSIS — K86.89 DIABETES MELLITUS SECONDARY TO PANCREATIC INSUFFICIENCY (H): Primary | ICD-10-CM

## 2021-04-14 DIAGNOSIS — E28.39 PRIMARY OVARIAN FAILURE: ICD-10-CM

## 2021-04-14 DIAGNOSIS — Z83.79 FAMILY HISTORY OF PANCREATITIS: ICD-10-CM

## 2021-04-14 DIAGNOSIS — K86.1 CHRONIC PANCREATITIS, UNSPECIFIED PANCREATITIS TYPE (H): ICD-10-CM

## 2021-04-14 DIAGNOSIS — R11.0 NAUSEA: Primary | ICD-10-CM

## 2021-04-14 DIAGNOSIS — K86.1 IDIOPATHIC CHRONIC PANCREATITIS (H): Primary | ICD-10-CM

## 2021-04-14 DIAGNOSIS — E08.9 DIABETES MELLITUS SECONDARY TO PANCREATIC INSUFFICIENCY (H): Primary | ICD-10-CM

## 2021-04-14 DIAGNOSIS — K85.90 RECURRENT ACUTE PANCREATITIS: ICD-10-CM

## 2021-04-14 LAB
DEPRECATED CALCIDIOL+CALCIFEROL SERPL-MC: <26 UG/L (ref 20–75)
ELASTASE PANC STL-MCNT: >800 UG/G
ERCP: NORMAL
VITAMIN D2 SERPL-MCNC: <5 UG/L
VITAMIN D3 SERPL-MCNC: 21 UG/L

## 2021-04-14 PROCEDURE — 99417 PROLNG OP E/M EACH 15 MIN: CPT | Performed by: PEDIATRICS

## 2021-04-14 PROCEDURE — 99205 OFFICE O/P NEW HI 60 MIN: CPT | Mod: 95 | Performed by: PEDIATRICS

## 2021-04-14 PROCEDURE — 96040 HC GENETIC COUNSELING, EACH 30 MINUTES: CPT | Mod: GT | Performed by: GENETIC COUNSELOR, MS

## 2021-04-14 RX ORDER — METFORMIN HYDROCHLORIDE EXTENDED-RELEASE TABLETS 1000 MG/1
1000 TABLET, FILM COATED, EXTENDED RELEASE ORAL
Qty: 60 TABLET | Refills: 3 | Status: SHIPPED | OUTPATIENT
Start: 2021-04-14 | End: 2021-04-22

## 2021-04-14 RX ORDER — ONDANSETRON 4 MG/1
4 TABLET, FILM COATED ORAL EVERY 6 HOURS PRN
Qty: 20 TABLET | Refills: 0 | Status: SHIPPED | OUTPATIENT
Start: 2021-04-14

## 2021-04-14 ASSESSMENT — PAIN SCALES - GENERAL: PAINLEVEL: SEVERE PAIN (7)

## 2021-04-14 NOTE — TELEPHONE ENCOUNTER
Called patient per her request. Reviewed Dr. Perez recommendation that she go to ER, had to leave as she had another appointment scheduled.    ML

## 2021-04-14 NOTE — PROGRESS NOTES
Pediatric Endocrinology/ Islet Autotransplant  Chronic Pancreatitis Consult    Patient Active Problem List   Diagnosis     Idiopathic chronic pancreatitis (H)     Chronic pancreatitis (H)       Assessment/Plan:  1.  Chronic pancreatitis, considered idiopathic but very likely is genetic.   2.  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  Labs this week were diagnostic ( mg/dL, HbA1c 6.5%)  3.  Premature ovarian failure    Andriy is a 30 year old with pancreatitis, considering surgical management. She has new onset diabetes mellitus secondary to chronic pancreatitis but is also quite insulin resistant with an excellent C-peptide response to mixed meal testing.    For new onset diabetes, given the insulin resistance component, I would recommend starting with metformin therapy extended release 1000 mg daily and increase to 2000 mg daily in 2 weeks if tolerated.  She is likely to eventually require insulin therapy.  Weight loss will be hugely beneficial and would likely delay need for insulin therapy.  Andriy has struggled to lose weight in the past, but we did discuss that lower carb diet and exercise will be beneficial even in the absnce of weight loss.  Certainly if she does have a TPIAT in the future, we would target a lower BMI before surgery, <30 kg/m2 (180 pounds), but diabetes outcomes would be best if she can get to 25-27 kg/m2 (150-160 pounds).     For premature ovarian failure, she is on an appropriate hormonal regimen, although has struggled with some breakthrough and weight gain, I do not have an alternate therapy to suggest.     The primary purpose of today's visit was to review the patient's endocrine history and provide counseling on islet autotransplantation.  We discussed the procedure of islet autotransplant, the post-operative management, and the prognosis.  We specifically discussed that all patients are on insulin after this procedure, typically  for at least several months.  About 1/3rd of patients will become insulin independent in general.  However,because Andriy already has mild diabetes she would be expected to remain on insulin after a TPIAT.  Of the patients who require life long insulin therapy, most will have some graft function and a significant portion can maintain reasonable glycemic control on once injection per day of basal insulin.  However, about 30-40% of patients will require 4-6 injections per day or an insulin pump for management-- the risk for this is higher with obesity and insulin resistance.  Only 10% have no or minimal islet function;  These patients are at higher risk for a labile form of diabetes mellitus that can be difficult to manage.  The main goal of the islet autotransplant procedure is to preserve enough beta cell mass to make diabetes manageable.  Because of the high risk of diabetes mellitus, all patients must be willing to accept diabetes and insulin injections as a trade off for relief from pancreatic pain.    All surgical consults are reviewed by our multi-disciplinary team to determine if surgery is an appropriate next option.  I    Charissa Hyman MD  [facility] [place]  Phone:  [phone]  Fax:  [phone]    [facility]  Review of the result(s) of each unique test - as in note below, internal and external labs  Discussion of management or test interpretation with external physician/other qualified healthcare professional/appropriate source - Dr. Perez, Dr. Melissa  Prescription drug management  90 minutes spent on the date of the encounter doing chart review, history and exam, documentation and further activities per the note        CC:  Chronic pancreatitis, surgical evaluation    HPI:  Andriy Yoon is a 30 year old female referred by Dr. Yefri Perez for  new patient consultation of endocrine function in the setting of chronic pancreatitis.    Pancreatitis history is as follows:  Andriy has recurrent acute on chronic pancreatitis that was diagnosed first at age 25 years in 2015. She recalls specifically that she was at her CPA exam and had abrupt onset of abdominal pain during the exam, went to ED subsequently and was diagnosed with acute pancreatitis.  Her imaging in 2015 also showed chronic pancreatitis, so although she was basically asymptomatic this clearly started earlier. She recalls stomach pains recurrently in childhood, but nothing severe enough to bring her in for evaluation. After this first episode, she had a second one 3-4 weeks later and then episodes about 1 times per month, until she had her cholecystectomy in 2017 or 2018. After this, it settled down to 3 times per year, except this past year where she has had 7-8 flares. She has an intermittent pain phenotype, most days has no pain. 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 She has also had ERCP procedures in 2015, and then again here yesterday for clearance of pancreatic ductal stones. She takes a low dose of pancreatic enzymes, and uses tramadol and valium for pain management for attacks at home. She does not like to into the hospital and has her own plan to manage episodes at home.  Incidentally she also had premature ovarian failure diagnosed in 2018, and she thinks she had ovarian pain in 2015 that went undiagnosed. Her POF is considered idiopathic, and she recalls work up that included fragile X gene mutation analysis.  She is now on estrogen and progesterone, struggled with menopausal and mood symptoms on a cyclic regimen so she now takes estrace 2mg and prometrium 200 mg as a continuous regimen.  These hormone replacement therapies were clearly started after the onset of pancreatitis.  She has had pre-diabetes, which she says varies by her diet, and once in past was on  metformin for pre-diabetes.  Today she has labs clearly in the early diabetes range, as below.    Evaluation/ imaging/ treatments:  Elevated amylase and lipase by history:  yes  1/6/2021 lipase 281 (ref 4-60).   Lipase values before 2019 are not available in care everywhere.  Etiology of disease:  Considered idiopathic -- course suggests genetic but is pending genetic eval here  Number of hospitalizations in last 1 year: 0 (manages at home  Recent imaging studies: below  CT 9/5/19:  Pancreas coarse calcification head and tail of the pancreas consistent with calcifications of chronic pancreatitis.    5/29/15 EUS:  indicative of moderate chronic pancreatitis in the entire pancreas.         The parenchyma had calcifications, diffuse echogenicity, hyperechoic         strands, hypoechoic foci, shadowing foci and hyperechoic foci with         shadowing. The pancreatic duct had duct dilation. The pancreatic         duct measured up to 4.6 mm in diameter. As the duct is followed         towards the tail the lumen appears narrowed with hyperechoic duct         wall suggesting an underlying stricture. There is a 3mm diminutive         stone with acoustic shadowing within the duct in the genu.        The pancreas was then surveyed from the duodenal stations. The         pancreatic parenchyma has a similar appearance. There is a         calcification noted on the course of the main PD measuring 8.7 x         6.9. This does not appear to be intraductal but rather parenchymal         calcification. The common bile duct was identified and measured 3.4.         The CBD appeared normal on its course without evidence of defects,         thickening or irregularity.    Yesterday EUS for the ERCP here:  In terms of endosonography, there was diffuse heterogeneity of        the pancreatic parenchyma with multifocal lobularity, strands and foci.        There were no solid or liquid lesions of the pancreatic parenchyma        though there  were multifocal calcifications in the genu, head and        uncinate. Due to shadowing it was unclear if the stone involved the main        duct which was ventral dominant, however given there was only minimal        dilation of the duct in the genu to 4mm upstream of the stone in the        neck, the duct is thought to be at least partially patent. The main duct        then tapered to 2mm in the body and 1mm in the tail. There duct walls        were hyperechoic and there were numerous dilated side branches in the        body and tail. There was no overt atrophy of the gland.  Medical treatment(s): as noted above  ERCP procedures: yes, 2;   2015 and 2021- pending GI dictation for interventions  Prior pancreatic surgery:  None  Has had cholecystectomy  No nerve blocks done.    Endocrine history:  As noted in the HPI above.  Additional data below    DXA from 2019  FINDINGS:  The L1 to L4 vertebral bone mineral density is equal to 1.353 g/cm squared with a T score of 1.4.  The left femoral neck bone mineral density is equal to 1.040 g/cm squared with a T score of 0.0.  The total hip bone mineral density is equal to 1.042 g/cm squared with a T score of 0.3.    Mixed meal  Component      Latest Ref Rng & Units 4/12/2021 4/12/2021 4/12/2021           9:35 AM 11:08 AM 12:10 PM   Sodium      133 - 144 mmol/L 138     Potassium      3.4 - 5.3 mmol/L 3.8     Chloride      94 - 109 mmol/L 107     Carbon Dioxide      20 - 32 mmol/L 27     Anion Gap      3 - 14 mmol/L 4     Glucose      70 - 99 mg/dL 131 (H) 196 (H) 149 (H)   Urea Nitrogen      7 - 30 mg/dL 9     Creatinine      0.52 - 1.04 mg/dL 0.73     GFR Estimate      >60 mL/min/1.73:m2 >90     GFR Estimate If Black      >60 mL/min/1.73:m2 >90     Calcium      8.5 - 10.1 mg/dL 8.8     Bilirubin Total      0.2 - 1.3 mg/dL 0.4     Albumin      3.4 - 5.0 g/dL 3.6     Protein Total      6.8 - 8.8 g/dL 7.4     Alkaline Phosphatase      40 - 150 U/L 73     ALT      0 - 50 U/L 36      AST      0 - 45 U/L 20     C-Peptide      0.9 - 6.9 ng/mL 4.3 13.0 (H) 6.9     Lab Results   Component Value Date    A1C 6.5 04/12/2021       Obesity:  Current BMI of 31.  Weight of 180 pounds would put BMI at 30 kg/m2, 160 = 26.6, and 150 = 25.0.         Review of Systems:  A comprehensive 10 point review of systems was performed and was negative except as noted in the HPI above.    Past Medical History:  Past Medical History:   Diagnosis Date     Chronic pancreatitis (H)      Diabetes (H)      Past Surgical History:   Procedure Laterality Date     CHOLECYSTECTOMY       ENDOSCOPIC RETROGRADE CHOLANGIOPANCREATOGRAM         Current medications:  Current Outpatient Medications   Medication Sig Dispense Refill     amylase-lipase-protease (CREON 12) [id number] units CPEP        amylase-lipase-protease (CREON) 25525-39846 units CPEP per EC capsule Take 2-3 with meals / 1-2 with snacks, up to 15 per day. 450 capsule 6     diazepam (VALIUM) 5 MG tablet Take 5 mg by mouth       estradiol (ESTRACE) 2 MG tablet Take 2 mg by mouth       progesterone (PROMETRIUM) 200 MG capsule Take 200 mg by mouth       traMADol (ULTRAM) 50 MG tablet TAKE 1 TO 2 TABLETS BY MOUTH EVERY 6 HOURS AS NEEDED FOR ABDOMINAL PAIN         Family History:  The family history was reviewed with particular attention to diabetes and pancreatitis history, and updated by me as pertinent, and is as documented below.    Family History   Problem Relation Age of Onset     Pancreatitis No family hx of    Diabetes  Maternal GM  Also on maternal side:  Cousin with muscular dystrophy (or atrophy) of unclear exact diagnosis (1 year old), and another cousin with multiple autoimmune disorders.     Social History:  Andriy is  and lives with her  in [place].  She has her own business and works full time.       Physical Exam:  General:  Appearance is normal, no acute distress  HEENT:  NC/AT, sclera appear white  Neck:  No obvious  thyromegaly  CV/Lungs:  Non distressed breathing  Skin:  No apparent rashes  Neuro:  Normal mental status  Psych:  Normal affect      Results:  Hemoglobin A1c levels (this and prior visits):  Lab Results   Component Value Date    A1C 6.5 04/12/2021       Mixed meal test results (from [facility]):  C Peptide   Date Value Ref Range Status   04/12/2021 6.9 0.9 - 6.9 ng/mL Final   04/12/2021 13.0 (H) 0.9 - 6.9 ng/mL Final   04/12/2021 4.3 0.9 - 6.9 ng/mL Final     Glucose   Date Value Ref Range Status   04/13/2021 137 (H) 70 - 99 mg/dL Final   04/12/2021 149 (H) 70 - 99 mg/dL Final   04/12/2021 196 (H) 70 - 99 mg/dL Final   04/12/2021 131 (H) 70 - 99 mg/dL Final         Liver enzymes:  AST   Date Value Ref Range Status   04/13/2021 22 0 - 45 U/L Final     ALT   Date Value Ref Range Status   04/13/2021 33 0 - 50 U/L Final     No results found for: BILICONJ   Bilirubin Total   Date Value Ref Range Status   04/13/2021 0.3 0.2 - 1.3 mg/dL Final     Albumin   Date Value Ref Range Status   04/13/2021 3.7 3.4 - 5.0 g/dL Final     Protein Total   Date Value Ref Range Status   04/13/2021 7.4 6.8 - 8.8 g/dL Final      Alkaline Phosphatase   Date Value Ref Range Status   04/13/2021 73 40 - 150 U/L Final       Creatinine:  Creatinine   Date Value Ref Range Status   04/13/2021 0.67 0.52 - 1.04 mg/dL Final   ]    Complete Blood Count:  CBC RESULTS:   Recent Labs   Lab Test 04/13/21  0914   WBC 7.5   RBC 4.66   HGB 13.6   HCT 40.7   MCV 87   MCH 29.2   MCHC 33.4   RDW 12.1

## 2021-04-14 NOTE — PROGRESS NOTES
Patient Telephone Reminder Call    Date of call:  04/14/21  Phone numbers:  Home number on file [phone] (home)    Reached patient/confirmed appointment:  No - left message:   on voicemail  Appointment with:   Dr. Manish Melissa  Reason for visit:  TP-IAT

## 2021-04-14 NOTE — NURSING NOTE
How would you like to obtain your AVS? José Miguel Ashraf JONATHAN Karrie complains of  No chief complaint on file.      Patient would like the video invitation sent by: Send to e-mail at:[email or web address]    Patient is located in [place]? Yes     I have reviewed and updated the patient's medication list, allergies and preferred pharmacy.      Joaquin Dixon LPN

## 2021-04-14 NOTE — LETTER
"  4/14/2021      RE: Andriy Yoon  [address] [place] [address]       Genetic Counseling Consultation  This note is a summary of Andriy Yoon s virtual visit to [facility] on April 14, 2021. She was seen today for discussion of pancreatitis genetic testing as part of her evaluation for consideration of surgery due to a history of acute and chronic pancreatitis. Genetic testing and a genetic counseling consultation was recommended to aid in better understanding the cause the pancreatitis, as well as provide additional information helpful in medical management and genetic risks for family members. She was in [place] during our virtual visit.    Summary of visit:  1. Genetics of pancreatitis were discussed, including known involved genes, inheritance patterns, and impact on family members.    2. Genetic testing options were discussed, and PancreasDx panel testing through [facility] was ordered through [employer] [facility] [employer] and [facility]. Andriy can initiate the testing process through logging in to the portal. Once insurance is verified and testing commences, results will be available in approximately 4-6 weeks. I will call her at that time.  3. Contact information was given for future questions or concerns that arise.  4. Genetic testing would be available related to her history of primary ovarian failure locally or here if/when she returns to [place]. I am happy to assist with this if helpful.    Personal Medical History:  Andriy has a history of acute and chronic calcific pancreatitis, diagnosed in 2015 when she was ~25 years old. She notes many hospitalizations with elevated lipases/amylases, as well as many episodes managed at home. She does feel that clinical attacks of pain has accelerated in the recent months. She has undergone a substantial work-up including multiple CTs, and endoscopic ultrasound which reported \"pancreatic calcifications but findings of moderate chronic pancreatitis\". CT scans " have shown fairly extensive calcification. Per GI notes, she has also had ERCP procedures in 2015, and then again recently for clearance of pancreatic ductal stones. She is on pancreatic enzymes. She did undergo a cholecystectomy ~2016 for suspected biliary component and she felt that this reduced the frequency of her clinical attacks. 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 See GI notes for full pancreatitis history.    Andriy has no smoking history. Excessive use of alcohol or other triggering factors is denied and work-up for pancreatitis appears to be otherwise negative for known causative factors. She has not had pancreatitis genetic testing to date.     Additional history includes idiopathic primary ovarian failure (symptoms starting in 2015, diagnosed in 2018), a couple of sinus infections, and congestion. She is 5'4''. Andriy did believe she had normal Fragile X testing previously, but no other genetic testing related to her primary ovarian failure; results were not available for review today. She has not seen a genetic counselor previously.    Family History:  A three generation pedigree was obtained today and sent to be scanned into the EMR. The family history was significant for the following:    Andriy has no children.    Andriy has one full sister, who is 29 years old and healthy. She is about 5'8'' or 5'9''. Andriy also has a paternal half brother, who is 35 years old and healthy. He is above 6'. Andriy's nieces/nephews from both siblings have no major health concerns.    Andriy's mother has no major health concerns. She is 5'8'' or 5'9''. She has three brothers, who are healthy. One of Andriy's first cousins has a history of lupus and Hashimoto's. Another one of her cousins (female) has a son with muscular dystrophy. Full details were unknown, but we reviewed that there are many kinds of muscular dystrophy that can be due to underlying genetic changes. For example, for  "duchenne type muscular dystrophy, in many cases, an affected male inherits the mutation from his mother who carries one altered copy of the associated gene. Therefore, based on this family history, other family members may have an increased chance to have or be a carrier for a muscular dystrophy. If more information is discovered about this individual's diagnosis, recurrence information for other family members and targeted testing may be available. Another cousin has a history of 3 miscarriages (full details unknown) and 2 healthy children.    Andriy's maternal grandmother passed at 70 years and had a history of type II diabetes. Andriy's maternal grandfather passed at 52 years from a heart attack.    Andriy's father has no major health concerns. He is 6'2''. He has three sisters, who are healthy, and 2 brothers. One brother has a history of heart issues/CHF. The other brother has a history of type II diabetes. After our visit, Andriy updated the team that she has since heard that a paternal first cousin has been diagnosed with pancreatitis (unknown if chronic or not, or if genetic testing has been completed or not). This cousin (female, 35 yrs) also has a history of obesity, MORE, and \"gallbladder inflammation with stones\". Paternal first cousins are otherwise healthy.    Andriy's paternal grandmother passed at 52 years from lung cancer. Andriy's paternal grandfather passed at 52 years from a heart attack.     The family history was negative for known individuals with diagnosed pancreatic cancer, known genetic conditions, Fragile X, ID/DD, birth defects, and cystic fibrosis/CF symptoms. Andriy is of  ancestry on her maternal and paternal side. Consanguinity was denied.    General Genetic Background Information  Genes are the instruction code for our body, and tell our body how to grow and function. Our genes are in every cell of our body, and are packaged in our chromosomes.  Genes and chromosomes come in pairs. " We inherit one copy out of each pair from our mother and one copy of each pair from our father. No one has control over which copy they pass on to their child since it is a random process.   Every person has two copies of each gene.     Genetics of Pancreatitis  There are many known factors for pancreatitis, including genetic factors. Knowns and unknowns about these causes of pancreatitis, focusing on hereditary pancreatitis, were discussed today. Hereditary pancreatitis can be defined based on family history criteria or on genetic findings in an individual. One of the genes that is causative and associated with high risk for pancreatitis is PRSS1. This gene can also cause an increased risk for pancreatic cancer.  Many other genes, such as SPINK1, CTRC, CPA1, CaSR, and others are thought to be moderate risk genes and have an association with pancreatitis. Additionally idiopathic pancreatitis has been found to be associated with changes in the gene for cystic fibrosis (CFTR) as well. The variability in the inheritance of genes associated with hereditary pancreatitis was discussed.      Typical hereditary pancreatitis is a disease that is primarily inherited in an autosomal dominant manner, meaning that a change in one copy of a gene, like PRSS1, is needed to develop the condition. One other gene known to be associated with hereditary pancreatitis, CFTR, typically causes disease when inherited in an autosomal recessive manner, meaning that an individual must inherit a change in the CFTR gene from both their mother and father. The risk factor genes are also inherited in an autosomal dominant fashion, but with these genes most individuals that carry a change do not get pancreatitis. We discussed the complexity of genetic testing for hereditary pancreatitis and that further discussion would be needed when we have the results.    If a known disease causing change was found, this would carry up to a 50% potential risk for  future children and siblings to inherit the genetic change, and a 50% chance of not inheriting it. Not all individuals with genetic changes in these genes go on to develop pancreatitis, and each gene is associated with a different risk for the development of pancreatitis. If a change is found in the CFTR gene, this result would have additional implications (such as chance for cystic fibrosis in an individual and/or their children) and they will be discussed further if applicable. Brief information about cystic fibrosis, its associated symptoms (lung infections and complications, pancreatitis, sinus disease, and/or male infertility), and its variability were discussed today. If a specific genetic variant in a family can be identified, more information about risk for other family members and familial testing would be available.    Genetic Testing  There are genetic saliva or blood tests available that can help determine if an individual has changes in genes associated with pancreatitis (including PRSS1, SPINK1, CTRC, and CFTR genes). As the cause of Tatis pancreatitis is unclear and a genetic factor suspected, comprehensive genetic testing will give useful information to aid in directing medical management, reproductive risks, and family member risks. Specifically, if a underlying explanation for Andriy's pancreatis can be found, it may give more information about how to appropriate manage her or give information about what to expect. In addition, it is possible an underlying genetic cause may predispose Andriy to other health risks. Knowing about these additional health risks can help us stay ahead of her  healthcare to more appropriately screen for and potentially prevent other complications. Lastly, discovering an underlying reason may help predict the chance for other family members to have pancreatitis and/or other genetic conditions, such as cystic fibrosis. Given Andriy's young age of onset for her pancreatitis  and family history of pancreatitis, genetic testing assessing hereditary factors is especially beneficial and medically necessary for Andriy.    Limitations and risks of genetic testing were also discussed, including that our genetic testing in 2021 is only as good as our current knowledge of genetics and genes. Therefore, a negative test result does not rule out all genetic changes and causes of pancreatitis. Other follow up may be recommended in the future.    [facility] is a genetic testing company that specializes in causes of pancreatitis. We discussed benefits and limitations of testing both through [facility] and another commercial laboratory today. This included information about testing coverage/genes tested, cost, and testing protocol. Of note, [facility] includes testing for additional genetic risk factors for pancreatitis not included elsewhere.    Andriy elected to proceed with testing through [facility], pending coverage. Orders will be placed through their online secure portal, and Andriy will then log in and enter insurance information and consent for testing. Once Andriy initiates this testing process, insurance information will be obtained by the lab and shared with her, and she will be sent a saliva collection kit. Information about [facility] [facility] [employer]'s coverage process and maximum OOP cost was discussed briefly today and will be discussed in detail with Andriy by [facility]. Once Andriy mails this sample kit back to [place], testing will be initiated. Results from there take 4-6 weeks, at which time I would call her. Follow up for Andriy and her family would depend on results.    Result Outcomes  Once results are available, we discussed the following possibilities:  1. One disease causing mutation found: Individuals with typical hereditary pancreatitis typically have one disease causing change detected.  Depending on the change found and other factors, individuals are at risk for  development of pancreatitis, and other family members would be at risk.  2. One or two possibly contributory mutation found:  This result has various implications depending the specific change found.  The change(s) may contribute to disease.   3. Two mutations in the gene for CF:  Individuals with cystic fibrosis or cystic fibrosis-related disorder usually have two mutations in this gene, one inherited from their mother and one from their father. Depending on the changes in the gene and other factors, some people may have respiratory, GI symptoms, and/or infertility issues.   4. No mutations in these genes: No genetic evidence to support hereditary pancreatitis at this time. It is possible we will learn more about the genetics of pancreatitis in the future and be able to offer more comprehensive genetic testing.   5. The finding of an unknown change: This happens when the laboratory detects a change but they do not know if it is found only in individuals with the condition, or if the change is found in individuals without pancreatitis as well. If you have this type of result, we will discuss it further at that time.    Premature Ovarian Failure Genetic Testing  Primary ovarian insufficiency (POI), also known as premature ovarian failure, happens when a woman's ovaries stop working normally before 40 years. Etiology for POI is variable, and in many cases the exact cause of POI is unknown. A percentage of POI is thought to be due to an underlying genetic etiology. This includes Fragile X syndrome, Manriquez syndrome, and mutation(s) in a handful of other syndromic and non-syndromic genes. Andriy reportedly has had genetic testing for Fragile X syndrome that was normal, but additional genetic testing would be available related to this history.     We discussed options today of ordering additional genetic testing related to POI (from another lab) vs focusing only on pancreatitis testing today, with POI genetic testing being  discussed at a return visit to [place] or locally. Andriy elected to only proceed with pancreatitis genetic testing today, and genetic testing options related to POI were therefore not fully discussed today. She was aware this could be further discussed upon return visit to [place] or locally. [facility] and the [facility] have genetics services from my understanding. Andriy could call them directly and/or have a provider place a genetics referral to be seen there. Virtual genetic counselors can also be found at [email or web address] I am happy to assist with these options if helpful.      It was a pleasure to meet with Andriy virtually today. She had no additional questions. My contact information was shared via [employer] for future questions or concerns that arise.    Plan  1. Consent was obtained and orders were placed for PancreasDx testing through [facility]. Testing process will be initiated by the patient through the [facility] Portal. From there, [facility] will send Andriy a saliva sample, which she will mail back directly to the lab.  2. Results will be returned to us 4-6 weeks after testing starts and I will notify Andriy of the results as soon as we receive them. This test can detect many changes in the known genes associated with hereditary pancreatitis; however, there remain genetic contributions that are not well understood.   3. Additional medical management recommendations or family member recommendations will be discussed in the future based on results.  4. Genetic testing would be available related to her history of primary ovarian failure locally or here if/when she returns to [place]. I am happy to assist with this if helpful.     Lexi Sam MS, [facility]  Genetic Counseling  Genetics and Metabolism Division  [facility]   Phone: [phone]  Pager: [phone]     CC:  PCP, patient,   Chris      Video-Visit Details  Type of service:  Video Visit  Video End Time: 50 minutes  Originating Location (pt. Location): [place] in [place]  Distant Location (provider location):   [facility]   Platform used for Video Visit: [employer]

## 2021-04-14 NOTE — TELEPHONE ENCOUNTER
"Called patient r/t symptoms after EUS/ERCP yesterday.    Feeling gas pain, overall abd pain, nauseated. No vomiting. Passing some gas and belching. Canceled visit with Dr Hyman due to symptoms. Taking her home tramadol q6hrs and \"it isn't touching the pain\". Does not feel like typical pancreatitis pain.    Advised CLD and ambulation. Zofran ordered per protocol to [facility]. Message sent to Dr. Perez regarding symptoms and to [facility] team to see if her endocrine visit can be done virtually.    Clarice Sun, RN Care Coordinator      "

## 2021-04-14 NOTE — PROGRESS NOTES
Andriy is a 30 year old who is being evaluated via a billable video visit.      How would you like to obtain your AVS? MyChart  If the video visit is dropped, the invitation should be resent by: Text to cell phone: [phone]  Will anyone else be joining your video visit? No    Video Start Time: 9:00    Video-Visit Details    Type of service:  Video Visit    Video End Time:9:45    Originating Location (pt. Location): Other [facility] in [place]    Distant Location (provider location):  [facility]     Platform used for Video Visit: [employer]

## 2021-04-14 NOTE — LETTER
4/14/2021         RE: Andriy Yoon  [address] [place] [address]        Dear Colleague,    Thank you for referring your patient, Andriy Yoon, to the [facility]. Please see a copy of my visit note below.    Andriy is a 30 year old who is being evaluated via a billable video visit.      How would you like to obtain your AVS? MyChart  If the video visit is dropped, the invitation should be resent by: Text to cell phone: [phone]  Will anyone else be joining your video visit? No    Video Start Time: 9:00    Video-Visit Details    Type of service:  Video Visit    Video End Time:9:45    Originating Location (pt. Location): Other [facility] in [place]    Distant Location (provider location):  [facility]     Platform used for Video Visit: Inbilin      Pediatric Endocrinology/ Islet Autotransplant  Chronic Pancreatitis Consult    Patient Active Problem List   Diagnosis     Idiopathic chronic pancreatitis (H)     Chronic pancreatitis (H)       Assessment/Plan:  1.  Chronic pancreatitis, considered idiopathic but very likely is genetic.   2.  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  Labs this week were diagnostic ( mg/dL, HbA1c 6.5%)  3.  Premature ovarian failure    Andriy is a 30 year old with pancreatitis, considering surgical management. She has new onset diabetes mellitus secondary to chronic pancreatitis but is also quite insulin resistant with an excellent C-peptide response to mixed meal testing.    For new onset diabetes, given the insulin resistance component, I would recommend starting with metformin therapy extended release 1000 mg daily and increase to 2000 mg daily in 2 weeks if tolerated.  She is likely to eventually require insulin therapy.  Weight loss will be hugely beneficial and would likely delay need for insulin therapy.  Andriy has struggled to lose weight in the past, but we did  discuss that lower carb diet and exercise will be beneficial even in the absnce of weight loss.  Certainly if she does have a TPIAT in the future, we would target a lower BMI before surgery, <30 kg/m2 (180 pounds), but diabetes outcomes would be best if she can get to 25-27 kg/m2 (150-160 pounds).     For premature ovarian failure, she is on an appropriate hormonal regimen, although has struggled with some breakthrough and weight gain, I do not have an alternate therapy to suggest.     The primary purpose of today's visit was to review the patient's endocrine history and provide counseling on islet autotransplantation.  We discussed the procedure of islet autotransplant, the post-operative management, and the prognosis.  We specifically discussed that all patients are on insulin after this procedure, typically for at least several months.  About 1/3rd of patients will become insulin independent in general.  However,because Andriy already has mild diabetes she would be expected to remain on insulin after a TPIAT.  Of the patients who require life long insulin therapy, most will have some graft function and a significant portion can maintain reasonable glycemic control on once injection per day of basal insulin.  However, about 30-40% of patients will require 4-6 injections per day or an insulin pump for management-- the risk for this is higher with obesity and insulin resistance.  Only 10% have no or minimal islet function;  These patients are at higher risk for a labile form of diabetes mellitus that can be difficult to manage.  The main goal of the islet autotransplant procedure is to preserve enough beta cell mass to make diabetes manageable.  Because of the high risk of diabetes mellitus, all patients must be willing to accept diabetes and insulin injections as a trade off for relief from pancreatic pain.    All surgical consults are reviewed by our multi-disciplinary team to determine if surgery is an appropriate  next option.  I    Charissa Hyman MD  [facility]  Phone:  [phone]  Fax:  [phone]    Review of prior external note(s) from - CareEverywhere information from [facility] reviewed  Review of the result(s) of each unique test - as in note below, internal and external labs  Discussion of management or test interpretation with external physician/other qualified healthcare professional/appropriate source - Dr. Perez, Dr. Melissa  Prescription drug management  90 minutes spent on the date of the encounter doing chart review, history and exam, documentation and further activities per the note        CC:  Chronic pancreatitis, surgical evaluation    HPI:  Andriy Yoon is a 30 year old female referred by Dr. Yefri Perez for new patient consultation of endocrine function in the setting of chronic pancreatitis.    Pancreatitis history is as follows:  Andriy has recurrent acute on chronic pancreatitis that was diagnosed first at age 25 years in 2015. She recalls specifically that she was at her CPA exam and had abrupt onset of abdominal pain during the exam, went to ED subsequently and was diagnosed with acute pancreatitis.  Her imaging in 2015 also showed chronic pancreatitis, so although she was basically asymptomatic this clearly started earlier. She recalls stomach pains recurrently in childhood, but nothing severe enough to bring her in for evaluation. After this first episode, she had a second one 3-4 weeks later and then episodes about 1 times per month, until she had her cholecystectomy in 2017 or 2018. After this, it settled down to 3 times per year, except this past year where she has had 7-8 flares. She has an intermittent pain phenotype, most days has no pain. 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 She has also had ERCP procedures in 2015, and then again here yesterday for clearance of pancreatic  ductal stones. She takes a low dose of pancreatic enzymes, and uses tramadol and valium for pain management for attacks at home. She does not like to into the hospital and has her own plan to manage episodes at home.  Incidentally she also had premature ovarian failure diagnosed in 2018, and she thinks she had ovarian pain in 2015 that went undiagnosed. Her POF is considered idiopathic, and she recalls work up that included fragile X gene mutation analysis.  She is now on estrogen and progesterone, struggled with menopausal and mood symptoms on a cyclic regimen so she now takes estrace 2mg and prometrium 200 mg as a continuous regimen.  These hormone replacement therapies were clearly started after the onset of pancreatitis.  She has had pre-diabetes, which she says varies by her diet, and once in past was on metformin for pre-diabetes.  Today she has labs clearly in the early diabetes range, as below.    Evaluation/ imaging/ treatments:  Elevated amylase and lipase by history:  yes  1/6/2021 lipase 281 (ref 4-60).   Lipase values before 2019 are not available in care everywhere.  Etiology of disease:  Considered idiopathic -- course suggests genetic but is pending genetic eval here  Number of hospitalizations in last 1 year: 0 (manages at home  Recent imaging studies: below  CT 9/5/19:  Pancreas coarse calcification head and tail of the pancreas consistent with calcifications of chronic pancreatitis.    5/29/15 EUS:  indicative of moderate chronic pancreatitis in the entire pancreas.         The parenchyma had calcifications, diffuse echogenicity, hyperechoic         strands, hypoechoic foci, shadowing foci and hyperechoic foci with         shadowing. The pancreatic duct had duct dilation. The pancreatic         duct measured up to 4.6 mm in diameter. As the duct is followed         towards the tail the lumen appears narrowed with hyperechoic duct         wall suggesting an underlying stricture. There is a 3mm  diminutive         stone with acoustic shadowing within the duct in the genu.        The pancreas was then surveyed from the duodenal stations. The         pancreatic parenchyma has a similar appearance. There is a         calcification noted on the course of the main PD measuring 8.7 x         6.9. This does not appear to be intraductal but rather parenchymal         calcification. The common bile duct was identified and measured 3.4.         The CBD appeared normal on its course without evidence of defects,         thickening or irregularity.    Yesterday EUS for the ERCP here:  In terms of endosonography, there was diffuse heterogeneity of        the pancreatic parenchyma with multifocal lobularity, strands and foci.        There were no solid or liquid lesions of the pancreatic parenchyma        though there were multifocal calcifications in the genu, head and        uncinate. Due to shadowing it was unclear if the stone involved the main        duct which was ventral dominant, however given there was only minimal        dilation of the duct in the genu to 4mm upstream of the stone in the        neck, the duct is thought to be at least partially patent. The main duct        then tapered to 2mm in the body and 1mm in the tail. There duct walls        were hyperechoic and there were numerous dilated side branches in the        body and tail. There was no overt atrophy of the gland.  Medical treatment(s): as noted above  ERCP procedures: yes, 2;   2015 and 2021- pending GI dictation for interventions  Prior pancreatic surgery:  None  Has had cholecystectomy  No nerve blocks done.    Endocrine history:  As noted in the HPI above.  Additional data below    DXA from 2019  FINDINGS:  The L1 to L4 vertebral bone mineral density is equal to 1.353 g/cm squared with a T score of 1.4.  The left femoral neck bone mineral density is equal to 1.040 g/cm squared with a T score of 0.0.  The total hip bone mineral density is  equal to 1.042 g/cm squared with a T score of 0.3.    Mixed meal  Component      Latest Ref Rng & Units 4/12/2021 4/12/2021 4/12/2021           9:35 AM 11:08 AM 12:10 PM   Sodium      133 - 144 mmol/L 138     Potassium      3.4 - 5.3 mmol/L 3.8     Chloride      94 - 109 mmol/L 107     Carbon Dioxide      20 - 32 mmol/L 27     Anion Gap      3 - 14 mmol/L 4     Glucose      70 - 99 mg/dL 131 (H) 196 (H) 149 (H)   Urea Nitrogen      7 - 30 mg/dL 9     Creatinine      0.52 - 1.04 mg/dL 0.73     GFR Estimate      >60 mL/min/1.73:m2 >90     GFR Estimate If Black      >60 mL/min/1.73:m2 >90     Calcium      8.5 - 10.1 mg/dL 8.8     Bilirubin Total      0.2 - 1.3 mg/dL 0.4     Albumin      3.4 - 5.0 g/dL 3.6     Protein Total      6.8 - 8.8 g/dL 7.4     Alkaline Phosphatase      40 - 150 U/L 73     ALT      0 - 50 U/L 36     AST      0 - 45 U/L 20     C-Peptide      0.9 - 6.9 ng/mL 4.3 13.0 (H) 6.9     Lab Results   Component Value Date    A1C 6.5 04/12/2021       Obesity:  Current BMI of 31.  Weight of 180 pounds would put BMI at 30 kg/m2, 160 = 26.6, and 150 = 25.0.         Review of Systems:  A comprehensive 10 point review of systems was performed and was negative except as noted in the HPI above.    Past Medical History:  Past Medical History:   Diagnosis Date     Chronic pancreatitis (H)      Diabetes (H)      Past Surgical History:   Procedure Laterality Date     CHOLECYSTECTOMY       ENDOSCOPIC RETROGRADE CHOLANGIOPANCREATOGRAM         Current medications:  Current Outpatient Medications   Medication Sig Dispense Refill     amylase-lipase-protease (CREON 12) 21347-599[address]-39661 units CPEP        amylase-lipase-protease (CREON) 31923-77409 units CPEP per EC capsule Take 2-3 with meals / 1-2 with snacks, up to 15 per day. 450 capsule 6     diazepam (VALIUM) 5 MG tablet Take 5 mg by mouth       estradiol (ESTRACE) 2 MG tablet Take 2 mg by mouth       progesterone (PROMETRIUM) 200 MG capsule Take 200 mg by mouth        traMADol (ULTRAM) 50 MG tablet TAKE 1 TO 2 TABLETS BY MOUTH EVERY 6 HOURS AS NEEDED FOR ABDOMINAL PAIN         Family History:  The family history was reviewed with particular attention to diabetes and pancreatitis history, and updated by me as pertinent, and is as documented below.    Family History   Problem Relation Age of Onset     Pancreatitis No family hx of    Diabetes  Maternal GM  Also on maternal side:  Cousin with muscular dystrophy (or atrophy) of unclear exact diagnosis (1 year old), and another cousin with multiple autoimmune disorders.     Social History:  Andriy is  and lives with her  in [place].  She has her own business and works full time.       Physical Exam:  General:  Appearance is normal, no acute distress  HEENT:  NC/AT, sclera appear white  Neck:  No obvious thyromegaly  CV/Lungs:  Non distressed breathing  Skin:  No apparent rashes  Neuro:  Normal mental status  Psych:  Normal affect      Results:  Hemoglobin A1c levels (this and prior visits):  Lab Results   Component Value Date    A1C 6.5 04/12/2021       Mixed meal test results (from [facility]):  C Peptide   Date Value Ref Range Status   04/12/2021 6.9 0.9 - 6.9 ng/mL Final   04/12/2021 13.0 (H) 0.9 - 6.9 ng/mL Final   04/12/2021 4.3 0.9 - 6.9 ng/mL Final     Glucose   Date Value Ref Range Status   04/13/2021 137 (H) 70 - 99 mg/dL Final   04/12/2021 149 (H) 70 - 99 mg/dL Final   04/12/2021 196 (H) 70 - 99 mg/dL Final   04/12/2021 131 (H) 70 - 99 mg/dL Final         Liver enzymes:  AST   Date Value Ref Range Status   04/13/2021 22 0 - 45 U/L Final     ALT   Date Value Ref Range Status   04/13/2021 33 0 - 50 U/L Final     No results found for: BILICONJ   Bilirubin Total   Date Value Ref Range Status   04/13/2021 0.3 0.2 - 1.3 mg/dL Final     Albumin   Date Value Ref Range Status   04/13/2021 3.7 3.4 - 5.0 g/dL Final     Protein Total   Date Value Ref Range Status   04/13/2021 7.4 6.8 - 8.8 g/dL Final      Alkaline  Phosphatase   Date Value Ref Range Status   04/13/2021 73 40 - 150 U/L Final       Creatinine:  Creatinine   Date Value Ref Range Status   04/13/2021 0.67 0.52 - 1.04 mg/dL Final   ]    Complete Blood Count:  CBC RESULTS:   Recent Labs   Lab Test 04/13/21  0914   WBC 7.5   RBC 4.66   HGB 13.6   HCT 40.7   MCV 87   MCH 29.2   MCHC 33.4   RDW 12.1            Again, thank you for allowing me to participate in the care of your patient.        Sincerely,        Charissa Hyman MD

## 2021-04-14 NOTE — OR NURSING
Pt states that her pain is mild, and that she is feeling well enough to go home. Dr. Perez, ok'd patient to discharge.

## 2021-04-14 NOTE — LETTER
"4/14/2021      RE: Andriy Yoon  [address] [place] [address]       Genetic Counseling Consultation  This note is a summary of Andriy Yoon s virtual visit to [facility] on April 14, 2021. She was seen today for discussion of genetic testing as part of her evaluation for consideration of surgery due to a history of acute and chronic pancreatitis. Genetic testing and a genetic counseling consultation was recommended to aid in better understanding the cause the pancreatitis, as well as provide additional information helpful in medical management and genetic risks for family members.     Summary of visit:  1. Genetics of pancreatitis were discussed, including known involved genes, inheritance patterns, and impact on family members.    2.Genetic testing options were discussed, and PancreasDx panel testing through [employer] was ordered through [employer] and [employer]. Andriy can initiate the testing process through logging in to the portal. Once insurance is verified and testing commences, results will be available in approximately 4-6 weeks. I will call her at that time.  3. Contact information was given for future questions or concerns that arise.  4. Genetic testing would be available related to her history of primary ovarian failure locally or here if/when she returns to [place].    Personal Medical History:  Andriy has a history of acute and chronic calcific pancreatitis, diagnosed in 2015 when she was ~25 years old. She notes many hospitalizations and with elevated lipases/amylases, as well as many episodes managed at home. She does feel that clinical attacks of pain has accelerated in the recent months. She has undergone a substantial work-up including multiple CTs, and endoscopic ultrasound which reported \"pancreatic calcifications but findings of moderate chronic pancreatitis\". CT scans have shown fairly extensive calcification. Per GI notes, she has also had ERCP procedures in " 2015, and then again recently for clearance of pancreatic ductal stones. She is on pancreatic enzymes. She did undergo a cholecystectomy ~2016 for suspected biliary component and she felt that this reduced the frequency of her clinical attacks. 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 See GI notes for full pancreatitis history.    Andriy has no smoking history. Excessive use of alcohol or other triggering factors is denied and work-up for pancreatitis appears to be otherwise negative for known causative factors. She has not had pancreatitis genetic testing to date.     Additional history includes idiopathic primary ovarian failure (symptoms starting in 2015, diagnosed in 2018), couple of sinus infections, and congestion. Andriy did believe she had normal Fragile X testing previously, but no other genetic testing related to her primary ovarian failure; results were not available for review today. She is 5'4''.    Family History:  A three generation pedigree was obtained today and sent to be scanned into the EMR. The family history was significant for the following:    Andriy has no children.    Andriy has one full sister, who is 29 years old and healthy. She is about 5'8'' or 5'9''. Andriy also has a paternal half brother, who is 35 years old and healthy. He is above 6'. Andriy's nieces/nephews from both siblings have no major health concerns.    Andriy's mother has no major health concerns. She is 5'8'' or 5'9''. She has three brothers, who are healthy. One of Andriy's first cousins has a history of lupus and Hashimoto's. Another one of her cousins (female) has a son with muscular dystrophy. Details were unknown, but we reviewed that there are many kinds of muscular dystrophy due to underlying genetic changes. For example, duchenne type muscular dystrophy ***. If more information is discovered about this individual's diagnosis, recurrence information for other family members would be  "available. Another cousin has a history of 3 miscarriages (details unknown) and 2 healthy children.    Andriy's maternal grandmother passed at 70 years and had a history of type II diabetes. Andriy's maternal grandfather passed at 52 years from a heart attack.    Andriy's father has no major health concerns. He is 6'2''. He has three sisters, who are healthy, and 2 brothers. One brother has a history of heart issues/CHF. The other brother has a history of \"severe\" type II diabetes. After our visit, Andriy updated the team that she has since heard that a paternal first cousin has been diagnosed with pancreatitis (unknown if chronic or not, or if genetic testing has been completed). This cousin (female, 35 yrs) also has a history of obesity, MORE, and \"gallbladder inflammation with stones\". Paternal first cousins are otherwise healthy.    Andriy's paternal grandmother passed at 52 years from lung cancer. Andriy's paternal grandfather passed at 52 years from a heart attack.     The family history was negative for known individuals with diagnosed pancreatic cancer, known genetic conditions, Fragile X, ID/DD, birth defects, and cystic fibrosis/CF symptoms. Andriy is of  ancestry on her maternal and paternal side. Consanguinity was denied.    General Genetic Background Information  Genes are the instruction code for our body, and tell our body how to grow and function. Our genes are in every cell of our body, and are packaged in our chromosomes.  Genes and chromosomes come in pairs. We inherit one copy out of each pair from our mother and one copy of each pair from our father. No one has control over which copy they pass on to their child since it is a random process.   Every person has two copies of each gene.     Genetics of Pancreatitis  There are many known factors for pancreatitis, including genetic factors. Knowns and unknowns about these causes of pancreatitis, focusing on hereditary pancreatitis, were discussed " today. Hereditary pancreatitis can be defined based on family history criteria or on genetic findings in an individual. One of the genes that is causative and associated with high risk for pancreatitis is PRSS1. This gene can also cause an increased risk for pancreatic cancer.  Many other genes, such as SPINK1, CTRC, CPA1, CaSR, and others are thought to be moderate risk genes and have an association with pancreatitis. Additionally idiopathic pancreatitis has been found to be associated with changes in the gene for cystic fibrosis (CFTR) as well. The variability in the inheritance of genes associated with hereditary pancreatitis was discussed.      Typical hereditary pancreatitis is a disease that is primarily inherited in an autosomal dominant manner, meaning that a change in one copy of a gene, like PRSS1, is needed to develop the condition. One other gene known to be associated with hereditary pancreatitis, CFTR, typically causes disease when inherited in an autosomal recessive manner, meaning that an individual must inherit a change in the CFTR gene from both their mother and father. The risk factor genes are also inherited in an autosomal dominant fashion, but with these genes most individuals that carry a change do not get pancreatitis. We discussed the complexity of genetic testing for hereditary pancreatitis and that further discussion would be needed when we have the results.    If a known disease causing change was found, this would carry up to a 50% potential risk for future children and siblings to inherit the genetic change, and a 50% chance of not inheriting it. Not all individuals with genetic changes in these genes go on to develop pancreatitis, and each gene is associated with a different risk for the development of pancreatitis. If a change is found in the CFTR gene, this result would have additional implications (such as chance for cystic fibrosis in an individual and/or their children) and they  will be discussed further if applicable. Brief information about cystic fibrosis, its associated symptoms (lung infections and complications, pancreatitis, sinus disease, and/or male infertility), and its variability were discussed today. If a specific genetic variant in a family can be identified, more information about risk for other family members and familial testing would be available.    Genetic Testing  There are genetic saliva or blood tests available that can help determine if an individual has changes in genes associated with pancreatitis (including PRSS1, SPINK1, CTRC, and CFTR genes). As the cause of Tatis pancreatitis is unclear and a genetic factor suspected, comprehensive genetic testing will give useful information to aid in directing medical management, reproductive risks, and family member risks. Specifically, if a underlying explanation for Tatis pancreatis can be found, it may give more information about how to appropriate manage her or give information about what to expect. In addition, it is possible an underlying genetic cause may predispose Andriy to other health risks. Knowing about these additional health risks can help us stay ahead of her  healthcare to more appropriately screen for and potentially prevent other complications. Lastly, discovering an underlying reason may help predict the chance for other family members to have pancreatitis and/or other genetic conditions, such as cystic fibrosis. Given Andriy's young age of onset for her pancreatitis and family history of pancreatitis, genetic testing assessing hereditary factors is especially beneficial and medically necessary for Andriy.    Limitations and risks of genetic testing were also discussed, including that our genetic testing in 2021 is only as good as our current knowledge of genetics and genes. Therefore, a negative test result does not rule out all genetic changes and causes of pancreatitis. Other follow up may be  recommended in the future.    [employer] is a genetic testing company that specializes in causes of pancreatitis. We discussed benefits and limitations of testing both through [facility] and another commercial laboratory today. This included information about testing coverage/genes tested, cost, and testing protocol. Of note, [facility] includes testing for additional genetic risk factors for pancreatitis not included elsewhere.    Andriy elected to proceed with testing through [facility], pending coverage. Orders will be placed through their online secure portal, and Andriy will then log in and enter insurance information and consent for testing. Once Andriy initiates this testing process, insurance information will be obtained by the lab and shared with her, and she will be sent a saliva collection kit. Information about [facility] coverage process and maximum OOP cost was discussed briefly today and will be discussed in detail with Andriy by [facility]. Once Andriy mails this sample kit back to [facility], testing will be initiated. Results from there take 4-6 weeks, at which time I would call her. Follow up for Andriy and her family would depend on results.    Result Outcomes  Once results are available, we discussed the following possibilities:  1. One disease causing mutation found: Individuals with typical hereditary pancreatitis typically have one disease causing change detected.  Depending on the change found and other factors, individuals are at risk for development of pancreatitis, and other family members would be at risk.  2. One or two possibly contributory mutation found:  This result has various implications depending the specific change found.  The change(s) may contribute to disease.   3. Two mutations in the gene for CF:  Individuals with cystic fibrosis or cystic fibrosis-related disorder usually have two mutations in this gene, one inherited from their mother and one  from their father. Depending on the changes in the gene and other factors, some people may have respiratory, GI symptoms, and/or infertility issues.   4. No mutations in these genes: No genetic evidence to support hereditary pancreatitis at this time. It is possible we will learn more about the genetics of pancreatitis in the future and be able to offer more comprehensive genetic testing.   5. The finding of an unknown change: This happens when the laboratory detects a change but they do not know if it is found only in individuals with the condition, or if the change is found in individuals without pancreatitis as well. If you have this type of result, we will discuss it further at that time.    Premature Ovarian Failure Genetic Testing:  ***      Plan  1. Consent was obtained and orders were placed for PancreasDx testing through [employer]. Testing process will be initiated by the patient through the [employer] Portal. From there, [facility] will send Andriy a saliva sample, which she will mail back directly to the lab.  2. Results will be returned to us 4-6 weeks after testing starts and I will notify Andriy of the results as soon as we receive them. This test can detect many changes in the known genes associated with hereditary pancreatitis; however, there remain genetic contributions that are not well understood.   3. Additional medical management recommendations or family member recommendations will be discussed in the future based on results.  4. Genetic testing would be available related to her history of primary ovarian failure locally or here if/when she returns to [place].        It was a pleasure to meet with Andriy virtually today. She had no additional questions. My contact information was shared via [employer] for future questions or concerns that arise.     Lexi Sam MS, [facility]  Genetic Counseling  Genetics and Metabolism Division  [facility]   Phone:  [phone]  Pager: [phone]        CC:  PCP, ***    Video-Visit Details  Type of service:  Video Visit  Video End Time: ***  Originating Location (pt. Location): Hotel in [place]  Distant Location (provider location):  [facility]   Platform used for Video Visit: Samuel        Genetic Counseling Consultation  This note is a summary of Andriy Yoon s virtual visit to [facility] on April 14, 2021. She was seen today for discussion of pancreatitis genetic testing as part of her evaluation for consideration of surgery due to a history of acute and chronic pancreatitis. Genetic testing and a genetic counseling consultation was recommended to aid in better understanding the cause the pancreatitis, as well as provide additional information helpful in medical management and genetic risks for family members. She was in [place] during our virtual visit.    Summary of visit:  1. Genetics of pancreatitis were discussed, including known involved genes, inheritance patterns, and impact on family members.    2. Genetic testing options were discussed, and PancreasDx panel testing through [employer] was ordered through [employer] and [email or web address] portal. Andriy can initiate the testing process through logging in to the portal. Once insurance is verified and testing commences, results will be available in approximately 4-6 weeks. I will call her at that time.  3. Contact information was given for future questions or concerns that arise.  4. Genetic testing would be available related to her history of primary ovarian failure locally or here if/when she returns to [place]. I am happy to assist with this if helpful.    Personal Medical History:  Andiry has a history of acute and chronic calcific pancreatitis, diagnosed in 2015 when she was ~25 years old. She notes many hospitalizations with elevated lipases/amylases, as well as many episodes managed at home. She does feel that clinical  "attacks of pain has accelerated in the recent months. She has undergone a substantial work-up including multiple CTs, and endoscopic ultrasound which reported \"pancreatic calcifications but findings of moderate chronic pancreatitis\". CT scans have shown fairly extensive calcification. Per GI notes, she has also had ERCP procedures in 2015, and then again recently for clearance of pancreatic ductal stones. She is on pancreatic enzymes. She did undergo a cholecystectomy ~2016 for suspected biliary component and she felt that this reduced the frequency of her clinical attacks. 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 See GI notes for full pancreatitis history.    Andriy has no smoking history. Excessive use of alcohol or other triggering factors is denied and work-up for pancreatitis appears to be otherwise negative for known causative factors. She has not had pancreatitis genetic testing to date.     Additional history includes idiopathic primary ovarian failure (symptoms starting in 2015, diagnosed in 2018), a couple of sinus infections, and congestion. She is 5'4''. Andriy did believe she had normal Fragile X testing previously, but no other genetic testing related to her primary ovarian failure; results were not available for review today. She has not seen a genetic counselor previously.    Family History:  A three generation pedigree was obtained today and sent to be scanned into the EMR. The family history was significant for the following:    Andriy has no children.    Andriy has one full sister, who is 29 years old and healthy. She is about 5'8'' or 5'9''. Andriy also has a paternal half brother, who is 35 years old and healthy. He is above 6'. Andriy's nieces/nephews from both siblings have no major health concerns.    Andriy's mother has no major health concerns. She is 5'8'' or 5'9''. She has three brothers, who are healthy. One of Andriy's first cousins has a history of lupus and " "Hashimoto's. Another one of her cousins (female) has a son with muscular dystrophy. Full details were unknown, but we reviewed that there are many kinds of muscular dystrophy that can be due to underlying genetic changes. For example, for duchenne type muscular dystrophy, in many cases, an affected male inherits the mutation from his mother who carries one altered copy of the associated gene. Therefore, based on this family history, other family members may have an increased chance to have or be a carrier for a muscular dystrophy. If more information is discovered about this individual's diagnosis, recurrence information for other family members and targeted testing may be available. Another cousin has a history of 3 miscarriages (full details unknown) and 2 healthy children.    Andriy's maternal grandmother passed at 70 years and had a history of type II diabetes. Andriy's maternal grandfather passed at 52 years from a heart attack.    Andriy's father has no major health concerns. He is 6'2''. He has three sisters, who are healthy, and 2 brothers. One brother has a history of heart issues/CHF. The other brother has a history of type II diabetes. After our visit, Andriy updated the team that she has since heard that a paternal first cousin has been diagnosed with pancreatitis (unknown if chronic or not, or if genetic testing has been completed or not). This cousin (female, 35 yrs) also has a history of obesity, MORE, and \"gallbladder inflammation with stones\". Paternal first cousins are otherwise healthy.    Andriy's paternal grandmother passed at 52 years from lung cancer. Andriy's paternal grandfather passed at 52 years from a heart attack.     The family history was negative for known individuals with diagnosed pancreatic cancer, known genetic conditions, Fragile X, ID/DD, birth defects, and cystic fibrosis/CF symptoms. Andriy is of  ancestry on her maternal and paternal side. Consanguinity was " denied.    General Genetic Background Information  Genes are the instruction code for our body, and tell our body how to grow and function. Our genes are in every cell of our body, and are packaged in our chromosomes.  Genes and chromosomes come in pairs. We inherit one copy out of each pair from our mother and one copy of each pair from our father. No one has control over which copy they pass on to their child since it is a random process.   Every person has two copies of each gene.     Genetics of Pancreatitis  There are many known factors for pancreatitis, including genetic factors. Knowns and unknowns about these causes of pancreatitis, focusing on hereditary pancreatitis, were discussed today. Hereditary pancreatitis can be defined based on family history criteria or on genetic findings in an individual. One of the genes that is causative and associated with high risk for pancreatitis is PRSS1. This gene can also cause an increased risk for pancreatic cancer.  Many other genes, such as SPINK1, CTRC, CPA1, CaSR, and others are thought to be moderate risk genes and have an association with pancreatitis. Additionally idiopathic pancreatitis has been found to be associated with changes in the gene for cystic fibrosis (CFTR) as well. The variability in the inheritance of genes associated with hereditary pancreatitis was discussed.      Typical hereditary pancreatitis is a disease that is primarily inherited in an autosomal dominant manner, meaning that a change in one copy of a gene, like PRSS1, is needed to develop the condition. One other gene known to be associated with hereditary pancreatitis, CFTR, typically causes disease when inherited in an autosomal recessive manner, meaning that an individual must inherit a change in the CFTR gene from both their mother and father. The risk factor genes are also inherited in an autosomal dominant fashion, but with these genes most individuals that carry a change do not get  pancreatitis. We discussed the complexity of genetic testing for hereditary pancreatitis and that further discussion would be needed when we have the results.    If a known disease causing change was found, this would carry up to a 50% potential risk for future children and siblings to inherit the genetic change, and a 50% chance of not inheriting it. Not all individuals with genetic changes in these genes go on to develop pancreatitis, and each gene is associated with a different risk for the development of pancreatitis. If a change is found in the CFTR gene, this result would have additional implications (such as chance for cystic fibrosis in an individual and/or their children) and they will be discussed further if applicable. Brief information about cystic fibrosis, its associated symptoms (lung infections and complications, pancreatitis, sinus disease, and/or male infertility), and its variability were discussed today. If a specific genetic variant in a family can be identified, more information about risk for other family members and familial testing would be available.    Genetic Testing  There are genetic saliva or blood tests available that can help determine if an individual has changes in genes associated with pancreatitis (including PRSS1, SPINK1, CTRC, and CFTR genes). As the cause of Andriy's pancreatitis is unclear and a genetic factor suspected, comprehensive genetic testing will give useful information to aid in directing medical management, reproductive risks, and family member risks. Specifically, if a underlying explanation for Andriy's pancreatis can be found, it may give more information about how to appropriate manage her or give information about what to expect. In addition, it is possible an underlying genetic cause may predispose Andriy to other health risks. Knowing about these additional health risks can help us stay ahead of her  healthcare to more appropriately screen for and potentially  prevent other complications. Lastly, discovering an underlying reason may help predict the chance for other family members to have pancreatitis and/or other genetic conditions, such as cystic fibrosis. Given Andriy's young age of onset for her pancreatitis and family history of pancreatitis, genetic testing assessing hereditary factors is especially beneficial and medically necessary for Andriy.    Limitations and risks of genetic testing were also discussed, including that our genetic testing in 2021 is only as good as our current knowledge of genetics and genes. Therefore, a negative test result does not rule out all genetic changes and causes of pancreatitis. Other follow up may be recommended in the future.    [facility] is a genetic testing company that specializes in causes of pancreatitis. We discussed benefits and limitations of testing both through [facility] and another commercial laboratory today. This included information about testing coverage/genes tested, cost, and testing protocol. Of note, [facility] includes testing for additional genetic risk factors for pancreatitis not included elsewhere.    Andriy elected to proceed with testing through [facility], pending coverage. Orders will be placed through their online secure portal, and Andriy will then log in and enter insurance information and consent for testing. Once Andriy initiates this testing process, insurance information will be obtained by the lab and shared with her, and she will be sent a saliva collection kit. Information about [facility] coverage process and maximum OOP cost was discussed briefly today and will be discussed in detail with Andriy by [facility]. Once Andriy mails this sample kit back to [facility], testing will be initiated. Results from there take 4-6 weeks, at which time I would call her. Follow up for Andriy and her family would depend on results.    Result Outcomes  Once results are available, we  discussed the following possibilities:  1. One disease causing mutation found: Individuals with typical hereditary pancreatitis typically have one disease causing change detected.  Depending on the change found and other factors, individuals are at risk for development of pancreatitis, and other family members would be at risk.  2. One or two possibly contributory mutation found:  This result has various implications depending the specific change found.  The change(s) may contribute to disease.   3. Two mutations in the gene for CF:  Individuals with cystic fibrosis or cystic fibrosis-related disorder usually have two mutations in this gene, one inherited from their mother and one from their father. Depending on the changes in the gene and other factors, some people may have respiratory, GI symptoms, and/or infertility issues.   4. No mutations in these genes: No genetic evidence to support hereditary pancreatitis at this time. It is possible we will learn more about the genetics of pancreatitis in the future and be able to offer more comprehensive genetic testing.   5. The finding of an unknown change: This happens when the laboratory detects a change but they do not know if it is found only in individuals with the condition, or if the change is found in individuals without pancreatitis as well. If you have this type of result, we will discuss it further at that time.    Premature Ovarian Failure Genetic Testing  Primary ovarian insufficiency (POI), also known as premature ovarian failure, happens when a woman's ovaries stop working normally before 40 years. Etiology for POI is variable, and in many cases the exact cause of POI is unknown. A percentage of POI is thought to be due to an underlying genetic etiology. This includes Fragile X syndrome, Manriquez syndrome, and mutation(s) in a handful of other syndromic and non-syndromic genes. Andriy reportedly has had genetic testing for Fragile X syndrome that was normal,  but additional genetic testing would be available related to this history.     We discussed options today of ordering additional genetic testing related to POI (from another lab) vs focusing only on pancreatitis testing today, with POI genetic testing being discussed at a return visit to [place] or locally. Andriy elected to only proceed with pancreatitis genetic testing today, and genetic testing options related to POI were therefore not fully discussed today. She was aware this could be further discussed upon return visit to [place] or locally. [facility] and the [facility] have genetics services from my understanding. Andriy could call them directly and/or have a provider place a genetics referral to be seen there. Virtual genetic counselors can also be found at [email or web address] I am happy to assist with these options if helpful.      It was a pleasure to meet with Andriy virtually today. She had no additional questions. My contact information was shared via [employer] message for future questions or concerns that arise.    Plan  1. Consent was obtained and orders were placed for Pan[facility]Dx testing through [facility]. Testing process will be initiated by the patient through the [facility] [facility] [employer] Portal. From there, [facility] will send Andriy a saliva sample, which she will mail back directly to the lab.  2. Results will be returned to us 4-6 weeks after testing starts and I will notify Andriy of the results as soon as we receive them. This test can detect many changes in the known genes associated with hereditary pancreatitis; however, there remain genetic contributions that are not well understood.   3. Additional medical management recommendations or family member recommendations will be discussed in the future based on results.  4. Genetic testing would be available related to her history of primary ovarian failure locally or here if/when she returns to [place]. I  am happy to assist with this if helpful.     Lexi Sam MS, [facility]  Genetic Counseling  Genetics and Metabolism Division  [facility]   Phone: [phone]  Pager: [phone]     CC:  PCP, patient, Dr. Perez      Video-Visit Details  Type of service:  Video Visit  Video End Time: 50 minutes  Originating Location (pt. Location): [place] in [place]  Distant Location (provider location):  [facility]   Platform used for Video Visit: Samuel Sam GC

## 2021-04-14 NOTE — PATIENT INSTRUCTIONS
"Andriy Thomas.  It was great to meet you today.    Here is our summary:    Type 3c Diabetes:  Your fasting glucose was 131 mg/dL (diabetes range is 126 or above) and HbA1c was 6.5% (diabetes range is 6.5% or above) so you do have early type 3c diabetes.  The good news is that your pancreas still makes a good amount of insulin, just not quite enough to keep your blood sugars normal.    -  Eating a diet that is lower in carbohydrates and exercising can be very effective in improving blood sugars.    -  We will also add metformin extended release 1000 mg per day.  You can take this dose for 2 weeks and then increase to 2000 mg per day after 2 weeks if tolerated.    -  I do not think we need to start insulin yet since your body makes a lot of insulin now, but because of your pancreatitis, I do think you will eventually need to be on insulin (even if you do not have surgery).   -  If weight loss remains an issue, I do think you could consider a \"SGLT2\" inhibitor like Invokana.  This class of medication is associated with some weight loss and we have used it before in patients with pancreatitis or pancreatectomy, but there is not much data in the medical literature and it is not considered 'first line'.  -- Meds to avoid for your providers:  I recommend not using GLP-1 therapies (like victoza, trulicity, ozempic) or DPP-4 inhibitors (like sitagliptin) due to possible exacerbation of pancreatitis.    For surgical considerations:  I do think if you decide to do surgery in the future, TPIAT is the best option.  85% of patients have pain relief, but some 'phantom' pain is common, and risk of phantom pain increases with longer duration of pain, daily pain, or increased doses of opioids.   I would recommend an islet transplant if pancreatectomy is done, even with your diabetes, because having some islets is better than no islets-- protects you from brittle diabetes, severe low and high fluctuations, other diabetes complications " like diabetic ketoacidosis, and can generally make diabetes more easy to manage.

## 2021-04-14 NOTE — PROGRESS NOTES
The patient is scheduled for clinic follow up within 24-48 hours of hospital discharge. No post-hospital call is needed at this time.    Name: Andriy Yoon MRN: [id number]   Date: 4/14/2021 Status: [facility]   Time: 8:20 AM Length: 40   Visit Type: UMP NEW AUTO ISLET [[id number]] CECELIA: [id number]   Provider: Charissa Hyman MD Department:  [facility] SURGERY     Maddy Mendez CMA, [facility]  Post Hospital Discharge Team

## 2021-04-15 ENCOUNTER — PATIENT MESSAGE (OUTPATIENT)
Dept: INFUSION THERAPY | Facility: HOSPITAL | Age: 31
End: 2021-04-15

## 2021-04-15 ENCOUNTER — PATIENT MESSAGE (OUTPATIENT)
Dept: FAMILY MEDICINE | Facility: CLINIC | Age: 31
End: 2021-04-15

## 2021-04-15 DIAGNOSIS — K86.1 IDIOPATHIC CHRONIC PANCREATITIS: Primary | ICD-10-CM

## 2021-04-15 LAB
A-LINOLENATE SERPL-SCNC: 145 NMOL/ML (ref 50–130)
A-TOCOPHEROL VIT E SERPL-MCNC: 8.1 MG/L (ref 5.5–18)
AA SERPL-SCNC: 1335 NMOL/ML (ref 520–1490)
ANNOTATION COMMENT IMP: NORMAL
ARACHIDATE SERPL-SCNC: 59 NMOL/ML (ref 50–90)
BETA+GAMMA TOCOPHEROL SERPL-MCNC: 2.6 MG/L (ref 0–6)
CLINICAL BIOCHEMIST REVIEW: ABNORMAL
DHA SERPL-SCNC: 287 NMOL/ML (ref 30–250)
DOCOSAPENTAENATE W6 SERPL-SCNC: 43 NMOL/ML (ref 10–70)
DOCOSATETRAENOATE SERPL-SCNC: 54 NMOL/ML (ref 10–80)
DOCOSENOATE SERPL-SCNC: 6 NMOL/ML (ref 4–13)
DPA SERPL-SCNC: 85 NMOL/ML (ref 20–210)
EPA SERPL-SCNC: 74 NMOL/ML (ref 14–100)
FA SERPL-SCNC: 14.9 MMOL/L (ref 7.3–16.8)
G-LINOLENATE SERPL-SCNC: 72 NMOL/ML (ref 16–150)
HEXADECENOATE SERPL-SCNC: 58 NMOL/ML (ref 25–105)
HOMO-G LINOLENATE SERPL-SCNC: 227 NMOL/ML (ref 50–250)
LAURATE SERPL-SCNC: 19 NMOL/ML (ref 6–90)
LINOLEATE SERPL-SCNC: 4659 NMOL/ML (ref 2270–3850)
MEAD ACID SERPL-SCNC: 28 NMOL/ML (ref 7–30)
MONOUNSAT FA SERPL-SCNC: 3 MMOL/L (ref 1.3–5.8)
MYRISTATE SERPL-SCNC: 178 NMOL/ML (ref 30–450)
NERVONATE SERPL-SCNC: 73 NMOL/ML (ref 60–100)
OCTADECANOATE SERPL-SCNC: 1432 NMOL/ML (ref 590–1170)
OLEATE SERPL-SCNC: 2119 NMOL/ML (ref 650–3500)
PALMITATE SERPL-SCNC: 2995 NMOL/ML (ref 1480–3730)
PALMITOLEATE SERPL-SCNC: 436 NMOL/ML (ref 110–1130)
PANC ISLET CELL AB TITR SER: NORMAL {TITER}
POLYUNSAT FA SERPL-SCNC: 7 MMOL/L (ref 3.2–5.8)
RETINYL PALMITATE SERPL-MCNC: <0.02 MG/L (ref 0–0.1)
SAT FA SERPL-SCNC: 4.8 MMOL/L (ref 2.5–5.5)
TRIENOATE/AA SERPL-SRTO: 0.02 {RATIO} (ref 0.01–0.04)
VACCENATE SERPL-SCNC: 285 NMOL/ML (ref 280–740)
VIT A SERPL-MCNC: 0.61 MG/L (ref 0.3–1.2)
W3 FA SERPL-SCNC: 0.6 MMOL/L (ref 0.2–0.5)
W6 FA SERPL-SCNC: 6.4 MMOL/L (ref 3–5.4)

## 2021-04-16 ENCOUNTER — LAB VISIT (OUTPATIENT)
Dept: LAB | Facility: HOSPITAL | Age: 31
End: 2021-04-16
Attending: FAMILY MEDICINE
Payer: COMMERCIAL

## 2021-04-16 ENCOUNTER — PATIENT MESSAGE (OUTPATIENT)
Dept: FAMILY MEDICINE | Facility: CLINIC | Age: 31
End: 2021-04-16

## 2021-04-16 ENCOUNTER — INFUSION (OUTPATIENT)
Dept: INFUSION THERAPY | Facility: HOSPITAL | Age: 31
End: 2021-04-16
Attending: FAMILY MEDICINE
Payer: COMMERCIAL

## 2021-04-16 VITALS
HEART RATE: 89 BPM | DIASTOLIC BLOOD PRESSURE: 81 MMHG | OXYGEN SATURATION: 98 % | SYSTOLIC BLOOD PRESSURE: 119 MMHG | RESPIRATION RATE: 18 BRPM

## 2021-04-16 DIAGNOSIS — K86.1 IDIOPATHIC CHRONIC PANCREATITIS: ICD-10-CM

## 2021-04-16 DIAGNOSIS — K86.1 IDIOPATHIC CHRONIC PANCREATITIS: Primary | ICD-10-CM

## 2021-04-16 DIAGNOSIS — K86.1 CHRONIC BILIARY PANCREATITIS: ICD-10-CM

## 2021-04-16 LAB
AMYLASE SERPL-CCNC: 80 U/L (ref 20–110)
GAD65 AB SER IA-ACNC: <5 IU/ML (ref 0–5)
LIPASE SERPL-CCNC: 48 U/L (ref 4–60)

## 2021-04-16 PROCEDURE — 83690 ASSAY OF LIPASE: CPT | Performed by: FAMILY MEDICINE

## 2021-04-16 PROCEDURE — 82150 ASSAY OF AMYLASE: CPT | Performed by: FAMILY MEDICINE

## 2021-04-16 PROCEDURE — 96365 THER/PROPH/DIAG IV INF INIT: CPT

## 2021-04-16 PROCEDURE — 63600175 PHARM REV CODE 636 W HCPCS: Performed by: FAMILY MEDICINE

## 2021-04-16 PROCEDURE — 36415 COLL VENOUS BLD VENIPUNCTURE: CPT | Performed by: FAMILY MEDICINE

## 2021-04-16 RX ORDER — SODIUM CHLORIDE 0.9 % (FLUSH) 0.9 %
10 SYRINGE (ML) INJECTION
Status: CANCELLED | OUTPATIENT
Start: 2021-04-16

## 2021-04-16 RX ORDER — HEPARIN 100 UNIT/ML
500 SYRINGE INTRAVENOUS
Status: CANCELLED | OUTPATIENT
Start: 2021-04-16

## 2021-04-16 RX ADMIN — SODIUM CHLORIDE, SODIUM LACTATE, POTASSIUM CHLORIDE, AND CALCIUM CHLORIDE 1000 ML: .6; .31; .03; .02 INJECTION, SOLUTION INTRAVENOUS at 04:04

## 2021-04-17 LAB — INSULIN HUMAN AB SER-ACNC: <0.4 U/ML (ref 0–0.4)

## 2021-04-19 ENCOUNTER — MYC MEDICAL ADVICE (OUTPATIENT)
Dept: TRANSPLANT | Facility: CLINIC | Age: 31
End: 2021-04-19

## 2021-04-19 ENCOUNTER — PREP FOR PROCEDURE (OUTPATIENT)
Dept: GASTROENTEROLOGY | Facility: CLINIC | Age: 31
End: 2021-04-19

## 2021-04-19 ENCOUNTER — PATIENT OUTREACH (OUTPATIENT)
Dept: GASTROENTEROLOGY | Facility: CLINIC | Age: 31
End: 2021-04-19

## 2021-04-19 DIAGNOSIS — K86.89 PANCREATIC STONES: Primary | ICD-10-CM

## 2021-04-19 NOTE — TELEPHONE ENCOUNTER
Post ERCP/EUS follow up:    Perform extracorporeal shockwave lithotripsy (ESWL)  followed by ERCP within same visit in 4-8 weeks, [facility] Message sent to scheduling.    Follow up ERCP after ESWL, case request placed:    Please assist in scheduling:     Procedure/Imaging/Clinic: ERCP  Physician:   Timin-8 weeks, after ESWL  Procedure length:90 minutes  Anesthesia:general   Dx: pancreatic stones  Tier:3  Location: [facility]     Per patient she wants to come for ESWL and ERCP, will follow up with TPIAT at a later date. Thinks she'll follow up closer to 8 week due to family issues. Prefers TPIAT on a Friday and admit over the weekend if needed. Will follow up with scheduling details.    ML

## 2021-04-21 ENCOUNTER — PREP FOR PROCEDURE (OUTPATIENT)
Dept: UROLOGY | Facility: CLINIC | Age: 31
End: 2021-04-21

## 2021-04-21 DIAGNOSIS — K86.89 PANCREATIC STONES: Primary | ICD-10-CM

## 2021-04-21 RX ORDER — CIPROFLOXACIN 2 MG/ML
400 INJECTION, SOLUTION INTRAVENOUS
Status: CANCELLED | OUTPATIENT
Start: 2021-04-21

## 2021-04-22 ENCOUNTER — TELEPHONE (OUTPATIENT)
Dept: FAMILY MEDICINE | Facility: CLINIC | Age: 31
End: 2021-04-22

## 2021-04-22 ENCOUNTER — TELEPHONE (OUTPATIENT)
Dept: UROLOGY | Facility: CLINIC | Age: 31
End: 2021-04-22

## 2021-04-22 DIAGNOSIS — K86.89 DIABETES MELLITUS SECONDARY TO PANCREATIC INSUFFICIENCY (H): ICD-10-CM

## 2021-04-22 DIAGNOSIS — E08.9 DIABETES MELLITUS SECONDARY TO PANCREATIC INSUFFICIENCY (H): ICD-10-CM

## 2021-04-22 RX ORDER — METFORMIN HYDROCHLORIDE EXTENDED-RELEASE TABLETS 1000 MG/1
1000 TABLET, FILM COATED, EXTENDED RELEASE ORAL
Qty: 60 TABLET | Refills: 3 | OUTPATIENT
Start: 2021-04-22 | End: 2021-04-22

## 2021-04-22 RX ORDER — METFORMIN HCL 500 MG
TABLET, EXTENDED RELEASE 24 HR ORAL
Qty: 120 TABLET | Refills: 3 | Status: SHIPPED | OUTPATIENT
Start: 2021-04-22

## 2021-04-22 NOTE — TELEPHONE ENCOUNTER
Called to schedule surgery with Dr Simpson. Left voice mail for patient to call back at [phone].

## 2021-04-27 ENCOUNTER — PREP FOR PROCEDURE (OUTPATIENT)
Dept: UROLOGY | Facility: CLINIC | Age: 31
End: 2021-04-27

## 2021-04-29 ENCOUNTER — TELEPHONE (OUTPATIENT)
Dept: UROLOGY | Facility: CLINIC | Age: 31
End: 2021-04-29

## 2021-04-29 NOTE — CONFIDENTIAL NOTE
Called to schedule surgery with Dr Simpson.  Patient will call back within a week when she knows her work schedule to coordinate.

## 2021-05-03 ENCOUNTER — HOSPITAL ENCOUNTER (OUTPATIENT)
Facility: CLINIC | Age: 31
End: 2021-05-03
Attending: UROLOGY | Admitting: UROLOGY
Payer: COMMERCIAL

## 2021-05-03 ENCOUNTER — PATIENT OUTREACH (OUTPATIENT)
Dept: GASTROENTEROLOGY | Facility: CLINIC | Age: 31
End: 2021-05-03

## 2021-05-03 DIAGNOSIS — K86.89 PANCREATIC STONES: ICD-10-CM

## 2021-05-03 NOTE — PROGRESS NOTES
Next step are that patient needs ESWL followed by ERCP. Reviewed timing. Pt has been in contact with Urology, but has not scheduled yet. Asked that she contact me when ESWL is scheduled. Also may be finding a new job, sister having a baby that affect patients ability to follow up.    Pt will follow up as needed.    NADEEM

## 2021-05-04 ENCOUNTER — PATIENT MESSAGE (OUTPATIENT)
Dept: FAMILY MEDICINE | Facility: CLINIC | Age: 31
End: 2021-05-04

## 2021-05-04 ENCOUNTER — TELEPHONE (OUTPATIENT)
Dept: UROLOGY | Facility: CLINIC | Age: 31
End: 2021-05-04

## 2021-05-04 NOTE — CONFIDENTIAL NOTE
Patient is scheduled for surgery with Dr. Simpson    Spoke with: Andriy on 5/3/21    Date of Surgery: 5/24/21    Location: [facility]    Informed patient they will need an adult  yes    Pre-op with surgeon (if applicable): n/a    H&P: Scheduled with PCP    Pre-procedure COVID-19 Test: informed to get within 4 days of the surgery    Additional imaging/appointments: n/a    Surgery packet: to be sent/mailed by 5/6/21     Additional comments: n/a

## 2021-05-06 ENCOUNTER — OFFICE VISIT (OUTPATIENT)
Dept: FAMILY MEDICINE | Facility: CLINIC | Age: 31
End: 2021-05-06
Payer: COMMERCIAL

## 2021-05-06 VITALS
SYSTOLIC BLOOD PRESSURE: 110 MMHG | RESPIRATION RATE: 97 BRPM | WEIGHT: 181.38 LBS | TEMPERATURE: 97 F | HEART RATE: 76 BPM | HEIGHT: 64 IN | BODY MASS INDEX: 30.96 KG/M2 | DIASTOLIC BLOOD PRESSURE: 60 MMHG

## 2021-05-06 DIAGNOSIS — E11.9 TYPE 2 DIABETES MELLITUS WITHOUT COMPLICATION, WITHOUT LONG-TERM CURRENT USE OF INSULIN: Primary | ICD-10-CM

## 2021-05-06 DIAGNOSIS — K86.1 IDIOPATHIC CHRONIC PANCREATITIS: ICD-10-CM

## 2021-05-06 PROCEDURE — 3008F BODY MASS INDEX DOCD: CPT | Mod: S$GLB,,, | Performed by: FAMILY MEDICINE

## 2021-05-06 PROCEDURE — 99214 PR OFFICE/OUTPT VISIT, EST, LEVL IV, 30-39 MIN: ICD-10-PCS | Mod: S$GLB,,, | Performed by: FAMILY MEDICINE

## 2021-05-06 PROCEDURE — 99214 OFFICE O/P EST MOD 30 MIN: CPT | Mod: S$GLB,,, | Performed by: FAMILY MEDICINE

## 2021-05-06 PROCEDURE — 3008F PR BODY MASS INDEX (BMI) DOCUMENTED: ICD-10-PCS | Mod: S$GLB,,, | Performed by: FAMILY MEDICINE

## 2021-05-06 PROCEDURE — 1126F PR PAIN SEVERITY QUANTIFIED, NO PAIN PRESENT: ICD-10-PCS | Mod: S$GLB,,, | Performed by: FAMILY MEDICINE

## 2021-05-06 PROCEDURE — 1126F AMNT PAIN NOTED NONE PRSNT: CPT | Mod: S$GLB,,, | Performed by: FAMILY MEDICINE

## 2021-05-06 RX ORDER — METFORMIN HYDROCHLORIDE 500 MG/1
TABLET, EXTENDED RELEASE ORAL
COMMUNITY
Start: 2021-04-22 | End: 2021-09-01

## 2021-05-06 RX ORDER — TRAMADOL HYDROCHLORIDE 50 MG/1
TABLET ORAL
Qty: 30 TABLET | Refills: 0 | Status: SHIPPED | OUTPATIENT
Start: 2021-05-06 | End: 2021-05-24 | Stop reason: SDUPTHER

## 2021-05-10 DIAGNOSIS — Z11.59 ENCOUNTER FOR SCREENING FOR OTHER VIRAL DISEASES: ICD-10-CM

## 2021-05-23 ENCOUNTER — PATIENT MESSAGE (OUTPATIENT)
Dept: FAMILY MEDICINE | Facility: CLINIC | Age: 31
End: 2021-05-23

## 2021-05-23 DIAGNOSIS — K86.1 IDIOPATHIC CHRONIC PANCREATITIS: ICD-10-CM

## 2021-05-23 DIAGNOSIS — F41.9 ANXIETY: ICD-10-CM

## 2021-05-24 ENCOUNTER — TELEPHONE (OUTPATIENT)
Dept: FAMILY MEDICINE | Facility: CLINIC | Age: 31
End: 2021-05-24

## 2021-05-24 RX ORDER — DIAZEPAM 5 MG/1
5 TABLET ORAL EVERY 12 HOURS PRN
Qty: 30 TABLET | Refills: 0 | Status: SHIPPED | OUTPATIENT
Start: 2021-05-24 | End: 2021-07-22

## 2021-05-24 RX ORDER — TRAMADOL HYDROCHLORIDE 50 MG/1
TABLET ORAL
Qty: 30 TABLET | Refills: 0 | Status: SHIPPED | OUTPATIENT
Start: 2021-05-24 | End: 2021-07-21 | Stop reason: SDUPTHER

## 2021-05-26 ENCOUNTER — PRE VISIT (OUTPATIENT)
Dept: SURGERY | Facility: CLINIC | Age: 31
End: 2021-05-26

## 2021-05-29 ENCOUNTER — PATIENT MESSAGE (OUTPATIENT)
Dept: OBSTETRICS AND GYNECOLOGY | Facility: CLINIC | Age: 31
End: 2021-05-29

## 2021-05-31 ENCOUNTER — PATIENT MESSAGE (OUTPATIENT)
Dept: FAMILY MEDICINE | Facility: CLINIC | Age: 31
End: 2021-05-31

## 2021-06-01 ENCOUNTER — PRE VISIT (OUTPATIENT)
Dept: UROLOGY | Facility: CLINIC | Age: 31
End: 2021-06-01

## 2021-06-01 NOTE — TELEPHONE ENCOUNTER
Pancreatic stone follow up.  Discuss surgical options.  Records available in [facility].

## 2021-06-23 ENCOUNTER — MYC MEDICAL ADVICE (OUTPATIENT)
Dept: GASTROENTEROLOGY | Facility: CLINIC | Age: 31
End: 2021-06-23

## 2021-06-24 ENCOUNTER — MYC MEDICAL ADVICE (OUTPATIENT)
Dept: UROLOGY | Facility: CLINIC | Age: 31
End: 2021-06-24

## 2021-06-24 NOTE — TELEPHONE ENCOUNTER
Called patient to discuss her goals for POC. Pt now wants to come for combo procedure with urology and adv endo over the course of a week.     Encouraged patient to talk to Dr Simpson's office to figure out their process (clinic vs procedure) and that I can help organize items once procedures are ready to be organized. Pt hoping that mom can come to help her during procedure rather than her  taking off work also, is timing also time around her sister being off work, hoping to get all items accomplished by early August.     ML

## 2021-06-28 ENCOUNTER — PREP FOR PROCEDURE (OUTPATIENT)
Dept: UROLOGY | Facility: CLINIC | Age: 31
End: 2021-06-28

## 2021-06-28 DIAGNOSIS — K86.89 PANCREATIC DUCT STONES: Primary | ICD-10-CM

## 2021-06-28 RX ORDER — CIPROFLOXACIN 2 MG/ML
400 INJECTION, SOLUTION INTRAVENOUS
Status: CANCELLED | OUTPATIENT
Start: 2021-06-28

## 2021-07-01 ENCOUNTER — PATIENT MESSAGE (OUTPATIENT)
Dept: FAMILY MEDICINE | Facility: CLINIC | Age: 31
End: 2021-07-01

## 2021-07-09 ENCOUNTER — TELEPHONE (OUTPATIENT)
Dept: UROLOGY | Facility: CLINIC | Age: 31
End: 2021-07-09

## 2021-07-09 PROBLEM — K86.89 PANCREATIC DUCT STONES: Status: ACTIVE | Noted: 2021-07-09

## 2021-07-09 NOTE — CONFIDENTIAL NOTE
Patient is scheduled for surgery with Dr. Simpson    Spoke or Left voice mail with: n/a    Date of Surgery: 8/16/21    Location: [facility]    Informed patient they will need an adult  yes    H&P: Scheduled with PCP    Pre-procedure COVID-19 Test: PCP within 4 days of surgery    Additional imaging/appointments: n/a    Surgery packet: to be mailed by 7/12/21     Additional comments: patient informed by endoscopy

## 2021-07-10 DIAGNOSIS — Z11.59 ENCOUNTER FOR SCREENING FOR OTHER VIRAL DISEASES: ICD-10-CM

## 2021-07-12 ENCOUNTER — INFUSION (OUTPATIENT)
Dept: INFUSION THERAPY | Facility: HOSPITAL | Age: 31
End: 2021-07-12
Payer: COMMERCIAL

## 2021-07-12 ENCOUNTER — MYC MEDICAL ADVICE (OUTPATIENT)
Dept: UROLOGY | Facility: CLINIC | Age: 31
End: 2021-07-12

## 2021-07-12 ENCOUNTER — PATIENT OUTREACH (OUTPATIENT)
Dept: UROLOGY | Facility: CLINIC | Age: 31
End: 2021-07-12

## 2021-07-12 VITALS
SYSTOLIC BLOOD PRESSURE: 111 MMHG | OXYGEN SATURATION: 97 % | HEART RATE: 73 BPM | TEMPERATURE: 98 F | DIASTOLIC BLOOD PRESSURE: 69 MMHG | RESPIRATION RATE: 12 BRPM

## 2021-07-12 DIAGNOSIS — K86.1 IDIOPATHIC CHRONIC PANCREATITIS: Primary | ICD-10-CM

## 2021-07-12 DIAGNOSIS — K86.1 CHRONIC BILIARY PANCREATITIS: ICD-10-CM

## 2021-07-12 DIAGNOSIS — K86.89 PANCREATIC STONES: Primary | ICD-10-CM

## 2021-07-12 PROCEDURE — 63600175 PHARM REV CODE 636 W HCPCS: Performed by: FAMILY MEDICINE

## 2021-07-12 PROCEDURE — 96365 THER/PROPH/DIAG IV INF INIT: CPT

## 2021-07-12 RX ORDER — SODIUM CHLORIDE 0.9 % (FLUSH) 0.9 %
10 SYRINGE (ML) INJECTION
Status: CANCELLED | OUTPATIENT
Start: 2021-07-12

## 2021-07-12 RX ORDER — HEPARIN 100 UNIT/ML
500 SYRINGE INTRAVENOUS
Status: CANCELLED | OUTPATIENT
Start: 2021-07-12

## 2021-07-12 RX ADMIN — SODIUM CHLORIDE, SODIUM LACTATE, POTASSIUM CHLORIDE, AND CALCIUM CHLORIDE 1000 ML: .6; .31; .03; .02 INJECTION, SOLUTION INTRAVENOUS at 09:07

## 2021-07-13 ENCOUNTER — OFFICE VISIT (OUTPATIENT)
Dept: FAMILY MEDICINE | Facility: CLINIC | Age: 31
End: 2021-07-13
Payer: COMMERCIAL

## 2021-07-13 ENCOUNTER — LAB VISIT (OUTPATIENT)
Dept: LAB | Facility: HOSPITAL | Age: 31
End: 2021-07-13
Attending: FAMILY MEDICINE
Payer: COMMERCIAL

## 2021-07-13 VITALS
SYSTOLIC BLOOD PRESSURE: 110 MMHG | WEIGHT: 185.38 LBS | BODY MASS INDEX: 31.65 KG/M2 | OXYGEN SATURATION: 98 % | RESPIRATION RATE: 15 BRPM | DIASTOLIC BLOOD PRESSURE: 80 MMHG | HEART RATE: 86 BPM | HEIGHT: 64 IN

## 2021-07-13 DIAGNOSIS — Z11.59 NEED FOR HEPATITIS C SCREENING TEST: ICD-10-CM

## 2021-07-13 DIAGNOSIS — Z01.818 PRE-OP EXAMINATION: ICD-10-CM

## 2021-07-13 DIAGNOSIS — E11.9 TYPE 2 DIABETES MELLITUS WITHOUT COMPLICATION, WITHOUT LONG-TERM CURRENT USE OF INSULIN: ICD-10-CM

## 2021-07-13 DIAGNOSIS — E11.9 TYPE 2 DIABETES MELLITUS WITHOUT COMPLICATION, WITHOUT LONG-TERM CURRENT USE OF INSULIN: Primary | ICD-10-CM

## 2021-07-13 DIAGNOSIS — K86.1 IDIOPATHIC CHRONIC PANCREATITIS: ICD-10-CM

## 2021-07-13 LAB
ESTIMATED AVG GLUCOSE: 134 MG/DL (ref 68–131)
HBA1C MFR BLD: 6.3 % (ref 4–5.6)

## 2021-07-13 PROCEDURE — 3008F BODY MASS INDEX DOCD: CPT | Mod: S$GLB,,, | Performed by: FAMILY MEDICINE

## 2021-07-13 PROCEDURE — 99214 OFFICE O/P EST MOD 30 MIN: CPT | Mod: S$GLB,,, | Performed by: FAMILY MEDICINE

## 2021-07-13 PROCEDURE — 86803 HEPATITIS C AB TEST: CPT | Performed by: FAMILY MEDICINE

## 2021-07-13 PROCEDURE — 99214 PR OFFICE/OUTPT VISIT, EST, LEVL IV, 30-39 MIN: ICD-10-PCS | Mod: S$GLB,,, | Performed by: FAMILY MEDICINE

## 2021-07-13 PROCEDURE — 83036 HEMOGLOBIN GLYCOSYLATED A1C: CPT | Performed by: FAMILY MEDICINE

## 2021-07-13 PROCEDURE — 1160F PR REVIEW ALL MEDS BY PRESCRIBER/CLIN PHARMACIST DOCUMENTED: ICD-10-PCS | Mod: S$GLB,,, | Performed by: FAMILY MEDICINE

## 2021-07-13 PROCEDURE — 3008F PR BODY MASS INDEX (BMI) DOCUMENTED: ICD-10-PCS | Mod: S$GLB,,, | Performed by: FAMILY MEDICINE

## 2021-07-13 PROCEDURE — 36415 COLL VENOUS BLD VENIPUNCTURE: CPT | Performed by: FAMILY MEDICINE

## 2021-07-13 PROCEDURE — 1159F PR MEDICATION LIST DOCUMENTED IN MEDICAL RECORD: ICD-10-PCS | Mod: S$GLB,,, | Performed by: FAMILY MEDICINE

## 2021-07-13 PROCEDURE — 1160F RVW MEDS BY RX/DR IN RCRD: CPT | Mod: S$GLB,,, | Performed by: FAMILY MEDICINE

## 2021-07-13 PROCEDURE — 1126F PR PAIN SEVERITY QUANTIFIED, NO PAIN PRESENT: ICD-10-PCS | Mod: S$GLB,,, | Performed by: FAMILY MEDICINE

## 2021-07-13 PROCEDURE — 1126F AMNT PAIN NOTED NONE PRSNT: CPT | Mod: S$GLB,,, | Performed by: FAMILY MEDICINE

## 2021-07-13 PROCEDURE — 1159F MED LIST DOCD IN RCRD: CPT | Mod: S$GLB,,, | Performed by: FAMILY MEDICINE

## 2021-07-15 ENCOUNTER — PATIENT MESSAGE (OUTPATIENT)
Dept: OBSTETRICS AND GYNECOLOGY | Facility: CLINIC | Age: 31
End: 2021-07-15

## 2021-07-15 DIAGNOSIS — E11.9 TYPE 2 DIABETES MELLITUS WITHOUT COMPLICATION, UNSPECIFIED WHETHER LONG TERM INSULIN USE: ICD-10-CM

## 2021-07-16 LAB — HCV AB SERPL QL IA: NEGATIVE

## 2021-07-20 ENCOUNTER — TELEPHONE (OUTPATIENT)
Dept: FAMILY MEDICINE | Facility: CLINIC | Age: 31
End: 2021-07-20

## 2021-07-21 ENCOUNTER — PATIENT MESSAGE (OUTPATIENT)
Dept: FAMILY MEDICINE | Facility: CLINIC | Age: 31
End: 2021-07-21

## 2021-07-21 RX ORDER — TRAMADOL HYDROCHLORIDE 50 MG/1
TABLET ORAL
Qty: 30 TABLET | Refills: 0 | Status: SHIPPED | OUTPATIENT
Start: 2021-07-21 | End: 2022-05-09 | Stop reason: SDUPTHER

## 2021-07-21 RX ORDER — HYDROCODONE BITARTRATE AND ACETAMINOPHEN 10; 325 MG/1; MG/1
1 TABLET ORAL EVERY 8 HOURS PRN
Qty: 21 TABLET | Refills: 0 | Status: SHIPPED | OUTPATIENT
Start: 2021-07-21 | End: 2021-07-28

## 2021-07-27 ENCOUNTER — LAB VISIT (OUTPATIENT)
Dept: LAB | Facility: HOSPITAL | Age: 31
End: 2021-07-27
Attending: FAMILY MEDICINE
Payer: COMMERCIAL

## 2021-07-27 ENCOUNTER — INFUSION (OUTPATIENT)
Dept: INFUSION THERAPY | Facility: HOSPITAL | Age: 31
End: 2021-07-27
Payer: COMMERCIAL

## 2021-07-27 ENCOUNTER — TELEPHONE (OUTPATIENT)
Dept: FAMILY MEDICINE | Facility: CLINIC | Age: 31
End: 2021-07-27

## 2021-07-27 VITALS
SYSTOLIC BLOOD PRESSURE: 112 MMHG | OXYGEN SATURATION: 97 % | DIASTOLIC BLOOD PRESSURE: 77 MMHG | HEART RATE: 93 BPM | RESPIRATION RATE: 14 BRPM | TEMPERATURE: 98 F

## 2021-07-27 DIAGNOSIS — K86.1 IDIOPATHIC CHRONIC PANCREATITIS: Primary | ICD-10-CM

## 2021-07-27 DIAGNOSIS — K86.1 CHRONIC BILIARY PANCREATITIS: ICD-10-CM

## 2021-07-27 DIAGNOSIS — K86.1 IDIOPATHIC CHRONIC PANCREATITIS: ICD-10-CM

## 2021-07-27 LAB
AMYLASE SERPL-CCNC: 289 U/L (ref 20–110)
LIPASE SERPL-CCNC: 932 U/L (ref 4–60)

## 2021-07-27 PROCEDURE — 36415 COLL VENOUS BLD VENIPUNCTURE: CPT | Performed by: FAMILY MEDICINE

## 2021-07-27 PROCEDURE — 83690 ASSAY OF LIPASE: CPT | Performed by: FAMILY MEDICINE

## 2021-07-27 PROCEDURE — 63600175 PHARM REV CODE 636 W HCPCS: Performed by: FAMILY MEDICINE

## 2021-07-27 PROCEDURE — 82150 ASSAY OF AMYLASE: CPT | Performed by: FAMILY MEDICINE

## 2021-07-27 PROCEDURE — 96365 THER/PROPH/DIAG IV INF INIT: CPT

## 2021-07-27 RX ORDER — SODIUM CHLORIDE 0.9 % (FLUSH) 0.9 %
10 SYRINGE (ML) INJECTION
Status: CANCELLED | OUTPATIENT
Start: 2021-07-27

## 2021-07-27 RX ORDER — HEPARIN 100 UNIT/ML
500 SYRINGE INTRAVENOUS
Status: CANCELLED | OUTPATIENT
Start: 2021-07-27

## 2021-07-27 RX ADMIN — SODIUM CHLORIDE, SODIUM LACTATE, POTASSIUM CHLORIDE, AND CALCIUM CHLORIDE 1000 ML: .6; .31; .03; .02 INJECTION, SOLUTION INTRAVENOUS at 10:07

## 2021-07-31 ENCOUNTER — PATIENT MESSAGE (OUTPATIENT)
Dept: FAMILY MEDICINE | Facility: CLINIC | Age: 31
End: 2021-07-31

## 2021-08-03 ENCOUNTER — PATIENT MESSAGE (OUTPATIENT)
Dept: FAMILY MEDICINE | Facility: CLINIC | Age: 31
End: 2021-08-03

## 2021-08-03 DIAGNOSIS — H10.9 CONJUNCTIVITIS, UNSPECIFIED CONJUNCTIVITIS TYPE, UNSPECIFIED LATERALITY: Primary | ICD-10-CM

## 2021-08-04 ENCOUNTER — PATIENT MESSAGE (OUTPATIENT)
Dept: FAMILY MEDICINE | Facility: CLINIC | Age: 31
End: 2021-08-04

## 2021-08-04 RX ORDER — POLYMYXIN B SULFATE AND TRIMETHOPRIM 1; 10000 MG/ML; [USP'U]/ML
SOLUTION OPHTHALMIC
Qty: 10 ML | Refills: 0 | Status: SHIPPED | OUTPATIENT
Start: 2021-08-04 | End: 2021-08-11 | Stop reason: CLARIF

## 2021-08-05 ENCOUNTER — TELEPHONE (OUTPATIENT)
Dept: CONSULT | Facility: CLINIC | Age: 31
End: 2021-08-05

## 2021-08-05 NOTE — TELEPHONE ENCOUNTER
I called Andriy to talk about her pancreatitis genetic testing results from [facility], which have returned.     We briefly reviewed that [facility] genetic testing included PRSS1 (a high risk pancreatitis gene), multiple moderate risk pancreatitis genes, and minor risk variants. Risk variants are common genetic changes that have been associated with slight increased pancreatitis risk for some individuals, but are NOT causative.    Genes are long stretches of DNA that are responsible for how our bodies look and how our bodies work.  We all have two copies of each gene; one inherited from the mother and one inherited from the father.  When there is a change, called a mutation, in the DNA sequence of a gene it can cause the signs and symptoms of a genetic condition.       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 The numbers and letters in this result stand for exactly where in the gene the genetic change is located and what change was found. We discussed that the SPINK1 gene is a known pancreatitis risk factor gene, with higher risk when there is a variant on both of one's two copies of the gene. If an individual has only a single SPINK1 variant, this is a smaller risk factor for pancreatitis, but not enough to cause pancreatitis on its own. About 1-3% of the general population has one SPINK1 variant. A SPINK1 variant on both copies of the gene is more rare, and is associated with more moderate/significant pancreatitis risk. Individuals with variant(s) in this gene can have varying exact symptoms/severity and onset of pancreatitis. For example, not all individuals with this homozygous variant will ever develop pancreatitis in their life and age of first episode of pancreatitis can vary greatly, even within the same family. Therefore, we discussed that Andriy having this p.N34S variant on both copies of  her SPINK1 gene is not fully causative for pancreatitis, but very likely contributes to her history of pancreatitis and is a moderate or large factor.    Next, we next discussed several minor risk variants identified. Reinforced that these are not causative and do not explain ones history on their own, but could potentially be factors that contributed to one's pancreatitis, likely with other factors. Testing would not be recommended for family members for these variants. These included 2 common CFTR risk variants, which are associated with a small increased risk of pancreatis. Of note, these are NOT cystic fibrosis causing variants. Next, common linked CLDN2 risk variants were found that may be associated with slight increased risk for pancreatitis and slight increased risk for progression of acute recurrent to chronic pancreatitis. 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 It is likely that we will have additional information about these results as more people have this testing and more definite conclusions can be drawn.      In summary, the presence of a homozygous SPINK1 variant is not fully causative for pancreatitis, but helps explain Andriy's history of pancreatitis and is at least a significant factor. 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 Additional information and testing may be available in the future.     We discussed that while identify genetic factors unfortunately does not give us a cure for one's pancreatitis, this can sometimes give more information about the cause of one's pancreatitis and what to expect. This information will be shared with Andriy's GI team for on-going management. [facility] noted that given the presence of these variants, they recommend discussion of avoiding alcohol and  tobacco.    Family Implications:  Given Andriy's results, we would assume both of her parents carry at least one SPINK1 variant personally. Andriy's full sister would therefore have at least a 25% chance to have a SPINK1 variant on both copies of her gene/two non-working copies, as well as a chance to have a variant on one copy/one change. Andriy's half brother's chance to have 1 or 2 variants in this gene would depend on both his parents' results, but he would have at least a 50% chance to have one change. SPINK1 testing would be available for Andriy's parents and siblings, especially if they have pancreatitis/related symptoms or for family planning purposes. The family is welcome to reach out to me directly to discuss testing if local, or could ask their PCP for a genetic counselor referral.     Extended family could also pursue genetic testing, especially if they have pancreatitis/related symptoms or for family planning purposes. Specifically, genetic testing would be available for Andriy's paternal cousin who has pancreatitis, if not done prior.    Reproductive Implications:  Andriy does not have children, but questioned reproductive information if her siblings end up having one or two changes in this gene.    One's children's chance to inherit two non-working SPINK1 copies vs one vs none depends on both parents' results. If only one parent has changes in this gene, chances for a child to have two non-working copies would be very low, though they may have one. We discussed that we usually do not recommend SPINK1 testing for asymptomatic children for only a single familial SPINK1 mutation as this would not determine if they will have pancreatitis or not (being a carrier for one change increases risk for pancreatitis, but is not causative). Therefore, we typically recommend waiting until children are of age and can consent personally for genetic testing, as long as they have no symptoms. In contrast, if both parents  have one or more changes in this gene, children would have a chance to inherit two non-working copies and testing would be available for them.     In summary, for family members found to have at least one genetic change in this gene, genetic counseling and carrier testing for their partner would be available for reproductive implications as well as more information about testing recommendations for children.    Additional History:  We discussed that this result does not explain Andriy's history of primary ovarian insufficiency (POI). Etiology for POI is variable, and in many cases the exact cause of POI is unknown. A percentage of POI is thought to be due to an underlying genetic etiology. This includes Fragile X syndrome, Manriquez syndrome, and mutation(s) in a handful of other syndromic and non-syndromic genes. Andriy reportedly has had genetic testing for Fragile X syndrome that was normal, but additional genetic testing would be available related to this history if helpful and not completed prior. I briefly reviewed that this is an option when she returns to [place], or locally. [facility] and the [facility] have genetics services from my understanding.     Andriy currently has GI appointments scheduled in [place] for mid-August, though these may be rescheduled as she has COVID-19 currently. I let her know to reach out to me ahead of time if she wishes to coordinate a genetic counseling visit here with any return to [place].        Andriy had no other questions at this time. A copy of this report and summary of our conversation will be sent by mail. Confirmed she had my number if any future questions or concerns that arise.     Lexi Sam MS, [facility]  Genetic Counseling  Genetics and Metabolism Division  [facility]   Phone: [phone]  Pager: [phone]

## 2021-08-11 ENCOUNTER — TELEPHONE (OUTPATIENT)
Dept: FAMILY MEDICINE | Facility: CLINIC | Age: 31
End: 2021-08-11

## 2021-08-11 ENCOUNTER — HOSPITAL ENCOUNTER (INPATIENT)
Facility: HOSPITAL | Age: 31
LOS: 3 days | Discharge: HOME | DRG: 177 | End: 2021-08-14
Attending: FAMILY MEDICINE | Admitting: HOSPITALIST
Payer: COMMERCIAL

## 2021-08-11 DIAGNOSIS — K86.1 OTHER CHRONIC PANCREATITIS: ICD-10-CM

## 2021-08-11 DIAGNOSIS — U07.1 PNEUMONIA DUE TO COVID-19 VIRUS: ICD-10-CM

## 2021-08-11 DIAGNOSIS — J12.82 PNEUMONIA DUE TO COVID-19 VIRUS: ICD-10-CM

## 2021-08-11 DIAGNOSIS — U07.1 ACUTE HYPOXEMIC RESPIRATORY FAILURE DUE TO COVID-19: Primary | ICD-10-CM

## 2021-08-11 DIAGNOSIS — Z20.822 SUSPECTED COVID-19 VIRUS INFECTION: ICD-10-CM

## 2021-08-11 DIAGNOSIS — J96.01 ACUTE HYPOXEMIC RESPIRATORY FAILURE DUE TO COVID-19: Primary | ICD-10-CM

## 2021-08-11 LAB
ALBUMIN SERPL BCP-MCNC: 3 G/DL (ref 3.5–5.2)
ALLENS TEST: ABNORMAL
ALP SERPL-CCNC: 100 U/L (ref 55–135)
ALT SERPL W/O P-5'-P-CCNC: 269 U/L (ref 10–44)
ANION GAP SERPL CALC-SCNC: 11 MMOL/L (ref 8–16)
AST SERPL-CCNC: 173 U/L (ref 10–40)
B-HCG UR QL: NEGATIVE
BASOPHILS # BLD AUTO: 0.02 K/UL (ref 0–0.2)
BASOPHILS NFR BLD: 0.2 % (ref 0–1.9)
BILIRUB SERPL-MCNC: 0.4 MG/DL (ref 0.1–1)
BNP SERPL-MCNC: 18 PG/ML (ref 0–99)
BUN SERPL-MCNC: 8 MG/DL (ref 6–20)
CALCIUM SERPL-MCNC: 8.6 MG/DL (ref 8.7–10.5)
CHLORIDE SERPL-SCNC: 100 MMOL/L (ref 95–110)
CK SERPL-CCNC: 46 U/L (ref 20–180)
CO2 SERPL-SCNC: 24 MMOL/L (ref 23–29)
CREAT SERPL-MCNC: 0.8 MG/DL (ref 0.5–1.4)
CRP SERPL-MCNC: 125.2 MG/L (ref 0–8.2)
DELSYS: ABNORMAL
DIFFERENTIAL METHOD: ABNORMAL
EOSINOPHIL # BLD AUTO: 0 K/UL (ref 0–0.5)
EOSINOPHIL NFR BLD: 0 % (ref 0–8)
ERYTHROCYTE [DISTWIDTH] IN BLOOD BY AUTOMATED COUNT: 13.2 % (ref 11.5–14.5)
EST. GFR  (AFRICAN AMERICAN): >60 ML/MIN/1.73 M^2
EST. GFR  (NON AFRICAN AMERICAN): >60 ML/MIN/1.73 M^2
FERRITIN SERPL-MCNC: 1478 NG/ML (ref 20–300)
FIO2: 36
FLOW: 4
GIANT PLATELETS BLD QL SMEAR: PRESENT
GLUCOSE SERPL-MCNC: 291 MG/DL (ref 70–110)
HCO3 UR-SCNC: 24.9 MMOL/L (ref 24–28)
HCT VFR BLD AUTO: 39.3 % (ref 37–48.5)
HGB BLD-MCNC: 13.1 G/DL (ref 12–16)
IMM GRANULOCYTES # BLD AUTO: 0.07 K/UL (ref 0–0.04)
IMM GRANULOCYTES NFR BLD AUTO: 0.7 % (ref 0–0.5)
INFLUENZA A, MOLECULAR: NEGATIVE
INFLUENZA B, MOLECULAR: NEGATIVE
LACTATE SERPL-SCNC: 1.9 MMOL/L (ref 0.5–2.2)
LDH SERPL L TO P-CCNC: 359 U/L (ref 110–260)
LIPASE SERPL-CCNC: 214 U/L (ref 4–60)
LYMPHOCYTES # BLD AUTO: 1.1 K/UL (ref 1–4.8)
LYMPHOCYTES NFR BLD: 11 % (ref 18–48)
MCH RBC QN AUTO: 28.1 PG (ref 27–31)
MCHC RBC AUTO-ENTMCNC: 33.3 G/DL (ref 32–36)
MCV RBC AUTO: 84 FL (ref 82–98)
MODE: ABNORMAL
MONOCYTES # BLD AUTO: 0.1 K/UL (ref 0.3–1)
MONOCYTES NFR BLD: 1.2 % (ref 4–15)
NEUTROPHILS # BLD AUTO: 8.5 K/UL (ref 1.8–7.7)
NEUTROPHILS NFR BLD: 86.9 % (ref 38–73)
NRBC BLD-RTO: 0 /100 WBC
PCO2 BLDA: 41 MMHG (ref 35–45)
PH SMN: 7.39 [PH] (ref 7.35–7.45)
PLATELET # BLD AUTO: 263 K/UL (ref 150–450)
PLATELET BLD QL SMEAR: ABNORMAL
PMV BLD AUTO: 9.6 FL (ref 9.2–12.9)
PO2 BLDA: 116 MMHG (ref 80–100)
POC BE: 0 MMOL/L
POC SATURATED O2: 98 % (ref 95–100)
POC TCO2: 26 MMOL/L (ref 23–27)
POTASSIUM SERPL-SCNC: 4.4 MMOL/L (ref 3.5–5.1)
PROT SERPL-MCNC: 7.4 G/DL (ref 6–8.4)
RBC # BLD AUTO: 4.67 M/UL (ref 4–5.4)
SAMPLE: ABNORMAL
SARS-COV-2 RDRP RESP QL NAA+PROBE: POSITIVE
SITE: ABNORMAL
SODIUM SERPL-SCNC: 135 MMOL/L (ref 136–145)
SPECIMEN SOURCE: NORMAL
TOXIC GRANULES BLD QL SMEAR: PRESENT
TROPONIN I SERPL DL<=0.01 NG/ML-MCNC: <0.006 NG/ML (ref 0–0.03)
WBC # BLD AUTO: 9.75 K/UL (ref 3.9–12.7)

## 2021-08-11 PROCEDURE — 83605 ASSAY OF LACTIC ACID: CPT | Performed by: FAMILY MEDICINE

## 2021-08-11 PROCEDURE — 99285 EMERGENCY DEPT VISIT HI MDM: CPT

## 2021-08-11 PROCEDURE — 25000003 PHARM REV CODE 250: Performed by: FAMILY MEDICINE

## 2021-08-11 PROCEDURE — 85025 COMPLETE CBC W/AUTO DIFF WBC: CPT | Performed by: FAMILY MEDICINE

## 2021-08-11 PROCEDURE — 82803 BLOOD GASES ANY COMBINATION: CPT

## 2021-08-11 PROCEDURE — 83615 LACTATE (LD) (LDH) ENZYME: CPT | Performed by: FAMILY MEDICINE

## 2021-08-11 PROCEDURE — 21400001 HC TELEMETRY ROOM

## 2021-08-11 PROCEDURE — 83880 ASSAY OF NATRIURETIC PEPTIDE: CPT | Performed by: FAMILY MEDICINE

## 2021-08-11 PROCEDURE — 27000221 HC OXYGEN, UP TO 24 HOURS

## 2021-08-11 PROCEDURE — 82728 ASSAY OF FERRITIN: CPT | Performed by: FAMILY MEDICINE

## 2021-08-11 PROCEDURE — 36600 WITHDRAWAL OF ARTERIAL BLOOD: CPT

## 2021-08-11 PROCEDURE — 82550 ASSAY OF CK (CPK): CPT | Performed by: FAMILY MEDICINE

## 2021-08-11 PROCEDURE — 93005 ELECTROCARDIOGRAM TRACING: CPT

## 2021-08-11 PROCEDURE — 83690 ASSAY OF LIPASE: CPT | Performed by: FAMILY MEDICINE

## 2021-08-11 PROCEDURE — 81025 URINE PREGNANCY TEST: CPT | Performed by: FAMILY MEDICINE

## 2021-08-11 PROCEDURE — 27000207 HC ISOLATION

## 2021-08-11 PROCEDURE — 93010 EKG 12-LEAD: ICD-10-PCS | Mod: ,,, | Performed by: INTERNAL MEDICINE

## 2021-08-11 PROCEDURE — 80053 COMPREHEN METABOLIC PANEL: CPT | Performed by: FAMILY MEDICINE

## 2021-08-11 PROCEDURE — 93010 ELECTROCARDIOGRAM REPORT: CPT | Mod: ,,, | Performed by: INTERNAL MEDICINE

## 2021-08-11 PROCEDURE — 84484 ASSAY OF TROPONIN QUANT: CPT | Performed by: FAMILY MEDICINE

## 2021-08-11 PROCEDURE — 87502 INFLUENZA DNA AMP PROBE: CPT | Performed by: FAMILY MEDICINE

## 2021-08-11 PROCEDURE — U0002 COVID-19 LAB TEST NON-CDC: HCPCS | Performed by: FAMILY MEDICINE

## 2021-08-11 PROCEDURE — 86140 C-REACTIVE PROTEIN: CPT | Performed by: FAMILY MEDICINE

## 2021-08-11 PROCEDURE — 99900035 HC TECH TIME PER 15 MIN (STAT)

## 2021-08-11 PROCEDURE — 63600175 PHARM REV CODE 636 W HCPCS: Performed by: FAMILY MEDICINE

## 2021-08-11 PROCEDURE — 71045 X-RAY EXAM CHEST 1 VIEW: CPT | Mod: TC,FY

## 2021-08-11 PROCEDURE — 71045 X-RAY EXAM CHEST 1 VIEW: CPT | Mod: 26,,, | Performed by: RADIOLOGY

## 2021-08-11 PROCEDURE — 71045 XR CHEST AP PORTABLE: ICD-10-PCS | Mod: 26,,, | Performed by: RADIOLOGY

## 2021-08-11 RX ORDER — LEVOFLOXACIN 5 MG/ML
750 INJECTION, SOLUTION INTRAVENOUS
Status: DISCONTINUED | OUTPATIENT
Start: 2021-08-11 | End: 2021-08-14 | Stop reason: HOSPADM

## 2021-08-11 RX ORDER — IBUPROFEN 200 MG
24 TABLET ORAL
Status: DISCONTINUED | OUTPATIENT
Start: 2021-08-11 | End: 2021-08-14 | Stop reason: HOSPADM

## 2021-08-11 RX ORDER — METFORMIN HYDROCHLORIDE 500 MG/1
500 TABLET, EXTENDED RELEASE ORAL 2 TIMES DAILY WITH MEALS
Status: DISCONTINUED | OUTPATIENT
Start: 2021-08-12 | End: 2021-08-11

## 2021-08-11 RX ORDER — SODIUM CHLORIDE 0.9 % (FLUSH) 0.9 %
10 SYRINGE (ML) INJECTION
Status: DISCONTINUED | OUTPATIENT
Start: 2021-08-11 | End: 2021-08-14 | Stop reason: HOSPADM

## 2021-08-11 RX ORDER — ACETAMINOPHEN 325 MG/1
650 TABLET ORAL EVERY 8 HOURS PRN
Status: DISCONTINUED | OUTPATIENT
Start: 2021-08-11 | End: 2021-08-14 | Stop reason: HOSPADM

## 2021-08-11 RX ORDER — ESTRADIOL 0.5 MG/1
2 TABLET ORAL DAILY
Status: DISCONTINUED | OUTPATIENT
Start: 2021-08-12 | End: 2021-08-12

## 2021-08-11 RX ORDER — DIAZEPAM 5 MG/1
5 TABLET ORAL EVERY 12 HOURS PRN
Status: DISCONTINUED | OUTPATIENT
Start: 2021-08-11 | End: 2021-08-14 | Stop reason: HOSPADM

## 2021-08-11 RX ORDER — ALBUTEROL SULFATE 90 UG/1
2 AEROSOL, METERED RESPIRATORY (INHALATION) EVERY 6 HOURS
Status: DISCONTINUED | OUTPATIENT
Start: 2021-08-12 | End: 2021-08-14 | Stop reason: HOSPADM

## 2021-08-11 RX ORDER — INSULIN ASPART 100 [IU]/ML
0-5 INJECTION, SOLUTION INTRAVENOUS; SUBCUTANEOUS
Status: DISCONTINUED | OUTPATIENT
Start: 2021-08-11 | End: 2021-08-14 | Stop reason: HOSPADM

## 2021-08-11 RX ORDER — PROGESTERONE 100 MG/1
CAPSULE ORAL
Status: COMPLETED
Start: 2021-08-11 | End: 2021-08-12

## 2021-08-11 RX ORDER — HYDROCODONE BITARTRATE AND ACETAMINOPHEN 5; 325 MG/1; MG/1
1 TABLET ORAL EVERY 6 HOURS PRN
Status: DISCONTINUED | OUTPATIENT
Start: 2021-08-11 | End: 2021-08-14 | Stop reason: HOSPADM

## 2021-08-11 RX ORDER — TRAMADOL HYDROCHLORIDE 50 MG/1
50 TABLET ORAL EVERY 6 HOURS PRN
Status: DISCONTINUED | OUTPATIENT
Start: 2021-08-11 | End: 2021-08-14 | Stop reason: HOSPADM

## 2021-08-11 RX ORDER — ACYCLOVIR 200 MG/1
400 CAPSULE ORAL 3 TIMES DAILY
Status: DISCONTINUED | OUTPATIENT
Start: 2021-08-12 | End: 2021-08-12

## 2021-08-11 RX ORDER — GLUCAGON 1 MG
1 KIT INJECTION
Status: DISCONTINUED | OUTPATIENT
Start: 2021-08-11 | End: 2021-08-14 | Stop reason: HOSPADM

## 2021-08-11 RX ORDER — IBUPROFEN 200 MG
16 TABLET ORAL
Status: DISCONTINUED | OUTPATIENT
Start: 2021-08-11 | End: 2021-08-14 | Stop reason: HOSPADM

## 2021-08-11 RX ORDER — ASCORBIC ACID 500 MG
500 TABLET ORAL 2 TIMES DAILY
Status: DISCONTINUED | OUTPATIENT
Start: 2021-08-11 | End: 2021-08-14 | Stop reason: HOSPADM

## 2021-08-11 RX ORDER — PROGESTERONE 100 MG/1
200 CAPSULE ORAL NIGHTLY
Status: DISCONTINUED | OUTPATIENT
Start: 2021-08-11 | End: 2021-08-14 | Stop reason: HOSPADM

## 2021-08-11 RX ORDER — ENOXAPARIN SODIUM 100 MG/ML
40 INJECTION SUBCUTANEOUS EVERY 24 HOURS
Status: DISCONTINUED | OUTPATIENT
Start: 2021-08-11 | End: 2021-08-14 | Stop reason: HOSPADM

## 2021-08-11 RX ORDER — ACETAMINOPHEN 500 MG
1000 TABLET ORAL
Status: COMPLETED | OUTPATIENT
Start: 2021-08-11 | End: 2021-08-11

## 2021-08-11 RX ORDER — KETOROLAC TROMETHAMINE 30 MG/ML
15 INJECTION, SOLUTION INTRAMUSCULAR; INTRAVENOUS
Status: COMPLETED | OUTPATIENT
Start: 2021-08-11 | End: 2021-08-11

## 2021-08-11 RX ORDER — AZITHROMYCIN 1 G/1
1 POWDER, FOR SUSPENSION ORAL ONCE
Status: ON HOLD | COMMUNITY
End: 2021-08-14 | Stop reason: HOSPADM

## 2021-08-11 RX ADMIN — SODIUM CHLORIDE 500 ML: 0.9 INJECTION, SOLUTION INTRAVENOUS at 05:08

## 2021-08-11 RX ADMIN — KETOROLAC TROMETHAMINE 15 MG: 30 INJECTION, SOLUTION INTRAMUSCULAR; INTRAVENOUS at 05:08

## 2021-08-11 RX ADMIN — ACETAMINOPHEN 1000 MG: 500 TABLET ORAL at 05:08

## 2021-08-12 LAB
25(OH)D3+25(OH)D2 SERPL-MCNC: 25 NG/ML (ref 30–96)
ALBUMIN SERPL BCP-MCNC: 2.6 G/DL (ref 3.5–5.2)
ALP SERPL-CCNC: 85 U/L (ref 55–135)
ALT SERPL W/O P-5'-P-CCNC: 179 U/L (ref 10–44)
ANION GAP SERPL CALC-SCNC: 13 MMOL/L (ref 8–16)
APTT BLDCRRT: 25.1 SEC (ref 21–32)
AST SERPL-CCNC: 84 U/L (ref 10–40)
BASOPHILS NFR BLD: 0 % (ref 0–1.9)
BILIRUB SERPL-MCNC: 0.2 MG/DL (ref 0.1–1)
BILIRUB UR QL STRIP: NEGATIVE
BUN SERPL-MCNC: 12 MG/DL (ref 6–20)
CALCIUM SERPL-MCNC: 8.7 MG/DL (ref 8.7–10.5)
CHLORIDE SERPL-SCNC: 102 MMOL/L (ref 95–110)
CLARITY UR: CLEAR
CO2 SERPL-SCNC: 23 MMOL/L (ref 23–29)
COLOR UR: YELLOW
CREAT SERPL-MCNC: 0.7 MG/DL (ref 0.5–1.4)
D DIMER PPP IA.FEU-MCNC: 0.39 MG/L FEU
DIFFERENTIAL METHOD: NORMAL
EOSINOPHIL NFR BLD: 0 % (ref 0–8)
ERYTHROCYTE [DISTWIDTH] IN BLOOD BY AUTOMATED COUNT: 13 % (ref 11.5–14.5)
ERYTHROCYTE [SEDIMENTATION RATE] IN BLOOD BY WESTERGREN METHOD: 45 MM/HR (ref 0–20)
EST. GFR  (AFRICAN AMERICAN): >60 ML/MIN/1.73 M^2
EST. GFR  (NON AFRICAN AMERICAN): >60 ML/MIN/1.73 M^2
GLUCOSE SERPL-MCNC: 284 MG/DL (ref 70–110)
GLUCOSE UR QL STRIP: ABNORMAL
HCT VFR BLD AUTO: 40.2 % (ref 37–48.5)
HGB BLD-MCNC: 13.2 G/DL (ref 12–16)
HGB UR QL STRIP: NEGATIVE
IMM GRANULOCYTES # BLD AUTO: NORMAL K/UL (ref 0–0.04)
IMM GRANULOCYTES NFR BLD AUTO: NORMAL % (ref 0–0.5)
INR PPP: 0.9 (ref 0.8–1.2)
KETONES UR QL STRIP: ABNORMAL
LEUKOCYTE ESTERASE UR QL STRIP: NEGATIVE
LYMPHOCYTES NFR BLD: 20 % (ref 18–48)
MAGNESIUM SERPL-MCNC: 2.4 MG/DL (ref 1.6–2.6)
MCH RBC QN AUTO: 28 PG (ref 27–31)
MCHC RBC AUTO-ENTMCNC: 32.8 G/DL (ref 32–36)
MCV RBC AUTO: 85 FL (ref 82–98)
MONOCYTES NFR BLD: 8 % (ref 4–15)
NEUTROPHILS NFR BLD: 72 % (ref 38–73)
NITRITE UR QL STRIP: NEGATIVE
NRBC BLD-RTO: 0 /100 WBC
PH UR STRIP: 7 [PH] (ref 5–8)
PHOSPHATE SERPL-MCNC: 2.7 MG/DL (ref 2.7–4.5)
PLATELET # BLD AUTO: 282 K/UL (ref 150–450)
PLATELET BLD QL SMEAR: NORMAL
PMV BLD AUTO: 9.9 FL (ref 9.2–12.9)
POCT GLUCOSE: 237 MG/DL (ref 70–110)
POCT GLUCOSE: 269 MG/DL (ref 70–110)
POCT GLUCOSE: 299 MG/DL (ref 70–110)
POCT GLUCOSE: 314 MG/DL (ref 70–110)
POCT GLUCOSE: 331 MG/DL (ref 70–110)
POTASSIUM SERPL-SCNC: 5.2 MMOL/L (ref 3.5–5.1)
PROT SERPL-MCNC: 7.1 G/DL (ref 6–8.4)
PROT UR QL STRIP: NEGATIVE
PROTHROMBIN TIME: 9.9 SEC (ref 9–12.5)
RBC # BLD AUTO: 4.72 M/UL (ref 4–5.4)
SODIUM SERPL-SCNC: 138 MMOL/L (ref 136–145)
SP GR UR STRIP: >=1.03 (ref 1–1.03)
URN SPEC COLLECT METH UR: ABNORMAL
UROBILINOGEN UR STRIP-ACNC: NEGATIVE EU/DL
WBC # BLD AUTO: 6.59 K/UL (ref 3.9–12.7)

## 2021-08-12 PROCEDURE — 80053 COMPREHEN METABOLIC PANEL: CPT | Performed by: HOSPITALIST

## 2021-08-12 PROCEDURE — 25000242 PHARM REV CODE 250 ALT 637 W/ HCPCS: Performed by: HOSPITALIST

## 2021-08-12 PROCEDURE — 81003 URINALYSIS AUTO W/O SCOPE: CPT | Performed by: HOSPITALIST

## 2021-08-12 PROCEDURE — 25000003 PHARM REV CODE 250: Performed by: HOSPITALIST

## 2021-08-12 PROCEDURE — 94761 N-INVAS EAR/PLS OXIMETRY MLT: CPT

## 2021-08-12 PROCEDURE — 63600175 PHARM REV CODE 636 W HCPCS: Performed by: HOSPITALIST

## 2021-08-12 PROCEDURE — 85730 THROMBOPLASTIN TIME PARTIAL: CPT | Performed by: HOSPITALIST

## 2021-08-12 PROCEDURE — 85651 RBC SED RATE NONAUTOMATED: CPT | Performed by: HOSPITALIST

## 2021-08-12 PROCEDURE — 83735 ASSAY OF MAGNESIUM: CPT | Performed by: HOSPITALIST

## 2021-08-12 PROCEDURE — 21400001 HC TELEMETRY ROOM

## 2021-08-12 PROCEDURE — 85610 PROTHROMBIN TIME: CPT | Performed by: HOSPITALIST

## 2021-08-12 PROCEDURE — 85379 FIBRIN DEGRADATION QUANT: CPT | Performed by: HOSPITALIST

## 2021-08-12 PROCEDURE — 82306 VITAMIN D 25 HYDROXY: CPT | Performed by: HOSPITALIST

## 2021-08-12 PROCEDURE — 27000207 HC ISOLATION

## 2021-08-12 PROCEDURE — 85027 COMPLETE CBC AUTOMATED: CPT | Performed by: HOSPITALIST

## 2021-08-12 PROCEDURE — 27000221 HC OXYGEN, UP TO 24 HOURS

## 2021-08-12 PROCEDURE — 94640 AIRWAY INHALATION TREATMENT: CPT

## 2021-08-12 PROCEDURE — 36415 COLL VENOUS BLD VENIPUNCTURE: CPT | Performed by: HOSPITALIST

## 2021-08-12 PROCEDURE — 85007 BL SMEAR W/DIFF WBC COUNT: CPT | Performed by: HOSPITALIST

## 2021-08-12 PROCEDURE — 25000003 PHARM REV CODE 250

## 2021-08-12 PROCEDURE — 84100 ASSAY OF PHOSPHORUS: CPT | Performed by: HOSPITALIST

## 2021-08-12 PROCEDURE — 27100098 HC SPACER

## 2021-08-12 RX ORDER — SODIUM CHLORIDE, SODIUM LACTATE, POTASSIUM CHLORIDE, CALCIUM CHLORIDE 600; 310; 30; 20 MG/100ML; MG/100ML; MG/100ML; MG/100ML
INJECTION, SOLUTION INTRAVENOUS CONTINUOUS
Status: DISCONTINUED | OUTPATIENT
Start: 2021-08-12 | End: 2021-08-14 | Stop reason: HOSPADM

## 2021-08-12 RX ORDER — PROGESTERONE 100 MG/1
CAPSULE ORAL
Status: DISPENSED
Start: 2021-08-12 | End: 2021-08-12

## 2021-08-12 RX ORDER — ACYCLOVIR 200 MG/1
400 CAPSULE ORAL 3 TIMES DAILY PRN
Status: DISCONTINUED | OUTPATIENT
Start: 2021-08-12 | End: 2021-08-14 | Stop reason: HOSPADM

## 2021-08-12 RX ORDER — ESTRADIOL 0.5 MG/1
2 TABLET ORAL NIGHTLY
Status: DISCONTINUED | OUTPATIENT
Start: 2021-08-12 | End: 2021-08-14 | Stop reason: HOSPADM

## 2021-08-12 RX ADMIN — OXYCODONE HYDROCHLORIDE AND ACETAMINOPHEN 500 MG: 500 TABLET ORAL at 08:08

## 2021-08-12 RX ADMIN — PROGESTERONE 200 MG: 100 CAPSULE ORAL at 12:08

## 2021-08-12 RX ADMIN — ALBUTEROL SULFATE 2 PUFF: 90 AEROSOL, METERED RESPIRATORY (INHALATION) at 07:08

## 2021-08-12 RX ADMIN — INSULIN ASPART 2 UNITS: 100 INJECTION, SOLUTION INTRAVENOUS; SUBCUTANEOUS at 12:08

## 2021-08-12 RX ADMIN — INSULIN ASPART 2 UNITS: 100 INJECTION, SOLUTION INTRAVENOUS; SUBCUTANEOUS at 10:08

## 2021-08-12 RX ADMIN — ALBUTEROL SULFATE 2 PUFF: 90 AEROSOL, METERED RESPIRATORY (INHALATION) at 01:08

## 2021-08-12 RX ADMIN — SODIUM CHLORIDE, SODIUM LACTATE, POTASSIUM CHLORIDE, AND CALCIUM CHLORIDE: .6; .31; .03; .02 INJECTION, SOLUTION INTRAVENOUS at 04:08

## 2021-08-12 RX ADMIN — INSULIN ASPART 1 UNITS: 100 INJECTION, SOLUTION INTRAVENOUS; SUBCUTANEOUS at 09:08

## 2021-08-12 RX ADMIN — TRAMADOL HYDROCHLORIDE 50 MG: 50 TABLET, FILM COATED ORAL at 09:08

## 2021-08-12 RX ADMIN — THERA TABS 1 TABLET: TAB at 10:08

## 2021-08-12 RX ADMIN — TRAMADOL HYDROCHLORIDE 50 MG: 50 TABLET, FILM COATED ORAL at 08:08

## 2021-08-12 RX ADMIN — ACYCLOVIR 400 MG: 200 CAPSULE ORAL at 10:08

## 2021-08-12 RX ADMIN — INSULIN ASPART 4 UNITS: 100 INJECTION, SOLUTION INTRAVENOUS; SUBCUTANEOUS at 04:08

## 2021-08-12 RX ADMIN — PROGESTERONE 200 MG: 100 CAPSULE ORAL at 08:08

## 2021-08-12 RX ADMIN — ESTRADIOL 2 MG: 0.5 TABLET ORAL at 08:08

## 2021-08-12 RX ADMIN — OXYCODONE HYDROCHLORIDE AND ACETAMINOPHEN 500 MG: 500 TABLET ORAL at 12:08

## 2021-08-12 RX ADMIN — LEVOFLOXACIN 750 MG: 750 INJECTION, SOLUTION INTRAVENOUS at 12:08

## 2021-08-12 RX ADMIN — LEVOFLOXACIN 750 MG: 750 INJECTION, SOLUTION INTRAVENOUS at 11:08

## 2021-08-12 RX ADMIN — REMDESIVIR 200 MG: 100 INJECTION, POWDER, LYOPHILIZED, FOR SOLUTION INTRAVENOUS at 10:08

## 2021-08-12 RX ADMIN — ENOXAPARIN SODIUM 40 MG: 40 INJECTION SUBCUTANEOUS at 04:08

## 2021-08-12 RX ADMIN — TRAMADOL HYDROCHLORIDE 50 MG: 50 TABLET, FILM COATED ORAL at 03:08

## 2021-08-12 RX ADMIN — DEXAMETHASONE 6 MG: 4 TABLET ORAL at 10:08

## 2021-08-12 RX ADMIN — ENOXAPARIN SODIUM 40 MG: 40 INJECTION SUBCUTANEOUS at 12:08

## 2021-08-12 RX ADMIN — ALBUTEROL SULFATE 2 PUFF: 90 AEROSOL, METERED RESPIRATORY (INHALATION) at 02:08

## 2021-08-12 NOTE — OR NURSING
Writer reviewed chart preparing for preadmission call for OR 8/16- found documentation of pt.being covid positive with worsening symptoms/? admission to hospital 8/11. Message sent to surgeon/RNCC to notify of need to verify info regarding above.

## 2021-08-13 LAB
ALBUMIN SERPL BCP-MCNC: 2.5 G/DL (ref 3.5–5.2)
ALP SERPL-CCNC: 68 U/L (ref 55–135)
ALT SERPL W/O P-5'-P-CCNC: 107 U/L (ref 10–44)
AMYLASE SERPL-CCNC: 596 U/L (ref 20–110)
ANION GAP SERPL CALC-SCNC: 11 MMOL/L (ref 8–16)
AST SERPL-CCNC: 36 U/L (ref 10–40)
BASOPHILS # BLD AUTO: 0.01 K/UL (ref 0–0.2)
BASOPHILS NFR BLD: 0.2 % (ref 0–1.9)
BILIRUB SERPL-MCNC: 0.3 MG/DL (ref 0.1–1)
BUN SERPL-MCNC: 11 MG/DL (ref 6–20)
CALCIUM SERPL-MCNC: 8.7 MG/DL (ref 8.7–10.5)
CHLORIDE SERPL-SCNC: 101 MMOL/L (ref 95–110)
CO2 SERPL-SCNC: 23 MMOL/L (ref 23–29)
CREAT SERPL-MCNC: 0.7 MG/DL (ref 0.5–1.4)
DIFFERENTIAL METHOD: ABNORMAL
EOSINOPHIL # BLD AUTO: 0 K/UL (ref 0–0.5)
EOSINOPHIL NFR BLD: 0 % (ref 0–8)
ERYTHROCYTE [DISTWIDTH] IN BLOOD BY AUTOMATED COUNT: 12.9 % (ref 11.5–14.5)
EST. GFR  (AFRICAN AMERICAN): >60 ML/MIN/1.73 M^2
EST. GFR  (NON AFRICAN AMERICAN): >60 ML/MIN/1.73 M^2
GIANT PLATELETS BLD QL SMEAR: PRESENT
GLUCOSE SERPL-MCNC: 282 MG/DL (ref 70–110)
HCT VFR BLD AUTO: 38.1 % (ref 37–48.5)
HGB BLD-MCNC: 12.5 G/DL (ref 12–16)
IMM GRANULOCYTES # BLD AUTO: 0.03 K/UL (ref 0–0.04)
IMM GRANULOCYTES NFR BLD AUTO: 0.6 % (ref 0–0.5)
LIPASE SERPL-CCNC: >1000 U/L (ref 4–60)
LYMPHOCYTES # BLD AUTO: 1.5 K/UL (ref 1–4.8)
LYMPHOCYTES NFR BLD: 30.6 % (ref 18–48)
MAGNESIUM SERPL-MCNC: 2.1 MG/DL (ref 1.6–2.6)
MCH RBC QN AUTO: 27.4 PG (ref 27–31)
MCHC RBC AUTO-ENTMCNC: 32.8 G/DL (ref 32–36)
MCV RBC AUTO: 84 FL (ref 82–98)
MONOCYTES # BLD AUTO: 0.4 K/UL (ref 0.3–1)
MONOCYTES NFR BLD: 9.2 % (ref 4–15)
NEUTROPHILS # BLD AUTO: 2.9 K/UL (ref 1.8–7.7)
NEUTROPHILS NFR BLD: 59.4 % (ref 38–73)
NRBC BLD-RTO: 0 /100 WBC
PHOSPHATE SERPL-MCNC: 2.7 MG/DL (ref 2.7–4.5)
PLATELET # BLD AUTO: 277 K/UL (ref 150–450)
PLATELET BLD QL SMEAR: ABNORMAL
PMV BLD AUTO: 9.6 FL (ref 9.2–12.9)
POCT GLUCOSE: 273 MG/DL (ref 70–110)
POCT GLUCOSE: 285 MG/DL (ref 70–110)
POCT GLUCOSE: 286 MG/DL (ref 70–110)
POCT GLUCOSE: 321 MG/DL (ref 70–110)
POTASSIUM SERPL-SCNC: 4.5 MMOL/L (ref 3.5–5.1)
PROT SERPL-MCNC: 6.4 G/DL (ref 6–8.4)
RBC # BLD AUTO: 4.56 M/UL (ref 4–5.4)
SODIUM SERPL-SCNC: 135 MMOL/L (ref 136–145)
WBC # BLD AUTO: 4.8 K/UL (ref 3.9–12.7)

## 2021-08-13 PROCEDURE — 27000207 HC ISOLATION

## 2021-08-13 PROCEDURE — 63600175 PHARM REV CODE 636 W HCPCS

## 2021-08-13 PROCEDURE — C9399 UNCLASSIFIED DRUGS OR BIOLOG: HCPCS | Performed by: HOSPITALIST

## 2021-08-13 PROCEDURE — 84100 ASSAY OF PHOSPHORUS: CPT | Performed by: HOSPITALIST

## 2021-08-13 PROCEDURE — 82150 ASSAY OF AMYLASE: CPT | Performed by: HOSPITALIST

## 2021-08-13 PROCEDURE — 25000003 PHARM REV CODE 250: Performed by: HOSPITALIST

## 2021-08-13 PROCEDURE — 94640 AIRWAY INHALATION TREATMENT: CPT

## 2021-08-13 PROCEDURE — 83690 ASSAY OF LIPASE: CPT | Performed by: HOSPITALIST

## 2021-08-13 PROCEDURE — 63600175 PHARM REV CODE 636 W HCPCS: Performed by: HOSPITALIST

## 2021-08-13 PROCEDURE — 27000221 HC OXYGEN, UP TO 24 HOURS

## 2021-08-13 PROCEDURE — 94761 N-INVAS EAR/PLS OXIMETRY MLT: CPT

## 2021-08-13 PROCEDURE — 36415 COLL VENOUS BLD VENIPUNCTURE: CPT | Performed by: HOSPITALIST

## 2021-08-13 PROCEDURE — 21400001 HC TELEMETRY ROOM

## 2021-08-13 PROCEDURE — 85025 COMPLETE CBC W/AUTO DIFF WBC: CPT | Performed by: HOSPITALIST

## 2021-08-13 PROCEDURE — 83735 ASSAY OF MAGNESIUM: CPT | Performed by: HOSPITALIST

## 2021-08-13 PROCEDURE — 80053 COMPREHEN METABOLIC PANEL: CPT | Performed by: HOSPITALIST

## 2021-08-13 RX ORDER — ONDANSETRON 2 MG/ML
INJECTION INTRAMUSCULAR; INTRAVENOUS
Status: COMPLETED
Start: 2021-08-13 | End: 2021-08-13

## 2021-08-13 RX ORDER — TRAMADOL HYDROCHLORIDE 50 MG/1
100 TABLET ORAL EVERY 6 HOURS PRN
Status: DISCONTINUED | OUTPATIENT
Start: 2021-08-13 | End: 2021-08-14 | Stop reason: HOSPADM

## 2021-08-13 RX ORDER — ONDANSETRON 2 MG/ML
4 INJECTION INTRAMUSCULAR; INTRAVENOUS EVERY 6 HOURS PRN
Status: DISCONTINUED | OUTPATIENT
Start: 2021-08-13 | End: 2021-08-14 | Stop reason: HOSPADM

## 2021-08-13 RX ADMIN — INSULIN ASPART 4 UNITS: 100 INJECTION, SOLUTION INTRAVENOUS; SUBCUTANEOUS at 04:08

## 2021-08-13 RX ADMIN — ONDANSETRON 4 MG: 2 INJECTION INTRAMUSCULAR; INTRAVENOUS at 09:08

## 2021-08-13 RX ADMIN — ALBUTEROL SULFATE 2 PUFF: 90 AEROSOL, METERED RESPIRATORY (INHALATION) at 01:08

## 2021-08-13 RX ADMIN — OXYCODONE HYDROCHLORIDE AND ACETAMINOPHEN 500 MG: 500 TABLET ORAL at 08:08

## 2021-08-13 RX ADMIN — INSULIN ASPART 3 UNITS: 100 INJECTION, SOLUTION INTRAVENOUS; SUBCUTANEOUS at 07:08

## 2021-08-13 RX ADMIN — ESTRADIOL 2 MG: 0.5 TABLET ORAL at 08:08

## 2021-08-13 RX ADMIN — OXYCODONE HYDROCHLORIDE AND ACETAMINOPHEN 500 MG: 500 TABLET ORAL at 09:08

## 2021-08-13 RX ADMIN — TRAMADOL HYDROCHLORIDE 100 MG: 50 TABLET, FILM COATED ORAL at 11:08

## 2021-08-13 RX ADMIN — PROGESTERONE 200 MG: 100 CAPSULE ORAL at 08:08

## 2021-08-13 RX ADMIN — REMDESIVIR 100 MG: 100 INJECTION, POWDER, LYOPHILIZED, FOR SOLUTION INTRAVENOUS at 09:08

## 2021-08-13 RX ADMIN — ONDANSETRON HYDROCHLORIDE 4 MG: 2 SOLUTION INTRAMUSCULAR; INTRAVENOUS at 09:08

## 2021-08-13 RX ADMIN — INSULIN ASPART 3 UNITS: 100 INJECTION, SOLUTION INTRAVENOUS; SUBCUTANEOUS at 12:08

## 2021-08-13 RX ADMIN — ENOXAPARIN SODIUM 40 MG: 40 INJECTION SUBCUTANEOUS at 04:08

## 2021-08-13 RX ADMIN — PANCRELIPASE 2 CAPSULE: 24000; 76000; 120000 CAPSULE, DELAYED RELEASE PELLETS ORAL at 12:08

## 2021-08-13 RX ADMIN — ALBUTEROL SULFATE 2 PUFF: 90 AEROSOL, METERED RESPIRATORY (INHALATION) at 07:08

## 2021-08-13 RX ADMIN — INSULIN DETEMIR 12 UNITS: 100 INJECTION, SOLUTION SUBCUTANEOUS at 08:08

## 2021-08-13 RX ADMIN — DEXAMETHASONE 6 MG: 4 TABLET ORAL at 09:08

## 2021-08-13 RX ADMIN — PANCRELIPASE 2 CAPSULE: 24000; 76000; 120000 CAPSULE, DELAYED RELEASE PELLETS ORAL at 04:08

## 2021-08-13 RX ADMIN — THERA TABS 1 TABLET: TAB at 09:08

## 2021-08-13 RX ADMIN — TRAMADOL HYDROCHLORIDE 50 MG: 50 TABLET, FILM COATED ORAL at 03:08

## 2021-08-13 RX ADMIN — SODIUM CHLORIDE, SODIUM LACTATE, POTASSIUM CHLORIDE, AND CALCIUM CHLORIDE: .6; .31; .03; .02 INJECTION, SOLUTION INTRAVENOUS at 05:08

## 2021-08-13 RX ADMIN — ALBUTEROL SULFATE 2 PUFF: 90 AEROSOL, METERED RESPIRATORY (INHALATION) at 08:08

## 2021-08-14 VITALS
WEIGHT: 180 LBS | TEMPERATURE: 97 F | DIASTOLIC BLOOD PRESSURE: 76 MMHG | BODY MASS INDEX: 31.89 KG/M2 | OXYGEN SATURATION: 97 % | HEART RATE: 48 BPM | RESPIRATION RATE: 18 BRPM | SYSTOLIC BLOOD PRESSURE: 131 MMHG | HEIGHT: 63 IN

## 2021-08-14 DIAGNOSIS — U07.1 COVID-19 VIRUS DETECTED: ICD-10-CM

## 2021-08-14 PROBLEM — J96.01 ACUTE HYPOXEMIC RESPIRATORY FAILURE DUE TO COVID-19: Status: RESOLVED | Noted: 2021-08-11 | Resolved: 2021-08-14

## 2021-08-14 LAB
ALBUMIN SERPL BCP-MCNC: 2.6 G/DL (ref 3.5–5.2)
ALP SERPL-CCNC: 60 U/L (ref 55–135)
ALT SERPL W/O P-5'-P-CCNC: 77 U/L (ref 10–44)
ANION GAP SERPL CALC-SCNC: 12 MMOL/L (ref 8–16)
AST SERPL-CCNC: 24 U/L (ref 10–40)
BASOPHILS NFR BLD: 0 % (ref 0–1.9)
BILIRUB SERPL-MCNC: 0.3 MG/DL (ref 0.1–1)
BUN SERPL-MCNC: 12 MG/DL (ref 6–20)
CALCIUM SERPL-MCNC: 8.7 MG/DL (ref 8.7–10.5)
CHLORIDE SERPL-SCNC: 99 MMOL/L (ref 95–110)
CO2 SERPL-SCNC: 24 MMOL/L (ref 23–29)
CREAT SERPL-MCNC: 0.7 MG/DL (ref 0.5–1.4)
D DIMER PPP IA.FEU-MCNC: 0.6 MG/L FEU
DIFFERENTIAL METHOD: NORMAL
EOSINOPHIL NFR BLD: 0 % (ref 0–8)
ERYTHROCYTE [DISTWIDTH] IN BLOOD BY AUTOMATED COUNT: 12.6 % (ref 11.5–14.5)
EST. GFR  (AFRICAN AMERICAN): >60 ML/MIN/1.73 M^2
EST. GFR  (NON AFRICAN AMERICAN): >60 ML/MIN/1.73 M^2
GLUCOSE SERPL-MCNC: 277 MG/DL (ref 70–110)
HCT VFR BLD AUTO: 39 % (ref 37–48.5)
HGB BLD-MCNC: 13 G/DL (ref 12–16)
IMM GRANULOCYTES # BLD AUTO: NORMAL K/UL (ref 0–0.04)
IMM GRANULOCYTES NFR BLD AUTO: NORMAL % (ref 0–0.5)
LYMPHOCYTES NFR BLD: 26 % (ref 18–48)
MAGNESIUM SERPL-MCNC: 2 MG/DL (ref 1.6–2.6)
MCH RBC QN AUTO: 28 PG (ref 27–31)
MCHC RBC AUTO-ENTMCNC: 33.3 G/DL (ref 32–36)
MCV RBC AUTO: 84 FL (ref 82–98)
MONOCYTES NFR BLD: 8 % (ref 4–15)
MYELOCYTES NFR BLD MANUAL: 1 %
NEUTROPHILS NFR BLD: 65 % (ref 38–73)
NRBC BLD-RTO: 0 /100 WBC
PHOSPHATE SERPL-MCNC: 3.6 MG/DL (ref 2.7–4.5)
PLATELET # BLD AUTO: 340 K/UL (ref 150–450)
PMV BLD AUTO: 9.5 FL (ref 9.2–12.9)
POCT GLUCOSE: 236 MG/DL (ref 70–110)
POCT GLUCOSE: 260 MG/DL (ref 70–110)
POTASSIUM SERPL-SCNC: 4.5 MMOL/L (ref 3.5–5.1)
PROT SERPL-MCNC: 6.4 G/DL (ref 6–8.4)
RBC # BLD AUTO: 4.65 M/UL (ref 4–5.4)
SODIUM SERPL-SCNC: 135 MMOL/L (ref 136–145)
WBC # BLD AUTO: 4.34 K/UL (ref 3.9–12.7)

## 2021-08-14 PROCEDURE — 85007 BL SMEAR W/DIFF WBC COUNT: CPT | Performed by: HOSPITALIST

## 2021-08-14 PROCEDURE — 80053 COMPREHEN METABOLIC PANEL: CPT | Performed by: HOSPITALIST

## 2021-08-14 PROCEDURE — 85379 FIBRIN DEGRADATION QUANT: CPT | Performed by: HOSPITALIST

## 2021-08-14 PROCEDURE — 85027 COMPLETE CBC AUTOMATED: CPT | Performed by: HOSPITALIST

## 2021-08-14 PROCEDURE — 63600175 PHARM REV CODE 636 W HCPCS: Performed by: HOSPITALIST

## 2021-08-14 PROCEDURE — 84100 ASSAY OF PHOSPHORUS: CPT | Performed by: HOSPITALIST

## 2021-08-14 PROCEDURE — 27000221 HC OXYGEN, UP TO 24 HOURS

## 2021-08-14 PROCEDURE — 36415 COLL VENOUS BLD VENIPUNCTURE: CPT | Performed by: HOSPITALIST

## 2021-08-14 PROCEDURE — 83735 ASSAY OF MAGNESIUM: CPT | Performed by: HOSPITALIST

## 2021-08-14 PROCEDURE — 25000003 PHARM REV CODE 250: Performed by: HOSPITALIST

## 2021-08-14 PROCEDURE — 94761 N-INVAS EAR/PLS OXIMETRY MLT: CPT

## 2021-08-14 PROCEDURE — 94640 AIRWAY INHALATION TREATMENT: CPT

## 2021-08-14 RX ORDER — ALBUTEROL SULFATE 90 UG/1
2 AEROSOL, METERED RESPIRATORY (INHALATION) EVERY 6 HOURS
Qty: 18 G | Refills: 0 | Status: SHIPPED | OUTPATIENT
Start: 2021-08-14 | End: 2021-09-01

## 2021-08-14 RX ORDER — LEVOFLOXACIN 500 MG/1
500 TABLET, FILM COATED ORAL DAILY
Qty: 10 TABLET | Refills: 0 | Status: SHIPPED | OUTPATIENT
Start: 2021-08-14 | End: 2021-08-24

## 2021-08-14 RX ADMIN — OXYCODONE HYDROCHLORIDE AND ACETAMINOPHEN 500 MG: 500 TABLET ORAL at 09:08

## 2021-08-14 RX ADMIN — ALBUTEROL SULFATE 2 PUFF: 90 AEROSOL, METERED RESPIRATORY (INHALATION) at 01:08

## 2021-08-14 RX ADMIN — THERA TABS 1 TABLET: TAB at 09:08

## 2021-08-14 RX ADMIN — TRAMADOL HYDROCHLORIDE 100 MG: 50 TABLET, FILM COATED ORAL at 11:08

## 2021-08-14 RX ADMIN — REMDESIVIR 100 MG: 100 INJECTION, POWDER, LYOPHILIZED, FOR SOLUTION INTRAVENOUS at 09:08

## 2021-08-14 RX ADMIN — PANCRELIPASE 2 CAPSULE: 24000; 76000; 120000 CAPSULE, DELAYED RELEASE PELLETS ORAL at 07:08

## 2021-08-14 RX ADMIN — SODIUM CHLORIDE, SODIUM LACTATE, POTASSIUM CHLORIDE, AND CALCIUM CHLORIDE: .6; .31; .03; .02 INJECTION, SOLUTION INTRAVENOUS at 12:08

## 2021-08-14 RX ADMIN — ALBUTEROL SULFATE 2 PUFF: 90 AEROSOL, METERED RESPIRATORY (INHALATION) at 07:08

## 2021-08-14 RX ADMIN — INSULIN ASPART 2 UNITS: 100 INJECTION, SOLUTION INTRAVENOUS; SUBCUTANEOUS at 07:08

## 2021-08-14 RX ADMIN — DEXAMETHASONE 6 MG: 4 TABLET ORAL at 09:08

## 2021-08-14 RX ADMIN — INSULIN ASPART 3 UNITS: 100 INJECTION, SOLUTION INTRAVENOUS; SUBCUTANEOUS at 11:08

## 2021-08-14 RX ADMIN — LEVOFLOXACIN 750 MG: 750 INJECTION, SOLUTION INTRAVENOUS at 12:08

## 2021-08-17 ENCOUNTER — PATIENT OUTREACH (OUTPATIENT)
Dept: ADMINISTRATIVE | Facility: HOSPITAL | Age: 31
End: 2021-08-17

## 2021-08-17 ENCOUNTER — PATIENT MESSAGE (OUTPATIENT)
Dept: ADMINISTRATIVE | Facility: HOSPITAL | Age: 31
End: 2021-08-17

## 2021-08-19 ENCOUNTER — TELEPHONE (OUTPATIENT)
Dept: UROLOGY | Facility: CLINIC | Age: 31
End: 2021-08-19

## 2021-08-19 NOTE — TELEPHONE ENCOUNTER
Patient reports already meeting with  and stated that she'll make an appt. to reschedule her surgery with another coordinator.

## 2021-08-19 NOTE — TELEPHONE ENCOUNTER
----- Message from Rebecca Sampson RN sent at 8/18/2021  4:12 PM CDT -----  Regarding: FW: pt for OR 8/16 positive covid, ? [place]?  Please call and schedule a VV with this patient and Dr. Simpson to discuss surgery. No hurry. Thank you  ----- Message -----  From: Rebecca Sampson RN  Sent: 8/13/2021   8:31 AM CDT  To: Rebecca Sampson RN  Subject: FW: pt for OR 8/16 positive covid, ? in hosp#    LMTRC. JMAHESH BRUSH 8/13/21  ----- Message -----  From: Chad Simpson MD  Sent: 8/12/2021  10:37 PM CDT  To: Rebecca Sampson RN, Brenda Braga  Subject: RE: pt for OR 8/16 positive covid, ? in hosp#    Ok.  Lets schedule them back to clinic in 6 weeks so I can make sure they have recovered and we can reschedule.  Geovanny Sierra.  ----- Message -----  From: Renuka Santana RN  Sent: 8/12/2021  12:49 PM CDT  To: Chad Simpson MD, Rebecca Sampson RN, #  Subject: RE: pt for OR 8/16 positive covid, ? in hosp#    Hello,    We are leaving a message with the clinic to ask.    Thanks for the heads up.    Renuka  ----- Message -----  From: Sue Tilley, RN  Sent: 8/12/2021  12:29 PM CDT  To: Chad Simpson MD, Renuka Santana, RN, #  Subject: pt for OR 8/16 positive covid, ? in hospital#    Hello,      In looking at this chart prior to preadmission call I see this patient on the OR schedule for 8/16 tested positive for covid earlier this month and has worsening symptoms of covid/pneumonia and it appears may be hospitalized in [place].Just a heads up in case you weren't aware as I see they remain on the schedule for Monday.        Thank you!    Sue Tilley RN,BSN    Preadmission Nursing

## 2021-09-01 ENCOUNTER — OFFICE VISIT (OUTPATIENT)
Dept: FAMILY MEDICINE | Facility: CLINIC | Age: 31
End: 2021-09-01
Payer: COMMERCIAL

## 2021-09-01 ENCOUNTER — CLINICAL SUPPORT (OUTPATIENT)
Dept: FAMILY MEDICINE | Facility: CLINIC | Age: 31
End: 2021-09-01
Attending: FAMILY MEDICINE
Payer: COMMERCIAL

## 2021-09-01 VITALS
WEIGHT: 178.19 LBS | RESPIRATION RATE: 15 BRPM | TEMPERATURE: 98 F | SYSTOLIC BLOOD PRESSURE: 125 MMHG | DIASTOLIC BLOOD PRESSURE: 84 MMHG | HEIGHT: 63 IN | BODY MASS INDEX: 31.57 KG/M2 | OXYGEN SATURATION: 98 % | HEART RATE: 87 BPM

## 2021-09-01 DIAGNOSIS — R05.9 COUGH: ICD-10-CM

## 2021-09-01 DIAGNOSIS — E11.9 TYPE 2 DIABETES MELLITUS WITHOUT COMPLICATION, WITHOUT LONG-TERM CURRENT USE OF INSULIN: ICD-10-CM

## 2021-09-01 DIAGNOSIS — E11.9 TYPE 2 DIABETES MELLITUS WITHOUT COMPLICATION, UNSPECIFIED WHETHER LONG TERM INSULIN USE: ICD-10-CM

## 2021-09-01 DIAGNOSIS — Z09 HOSPITAL DISCHARGE FOLLOW-UP: Primary | ICD-10-CM

## 2021-09-01 PROCEDURE — 1159F MED LIST DOCD IN RCRD: CPT | Mod: S$GLB,,, | Performed by: FAMILY MEDICINE

## 2021-09-01 PROCEDURE — 92228 IMG RTA DETC/MNTR DS PHY/QHP: CPT | Mod: TC,S$GLB,, | Performed by: FAMILY MEDICINE

## 2021-09-01 PROCEDURE — 92228 DIABETIC EYE SCREENING PHOTO: ICD-10-PCS | Mod: TC,S$GLB,, | Performed by: FAMILY MEDICINE

## 2021-09-01 PROCEDURE — 3044F HG A1C LEVEL LT 7.0%: CPT | Mod: S$GLB,,, | Performed by: FAMILY MEDICINE

## 2021-09-01 PROCEDURE — 1111F PR DISCHARGE MEDS RECONCILED W/ CURRENT OUTPATIENT MED LIST: ICD-10-PCS | Mod: S$GLB,,, | Performed by: FAMILY MEDICINE

## 2021-09-01 PROCEDURE — 3074F PR MOST RECENT SYSTOLIC BLOOD PRESSURE < 130 MM HG: ICD-10-PCS | Mod: S$GLB,,, | Performed by: FAMILY MEDICINE

## 2021-09-01 PROCEDURE — 3008F PR BODY MASS INDEX (BMI) DOCUMENTED: ICD-10-PCS | Mod: S$GLB,,, | Performed by: FAMILY MEDICINE

## 2021-09-01 PROCEDURE — 3008F BODY MASS INDEX DOCD: CPT | Mod: S$GLB,,, | Performed by: FAMILY MEDICINE

## 2021-09-01 PROCEDURE — 99499 DIABETIC EYE SCREENING PHOTO: ICD-10-PCS | Mod: S$GLB,,, | Performed by: OPHTHALMOLOGY

## 2021-09-01 PROCEDURE — 99213 OFFICE O/P EST LOW 20 MIN: CPT | Mod: S$GLB,,, | Performed by: FAMILY MEDICINE

## 2021-09-01 PROCEDURE — 3074F SYST BP LT 130 MM HG: CPT | Mod: S$GLB,,, | Performed by: FAMILY MEDICINE

## 2021-09-01 PROCEDURE — 3079F PR MOST RECENT DIASTOLIC BLOOD PRESSURE 80-89 MM HG: ICD-10-PCS | Mod: S$GLB,,, | Performed by: FAMILY MEDICINE

## 2021-09-01 PROCEDURE — 99499 UNLISTED E&M SERVICE: CPT | Mod: S$GLB,,, | Performed by: OPHTHALMOLOGY

## 2021-09-01 PROCEDURE — 1160F RVW MEDS BY RX/DR IN RCRD: CPT | Mod: S$GLB,,, | Performed by: FAMILY MEDICINE

## 2021-09-01 PROCEDURE — 1111F DSCHRG MED/CURRENT MED MERGE: CPT | Mod: S$GLB,,, | Performed by: FAMILY MEDICINE

## 2021-09-01 PROCEDURE — 1160F PR REVIEW ALL MEDS BY PRESCRIBER/CLIN PHARMACIST DOCUMENTED: ICD-10-PCS | Mod: S$GLB,,, | Performed by: FAMILY MEDICINE

## 2021-09-01 PROCEDURE — 99213 PR OFFICE/OUTPT VISIT, EST, LEVL III, 20-29 MIN: ICD-10-PCS | Mod: S$GLB,,, | Performed by: FAMILY MEDICINE

## 2021-09-01 PROCEDURE — 1159F PR MEDICATION LIST DOCUMENTED IN MEDICAL RECORD: ICD-10-PCS | Mod: S$GLB,,, | Performed by: FAMILY MEDICINE

## 2021-09-01 PROCEDURE — 3044F PR MOST RECENT HEMOGLOBIN A1C LEVEL <7.0%: ICD-10-PCS | Mod: S$GLB,,, | Performed by: FAMILY MEDICINE

## 2021-09-01 PROCEDURE — 3079F DIAST BP 80-89 MM HG: CPT | Mod: S$GLB,,, | Performed by: FAMILY MEDICINE

## 2021-09-01 RX ORDER — METFORMIN HYDROCHLORIDE 500 MG/1
500 TABLET, EXTENDED RELEASE ORAL
COMMUNITY
End: 2021-12-01 | Stop reason: SDUPTHER

## 2021-09-01 RX ORDER — PANCRELIPASE 24000; 76000; 120000 [USP'U]/1; [USP'U]/1; [USP'U]/1
2 CAPSULE, DELAYED RELEASE PELLETS ORAL
COMMUNITY
Start: 2021-07-26

## 2021-09-06 DIAGNOSIS — Z11.59 ENCOUNTER FOR SCREENING FOR OTHER VIRAL DISEASES: ICD-10-CM

## 2021-09-07 DIAGNOSIS — Z11.59 ENCOUNTER FOR SCREENING FOR OTHER VIRAL DISEASES: ICD-10-CM

## 2021-09-08 ENCOUNTER — PATIENT MESSAGE (OUTPATIENT)
Dept: FAMILY MEDICINE | Facility: CLINIC | Age: 31
End: 2021-09-08

## 2021-09-08 ENCOUNTER — OFFICE VISIT (OUTPATIENT)
Dept: FAMILY MEDICINE | Facility: CLINIC | Age: 31
End: 2021-09-08
Payer: COMMERCIAL

## 2021-09-08 DIAGNOSIS — R59.1 LYMPHADENOPATHY: ICD-10-CM

## 2021-09-08 DIAGNOSIS — R50.9 LOW GRADE FEVER: Primary | ICD-10-CM

## 2021-09-08 PROCEDURE — 3044F HG A1C LEVEL LT 7.0%: CPT | Mod: 95,,, | Performed by: FAMILY MEDICINE

## 2021-09-08 PROCEDURE — 99213 PR OFFICE/OUTPT VISIT, EST, LEVL III, 20-29 MIN: ICD-10-PCS | Mod: 95,,, | Performed by: FAMILY MEDICINE

## 2021-09-08 PROCEDURE — 3066F PR DOCUMENTATION OF TREATMENT FOR NEPHROPATHY: ICD-10-PCS | Mod: 95,,, | Performed by: FAMILY MEDICINE

## 2021-09-08 PROCEDURE — 3061F NEG MICROALBUMINURIA REV: CPT | Mod: 95,,, | Performed by: FAMILY MEDICINE

## 2021-09-08 PROCEDURE — 1111F DSCHRG MED/CURRENT MED MERGE: CPT | Mod: 95,,, | Performed by: FAMILY MEDICINE

## 2021-09-08 PROCEDURE — 99213 OFFICE O/P EST LOW 20 MIN: CPT | Mod: 95,,, | Performed by: FAMILY MEDICINE

## 2021-09-08 PROCEDURE — 3044F PR MOST RECENT HEMOGLOBIN A1C LEVEL <7.0%: ICD-10-PCS | Mod: 95,,, | Performed by: FAMILY MEDICINE

## 2021-09-08 PROCEDURE — 3061F PR NEG MICROALBUMINURIA RESULT DOCUMENTED/REVIEW: ICD-10-PCS | Mod: 95,,, | Performed by: FAMILY MEDICINE

## 2021-09-08 PROCEDURE — 1111F PR DISCHARGE MEDS RECONCILED W/ CURRENT OUTPATIENT MED LIST: ICD-10-PCS | Mod: 95,,, | Performed by: FAMILY MEDICINE

## 2021-09-08 PROCEDURE — 3066F NEPHROPATHY DOC TX: CPT | Mod: 95,,, | Performed by: FAMILY MEDICINE

## 2021-09-13 ENCOUNTER — TELEPHONE (OUTPATIENT)
Dept: UROLOGY | Facility: CLINIC | Age: 31
End: 2021-09-13

## 2021-09-14 ENCOUNTER — PATIENT MESSAGE (OUTPATIENT)
Dept: FAMILY MEDICINE | Facility: CLINIC | Age: 31
End: 2021-09-14

## 2021-09-15 ENCOUNTER — INFUSION (OUTPATIENT)
Dept: INFUSION THERAPY | Facility: HOSPITAL | Age: 31
End: 2021-09-15
Payer: COMMERCIAL

## 2021-09-15 VITALS
SYSTOLIC BLOOD PRESSURE: 119 MMHG | OXYGEN SATURATION: 98 % | HEART RATE: 78 BPM | TEMPERATURE: 97 F | RESPIRATION RATE: 18 BRPM | DIASTOLIC BLOOD PRESSURE: 83 MMHG

## 2021-09-15 DIAGNOSIS — K86.1 IDIOPATHIC CHRONIC PANCREATITIS: Primary | ICD-10-CM

## 2021-09-15 DIAGNOSIS — K86.1 CHRONIC BILIARY PANCREATITIS: ICD-10-CM

## 2021-09-15 PROCEDURE — 63600175 PHARM REV CODE 636 W HCPCS: Performed by: FAMILY MEDICINE

## 2021-09-15 PROCEDURE — 96366 THER/PROPH/DIAG IV INF ADDON: CPT

## 2021-09-15 PROCEDURE — 96365 THER/PROPH/DIAG IV INF INIT: CPT

## 2021-09-15 RX ORDER — HEPARIN 100 UNIT/ML
500 SYRINGE INTRAVENOUS
Status: CANCELLED | OUTPATIENT
Start: 2021-09-15

## 2021-09-15 RX ORDER — SODIUM CHLORIDE 0.9 % (FLUSH) 0.9 %
10 SYRINGE (ML) INJECTION
Status: CANCELLED | OUTPATIENT
Start: 2021-09-15

## 2021-09-15 RX ADMIN — SODIUM CHLORIDE, POTASSIUM CHLORIDE, SODIUM LACTATE AND CALCIUM CHLORIDE 1000 ML: 600; 310; 30; 20 INJECTION, SOLUTION INTRAVENOUS at 08:09

## 2021-10-11 ENCOUNTER — HEALTH MAINTENANCE LETTER (OUTPATIENT)
Age: 31
End: 2021-10-11

## 2021-10-14 ENCOUNTER — PATIENT MESSAGE (OUTPATIENT)
Dept: FAMILY MEDICINE | Facility: CLINIC | Age: 31
End: 2021-10-14
Payer: COMMERCIAL

## 2021-10-14 ENCOUNTER — LAB VISIT (OUTPATIENT)
Dept: LAB | Facility: HOSPITAL | Age: 31
End: 2021-10-14
Attending: FAMILY MEDICINE
Payer: COMMERCIAL

## 2021-10-14 DIAGNOSIS — E11.9 TYPE 2 DIABETES MELLITUS WITHOUT COMPLICATION, WITHOUT LONG-TERM CURRENT USE OF INSULIN: ICD-10-CM

## 2021-10-14 LAB
ESTIMATED AVG GLUCOSE: 169 MG/DL (ref 68–131)
HBA1C MFR BLD: 7.5 % (ref 4–5.6)

## 2021-10-14 PROCEDURE — 83036 HEMOGLOBIN GLYCOSYLATED A1C: CPT | Performed by: FAMILY MEDICINE

## 2021-10-14 PROCEDURE — 36415 COLL VENOUS BLD VENIPUNCTURE: CPT | Performed by: FAMILY MEDICINE

## 2021-10-27 ENCOUNTER — PATIENT MESSAGE (OUTPATIENT)
Dept: FAMILY MEDICINE | Facility: CLINIC | Age: 31
End: 2021-10-27
Payer: COMMERCIAL

## 2021-10-27 DIAGNOSIS — Z86.19 HX OF COLD SORES: Primary | ICD-10-CM

## 2021-10-27 RX ORDER — ACYCLOVIR 400 MG/1
400 TABLET ORAL 3 TIMES DAILY
Qty: 15 TABLET | Refills: 5 | Status: SHIPPED | OUTPATIENT
Start: 2021-10-27 | End: 2021-10-27 | Stop reason: SDUPTHER

## 2021-10-27 RX ORDER — ACYCLOVIR 400 MG/1
400 TABLET ORAL 3 TIMES DAILY
Qty: 15 TABLET | Refills: 5 | Status: SHIPPED | OUTPATIENT
Start: 2021-10-27 | End: 2023-01-18 | Stop reason: SDUPTHER

## 2021-10-30 ENCOUNTER — PATIENT MESSAGE (OUTPATIENT)
Dept: FAMILY MEDICINE | Facility: CLINIC | Age: 31
End: 2021-10-30
Payer: COMMERCIAL

## 2021-11-02 ENCOUNTER — TELEPHONE (OUTPATIENT)
Dept: INFUSION THERAPY | Facility: HOSPITAL | Age: 31
End: 2021-11-02
Payer: COMMERCIAL

## 2021-11-04 ENCOUNTER — PATIENT MESSAGE (OUTPATIENT)
Dept: FAMILY MEDICINE | Facility: CLINIC | Age: 31
End: 2021-11-04
Payer: COMMERCIAL

## 2021-11-04 DIAGNOSIS — K86.1 IDIOPATHIC CHRONIC PANCREATITIS: Primary | ICD-10-CM

## 2021-11-05 ENCOUNTER — INFUSION (OUTPATIENT)
Dept: INFUSION THERAPY | Facility: HOSPITAL | Age: 31
End: 2021-11-05
Attending: FAMILY MEDICINE
Payer: COMMERCIAL

## 2021-11-05 VITALS
TEMPERATURE: 98 F | DIASTOLIC BLOOD PRESSURE: 71 MMHG | HEART RATE: 83 BPM | OXYGEN SATURATION: 96 % | SYSTOLIC BLOOD PRESSURE: 118 MMHG | RESPIRATION RATE: 16 BRPM

## 2021-11-05 DIAGNOSIS — K86.1 IDIOPATHIC CHRONIC PANCREATITIS: Primary | ICD-10-CM

## 2021-11-05 DIAGNOSIS — K86.1 CHRONIC BILIARY PANCREATITIS: ICD-10-CM

## 2021-11-05 PROCEDURE — 63600175 PHARM REV CODE 636 W HCPCS: Performed by: FAMILY MEDICINE

## 2021-11-05 PROCEDURE — 96361 HYDRATE IV INFUSION ADD-ON: CPT

## 2021-11-05 PROCEDURE — 96360 HYDRATION IV INFUSION INIT: CPT

## 2021-11-05 RX ORDER — HEPARIN 100 UNIT/ML
500 SYRINGE INTRAVENOUS
Status: CANCELLED | OUTPATIENT
Start: 2021-11-05

## 2021-11-05 RX ORDER — SODIUM CHLORIDE 0.9 % (FLUSH) 0.9 %
10 SYRINGE (ML) INJECTION
Status: CANCELLED | OUTPATIENT
Start: 2021-11-05

## 2021-11-05 RX ADMIN — SODIUM CHLORIDE, SODIUM LACTATE, POTASSIUM CHLORIDE, AND CALCIUM CHLORIDE 1000 ML: .6; .31; .03; .02 INJECTION, SOLUTION INTRAVENOUS at 09:11

## 2021-11-07 ENCOUNTER — PATIENT MESSAGE (OUTPATIENT)
Dept: OBSTETRICS AND GYNECOLOGY | Facility: CLINIC | Age: 31
End: 2021-11-07
Payer: COMMERCIAL

## 2021-12-01 ENCOUNTER — PATIENT MESSAGE (OUTPATIENT)
Dept: FAMILY MEDICINE | Facility: CLINIC | Age: 31
End: 2021-12-01
Payer: COMMERCIAL

## 2021-12-01 RX ORDER — METFORMIN HYDROCHLORIDE 500 MG/1
500 TABLET, EXTENDED RELEASE ORAL
Qty: 30 TABLET | Refills: 2 | Status: SHIPPED | OUTPATIENT
Start: 2021-12-01 | End: 2022-01-18 | Stop reason: SDUPTHER

## 2021-12-03 ENCOUNTER — MYC MEDICAL ADVICE (OUTPATIENT)
Dept: UROLOGY | Facility: CLINIC | Age: 31
End: 2021-12-03
Payer: COMMERCIAL

## 2021-12-03 ENCOUNTER — OFFICE VISIT (OUTPATIENT)
Dept: FAMILY MEDICINE | Facility: CLINIC | Age: 31
End: 2021-12-03
Payer: COMMERCIAL

## 2021-12-03 VITALS
DIASTOLIC BLOOD PRESSURE: 76 MMHG | BODY MASS INDEX: 31.96 KG/M2 | HEIGHT: 63 IN | TEMPERATURE: 98 F | RESPIRATION RATE: 14 BRPM | OXYGEN SATURATION: 97 % | WEIGHT: 180.38 LBS | SYSTOLIC BLOOD PRESSURE: 122 MMHG | HEART RATE: 86 BPM

## 2021-12-03 DIAGNOSIS — K86.1 CHRONIC PANCREATITIS, UNSPECIFIED PANCREATITIS TYPE: ICD-10-CM

## 2021-12-03 DIAGNOSIS — Z01.818 PRE-OP TESTING: ICD-10-CM

## 2021-12-03 DIAGNOSIS — Z01.818 PREOP EXAMINATION: Primary | ICD-10-CM

## 2021-12-03 LAB
BACTERIA #/AREA URNS HPF: ABNORMAL /HPF
BILIRUB UR QL STRIP: NEGATIVE
CLARITY UR: CLEAR
COLOR UR: YELLOW
GLUCOSE UR QL STRIP: ABNORMAL
HGB UR QL STRIP: NEGATIVE
KETONES UR QL STRIP: NEGATIVE
LEUKOCYTE ESTERASE UR QL STRIP: ABNORMAL
MICROSCOPIC COMMENT: ABNORMAL
NITRITE UR QL STRIP: NEGATIVE
PH UR STRIP: 6 [PH] (ref 5–8)
PROT UR QL STRIP: NEGATIVE
SP GR UR STRIP: 1.01 (ref 1–1.03)
URN SPEC COLLECT METH UR: ABNORMAL
UROBILINOGEN UR STRIP-ACNC: NEGATIVE EU/DL
WBC #/AREA URNS HPF: 6 /HPF (ref 0–5)
YEAST URNS QL MICRO: ABNORMAL

## 2021-12-03 PROCEDURE — 3061F PR NEG MICROALBUMINURIA RESULT DOCUMENTED/REVIEW: ICD-10-PCS | Mod: S$GLB,,, | Performed by: FAMILY MEDICINE

## 2021-12-03 PROCEDURE — 99214 OFFICE O/P EST MOD 30 MIN: CPT | Mod: S$GLB,,, | Performed by: FAMILY MEDICINE

## 2021-12-03 PROCEDURE — 3066F PR DOCUMENTATION OF TREATMENT FOR NEPHROPATHY: ICD-10-PCS | Mod: S$GLB,,, | Performed by: FAMILY MEDICINE

## 2021-12-03 PROCEDURE — U0005 INFEC AGEN DETEC AMPLI PROBE: HCPCS | Performed by: FAMILY MEDICINE

## 2021-12-03 PROCEDURE — 81000 URINALYSIS NONAUTO W/SCOPE: CPT | Performed by: FAMILY MEDICINE

## 2021-12-03 PROCEDURE — 3066F NEPHROPATHY DOC TX: CPT | Mod: S$GLB,,, | Performed by: FAMILY MEDICINE

## 2021-12-03 PROCEDURE — U0003 INFECTIOUS AGENT DETECTION BY NUCLEIC ACID (DNA OR RNA); SEVERE ACUTE RESPIRATORY SYNDROME CORONAVIRUS 2 (SARS-COV-2) (CORONAVIRUS DISEASE [COVID-19]), AMPLIFIED PROBE TECHNIQUE, MAKING USE OF HIGH THROUGHPUT TECHNOLOGIES AS DESCRIBED BY CMS-2020-01-R: HCPCS | Performed by: FAMILY MEDICINE

## 2021-12-03 PROCEDURE — 99999 PR PBB SHADOW E&M-EST. PATIENT-LVL IV: CPT | Mod: PBBFAC,,, | Performed by: FAMILY MEDICINE

## 2021-12-03 PROCEDURE — 99214 PR OFFICE/OUTPT VISIT, EST, LEVL IV, 30-39 MIN: ICD-10-PCS | Mod: S$GLB,,, | Performed by: FAMILY MEDICINE

## 2021-12-03 PROCEDURE — 3061F NEG MICROALBUMINURIA REV: CPT | Mod: S$GLB,,, | Performed by: FAMILY MEDICINE

## 2021-12-03 PROCEDURE — 99999 PR PBB SHADOW E&M-EST. PATIENT-LVL IV: ICD-10-PCS | Mod: PBBFAC,,, | Performed by: FAMILY MEDICINE

## 2021-12-03 RX ORDER — DIAZEPAM 5 MG/1
5 TABLET ORAL
COMMUNITY
Start: 2021-01-01 | End: 2021-12-03

## 2021-12-03 RX ORDER — HYDROCODONE BITARTRATE AND ACETAMINOPHEN 10; 325 MG/1; MG/1
1 TABLET ORAL EVERY 8 HOURS PRN
Qty: 21 TABLET | Refills: 0 | Status: SHIPPED | OUTPATIENT
Start: 2021-12-03 | End: 2023-09-20 | Stop reason: SDUPTHER

## 2021-12-03 RX ORDER — FLUCONAZOLE 150 MG/1
150 TABLET ORAL DAILY
Qty: 1 TABLET | Refills: 0 | Status: SHIPPED | OUTPATIENT
Start: 2021-12-03 | End: 2021-12-04

## 2021-12-03 RX ORDER — SPIRONOLACTONE 25 MG/1
75 TABLET ORAL DAILY
COMMUNITY
Start: 2021-11-07 | End: 2022-08-09

## 2021-12-03 RX ORDER — AZITHROMYCIN 250 MG/1
250 TABLET, FILM COATED ORAL
COMMUNITY
Start: 2021-11-17 | End: 2021-12-03

## 2021-12-03 RX ORDER — NITROFURANTOIN 25; 75 MG/1; MG/1
100 CAPSULE ORAL 2 TIMES DAILY
Qty: 14 CAPSULE | Refills: 0 | Status: SHIPPED | OUTPATIENT
Start: 2021-12-03 | End: 2021-12-10

## 2021-12-04 LAB
SARS-COV-2 RNA RESP QL NAA+PROBE: NOT DETECTED
SARS-COV-2- CYCLE NUMBER: NORMAL

## 2021-12-05 ENCOUNTER — ANESTHESIA EVENT (OUTPATIENT)
Dept: SURGERY | Facility: CLINIC | Age: 31
End: 2021-12-05
Payer: COMMERCIAL

## 2021-12-06 ENCOUNTER — ANESTHESIA (OUTPATIENT)
Dept: SURGERY | Facility: CLINIC | Age: 31
End: 2021-12-06
Payer: COMMERCIAL

## 2021-12-06 ENCOUNTER — ANESTHESIA EVENT (OUTPATIENT)
Dept: SURGERY | Facility: CLINIC | Age: 31
End: 2021-12-06
Payer: COMMERCIAL

## 2021-12-06 ENCOUNTER — HOSPITAL ENCOUNTER (OUTPATIENT)
Facility: CLINIC | Age: 31
Discharge: HOME | End: 2021-12-06
Attending: UROLOGY | Admitting: UROLOGY
Payer: COMMERCIAL

## 2021-12-06 VITALS
HEART RATE: 76 BPM | RESPIRATION RATE: 16 BRPM | OXYGEN SATURATION: 99 % | WEIGHT: 178.35 LBS | DIASTOLIC BLOOD PRESSURE: 73 MMHG | HEIGHT: 63 IN | TEMPERATURE: 98.3 F | BODY MASS INDEX: 31.6 KG/M2 | SYSTOLIC BLOOD PRESSURE: 116 MMHG

## 2021-12-06 DIAGNOSIS — K86.89 PANCREATIC DUCT STONES: ICD-10-CM

## 2021-12-06 LAB
GLUCOSE BLDC GLUCOMTR-MCNC: 127 MG/DL (ref 70–99)
GLUCOSE BLDC GLUCOMTR-MCNC: 136 MG/DL (ref 70–99)

## 2021-12-06 PROCEDURE — 999N000141 HC STATISTIC PRE-PROCEDURE NURSING ASSESSMENT: Performed by: UROLOGY

## 2021-12-06 PROCEDURE — 250N000009 HC RX 250: Performed by: NURSE ANESTHETIST, CERTIFIED REGISTERED

## 2021-12-06 PROCEDURE — 250N000011 HC RX IP 250 OP 636: Performed by: NURSE ANESTHETIST, CERTIFIED REGISTERED

## 2021-12-06 PROCEDURE — 250N000011 HC RX IP 250 OP 636: Performed by: UROLOGY

## 2021-12-06 PROCEDURE — 48999 UNLISTED PROCEDURE PANCREAS: CPT | Mod: GC | Performed by: UROLOGY

## 2021-12-06 PROCEDURE — 250N000011 HC RX IP 250 OP 636: Performed by: STUDENT IN AN ORGANIZED HEALTH CARE EDUCATION/TRAINING PROGRAM

## 2021-12-06 PROCEDURE — 250N000025 HC SEVOFLURANE, PER MIN: Performed by: UROLOGY

## 2021-12-06 PROCEDURE — 258N000003 HC RX IP 258 OP 636: Performed by: NURSE ANESTHETIST, CERTIFIED REGISTERED

## 2021-12-06 PROCEDURE — 370N000017 HC ANESTHESIA TECHNICAL FEE, PER MIN: Performed by: UROLOGY

## 2021-12-06 PROCEDURE — 710N000010 HC RECOVERY PHASE 1, LEVEL 2, PER MIN: Performed by: UROLOGY

## 2021-12-06 PROCEDURE — 82962 GLUCOSE BLOOD TEST: CPT

## 2021-12-06 PROCEDURE — 360N000076 HC SURGERY LEVEL 3, PER MIN: Performed by: UROLOGY

## 2021-12-06 RX ORDER — FENTANYL CITRATE 50 UG/ML
INJECTION, SOLUTION INTRAMUSCULAR; INTRAVENOUS PRN
Status: DISCONTINUED | OUTPATIENT
Start: 2021-12-06 | End: 2021-12-06

## 2021-12-06 RX ORDER — CIPROFLOXACIN 2 MG/ML
400 INJECTION, SOLUTION INTRAVENOUS
Status: DISCONTINUED | OUTPATIENT
Start: 2021-12-06 | End: 2021-12-06 | Stop reason: HOSPADM

## 2021-12-06 RX ORDER — ONDANSETRON 2 MG/ML
INJECTION INTRAMUSCULAR; INTRAVENOUS PRN
Status: DISCONTINUED | OUTPATIENT
Start: 2021-12-06 | End: 2021-12-06

## 2021-12-06 RX ORDER — OXYCODONE HYDROCHLORIDE 5 MG/1
5 TABLET ORAL EVERY 4 HOURS PRN
Status: DISCONTINUED | OUTPATIENT
Start: 2021-12-06 | End: 2021-12-06 | Stop reason: HOSPADM

## 2021-12-06 RX ORDER — SODIUM CHLORIDE, SODIUM LACTATE, POTASSIUM CHLORIDE, CALCIUM CHLORIDE 600; 310; 30; 20 MG/100ML; MG/100ML; MG/100ML; MG/100ML
INJECTION, SOLUTION INTRAVENOUS CONTINUOUS PRN
Status: DISCONTINUED | OUTPATIENT
Start: 2021-12-06 | End: 2021-12-06

## 2021-12-06 RX ORDER — OXYCODONE HYDROCHLORIDE 5 MG/1
5 TABLET ORAL EVERY 6 HOURS PRN
Qty: 5 TABLET | Refills: 0 | Status: SHIPPED | OUTPATIENT
Start: 2021-12-06

## 2021-12-06 RX ORDER — LIDOCAINE HYDROCHLORIDE 20 MG/ML
INJECTION, SOLUTION INFILTRATION; PERINEURAL PRN
Status: DISCONTINUED | OUTPATIENT
Start: 2021-12-06 | End: 2021-12-06

## 2021-12-06 RX ORDER — CIPROFLOXACIN 2 MG/ML
400 INJECTION, SOLUTION INTRAVENOUS ONCE
Status: COMPLETED | OUTPATIENT
Start: 2021-12-06 | End: 2021-12-06

## 2021-12-06 RX ORDER — ONDANSETRON 4 MG/1
4 TABLET, ORALLY DISINTEGRATING ORAL EVERY 30 MIN PRN
Status: DISCONTINUED | OUTPATIENT
Start: 2021-12-06 | End: 2021-12-06 | Stop reason: HOSPADM

## 2021-12-06 RX ORDER — HYDROMORPHONE HCL IN WATER/PF 6 MG/30 ML
0.4 PATIENT CONTROLLED ANALGESIA SYRINGE INTRAVENOUS EVERY 5 MIN PRN
Status: DISCONTINUED | OUTPATIENT
Start: 2021-12-06 | End: 2021-12-06 | Stop reason: HOSPADM

## 2021-12-06 RX ORDER — SODIUM CHLORIDE, SODIUM LACTATE, POTASSIUM CHLORIDE, CALCIUM CHLORIDE 600; 310; 30; 20 MG/100ML; MG/100ML; MG/100ML; MG/100ML
INJECTION, SOLUTION INTRAVENOUS CONTINUOUS
Status: DISCONTINUED | OUTPATIENT
Start: 2021-12-06 | End: 2021-12-06 | Stop reason: HOSPADM

## 2021-12-06 RX ORDER — FENTANYL CITRATE 50 UG/ML
50 INJECTION, SOLUTION INTRAMUSCULAR; INTRAVENOUS EVERY 5 MIN PRN
Status: DISCONTINUED | OUTPATIENT
Start: 2021-12-06 | End: 2021-12-06 | Stop reason: HOSPADM

## 2021-12-06 RX ORDER — LIDOCAINE 40 MG/G
CREAM TOPICAL
Status: DISCONTINUED | OUTPATIENT
Start: 2021-12-06 | End: 2021-12-06 | Stop reason: HOSPADM

## 2021-12-06 RX ORDER — LABETALOL HYDROCHLORIDE 5 MG/ML
10 INJECTION, SOLUTION INTRAVENOUS
Status: DISCONTINUED | OUTPATIENT
Start: 2021-12-06 | End: 2021-12-06 | Stop reason: HOSPADM

## 2021-12-06 RX ORDER — ONDANSETRON 2 MG/ML
4 INJECTION INTRAMUSCULAR; INTRAVENOUS EVERY 30 MIN PRN
Status: DISCONTINUED | OUTPATIENT
Start: 2021-12-06 | End: 2021-12-06 | Stop reason: HOSPADM

## 2021-12-06 RX ORDER — PROPOFOL 10 MG/ML
INJECTION, EMULSION INTRAVENOUS PRN
Status: DISCONTINUED | OUTPATIENT
Start: 2021-12-06 | End: 2021-12-06

## 2021-12-06 RX ADMIN — PROPOFOL 150 MG: 10 INJECTION, EMULSION INTRAVENOUS at 13:59

## 2021-12-06 RX ADMIN — ONDANSETRON 4 MG: 2 INJECTION INTRAMUSCULAR; INTRAVENOUS at 15:44

## 2021-12-06 RX ADMIN — ONDANSETRON 4 MG: 2 INJECTION INTRAMUSCULAR; INTRAVENOUS at 14:48

## 2021-12-06 RX ADMIN — LIDOCAINE HYDROCHLORIDE 60 MG: 20 INJECTION, SOLUTION INFILTRATION; PERINEURAL at 13:59

## 2021-12-06 RX ADMIN — MIDAZOLAM 2 MG: 1 INJECTION INTRAMUSCULAR; INTRAVENOUS at 13:31

## 2021-12-06 RX ADMIN — CIPROFLOXACIN 400 MG: 2 INJECTION, SOLUTION INTRAVENOUS at 14:25

## 2021-12-06 RX ADMIN — SODIUM CHLORIDE, POTASSIUM CHLORIDE, SODIUM LACTATE AND CALCIUM CHLORIDE: 600; 310; 30; 20 INJECTION, SOLUTION INTRAVENOUS at 13:30

## 2021-12-06 RX ADMIN — FENTANYL CITRATE 50 MCG: 50 INJECTION, SOLUTION INTRAMUSCULAR; INTRAVENOUS at 14:31

## 2021-12-06 ASSESSMENT — COPD QUESTIONNAIRES: COPD: 0

## 2021-12-06 ASSESSMENT — MIFFLIN-ST. JEOR: SCORE: 1493.13

## 2021-12-06 ASSESSMENT — LIFESTYLE VARIABLES: TOBACCO_USE: 0

## 2021-12-06 NOTE — ANESTHESIA CARE TRANSFER NOTE
Patient: Andriy Yoon    Procedure: Procedure(s):  LITHOTRIPSY, EXTRACORPOREAL SHOCK WAVE (ESWL) PANCREAS STONE       Diagnosis: Pancreatic duct stones [K86.89]  Diagnosis Additional Information: No value filed.    Anesthesia Type:   General     Note:    Oropharynx: oropharynx clear of all foreign objects  Level of Consciousness: awake  Oxygen Supplementation: nasal cannula  Level of Supplemental Oxygen (L/min / FiO2): 3 LPM  Independent Airway: airway patency satisfactory and stable  Dentition: dentition unchanged  Vital Signs Stable: post-procedure vital signs reviewed and stable  Report to RN Given: handoff report given  Patient transferred to: PACU    Handoff Report: Identifed the Patient, Identified the Reponsible Provider, Reviewed the pertinent medical history, Discussed the surgical course, Reviewed Intra-OP anesthesia mangement and issues during anesthesia, Set expectations for post-procedure period and Allowed opportunity for questions and acknowledgement of understanding      Vitals:  Vitals Value Taken Time   /94 12/06/21 1507   Temp 36.6  C (97.9  F) 12/06/21 1507   Pulse 80 12/06/21 1507   Resp 18 12/06/21 1507   SpO2 99 % 12/06/21 1507   Vitals shown include unvalidated device data.    Electronically Signed By: SARAH Osorio CRNA  December 6, 2021  3:08 PM

## 2021-12-06 NOTE — OP NOTE
OPERATIVE REPORT 12/6/2021    PRE-OPERATIVE DIAGNOSIS: Pancreatic duct stone  POSTOPERATIVE DIAGNOSIS: Same    PROCEDURES  1. Extracorporeal shockwave lithotripsy of pancreatic duct stone  2. Interpretation of intraoperative fluoroscopic imaging     STAFF SURGEON: Chad Simpson MD  RESIDENT SURGEON: Gerardo Diaz MD    ANESTHESIA: General with endotracheal intubation    EBL: 0 cc    SPECIMENS:  None    DRAINS: None    SIGNIFICANT FINDINGS: Good fragmentation of the stone observed    OPERATIVE INDICATION: Andriy Yoon is a 31 year old female with acute and chronic pancreatitis with a pancreatic duct stone. She has been evaluated by gastroenterology and the plan is to undergo ESWL followed by ERCP. After a full explanation of the risks, benefits, and alternatives the patient elected to undergo the procedure above.     DETAILS OF PROCEDURE: After informed consent was obtained, the patient was taken back to operating room.  Anesthesia was induced and the patient was intubated while on the gurney. She was then carefully transferred to the lithotripter table in the supine position.  Biplanar fluoroscopy was then used to identify the stone. We began with 200 shocks at 18 kV in a gated fashion.  No arrhythmias or ectopy were observed. We then performed 400 shocks starting at 18kV and ramping up to 24kV.  We intermittently used biplanar fluoroscopy to monitor fragmentation of the stone.  A total of 2400 shocks was delivered with good fragmentation of the calculus. The patient tolerated the procedure well.  She was awakened from anesthesia, extubated, and transferred to PACU in stable condition.    POSTOP PLAN: Patient will discharge home today with follow-up tomorrow for ERCP with gastroenterology    Gerardo Diaz MD  Urology Resident

## 2021-12-06 NOTE — ANESTHESIA PREPROCEDURE EVALUATION
Anesthesia Pre-Procedure Evaluation    Patient: Andriy Yoon   MRN: [id number] : 1990        Preoperative Diagnosis: Pancreatic duct stones [K86.89]    Procedure : Procedure(s):  LITHOTRIPSY, EXTRACORPOREAL SHOCK WAVE (ESWL) PANCREAS STONE          Past Medical History:   Diagnosis Date     Chronic pancreatitis (H)      Diabetes (H)       Past Surgical History:   Procedure Laterality Date     CHOLECYSTECTOMY       ENDOSCOPIC RETROGRADE CHOLANGIOPANCREATOGRAM       ENDOSCOPIC RETROGRADE CHOLANGIOPANCREATOGRAM N/A 2021    Procedure: ENDOSCOPIC RETROGRADE CHOLANGIOPANCREATOGRAPHY with minor papillotomy and pancreatic stent placement;  Surgeon: Ayden Perez MD;  Location: [facility] OR     ENDOSCOPIC ULTRASOUND UPPER GASTROINTESTINAL TRACT (GI) N/A 2021    Procedure: Endoscopic ultrasound upper gastrointestinal tract (GI);  Surgeon: Torey Douglas MD;  Location: [facility] OR      Allergies   Allergen Reactions     Ceftin  [Cefuroxime]      Penicillins       Social History     Tobacco Use     Smoking status: Never Smoker     Smokeless tobacco: Never Used   Substance Use Topics     Alcohol use: Not Currently      Wt Readings from Last 1 Encounters:   21 86 kg (189 lb 9.5 oz)        Anesthesia Evaluation   Pt has had prior anesthetic. Type: General.    No history of anesthetic complications       ROS/MED HX  ENT/Pulmonary:  - neg pulmonary ROS     Neurologic:  - neg neurologic ROS     Cardiovascular:  - neg cardiovascular ROS     METS/Exercise Tolerance:     Hematologic:  - neg hematologic  ROS     Musculoskeletal:  - neg musculoskeletal ROS     GI/Hepatic: Comment:    Chronic pancreatitis  Chronic nausea    (+) GERD,     Renal/Genitourinary:  - neg Renal ROS     Endo: Comment: Diabetes 2/2 pancreatic insufficiency      Psychiatric/Substance Use:  - neg psychiatric ROS     Infectious Disease:  - neg infectious disease ROS     Malignancy:  - neg malignancy ROS     Other:  - neg other ROS              OUTSIDE LABS:  CBC:   Lab Results   Component Value Date    WBC 7.5 04/13/2021    WBC 6.6 04/12/2021    HGB 13.6 04/13/2021    HGB 13.4 04/12/2021    HCT 40.7 04/13/2021    HCT 40.4 04/12/2021     04/13/2021     04/12/2021     BMP:   Lab Results   Component Value Date     04/13/2021     04/12/2021    POTASSIUM 4.2 04/13/2021    POTASSIUM 3.8 04/12/2021    CHLORIDE 108 04/13/2021    CHLORIDE 107 04/12/2021    CO2 25 04/13/2021    CO2 27 04/12/2021    BUN 11 04/13/2021    BUN 9 04/12/2021    CR 0.67 04/13/2021    CR 0.73 04/12/2021     (H) 04/13/2021     (H) 04/12/2021     COAGS:   Lab Results   Component Value Date    INR 0.97 04/13/2021     POC:   Lab Results   Component Value Date     (H) 04/13/2021    HCG Negative 04/13/2021     HEPATIC:   Lab Results   Component Value Date    ALBUMIN 3.7 04/13/2021    PROTTOTAL 7.4 04/13/2021    ALT 33 04/13/2021    AST 22 04/13/2021    ALKPHOS 73 04/13/2021    BILITOTAL 0.3 04/13/2021     OTHER:   Lab Results   Component Value Date    A1C 6.5 (H) 04/12/2021    MARISA 9.4 04/13/2021    LIPASE 6,405 (H) 04/13/2021    AMYLASE 440 (H) 04/13/2021       Anesthesia Plan    ASA Status:  3   NPO Status:  NPO Appropriate    Anesthesia Type: General.     - Airway: ETT   Induction: Intravenous.   Maintenance: Balanced.        Consents    Anesthesia Plan(s) and associated risks, benefits, and realistic alternatives discussed. Questions answered and patient/representative(s) expressed understanding.    - Discussed:     - Discussed with:  Patient         Postoperative Care    Pain management: IV analgesics, Oral pain medications.   PONV prophylaxis: Ondansetron (or other 5HT-3), Dexamethasone or Solumedrol     Comments:                Arlene Rivera MD

## 2021-12-06 NOTE — ANESTHESIA PREPROCEDURE EVALUATION
Anesthesia Pre-Procedure Evaluation    Patient: Andriy Yoon   MRN: [id number] : 1990        Preoperative Diagnosis: Pancreatic stones [K86.89]    Procedure : Procedure(s):  ENDOSCOPIC RETROGRADE CHOLANGIOPANCREATOGRAPHY          Past Medical History:   Diagnosis Date     Chronic pancreatitis (H)      Diabetes (H)       Past Surgical History:   Procedure Laterality Date     CHOLECYSTECTOMY       ENDOSCOPIC RETROGRADE CHOLANGIOPANCREATOGRAM       ENDOSCOPIC RETROGRADE CHOLANGIOPANCREATOGRAM N/A 2021    Procedure: ENDOSCOPIC RETROGRADE CHOLANGIOPANCREATOGRAPHY with minor papillotomy and pancreatic stent placement;  Surgeon: Ayden Perez MD;  Location: [facility] OR     ENDOSCOPIC ULTRASOUND UPPER GASTROINTESTINAL TRACT (GI) N/A 2021    Procedure: Endoscopic ultrasound upper gastrointestinal tract (GI);  Surgeon: Torey Douglas MD;  Location: [facility] OR      Allergies   Allergen Reactions     Cefuroxime      Penicillins       Social History     Tobacco Use     Smoking status: Never Smoker     Smokeless tobacco: Never Used   Substance Use Topics     Alcohol use: Not Currently      Wt Readings from Last 1 Encounters:   21 80.9 kg (178 lb 5.6 oz)        Anesthesia Evaluation   Pt has had prior anesthetic. Type: General.    No history of anesthetic complications       ROS/MED HX  ENT/Pulmonary:    (-) tobacco use, asthma, COPD and sleep apnea   Neurologic:  - neg neurologic ROS     Cardiovascular:     (+) -----Previous cardiac testing   Echo: Date: Results:    Stress Test: Date: Results:    ECG Reviewed: Date: 21 Results:  Sinus rhythm, sinus arrhythmia, 73bpm  Cath: Date: Results:      METS/Exercise Tolerance: >4 METS    Hematologic:    (-) history of blood clots and anemia   Musculoskeletal:  - neg musculoskeletal ROS     GI/Hepatic: Comment: - idiopathic chronic calcific pancreatitis  - S/P shock wave lithotripsy       (+) GERD,     Renal/Genitourinary:  - neg Renal ROS     Endo:      (+) type II DM (2/2 chroninc pancreatitis ), Last HgA1c: 7.5, date: 10/14/21,     Psychiatric/Substance Use:  - neg psychiatric ROS     Infectious Disease:  - neg infectious disease ROS     Malignancy:  - neg malignancy ROS     Other:  - neg other ROS          Physical Exam    Airway        Mallampati: II   TM distance: > 3 FB   Neck ROM: full   Mouth opening: > 3 cm    Respiratory Devices and Support         Dental  no notable dental history         Cardiovascular          Rhythm and rate: regular and normal     Pulmonary   pulmonary exam normal        breath sounds clear to auscultation           OUTSIDE LABS:  CBC:   Lab Results   Component Value Date    WBC 7.5 04/13/2021    WBC 6.6 04/12/2021    HGB 13.6 04/13/2021    HGB 13.4 04/12/2021    HCT 40.7 04/13/2021    HCT 40.4 04/12/2021     04/13/2021     04/12/2021     BMP:   Lab Results   Component Value Date     04/13/2021     04/12/2021    POTASSIUM 4.2 04/13/2021    POTASSIUM 3.8 04/12/2021    CHLORIDE 108 04/13/2021    CHLORIDE 107 04/12/2021    CO2 25 04/13/2021    CO2 27 04/12/2021    BUN 11 04/13/2021    BUN 9 04/12/2021    CR 0.67 04/13/2021    CR 0.73 04/12/2021     (H) 12/06/2021     (H) 12/06/2021     COAGS:   Lab Results   Component Value Date    INR 0.97 04/13/2021     POC:   Lab Results   Component Value Date     (H) 04/13/2021    HCG Negative 04/13/2021     HEPATIC:   Lab Results   Component Value Date    ALBUMIN 3.7 04/13/2021    PROTTOTAL 7.4 04/13/2021    ALT 33 04/13/2021    AST 22 04/13/2021    ALKPHOS 73 04/13/2021    BILITOTAL 0.3 04/13/2021     OTHER:   Lab Results   Component Value Date    A1C 6.5 (H) 04/12/2021    MARIAS 9.4 04/13/2021    LIPASE 6,405 (H) 04/13/2021    AMYLASE 440 (H) 04/13/2021       Anesthesia Plan    ASA Status:  3   NPO Status:  NPO Appropriate    Anesthesia Type: General.     - Airway: ETT   Induction: Intravenous.   Maintenance: Inhalation.        Consents    Anesthesia  Plan(s) and associated risks, benefits, and realistic alternatives discussed. Questions answered and patient/representative(s) expressed understanding.    - Discussed:     - Discussed with:  Patient      - Extended Intubation/Ventilatory Support Discussed: No.      - Patient is DNR/DNI Status: No    Use of blood products discussed: No .     Postoperative Care    Pain management: Oral pain medications.   PONV prophylaxis: Ondansetron (or other 5HT-3), Scopolamine patch     Comments:                Jose Carlos Connell MD

## 2021-12-06 NOTE — DISCHARGE INSTRUCTIONS
[facility], [place] // Same-Day Surgery // Adult Discharge Orders & Instructions     Take it easy when you get home.  Remember, same day surgery DOES NOT MEAN SAME DAY RECOVERY! Healing is a gradual process.   You will need some time to recover- you may be more tired than you realize at first.  Rest and relax for at least the first 24 hours at home.  You'll feel better and heal faster if you take good care of yourself.     ANESTHESIA RECOVERY -   For 24 hours after surgery    1. Get plenty of rest.  A responsible adult must stay with you for at least 24 hours after you leave the hospital.   2. Do not drive or use heavy equipment.  If you have weakness or tingling, don't drive or use heavy equipment until this feeling goes away.  3. Do not drink alcohol.  4. Avoid strenuous or risky activities.  Ask for help when climbing stairs.   5. You may feel lightheaded.  IF so, sit for a few minutes before standing.  Have someone help you get up.   6. If you have nausea (feel sick to your stomach): Drink only clear liquids such as apple juice, ginger ale, broth or 7-Up.  Rest may also help.  Be sure to drink enough fluids.  Move to a regular diet as you feel able.  7. You may have a slight fever. Call the doctor if your fever is over 100 F (37.7 C) (taken under the tongue) or lasts longer than 24 hours.  8. You may have a dry mouth, a sore throat, muscle aches or trouble sleeping.  These should go away after 24 hours.  9. Do not make important or legal decisions.     Call your doctor for any of the followin.  Signs of infection (fever, growing tenderness at the surgery site, a large amount of drainage or bleeding, severe pain, foul-smelling drainage, redness, swelling).  2. It has been over 8 to 10 hours since surgery and you are still not able to urinate (pass water).  3.  Headache for over 24 hours.    To contact a doctor, call:    Dr Chad Simpson at [phone]--Urology  Clinic    [phone] and ask for the resident on call for Urology (answered 24 hours a day)    Emergency Department: [facility]: [phone] (TTY for hearing impaired: [phone])

## 2021-12-06 NOTE — ANESTHESIA POSTPROCEDURE EVALUATION
Patient: Andriy Yoon    Procedure: Procedure(s):  LITHOTRIPSY, EXTRACORPOREAL SHOCK WAVE (ESWL) PANCREAS STONE       Diagnosis:Pancreatic duct stones [K86.89]  Diagnosis Additional Information: No value filed.    Anesthesia Type:  General    Note:  Disposition: Outpatient   Postop Pain Control: Uneventful            Sign Out: Well controlled pain   PONV: No   Neuro/Psych: Uneventful            Sign Out: Acceptable/Baseline neuro status   Airway/Respiratory: Uneventful            Sign Out: Acceptable/Baseline resp. status   CV/Hemodynamics: Uneventful            Sign Out: Acceptable CV status; No obvious hypovolemia; No obvious fluid overload   Other NRE: NONE   DID A NON-ROUTINE EVENT OCCUR? No           Last vitals:  Vitals Value Taken Time   /91 12/06/21 1530   Temp 36.6  C (97.9  F) 12/06/21 1507   Pulse 81 12/06/21 1532   Resp 16 12/06/21 1530   SpO2 97 % 12/06/21 1532   Vitals shown include unvalidated device data.    Electronically Signed By: Tamia Lantigua MD  December 6, 2021  3:33 PM

## 2021-12-06 NOTE — OR NURSING
Discharge instructions provided and reviewed with Eliud (spouse), designated caregiver. Printed after visit summary sent home with patient. Understanding verbalized.  1 script picked up by spouse on way out of hospital.     Patient and spouse will be back to [facility] tomorrow morning for a planned ERCP.

## 2021-12-07 ENCOUNTER — APPOINTMENT (OUTPATIENT)
Dept: GENERAL RADIOLOGY | Facility: CLINIC | Age: 31
End: 2021-12-07
Attending: INTERNAL MEDICINE
Payer: COMMERCIAL

## 2021-12-07 ENCOUNTER — HOSPITAL ENCOUNTER (OUTPATIENT)
Facility: CLINIC | Age: 31
Discharge: HOME | End: 2021-12-07
Attending: INTERNAL MEDICINE | Admitting: INTERNAL MEDICINE
Payer: COMMERCIAL

## 2021-12-07 ENCOUNTER — ANESTHESIA (OUTPATIENT)
Dept: SURGERY | Facility: CLINIC | Age: 31
End: 2021-12-07
Payer: COMMERCIAL

## 2021-12-07 VITALS
DIASTOLIC BLOOD PRESSURE: 77 MMHG | BODY MASS INDEX: 31.6 KG/M2 | SYSTOLIC BLOOD PRESSURE: 118 MMHG | RESPIRATION RATE: 16 BRPM | OXYGEN SATURATION: 99 % | WEIGHT: 178.35 LBS | TEMPERATURE: 98.5 F | HEART RATE: 81 BPM | HEIGHT: 63 IN

## 2021-12-07 LAB
ALBUMIN SERPL-MCNC: 3.4 G/DL (ref 3.4–5)
ALP SERPL-CCNC: 75 U/L (ref 40–150)
ALT SERPL W P-5'-P-CCNC: 24 U/L (ref 0–50)
AMYLASE SERPL-CCNC: 69 U/L (ref 30–110)
AMYLASE SERPL-CCNC: 71 U/L (ref 30–110)
ANION GAP SERPL CALCULATED.3IONS-SCNC: 8 MMOL/L (ref 3–14)
AST SERPL W P-5'-P-CCNC: 19 U/L (ref 0–45)
BILIRUB SERPL-MCNC: 0.4 MG/DL (ref 0.2–1.3)
BUN SERPL-MCNC: 10 MG/DL (ref 7–30)
CALCIUM SERPL-MCNC: 9.3 MG/DL (ref 8.5–10.1)
CHLORIDE BLD-SCNC: 103 MMOL/L (ref 94–109)
CO2 SERPL-SCNC: 24 MMOL/L (ref 20–32)
CREAT SERPL-MCNC: 0.66 MG/DL (ref 0.52–1.04)
ERYTHROCYTE [DISTWIDTH] IN BLOOD BY AUTOMATED COUNT: 12.9 % (ref 10–15)
GFR SERPL CREATININE-BSD FRML MDRD: >90 ML/MIN/1.73M2
GLUCOSE BLD-MCNC: 162 MG/DL (ref 70–99)
GLUCOSE BLDC GLUCOMTR-MCNC: 170 MG/DL (ref 70–99)
GLUCOSE BLDC GLUCOMTR-MCNC: 172 MG/DL (ref 70–99)
GLUCOSE BLDC GLUCOMTR-MCNC: 227 MG/DL (ref 70–99)
GLUCOSE BLDC GLUCOMTR-MCNC: 232 MG/DL (ref 70–99)
HCT VFR BLD AUTO: 41.2 % (ref 35–47)
HGB BLD-MCNC: 13.5 G/DL (ref 11.7–15.7)
INR PPP: 0.99 (ref 0.85–1.15)
LIPASE SERPL-CCNC: 526 U/L (ref 73–393)
LIPASE SERPL-CCNC: 557 U/L (ref 73–393)
MCH RBC QN AUTO: 28 PG (ref 26.5–33)
MCHC RBC AUTO-ENTMCNC: 32.8 G/DL (ref 31.5–36.5)
MCV RBC AUTO: 85 FL (ref 78–100)
PLATELET # BLD AUTO: 322 10E3/UL (ref 150–450)
POTASSIUM BLD-SCNC: 4.1 MMOL/L (ref 3.4–5.3)
PROT SERPL-MCNC: 7.7 G/DL (ref 6.8–8.8)
RBC # BLD AUTO: 4.83 10E6/UL (ref 3.8–5.2)
SODIUM SERPL-SCNC: 135 MMOL/L (ref 133–144)
WBC # BLD AUTO: 9.7 10E3/UL (ref 4–11)

## 2021-12-07 PROCEDURE — 258N000003 HC RX IP 258 OP 636: Performed by: STUDENT IN AN ORGANIZED HEALTH CARE EDUCATION/TRAINING PROGRAM

## 2021-12-07 PROCEDURE — 250N000009 HC RX 250: Performed by: STUDENT IN AN ORGANIZED HEALTH CARE EDUCATION/TRAINING PROGRAM

## 2021-12-07 PROCEDURE — 250N000011 HC RX IP 250 OP 636: Performed by: STUDENT IN AN ORGANIZED HEALTH CARE EDUCATION/TRAINING PROGRAM

## 2021-12-07 PROCEDURE — 370N000017 HC ANESTHESIA TECHNICAL FEE, PER MIN: Performed by: INTERNAL MEDICINE

## 2021-12-07 PROCEDURE — 36415 COLL VENOUS BLD VENIPUNCTURE: CPT | Performed by: INTERNAL MEDICINE

## 2021-12-07 PROCEDURE — 250N000009 HC RX 250: Performed by: INTERNAL MEDICINE

## 2021-12-07 PROCEDURE — 710N000010 HC RECOVERY PHASE 1, LEVEL 2, PER MIN: Performed by: INTERNAL MEDICINE

## 2021-12-07 PROCEDURE — 85610 PROTHROMBIN TIME: CPT | Performed by: INTERNAL MEDICINE

## 2021-12-07 PROCEDURE — 250N000025 HC SEVOFLURANE, PER MIN: Performed by: INTERNAL MEDICINE

## 2021-12-07 PROCEDURE — 999N000141 HC STATISTIC PRE-PROCEDURE NURSING ASSESSMENT: Performed by: INTERNAL MEDICINE

## 2021-12-07 PROCEDURE — 250N000013 HC RX MED GY IP 250 OP 250 PS 637: Performed by: ANESTHESIOLOGY

## 2021-12-07 PROCEDURE — 83690 ASSAY OF LIPASE: CPT | Performed by: INTERNAL MEDICINE

## 2021-12-07 PROCEDURE — 82150 ASSAY OF AMYLASE: CPT | Performed by: INTERNAL MEDICINE

## 2021-12-07 PROCEDURE — 255N000002 HC RX 255 OP 636: Performed by: INTERNAL MEDICINE

## 2021-12-07 PROCEDURE — 82962 GLUCOSE BLOOD TEST: CPT

## 2021-12-07 PROCEDURE — 250N000013 HC RX MED GY IP 250 OP 250 PS 637: Performed by: STUDENT IN AN ORGANIZED HEALTH CARE EDUCATION/TRAINING PROGRAM

## 2021-12-07 PROCEDURE — 85027 COMPLETE CBC AUTOMATED: CPT | Performed by: INTERNAL MEDICINE

## 2021-12-07 PROCEDURE — 360N000082 HC SURGERY LEVEL 2 W/ FLUORO, PER MIN: Performed by: INTERNAL MEDICINE

## 2021-12-07 PROCEDURE — C1877 STENT, NON-COAT/COV W/O DEL: HCPCS | Performed by: INTERNAL MEDICINE

## 2021-12-07 PROCEDURE — 272N000001 HC OR GENERAL SUPPLY STERILE: Performed by: INTERNAL MEDICINE

## 2021-12-07 PROCEDURE — 710N000012 HC RECOVERY PHASE 2, PER MINUTE: Performed by: INTERNAL MEDICINE

## 2021-12-07 PROCEDURE — 250N000011 HC RX IP 250 OP 636: Performed by: INTERNAL MEDICINE

## 2021-12-07 PROCEDURE — 999N000181 XR SURGERY CARM FLUORO GREATER THAN 5 MIN W STILLS: Mod: TC

## 2021-12-07 PROCEDURE — 80053 COMPREHEN METABOLIC PANEL: CPT | Performed by: INTERNAL MEDICINE

## 2021-12-07 PROCEDURE — C1769 GUIDE WIRE: HCPCS | Performed by: INTERNAL MEDICINE

## 2021-12-07 PROCEDURE — C1726 CATH, BAL DIL, NON-VASCULAR: HCPCS | Performed by: INTERNAL MEDICINE

## 2021-12-07 DEVICE — STENT FREEMAN PANCREA FLEX 4FRX11CM W/O FLANGE SGL PIGTAIL: Type: IMPLANTABLE DEVICE | Site: PANCREATIC DUCT | Status: FUNCTIONAL

## 2021-12-07 DEVICE — STENT FREEMAN PANCREA FLEX 4FRX09CM W/O FLANGE SGL PIGTAIL: Type: IMPLANTABLE DEVICE | Site: PANCREATIC DUCT | Status: FUNCTIONAL

## 2021-12-07 RX ORDER — FLUMAZENIL 0.1 MG/ML
0.2 INJECTION, SOLUTION INTRAVENOUS
Status: DISCONTINUED | OUTPATIENT
Start: 2021-12-07 | End: 2021-12-07 | Stop reason: HOSPADM

## 2021-12-07 RX ORDER — HALOPERIDOL 5 MG/ML
1 INJECTION INTRAMUSCULAR
Status: DISCONTINUED | OUTPATIENT
Start: 2021-12-07 | End: 2021-12-07 | Stop reason: HOSPADM

## 2021-12-07 RX ORDER — ONDANSETRON 2 MG/ML
INJECTION INTRAMUSCULAR; INTRAVENOUS PRN
Status: DISCONTINUED | OUTPATIENT
Start: 2021-12-07 | End: 2021-12-07

## 2021-12-07 RX ORDER — SODIUM CHLORIDE, SODIUM LACTATE, POTASSIUM CHLORIDE, CALCIUM CHLORIDE 600; 310; 30; 20 MG/100ML; MG/100ML; MG/100ML; MG/100ML
INJECTION, SOLUTION INTRAVENOUS CONTINUOUS
Status: DISCONTINUED | OUTPATIENT
Start: 2021-12-07 | End: 2021-12-07 | Stop reason: HOSPADM

## 2021-12-07 RX ORDER — OXYCODONE HYDROCHLORIDE 5 MG/1
5 TABLET ORAL EVERY 4 HOURS PRN
Status: DISCONTINUED | OUTPATIENT
Start: 2021-12-07 | End: 2021-12-07 | Stop reason: HOSPADM

## 2021-12-07 RX ORDER — NALOXONE HYDROCHLORIDE 0.4 MG/ML
0.2 INJECTION, SOLUTION INTRAMUSCULAR; INTRAVENOUS; SUBCUTANEOUS
Status: DISCONTINUED | OUTPATIENT
Start: 2021-12-07 | End: 2021-12-07 | Stop reason: HOSPADM

## 2021-12-07 RX ORDER — FENTANYL CITRATE 50 UG/ML
INJECTION, SOLUTION INTRAMUSCULAR; INTRAVENOUS PRN
Status: DISCONTINUED | OUTPATIENT
Start: 2021-12-07 | End: 2021-12-07

## 2021-12-07 RX ORDER — ONDANSETRON 4 MG/1
4 TABLET, ORALLY DISINTEGRATING ORAL EVERY 6 HOURS PRN
Status: DISCONTINUED | OUTPATIENT
Start: 2021-12-07 | End: 2021-12-07 | Stop reason: HOSPADM

## 2021-12-07 RX ORDER — IOPAMIDOL 510 MG/ML
INJECTION, SOLUTION INTRAVASCULAR PRN
Status: DISCONTINUED | OUTPATIENT
Start: 2021-12-07 | End: 2021-12-07 | Stop reason: HOSPADM

## 2021-12-07 RX ORDER — METOPROLOL TARTRATE 1 MG/ML
1-2 INJECTION, SOLUTION INTRAVENOUS EVERY 5 MIN PRN
Status: DISCONTINUED | OUTPATIENT
Start: 2021-12-07 | End: 2021-12-07 | Stop reason: HOSPADM

## 2021-12-07 RX ORDER — EPHEDRINE SULFATE 50 MG/ML
INJECTION, SOLUTION INTRAMUSCULAR; INTRAVENOUS; SUBCUTANEOUS PRN
Status: DISCONTINUED | OUTPATIENT
Start: 2021-12-07 | End: 2021-12-07

## 2021-12-07 RX ORDER — SCOLOPAMINE TRANSDERMAL SYSTEM 1 MG/1
1 PATCH, EXTENDED RELEASE TRANSDERMAL ONCE
Status: DISCONTINUED | OUTPATIENT
Start: 2021-12-07 | End: 2021-12-07 | Stop reason: HOSPADM

## 2021-12-07 RX ORDER — HYDRALAZINE HYDROCHLORIDE 20 MG/ML
2.5-5 INJECTION INTRAMUSCULAR; INTRAVENOUS EVERY 10 MIN PRN
Status: DISCONTINUED | OUTPATIENT
Start: 2021-12-07 | End: 2021-12-07 | Stop reason: HOSPADM

## 2021-12-07 RX ORDER — ACETAMINOPHEN 325 MG/1
975 TABLET ORAL ONCE
Status: COMPLETED | OUTPATIENT
Start: 2021-12-07 | End: 2021-12-07

## 2021-12-07 RX ORDER — LIDOCAINE 40 MG/G
CREAM TOPICAL
Status: DISCONTINUED | OUTPATIENT
Start: 2021-12-07 | End: 2021-12-07 | Stop reason: HOSPADM

## 2021-12-07 RX ORDER — NITROFURANTOIN MACROCRYSTAL 100 MG
100 CAPSULE ORAL 4 TIMES DAILY
COMMUNITY

## 2021-12-07 RX ORDER — SPIRONOLACTONE 100 MG/1
100 TABLET, FILM COATED ORAL DAILY
COMMUNITY

## 2021-12-07 RX ORDER — ONDANSETRON 4 MG/1
4 TABLET, ORALLY DISINTEGRATING ORAL EVERY 30 MIN PRN
Status: DISCONTINUED | OUTPATIENT
Start: 2021-12-07 | End: 2021-12-07 | Stop reason: HOSPADM

## 2021-12-07 RX ORDER — ONDANSETRON 2 MG/ML
4 INJECTION INTRAMUSCULAR; INTRAVENOUS EVERY 30 MIN PRN
Status: DISCONTINUED | OUTPATIENT
Start: 2021-12-07 | End: 2021-12-07 | Stop reason: HOSPADM

## 2021-12-07 RX ORDER — ONDANSETRON 2 MG/ML
4 INJECTION INTRAMUSCULAR; INTRAVENOUS EVERY 6 HOURS PRN
Status: DISCONTINUED | OUTPATIENT
Start: 2021-12-07 | End: 2021-12-07 | Stop reason: HOSPADM

## 2021-12-07 RX ORDER — NALOXONE HYDROCHLORIDE 0.4 MG/ML
0.4 INJECTION, SOLUTION INTRAMUSCULAR; INTRAVENOUS; SUBCUTANEOUS
Status: DISCONTINUED | OUTPATIENT
Start: 2021-12-07 | End: 2021-12-07 | Stop reason: HOSPADM

## 2021-12-07 RX ORDER — LIDOCAINE HYDROCHLORIDE 20 MG/ML
INJECTION, SOLUTION INFILTRATION; PERINEURAL PRN
Status: DISCONTINUED | OUTPATIENT
Start: 2021-12-07 | End: 2021-12-07

## 2021-12-07 RX ORDER — FENTANYL CITRATE 50 UG/ML
25 INJECTION, SOLUTION INTRAMUSCULAR; INTRAVENOUS EVERY 5 MIN PRN
Status: DISCONTINUED | OUTPATIENT
Start: 2021-12-07 | End: 2021-12-07 | Stop reason: HOSPADM

## 2021-12-07 RX ORDER — PROPOFOL 10 MG/ML
INJECTION, EMULSION INTRAVENOUS PRN
Status: DISCONTINUED | OUTPATIENT
Start: 2021-12-07 | End: 2021-12-07

## 2021-12-07 RX ORDER — INDOMETHACIN 50 MG/1
SUPPOSITORY RECTAL PRN
Status: DISCONTINUED | OUTPATIENT
Start: 2021-12-07 | End: 2021-12-07 | Stop reason: HOSPADM

## 2021-12-07 RX ORDER — HYDROMORPHONE HYDROCHLORIDE 1 MG/ML
0.2 INJECTION, SOLUTION INTRAMUSCULAR; INTRAVENOUS; SUBCUTANEOUS EVERY 5 MIN PRN
Status: DISCONTINUED | OUTPATIENT
Start: 2021-12-07 | End: 2021-12-07 | Stop reason: HOSPADM

## 2021-12-07 RX ADMIN — ACETAMINOPHEN 975 MG: 325 TABLET, FILM COATED ORAL at 08:19

## 2021-12-07 RX ADMIN — ONDANSETRON 4 MG: 2 INJECTION INTRAMUSCULAR; INTRAVENOUS at 10:59

## 2021-12-07 RX ADMIN — PHENYLEPHRINE HYDROCHLORIDE 100 MCG: 10 INJECTION INTRAVENOUS at 09:59

## 2021-12-07 RX ADMIN — FENTANYL CITRATE 100 MCG: 50 INJECTION, SOLUTION INTRAMUSCULAR; INTRAVENOUS at 08:40

## 2021-12-07 RX ADMIN — PROPOFOL 100 MG: 10 INJECTION, EMULSION INTRAVENOUS at 08:41

## 2021-12-07 RX ADMIN — PROPOFOL 100 MG: 10 INJECTION, EMULSION INTRAVENOUS at 08:45

## 2021-12-07 RX ADMIN — PROCHLORPERAZINE EDISYLATE 5 MG: 5 INJECTION INTRAMUSCULAR; INTRAVENOUS at 11:07

## 2021-12-07 RX ADMIN — ONDANSETRON 4 MG: 2 INJECTION INTRAMUSCULAR; INTRAVENOUS at 09:33

## 2021-12-07 RX ADMIN — SCOPALAMINE 1 PATCH: 1 PATCH, EXTENDED RELEASE TRANSDERMAL at 08:18

## 2021-12-07 RX ADMIN — SODIUM CHLORIDE, POTASSIUM CHLORIDE, SODIUM LACTATE AND CALCIUM CHLORIDE: 600; 310; 30; 20 INJECTION, SOLUTION INTRAVENOUS at 09:05

## 2021-12-07 RX ADMIN — SUGAMMADEX 200 MG: 100 INJECTION, SOLUTION INTRAVENOUS at 10:27

## 2021-12-07 RX ADMIN — ROCURONIUM BROMIDE 50 MG: 50 INJECTION, SOLUTION INTRAVENOUS at 08:42

## 2021-12-07 RX ADMIN — PHENYLEPHRINE HYDROCHLORIDE 100 MCG: 10 INJECTION INTRAVENOUS at 09:57

## 2021-12-07 RX ADMIN — GLUCAGON HYDROCHLORIDE 0.4 MG: KIT at 09:22

## 2021-12-07 RX ADMIN — GLUCAGON HYDROCHLORIDE 0.4 MG: KIT at 09:56

## 2021-12-07 RX ADMIN — HUMAN INSULIN 3 UNITS: 100 INJECTION, SOLUTION SUBCUTANEOUS at 10:48

## 2021-12-07 RX ADMIN — Medication 5 MG: at 08:41

## 2021-12-07 RX ADMIN — SODIUM CHLORIDE, POTASSIUM CHLORIDE, SODIUM LACTATE AND CALCIUM CHLORIDE: 600; 310; 30; 20 INJECTION, SOLUTION INTRAVENOUS at 11:00

## 2021-12-07 RX ADMIN — SODIUM CHLORIDE, POTASSIUM CHLORIDE, SODIUM LACTATE AND CALCIUM CHLORIDE: 600; 310; 30; 20 INJECTION, SOLUTION INTRAVENOUS at 08:37

## 2021-12-07 RX ADMIN — LIDOCAINE HYDROCHLORIDE 100 MG: 20 INJECTION, SOLUTION INFILTRATION; PERINEURAL at 08:41

## 2021-12-07 ASSESSMENT — MIFFLIN-ST. JEOR: SCORE: 1493

## 2021-12-07 NOTE — ANESTHESIA PROCEDURE NOTES
Airway       Patient location during procedure: OR       Procedure Start/Stop Times: 12/7/2021 8:48 AM  Staff -        Anesthesiologist:  Francois Dawson MD       Resident/Fellow: Jose Carlos Connell MD       Performed By: resident  Consent for Airway        Urgency: elective  Indications and Patient Condition       Indications for airway management: valentín-procedural       Induction type:intravenous       Mask difficulty assessment: 1 - vent by mask    Final Airway Details       Final airway type: endotracheal airway       Successful airway: ETT - single  Endotracheal Airway Details        ETT size (mm): 7.0       Successful intubation technique: direct laryngoscopy       DL Blade Type: MAC 4       Grade View of Cords: 1       Adjucts: stylet       Position: Right       Measured from: gums/teeth       Secured at (cm): 22       Bite block used: None    Post intubation assessment        Placement verified by: capnometry, equal breath sounds and chest rise        Number of attempts at approach: 1       Number of other approaches attempted: 0       Secured with: pink tape       Ease of procedure: easy       Dentition: Intact and Unchanged

## 2021-12-07 NOTE — OR NURSING
Discharge instructions provided and reviewed with Eliud (spouse), designated caregiver. Printed after visit summary sent home with patient. Understanding verbalized.  No new scripts today    Dr Crawford paged w lab results prior to discharge, ok for discharge. Dr Crawford and Dr Perez saw patient prior to discharge

## 2021-12-07 NOTE — ANESTHESIA POSTPROCEDURE EVALUATION
Patient: Andriy Yoon    Procedure: Procedure(s):  ENDOSCOPIC RETROGRADE CHOLANGIOPANCREATOGRAPHY, WITH pancreatic sphincterotomy, stent placement, and stone fragment removal       Diagnosis:Pancreatic stones [K86.89]  Diagnosis Additional Information: No value filed.    Anesthesia Type:  General    Note:  Disposition: Outpatient   Postop Pain Control: Uneventful            Sign Out: Well controlled pain   PONV: No   Neuro/Psych: Uneventful            Sign Out: Acceptable/Baseline neuro status   Airway/Respiratory: Uneventful            Sign Out: Acceptable/Baseline resp. status   CV/Hemodynamics: Uneventful            Sign Out: Acceptable CV status; No obvious hypovolemia; No obvious fluid overload   Other NRE: NONE   DID A NON-ROUTINE EVENT OCCUR? No           Last vitals:  Vitals Value Taken Time   /84 12/07/21 1100   Temp 36.2  C (97.2  F) 12/07/21 1038   Pulse 76 12/07/21 1102   Resp 16 12/07/21 1045   SpO2 99 % 12/07/21 1102   Vitals shown include unvalidated device data.    Electronically Signed By: Tamia Lantigua MD  December 7, 2021  11:03 AM

## 2021-12-07 NOTE — PROVIDER NOTIFICATION
12/07/21 1045   Point of Care Testing   Puncture Site fingertip   Bedside Glucose (mg/dl )  232 mg/dl   Notified Dr. Dawson, order for IV insulin received.

## 2021-12-07 NOTE — DISCHARGE INSTRUCTIONS
[facility], [place] // Same-Day Surgery // Adult Discharge Orders & Instructions     Take it easy when you get home.  Remember, same day surgery DOES NOT MEAN SAME DAY RECOVERY! Healing is a gradual process.   You will need some time to recover- you may be more tired than you realize at first.  Rest and relax for at least the first 24 hours at home.  You'll feel better and heal faster if you take good care of yourself.     ANESTHESIA RECOVERY -   For 24 hours after surgery    1. Get plenty of rest.  A responsible adult must stay with you for at least 24 hours after you leave the hospital.   2. Do not drive or use heavy equipment.  If you have weakness or tingling, don't drive or use heavy equipment until this feeling goes away.  3. Do not drink alcohol.  4. Avoid strenuous or risky activities.  Ask for help when climbing stairs.   5. You may feel lightheaded.  IF so, sit for a few minutes before standing.  Have someone help you get up.   6. If you have nausea (feel sick to your stomach): Drink only clear liquids such as apple juice, ginger ale, broth or 7-Up.  Rest may also help.  Be sure to drink enough fluids.  Move to a regular diet as you feel able.  7. You may have a slight fever. Call the doctor if your fever is over 100 F (37.7 C) (taken under the tongue) or lasts longer than 24 hours.  8. You may have a dry mouth, a sore throat, muscle aches or trouble sleeping.  These should go away after 24 hours.  9. Do not make important or legal decisions.     Call your doctor for any of the followin. Chest pain, and/or shortness of breath  2. Abdominal  pain, bloating or cramping that has not improved or does not respond to pain reliving medications (Tylenol or narcotics if prescribed)   3. Difficulty swallowing or feeling as though food or liquids are stuck in your throat   4. Sore throat lasting more than 2 days or pain that has worsened over time   5. Black or tarry stools    6. Nausea and/or vomiting that is not resolving or has not responded to anti-nausea medications prescribed to you   7. It has been over 8 to 10 hours since surgery and you are still not able to urinate (pass water)   8. Headache for over 24 hours   9. Fever over 100.5 F (38 C) lasting more than 24 hours after the procedure   10. Signs of jaundice or blockage (fever, chills, abdominal pain, yellowing of the whites of your eyes, yellowing of your skin, and/or passing darker than normal urine)     To contact a doctor, call:  Dr Perez's clinic at [phone] and ask for the resident on call for Gastroenterology (answered 24 hours a day)    Emergency Department: [facility]: [phone] (TTY for hearing impaired: [phone])           Findings  - Major papilla patent, main duct patent to tail, reflux through minor. No residual large stone as prior.   - Swept major duct and removed residual stones fragments from yesterday's ESWL (extracorporeal shock wave lithotripsy), injected methylene blue to identify orifice of minor papilla, unable to cannulate minor papilla despite multiple devices.   - Needle knife precut of minor papilla, still unable to cannulate minor with papillotome.                       - Passed papillotomy antegrade through ventral out dorsal duct, wire bounced off obstructing dorsal duct stone and into large side-branch.  - Placement of two 4 French main duct stents to body through major papilla.      Plan    - Avoid aspirin and nonsteroidal anti-inflammatory medicines for 3 days.   - Observe clinical course and repeat CT A/P (computed tomography during arterial portography)with contrast in 2-3 months,followed by ERCP within same visit if residual pain and stone, depending on patient's availability from [place].

## 2021-12-07 NOTE — ANESTHESIA CARE TRANSFER NOTE
Patient: Andriy Yoon    Procedure: Procedure(s):  ENDOSCOPIC RETROGRADE CHOLANGIOPANCREATOGRAPHY       Diagnosis: Pancreatic stones [K86.89]  Diagnosis Additional Information: No value filed.    Anesthesia Type:   General     Note:    Oropharynx: oropharynx clear of all foreign objects  Level of Consciousness: awake  Oxygen Supplementation: face mask  Level of Supplemental Oxygen (L/min / FiO2): 6  Independent Airway: airway patency satisfactory and stable  Dentition: dentition unchanged  Vital Signs Stable: post-procedure vital signs reviewed and stable    Patient transferred to: PACU  Comments: VSS. Breathing spontaneously at a regular rate with adequate tidal volumes and maintaining O2 sats. Denies nausea or pain. No apparent complications from anesthesia.     Jose Carlos Connell MD   Anesthesia CA-1    Handoff Report: Identifed the Patient, Identified the Reponsible Provider, Reviewed the pertinent medical history, Discussed the surgical course, Reviewed Intra-OP anesthesia mangement and issues during anesthesia, Set expectations for post-procedure period and Allowed opportunity for questions and acknowledgement of understanding      Vitals:  Vitals Value Taken Time   /81 12/07/21 1038   Temp     Pulse 98 12/07/21 1042   Resp     SpO2 97 % 12/07/21 1042   Vitals shown include unvalidated device data.    Electronically Signed By: Jose Carlos Connell MD  December 7, 2021  10:43 AM

## 2021-12-10 LAB — ERCP: NORMAL

## 2021-12-13 ENCOUNTER — PATIENT OUTREACH (OUTPATIENT)
Dept: GASTROENTEROLOGY | Facility: CLINIC | Age: 31
End: 2021-12-13
Payer: COMMERCIAL

## 2021-12-13 DIAGNOSIS — Z46.89 ENCOUNTER FOR REMOVAL OF PANCREATIC STENT: Primary | ICD-10-CM

## 2021-12-13 NOTE — TELEPHONE ENCOUNTER
Post ERCP (12/7/21) with Dr. Perez: Follow-up    Post procedure recommendations:   Obtain KUB in 4 weeks to ensure spontaneous passage    of plastic stents.     - Observe clinical course , and if recurrent  pancreatitis attack, or pain, repeat CT A/P with   contrast in 2-3 months,followed by ERCP within same   visit if residual pain and stone, depending on patient's availability from [place].     Orders placed: KUB, Send to PCP    Patient states: feeling ok since procedure, understands follow up    Clinic contact and scheduling numbers verified for future questions/concerns.    Clarice Sun, RN Care Coordinator

## 2021-12-14 ENCOUNTER — DOCUMENTATION ONLY (OUTPATIENT)
Dept: GASTROENTEROLOGY | Facility: CLINIC | Age: 31
End: 2021-12-14
Payer: COMMERCIAL

## 2021-12-21 ENCOUNTER — PATIENT MESSAGE (OUTPATIENT)
Dept: FAMILY MEDICINE | Facility: CLINIC | Age: 31
End: 2021-12-21
Payer: COMMERCIAL

## 2021-12-21 DIAGNOSIS — R11.0 NAUSEA: Primary | ICD-10-CM

## 2021-12-22 RX ORDER — ONDANSETRON 4 MG/1
4 TABLET, ORALLY DISINTEGRATING ORAL EVERY 8 HOURS PRN
Qty: 30 TABLET | Refills: 1 | Status: SHIPPED | OUTPATIENT
Start: 2021-12-22 | End: 2022-11-11 | Stop reason: SDUPTHER

## 2021-12-29 ENCOUNTER — PATIENT MESSAGE (OUTPATIENT)
Dept: FAMILY MEDICINE | Facility: CLINIC | Age: 31
End: 2021-12-29
Payer: COMMERCIAL

## 2021-12-30 ENCOUNTER — INFUSION (OUTPATIENT)
Dept: INFUSION THERAPY | Facility: HOSPITAL | Age: 31
End: 2021-12-30
Attending: FAMILY MEDICINE
Payer: COMMERCIAL

## 2021-12-30 VITALS
TEMPERATURE: 98 F | OXYGEN SATURATION: 97 % | RESPIRATION RATE: 18 BRPM | DIASTOLIC BLOOD PRESSURE: 74 MMHG | HEART RATE: 90 BPM | SYSTOLIC BLOOD PRESSURE: 128 MMHG

## 2021-12-30 DIAGNOSIS — K86.1 IDIOPATHIC CHRONIC PANCREATITIS: Primary | ICD-10-CM

## 2021-12-30 DIAGNOSIS — K86.1 CHRONIC BILIARY PANCREATITIS: ICD-10-CM

## 2021-12-30 PROCEDURE — 96360 HYDRATION IV INFUSION INIT: CPT

## 2021-12-30 PROCEDURE — 63600175 PHARM REV CODE 636 W HCPCS: Performed by: FAMILY MEDICINE

## 2021-12-30 RX ORDER — SODIUM CHLORIDE 0.9 % (FLUSH) 0.9 %
10 SYRINGE (ML) INJECTION
Status: CANCELLED | OUTPATIENT
Start: 2021-12-30

## 2021-12-30 RX ORDER — HEPARIN 100 UNIT/ML
500 SYRINGE INTRAVENOUS
Status: CANCELLED | OUTPATIENT
Start: 2021-12-30

## 2021-12-30 RX ADMIN — SODIUM CHLORIDE, POTASSIUM CHLORIDE, SODIUM LACTATE AND CALCIUM CHLORIDE 1000 ML: 600; 310; 30; 20 INJECTION, SOLUTION INTRAVENOUS at 09:12

## 2022-01-07 ENCOUNTER — OFFICE VISIT (OUTPATIENT)
Dept: FAMILY MEDICINE | Facility: CLINIC | Age: 32
End: 2022-01-07
Payer: COMMERCIAL

## 2022-01-07 VITALS
TEMPERATURE: 98 F | HEART RATE: 87 BPM | HEIGHT: 63 IN | BODY MASS INDEX: 31.71 KG/M2 | RESPIRATION RATE: 18 BRPM | WEIGHT: 179 LBS | SYSTOLIC BLOOD PRESSURE: 122 MMHG | DIASTOLIC BLOOD PRESSURE: 84 MMHG | OXYGEN SATURATION: 97 %

## 2022-01-07 DIAGNOSIS — E11.9 TYPE 2 DIABETES MELLITUS WITHOUT COMPLICATION, UNSPECIFIED WHETHER LONG TERM INSULIN USE: Primary | ICD-10-CM

## 2022-01-07 PROCEDURE — 99999 PR PBB SHADOW E&M-EST. PATIENT-LVL III: ICD-10-PCS | Mod: PBBFAC,,, | Performed by: FAMILY MEDICINE

## 2022-01-07 PROCEDURE — 3074F SYST BP LT 130 MM HG: CPT | Mod: S$GLB,,, | Performed by: FAMILY MEDICINE

## 2022-01-07 PROCEDURE — 99999 PR PBB SHADOW E&M-EST. PATIENT-LVL III: CPT | Mod: PBBFAC,,, | Performed by: FAMILY MEDICINE

## 2022-01-07 PROCEDURE — 3074F PR MOST RECENT SYSTOLIC BLOOD PRESSURE < 130 MM HG: ICD-10-PCS | Mod: S$GLB,,, | Performed by: FAMILY MEDICINE

## 2022-01-07 PROCEDURE — 99214 PR OFFICE/OUTPT VISIT, EST, LEVL IV, 30-39 MIN: ICD-10-PCS | Mod: S$GLB,,, | Performed by: FAMILY MEDICINE

## 2022-01-07 PROCEDURE — 99214 OFFICE O/P EST MOD 30 MIN: CPT | Mod: S$GLB,,, | Performed by: FAMILY MEDICINE

## 2022-01-07 PROCEDURE — 3051F PR MOST RECENT HEMOGLOBIN A1C LEVEL 7.0 - < 8.0%: ICD-10-PCS | Mod: S$GLB,,, | Performed by: FAMILY MEDICINE

## 2022-01-07 PROCEDURE — 3008F PR BODY MASS INDEX (BMI) DOCUMENTED: ICD-10-PCS | Mod: S$GLB,,, | Performed by: FAMILY MEDICINE

## 2022-01-07 PROCEDURE — 3008F BODY MASS INDEX DOCD: CPT | Mod: S$GLB,,, | Performed by: FAMILY MEDICINE

## 2022-01-07 PROCEDURE — 3051F HG A1C>EQUAL 7.0%<8.0%: CPT | Mod: S$GLB,,, | Performed by: FAMILY MEDICINE

## 2022-01-07 PROCEDURE — 3079F DIAST BP 80-89 MM HG: CPT | Mod: S$GLB,,, | Performed by: FAMILY MEDICINE

## 2022-01-07 PROCEDURE — 3079F PR MOST RECENT DIASTOLIC BLOOD PRESSURE 80-89 MM HG: ICD-10-PCS | Mod: S$GLB,,, | Performed by: FAMILY MEDICINE

## 2022-01-07 NOTE — PROGRESS NOTES
"[facility] - Clinic Note    Subjective      Ms. Jean Baptiste is a 31 y.o. female who presents to clinic for a follow up.     Patient had an ERCP due to pancreatic stones on 12/7/21.   She is doing well.     DM: currently on metformin.   Last A1C was 7.5  Compliant with med    PMH Bob has a past medical history of Abdominal pain, epigastric, GERD (gastroesophageal reflux disease), Nausea alone, Pancreatitis, Pancreatitis, acute, and Premature ovarian insufficiency (08/2018).   PSXH Bob has a past surgical history that includes Esophagogastroduodenoscopy; Cholecystectomy; and Eye surgery.    Bob's family history includes Asthma in her sister; Diabetes in her maternal grandmother; No Known Problems in her father and mother.   SH Bob reports that she has never smoked. She has never used smokeless tobacco. She reports that she does not drink alcohol and does not use drugs.   PILLO Rojas is allergic to ceftin [cefuroxime axetil] and pcn [penicillins].   MARÍA Rojas has a current medication list which includes the following prescription(s): acyclovir, creon, diazepam, estradiol, hydrocodone-acetaminophen, metformin, ondansetron, spironolactone, tramadol, and progesterone.     Review of Systems   Constitutional: Negative for activity change, appetite change, chills, fatigue and fever.   Eyes: Negative for visual disturbance.   Respiratory: Negative for cough and shortness of breath.    Cardiovascular: Negative for chest pain, palpitations and leg swelling.   Gastrointestinal: Negative for abdominal pain, nausea and vomiting.   Skin: Negative for wound.   Neurological: Negative for dizziness and headaches.   Psychiatric/Behavioral: Negative for confusion.     Objective     /84 (BP Location: Left arm, Patient Position: Sitting, BP Method: Medium (Manual))   Pulse 87   Temp 98 °F (36.7 °C) (Oral)   Resp 18   Ht 5' 3" (1.6 m)   Wt 81.2 kg (179 lb)   SpO2 97%   BMI 31.71 kg/m²     Physical Exam "   Constitutional: She appears well-developed, well-nourished and obese.  Non-toxic appearance. She does not appear ill. No distress.   HENT:   Head: Normocephalic and atraumatic.   Eyes: Right eye exhibits no discharge. Left eye exhibits no discharge.   Cardiovascular: Normal rate, regular rhythm, normal heart sounds, intact distal pulses and normal pulses. Exam reveals no gallop and no friction rub.   No murmur heard.  Pulmonary/Chest: Effort normal and breath sounds normal. No respiratory distress. She has no wheezes. She has no rhonchi. She has no rales.   Abdominal: Normal appearance.   Musculoskeletal:      Cervical back: Neck supple.   Lymphadenopathy:     She has no cervical adenopathy.   Neurological: She is alert.   Skin: Skin is warm and dry. Capillary refill takes less than 2 seconds. She is not diaphoretic.   Psychiatric: She has a normal mood and affect. Her behavior is normal. Mood, judgment and thought content normal.   Vitals reviewed.     Assessment/Plan     Bob was seen today for follow-up.    Diagnoses and all orders for this visit:    Type 2 diabetes mellitus without complication, unspecified whether long term insulin use  -     Hemoglobin A1C; Future    -stable. Continue current regimen.    Follow up in about 6 months (around 7/7/2022), or if symptoms worsen or fail to improve.    Future Appointments   Date Time Provider Department Center   1/17/2022  8:00 AM [facility], LABORATORY [facility] LAB [facility]   3/7/2022  3:30 PM [facility]   7/7/2022  8:20 AM Poncho Atkinson MD [facility]       Poncho Atkinson MD  Family Medicine  [facility]

## 2022-01-08 ENCOUNTER — PATIENT MESSAGE (OUTPATIENT)
Dept: FAMILY MEDICINE | Facility: CLINIC | Age: 32
End: 2022-01-08
Payer: COMMERCIAL

## 2022-01-14 ENCOUNTER — PATIENT MESSAGE (OUTPATIENT)
Dept: FAMILY MEDICINE | Facility: CLINIC | Age: 32
End: 2022-01-14
Payer: COMMERCIAL

## 2022-01-16 ENCOUNTER — PATIENT MESSAGE (OUTPATIENT)
Dept: FAMILY MEDICINE | Facility: CLINIC | Age: 32
End: 2022-01-16
Payer: COMMERCIAL

## 2022-01-17 ENCOUNTER — TELEPHONE (OUTPATIENT)
Dept: FAMILY MEDICINE | Facility: CLINIC | Age: 32
End: 2022-01-17
Payer: COMMERCIAL

## 2022-01-17 ENCOUNTER — PATIENT MESSAGE (OUTPATIENT)
Dept: FAMILY MEDICINE | Facility: CLINIC | Age: 32
End: 2022-01-17
Payer: COMMERCIAL

## 2022-01-17 ENCOUNTER — LAB VISIT (OUTPATIENT)
Dept: LAB | Facility: HOSPITAL | Age: 32
End: 2022-01-17
Attending: FAMILY MEDICINE
Payer: COMMERCIAL

## 2022-01-17 DIAGNOSIS — E11.9 TYPE 2 DIABETES MELLITUS WITHOUT COMPLICATION, UNSPECIFIED WHETHER LONG TERM INSULIN USE: ICD-10-CM

## 2022-01-17 DIAGNOSIS — E11.9 TYPE 2 DIABETES MELLITUS WITHOUT COMPLICATION, UNSPECIFIED WHETHER LONG TERM INSULIN USE: Primary | ICD-10-CM

## 2022-01-17 LAB
ESTIMATED AVG GLUCOSE: 171 MG/DL (ref 68–131)
HBA1C MFR BLD: 7.6 % (ref 4–5.6)

## 2022-01-17 PROCEDURE — 83036 HEMOGLOBIN GLYCOSYLATED A1C: CPT | Performed by: FAMILY MEDICINE

## 2022-01-17 PROCEDURE — 36415 COLL VENOUS BLD VENIPUNCTURE: CPT | Performed by: FAMILY MEDICINE

## 2022-01-17 RX ORDER — METFORMIN HYDROCHLORIDE 500 MG/1
500 TABLET, EXTENDED RELEASE ORAL
Qty: 30 TABLET | Refills: 2 | OUTPATIENT
Start: 2022-01-17

## 2022-01-18 RX ORDER — METFORMIN HYDROCHLORIDE 500 MG/1
1000 TABLET, EXTENDED RELEASE ORAL 2 TIMES DAILY WITH MEALS
Qty: 120 TABLET | Refills: 5 | Status: SHIPPED | OUTPATIENT
Start: 2022-01-18 | End: 2022-02-17 | Stop reason: SINTOL

## 2022-01-24 ENCOUNTER — PATIENT MESSAGE (OUTPATIENT)
Dept: FAMILY MEDICINE | Facility: CLINIC | Age: 32
End: 2022-01-24
Payer: COMMERCIAL

## 2022-01-24 ENCOUNTER — DOCUMENTATION ONLY (OUTPATIENT)
Dept: GASTROENTEROLOGY | Facility: CLINIC | Age: 32
End: 2022-01-24

## 2022-01-24 DIAGNOSIS — Z96.89 PRESENCE OF PANCREATIC DUCT STENT: Primary | ICD-10-CM

## 2022-01-24 NOTE — PROGRESS NOTES
Xray orders refaxed to Dr Crespo's office per pt request [phone]    Nathalie Oquendo, RN, BSN,   [facility]  Care coordinator

## 2022-01-25 ENCOUNTER — PATIENT MESSAGE (OUTPATIENT)
Dept: FAMILY MEDICINE | Facility: CLINIC | Age: 32
End: 2022-01-25
Payer: COMMERCIAL

## 2022-01-25 DIAGNOSIS — F41.9 ANXIETY: ICD-10-CM

## 2022-01-25 DIAGNOSIS — K86.1 IDIOPATHIC CHRONIC PANCREATITIS: ICD-10-CM

## 2022-01-26 RX ORDER — DIAZEPAM 5 MG/1
TABLET ORAL
Qty: 30 TABLET | Refills: 2 | Status: SHIPPED | OUTPATIENT
Start: 2022-01-26 | End: 2023-03-16 | Stop reason: SDUPTHER

## 2022-01-27 ENCOUNTER — TELEPHONE (OUTPATIENT)
Dept: GASTROENTEROLOGY | Facility: CLINIC | Age: 32
End: 2022-01-27

## 2022-01-27 NOTE — TELEPHONE ENCOUNTER
Called Pt to follow up on Xray order. No answer. VM box full -unable to leave a message.     Jewel Moore LPN

## 2022-01-30 ENCOUNTER — HEALTH MAINTENANCE LETTER (OUTPATIENT)
Age: 32
End: 2022-01-30

## 2022-01-31 ENCOUNTER — DOCUMENTATION ONLY (OUTPATIENT)
Dept: GASTROENTEROLOGY | Facility: CLINIC | Age: 32
End: 2022-01-31

## 2022-01-31 ENCOUNTER — TRANSFERRED RECORDS (OUTPATIENT)
Dept: HEALTH INFORMATION MANAGEMENT | Facility: CLINIC | Age: 32
End: 2022-01-31

## 2022-01-31 ENCOUNTER — HOSPITAL ENCOUNTER (OUTPATIENT)
Dept: RADIOLOGY | Facility: HOSPITAL | Age: 32
Discharge: HOME | End: 2022-01-31
Attending: FAMILY MEDICINE
Payer: COMMERCIAL

## 2022-01-31 DIAGNOSIS — Z96.89 PRESENCE OF PANCREATIC DUCT STENT: ICD-10-CM

## 2022-01-31 PROCEDURE — 74019 RADEX ABDOMEN 2 VIEWS: CPT | Mod: 26,,, | Performed by: RADIOLOGY

## 2022-01-31 PROCEDURE — 74019 XR ABDOMEN AP AND LATERAL: ICD-10-PCS | Mod: 26,,, | Performed by: RADIOLOGY

## 2022-01-31 PROCEDURE — 74019 RADEX ABDOMEN 2 VIEWS: CPT | Mod: TC,FY

## 2022-01-31 NOTE — PROGRESS NOTES
Called to request for XR abd images, DOS 1/31/22:    [facility]    [address] [place] [address] [phone]     Per [facility], please fax over written request to them at [phone].    Jewel Moore LPN

## 2022-01-31 NOTE — PROGRESS NOTES
Written request for records (XR abd images, DOS 1/31/22) faxed to:     [address] [place] [address]    Phone: [phone]   Fax: [phone]    Jewel Moore LPN

## 2022-02-01 ENCOUNTER — MYC MEDICAL ADVICE (OUTPATIENT)
Dept: GASTROENTEROLOGY | Facility: CLINIC | Age: 32
End: 2022-02-01

## 2022-02-01 NOTE — TELEPHONE ENCOUNTER
"Returned call to patient regarding fecal elastase. Reviewed how she's taking her creon, she's only taking 2 with meals and none with snacks. Reviewed that she could take more, as prescribed up to 15 per day. Pt reports feeling well, no concerns. Xray done for stent. Pt says xray tech noted she could be \"obstructed with stool\". Pt does have frequent diarrhea/bowel movements. Advised Dr. Perez' will advised on anything concerning on imaging.    ML  "

## 2022-02-07 ENCOUNTER — DOCUMENTATION ONLY (OUTPATIENT)
Dept: GASTROENTEROLOGY | Facility: CLINIC | Age: 32
End: 2022-02-07

## 2022-02-07 NOTE — PROGRESS NOTES
CD/XR images still not received.     Written request for records (XR abd images, DOS 1/31/22) refaxed to:     [facility] at [phone]     Jewel Moore LPN

## 2022-02-09 ENCOUNTER — PATIENT MESSAGE (OUTPATIENT)
Dept: FAMILY MEDICINE | Facility: CLINIC | Age: 32
End: 2022-02-09

## 2022-02-09 ENCOUNTER — DOCUMENTATION ONLY (OUTPATIENT)
Dept: GASTROENTEROLOGY | Facility: CLINIC | Age: 32
End: 2022-02-09

## 2022-02-09 ENCOUNTER — OFFICE VISIT (OUTPATIENT)
Dept: FAMILY MEDICINE | Facility: CLINIC | Age: 32
End: 2022-02-09
Payer: COMMERCIAL

## 2022-02-09 DIAGNOSIS — Z02.82 ENCOUNTER FOR ADOPTION SERVICES: Primary | ICD-10-CM

## 2022-02-09 PROCEDURE — 99214 PR OFFICE/OUTPT VISIT, EST, LEVL IV, 30-39 MIN: ICD-10-PCS | Mod: 95,,, | Performed by: FAMILY MEDICINE

## 2022-02-09 PROCEDURE — 3051F HG A1C>EQUAL 7.0%<8.0%: CPT | Mod: 95,,, | Performed by: FAMILY MEDICINE

## 2022-02-09 PROCEDURE — 3051F PR MOST RECENT HEMOGLOBIN A1C LEVEL 7.0 - < 8.0%: ICD-10-PCS | Mod: 95,,, | Performed by: FAMILY MEDICINE

## 2022-02-09 PROCEDURE — 99214 OFFICE O/P EST MOD 30 MIN: CPT | Mod: 95,,, | Performed by: FAMILY MEDICINE

## 2022-02-09 NOTE — PROGRESS NOTES
Still no CD/images received. Called [facility] to follow up on XR images, 1/31/22. Left  for their [facility]-medical records department to call back with updates.     Written request faxed on 1/31/22 and 2/7/22.     [address] [place] [address]    Phone: [phone]   Fax: [phone]     Jewel Moore LPN

## 2022-02-09 NOTE — PROGRESS NOTES
[facility] - Clinic Note    Subjective    The patient location is: at work   The chief complaint leading to consultation is: adoption process    Visit type: audiovisual    Face to Face time with patient:  minutes of total time spent on the encounter, which includes face to face time and non-face to face time preparing to see the patient (eg, review of tests), Obtaining and/or reviewing separately obtained history, Documenting clinical information in the electronic or other health record, Independently interpreting results (not separately reported) and communicating results to the patient/family/caregiver, or Care coordination (not separately reported).         Each patient to whom he or she provides medical services by telemedicine is:  (1) informed of the relationship between the physician and patient and the respective role of any other health care provider with respect to management of the patient; and (2) notified that he or she may decline to receive medical services by telemedicine and may withdraw from such care at any time.    Notes:    Ms. Jean Baptiste is a 31 y.o. female who presents for a VV.     Patient is needing paperwork filled out for adoption.   She has a history of chronic idiopathic pancreatitis, diabetes, and premature ovarian failure.   She is currently followed by a pancreatic specialist in [place].   Diabetes is well controlled. likely secondary to chronic pancreatitis.  She takes tramadol and valium as needed for pancreatic attacks/flares to help manage the pain at those times. These medications are not taken daily.   Overall she is doing well.   Mentally feels well and ready for the adoption process. No concern for TB or other infectious diseases.      [facility] Bob has a past medical history of Abdominal pain, epigastric, GERD (gastroesophageal reflux disease), Nausea alone, Pancreatitis, Pancreatitis, acute, and Premature ovarian insufficiency (08/2018).   PSXH Bob has a past surgical history  that includes Esophagogastroduodenoscopy; Cholecystectomy; and Eye surgery.   AARON Rojas's family history includes Asthma in her sister; Diabetes in her maternal grandmother; No Known Problems in her father and mother.   LESLEY Rojas reports that she has never smoked. She has never used smokeless tobacco. She reports that she does not drink alcohol and does not use drugs.   PILLO Rojas is allergic to ceftin [cefuroxime axetil] and pcn [penicillins].   MARÍA Rojas has a current medication list which includes the following prescription(s): acyclovir, creon, diazepam, estradiol, hydrocodone-acetaminophen, metformin, ondansetron, progesterone, spironolactone, and tramadol.     Review of Systems   Constitutional: Negative for activity change and unexpected weight change.   HENT: Negative for hearing loss, rhinorrhea and trouble swallowing.    Eyes: Negative for discharge and visual disturbance.   Respiratory: Negative for chest tightness and wheezing.    Cardiovascular: Negative for chest pain and palpitations.   Gastrointestinal: Positive for diarrhea. Negative for blood in stool, constipation and vomiting.   Endocrine: Negative for polydipsia and polyuria.   Genitourinary: Negative for difficulty urinating, dysuria, hematuria and menstrual problem.   Musculoskeletal: Negative for arthralgias, joint swelling and neck pain.   Skin: Negative for wound.   Neurological: Negative for weakness and headaches.   Psychiatric/Behavioral: Negative for confusion, dysphoric mood and sleep disturbance. The patient is not nervous/anxious.      Objective     There were no vitals taken for this visit.    Physical Exam   Constitutional:  Non-toxic appearance. She does not appear ill. No distress.   HENT:   Head: Normocephalic and atraumatic.   Right Ear: External ear normal.   Left Ear: External ear normal.   Eyes: Right eye exhibits no discharge. Left eye exhibits no discharge.   Pulmonary/Chest: Effort normal. No respiratory distress.    Abdominal: Normal appearance.   Neurological: She is alert.   Skin: She is not diaphoretic.   Psychiatric: Her behavior is normal. Mood, judgment and thought content normal.      Assessment/Plan     Diagnoses and all orders for this visit:    Encounter for adoption services  -will fill out paperwork for patient and fax to requested office   -her chronic conditions are controlled and no concern for substance/medication abuse. She uses tramadol and valium prn for pancreatic attacks which are not frequent.       Poncho Atkinson MD  Family Medicine  [facility]

## 2022-02-10 ENCOUNTER — PATIENT MESSAGE (OUTPATIENT)
Dept: FAMILY MEDICINE | Facility: CLINIC | Age: 32
End: 2022-02-10
Payer: COMMERCIAL

## 2022-02-10 DIAGNOSIS — E11.9 TYPE 2 DIABETES MELLITUS WITHOUT COMPLICATION, UNSPECIFIED WHETHER LONG TERM INSULIN USE: Primary | ICD-10-CM

## 2022-02-17 ENCOUNTER — PATIENT MESSAGE (OUTPATIENT)
Dept: FAMILY MEDICINE | Facility: CLINIC | Age: 32
End: 2022-02-17
Payer: COMMERCIAL

## 2022-02-17 RX ORDER — GLIMEPIRIDE 2 MG/1
2 TABLET ORAL
Qty: 30 TABLET | Refills: 11 | Status: SHIPPED | OUTPATIENT
Start: 2022-02-17 | End: 2023-02-14 | Stop reason: DRUGHIGH

## 2022-02-18 ENCOUNTER — DOCUMENTATION ONLY (OUTPATIENT)
Dept: GASTROENTEROLOGY | Facility: CLINIC | Age: 32
End: 2022-02-18

## 2022-02-18 NOTE — PROGRESS NOTES
CD received from [facility]      Scan: Xray images, DOS 1/31/22    CD sent to 4th floor to be uploaded.    XR report sent to scanning.    Jewel Moore LPN

## 2022-02-23 RX ORDER — HEPARIN SODIUM,PORCINE 10 UNIT/ML
5 VIAL (ML) INTRAVENOUS
Status: CANCELLED | OUTPATIENT
Start: 2022-02-23

## 2022-02-23 RX ORDER — HEPARIN SODIUM (PORCINE) LOCK FLUSH IV SOLN 100 UNIT/ML 100 UNIT/ML
5 SOLUTION INTRAVENOUS
Status: CANCELLED | OUTPATIENT
Start: 2022-02-23

## 2022-03-01 ENCOUNTER — PATIENT MESSAGE (OUTPATIENT)
Dept: FAMILY MEDICINE | Facility: CLINIC | Age: 32
End: 2022-03-01
Payer: COMMERCIAL

## 2022-03-21 DIAGNOSIS — K86.1 CHRONIC PANCREATITIS (H): ICD-10-CM

## 2022-03-22 ENCOUNTER — PATIENT MESSAGE (OUTPATIENT)
Dept: FAMILY MEDICINE | Facility: CLINIC | Age: 32
End: 2022-03-22
Payer: COMMERCIAL

## 2022-03-28 ENCOUNTER — PATIENT MESSAGE (OUTPATIENT)
Dept: FAMILY MEDICINE | Facility: CLINIC | Age: 32
End: 2022-03-28
Payer: COMMERCIAL

## 2022-04-03 ENCOUNTER — PATIENT MESSAGE (OUTPATIENT)
Dept: FAMILY MEDICINE | Facility: CLINIC | Age: 32
End: 2022-04-03
Payer: COMMERCIAL

## 2022-04-04 ENCOUNTER — INFUSION (OUTPATIENT)
Dept: INFUSION THERAPY | Facility: HOSPITAL | Age: 32
End: 2022-04-04
Payer: COMMERCIAL

## 2022-04-04 VITALS
RESPIRATION RATE: 18 BRPM | TEMPERATURE: 98 F | DIASTOLIC BLOOD PRESSURE: 78 MMHG | HEART RATE: 82 BPM | SYSTOLIC BLOOD PRESSURE: 120 MMHG

## 2022-04-04 VITALS
TEMPERATURE: 98 F | RESPIRATION RATE: 18 BRPM | DIASTOLIC BLOOD PRESSURE: 66 MMHG | HEART RATE: 63 BPM | SYSTOLIC BLOOD PRESSURE: 110 MMHG

## 2022-04-04 DIAGNOSIS — K86.1 CHRONIC BILIARY PANCREATITIS: ICD-10-CM

## 2022-04-04 DIAGNOSIS — K86.1 IDIOPATHIC CHRONIC PANCREATITIS: Primary | ICD-10-CM

## 2022-04-04 PROCEDURE — 63600175 PHARM REV CODE 636 W HCPCS: Performed by: FAMILY MEDICINE

## 2022-04-04 PROCEDURE — 96360 HYDRATION IV INFUSION INIT: CPT

## 2022-04-04 RX ORDER — HEPARIN 100 UNIT/ML
500 SYRINGE INTRAVENOUS
Status: CANCELLED | OUTPATIENT
Start: 2022-04-04

## 2022-04-04 RX ORDER — SODIUM CHLORIDE 0.9 % (FLUSH) 0.9 %
10 SYRINGE (ML) INJECTION
Status: CANCELLED | OUTPATIENT
Start: 2022-04-04

## 2022-04-04 RX ADMIN — SODIUM CHLORIDE, SODIUM LACTATE, POTASSIUM CHLORIDE, AND CALCIUM CHLORIDE 1000 ML: .6; .31; .03; .02 INJECTION, SOLUTION INTRAVENOUS at 10:04

## 2022-04-04 NOTE — PLAN OF CARE
Pleasant alert and oriented patient ambulated independently to clinic for IVF - pt tolerated well - discharged home with no concerns - will RTC PRN for fluids.

## 2022-04-06 ENCOUNTER — DOCUMENTATION ONLY (OUTPATIENT)
Dept: GASTROENTEROLOGY | Facility: CLINIC | Age: 32
End: 2022-04-06
Payer: COMMERCIAL

## 2022-04-06 DIAGNOSIS — K86.1 IDIOPATHIC CHRONIC PANCREATITIS (H): Primary | ICD-10-CM

## 2022-04-06 DIAGNOSIS — K86.89 PANCREATIC DUCT STONES: ICD-10-CM

## 2022-04-06 NOTE — PROGRESS NOTES
Order for CT dated 4/6/2022 by Dr. Perez faxed to , [facility]   [phone]     Chikis Calderon, [facility]

## 2022-04-20 DIAGNOSIS — E11.9 TYPE 2 DIABETES MELLITUS WITHOUT COMPLICATION: ICD-10-CM

## 2022-04-25 ENCOUNTER — PATIENT MESSAGE (OUTPATIENT)
Dept: ADMINISTRATIVE | Facility: HOSPITAL | Age: 32
End: 2022-04-25
Payer: COMMERCIAL

## 2022-04-25 ENCOUNTER — PATIENT MESSAGE (OUTPATIENT)
Dept: FAMILY MEDICINE | Facility: CLINIC | Age: 32
End: 2022-04-25
Payer: COMMERCIAL

## 2022-04-27 ENCOUNTER — LAB VISIT (OUTPATIENT)
Dept: LAB | Facility: HOSPITAL | Age: 32
End: 2022-04-27
Attending: FAMILY MEDICINE
Payer: COMMERCIAL

## 2022-04-27 ENCOUNTER — PATIENT MESSAGE (OUTPATIENT)
Dept: FAMILY MEDICINE | Facility: CLINIC | Age: 32
End: 2022-04-27
Payer: COMMERCIAL

## 2022-04-27 DIAGNOSIS — E11.9 TYPE 2 DIABETES MELLITUS WITHOUT COMPLICATION: ICD-10-CM

## 2022-04-27 LAB
CHOLEST SERPL-MCNC: 217 MG/DL (ref 120–199)
CHOLEST/HDLC SERPL: 4.8 {RATIO} (ref 2–5)
HDLC SERPL-MCNC: 45 MG/DL (ref 40–75)
HDLC SERPL: 20.7 % (ref 20–50)
LDLC SERPL CALC-MCNC: 138.6 MG/DL (ref 63–159)
NONHDLC SERPL-MCNC: 172 MG/DL
TRIGL SERPL-MCNC: 167 MG/DL (ref 30–150)

## 2022-04-27 PROCEDURE — 36415 COLL VENOUS BLD VENIPUNCTURE: CPT | Performed by: FAMILY MEDICINE

## 2022-04-27 PROCEDURE — 80061 LIPID PANEL: CPT | Performed by: FAMILY MEDICINE

## 2022-04-28 ENCOUNTER — DOCUMENTATION ONLY (OUTPATIENT)
Dept: GASTROENTEROLOGY | Facility: CLINIC | Age: 32
End: 2022-04-28
Payer: COMMERCIAL

## 2022-04-28 NOTE — PROGRESS NOTES
Orders for CT abdominal pelvis dated 4/6/2022 by Dr. Perez faxed to [facility] fax# [phone] ATTKELSEY Escalera.    Chikis Calderon, [facility]

## 2022-05-04 DIAGNOSIS — E11.9 TYPE 2 DIABETES MELLITUS WITHOUT COMPLICATION, UNSPECIFIED WHETHER LONG TERM INSULIN USE: Primary | ICD-10-CM

## 2022-05-09 ENCOUNTER — PATIENT MESSAGE (OUTPATIENT)
Dept: FAMILY MEDICINE | Facility: CLINIC | Age: 32
End: 2022-05-09
Payer: COMMERCIAL

## 2022-05-09 DIAGNOSIS — K86.1 IDIOPATHIC CHRONIC PANCREATITIS: ICD-10-CM

## 2022-05-10 RX ORDER — TRAMADOL HYDROCHLORIDE 50 MG/1
TABLET ORAL
Qty: 30 TABLET | Refills: 0 | Status: SHIPPED | OUTPATIENT
Start: 2022-05-10 | End: 2023-09-20 | Stop reason: SDUPTHER

## 2022-05-11 ENCOUNTER — OFFICE VISIT (OUTPATIENT)
Dept: FAMILY MEDICINE | Facility: CLINIC | Age: 32
End: 2022-05-11
Payer: COMMERCIAL

## 2022-05-11 ENCOUNTER — PATIENT MESSAGE (OUTPATIENT)
Dept: FAMILY MEDICINE | Facility: CLINIC | Age: 32
End: 2022-05-11

## 2022-05-11 ENCOUNTER — TRANSFERRED RECORDS (OUTPATIENT)
Dept: HEALTH INFORMATION MANAGEMENT | Facility: CLINIC | Age: 32
End: 2022-05-11
Payer: COMMERCIAL

## 2022-05-11 ENCOUNTER — HOSPITAL ENCOUNTER (OUTPATIENT)
Dept: RADIOLOGY | Facility: HOSPITAL | Age: 32
Discharge: HOME | End: 2022-05-11
Attending: INTERNAL MEDICINE
Payer: COMMERCIAL

## 2022-05-11 DIAGNOSIS — R10.9 ABDOMINAL PAIN: ICD-10-CM

## 2022-05-11 DIAGNOSIS — K86.89 PANCREATIC DUCT STONES: ICD-10-CM

## 2022-05-11 DIAGNOSIS — K86.1 IDIOPATHIC CHRONIC PANCREATITIS: ICD-10-CM

## 2022-05-11 DIAGNOSIS — K30 INDIGESTION: ICD-10-CM

## 2022-05-11 DIAGNOSIS — E11.9 TYPE 2 DIABETES MELLITUS WITHOUT COMPLICATION, UNSPECIFIED WHETHER LONG TERM INSULIN USE: Primary | ICD-10-CM

## 2022-05-11 DIAGNOSIS — K21.9 GASTROESOPHAGEAL REFLUX DISEASE, UNSPECIFIED WHETHER ESOPHAGITIS PRESENT: ICD-10-CM

## 2022-05-11 PROCEDURE — 3051F HG A1C>EQUAL 7.0%<8.0%: CPT | Mod: 95,,, | Performed by: FAMILY MEDICINE

## 2022-05-11 PROCEDURE — 3051F PR MOST RECENT HEMOGLOBIN A1C LEVEL 7.0 - < 8.0%: ICD-10-PCS | Mod: 95,,, | Performed by: FAMILY MEDICINE

## 2022-05-11 PROCEDURE — 74177 CT ABDOMEN PELVIS WITH CONTRAST: ICD-10-PCS | Mod: 26,,, | Performed by: RADIOLOGY

## 2022-05-11 PROCEDURE — 3044F PR MOST RECENT HEMOGLOBIN A1C LEVEL <7.0%: ICD-10-PCS | Mod: 95,,, | Performed by: FAMILY MEDICINE

## 2022-05-11 PROCEDURE — 74177 CT ABD & PELVIS W/CONTRAST: CPT | Mod: 26,,, | Performed by: RADIOLOGY

## 2022-05-11 PROCEDURE — 25500020 PHARM REV CODE 255

## 2022-05-11 PROCEDURE — 99214 PR OFFICE/OUTPT VISIT, EST, LEVL IV, 30-39 MIN: ICD-10-PCS | Mod: 95,,, | Performed by: FAMILY MEDICINE

## 2022-05-11 PROCEDURE — 3044F HG A1C LEVEL LT 7.0%: CPT | Mod: 95,,, | Performed by: FAMILY MEDICINE

## 2022-05-11 PROCEDURE — 74177 CT ABD & PELVIS W/CONTRAST: CPT | Mod: TC

## 2022-05-11 PROCEDURE — 99214 OFFICE O/P EST MOD 30 MIN: CPT | Mod: 95,,, | Performed by: FAMILY MEDICINE

## 2022-05-11 RX ORDER — PANTOPRAZOLE SODIUM 40 MG/1
40 TABLET, DELAYED RELEASE ORAL DAILY
Qty: 30 TABLET | Refills: 0 | Status: SHIPPED | OUTPATIENT
Start: 2022-05-11 | End: 2022-06-21

## 2022-05-11 RX ADMIN — IOHEXOL 75 ML: 350 INJECTION, SOLUTION INTRAVENOUS at 08:05

## 2022-05-11 NOTE — PROGRESS NOTES
[facility] - Clinic Note    Subjective    The patient location is: home  The chief complaint leading to consultation is: reflux.    Visit type: audiovisual    Face to Face time with patient: 10 minutes of total time spent on the encounter, which includes face to face time and non-face to face time preparing to see the patient (eg, review of tests), Obtaining and/or reviewing separately obtained history, Documenting clinical information in the electronic or other health record, Independently interpreting results (not separately reported) and communicating results to the patient/family/caregiver, or Care coordination (not separately reported).         Each patient to whom he or she provides medical services by telemedicine is:  (1) informed of the relationship between the physician and patient and the respective role of any other health care provider with respect to management of the patient; and (2) notified that he or she may decline to receive medical services by telemedicine and may withdraw from such care at any time.    Notes:     Ms. Jean Baptiste is a 31 y.o. female who presents for a VV.    Has been having reflux.   Locates pain in the epigastric region with radiation up to the chest.   Unsure if this is a pancreatic attack as it is different from her usual.   Has noticed more reflux since starting the amaryl.      [facility] Bob has a past medical history of Abdominal pain, epigastric, GERD (gastroesophageal reflux disease), Nausea alone, Pancreatitis, Pancreatitis, acute, and Premature ovarian insufficiency (08/2018).   PSXH Bob has a past surgical history that includes Esophagogastroduodenoscopy; Cholecystectomy; and Eye surgery.    Bob's family history includes Asthma in her sister; Diabetes in her maternal grandmother; No Known Problems in her father and mother.    Bob reports that she has never smoked. She has never used smokeless tobacco. She reports that she does not drink alcohol and does not use  drugs.   PILLO Rojas is allergic to ceftin [cefuroxime axetil] and pcn [penicillins].   MARÍA Rojas has a current medication list which includes the following prescription(s): acyclovir, creon, diazepam, estradiol, glimepiride, hydrocodone-acetaminophen, ondansetron, pantoprazole, progesterone, spironolactone, and tramadol.     Review of Systems   Constitutional: Negative for activity change and unexpected weight change.   HENT: Negative for hearing loss, rhinorrhea and trouble swallowing.    Eyes: Negative for discharge and visual disturbance.   Respiratory: Negative for chest tightness and wheezing.    Cardiovascular: Negative for chest pain and palpitations.   Gastrointestinal: Positive for constipation. Negative for blood in stool, diarrhea and vomiting.   Endocrine: Negative for polydipsia and polyuria.   Genitourinary: Negative for difficulty urinating, dysuria, hematuria and menstrual problem.   Musculoskeletal: Negative for arthralgias, joint swelling and neck pain.   Neurological: Negative for weakness and headaches.   Psychiatric/Behavioral: Negative for confusion and dysphoric mood.     Objective     There were no vitals taken for this visit.    Physical Exam   Constitutional: She appears well-developed, well-nourished and obese.  Non-toxic appearance. She does not appear ill. No distress.   HENT:   Head: Normocephalic and atraumatic.   Right Ear: External ear normal.   Left Ear: External ear normal.   Eyes: Right eye exhibits no discharge. Left eye exhibits no discharge.   Cardiovascular: Normal pulses.   Pulmonary/Chest: Effort normal. No respiratory distress.   Speaks in complete sentences without notable SOB. No tachypnea.    Abdominal: Normal appearance.   Neurological: She is alert.   Skin: She is not diaphoretic.   Psychiatric: Her behavior is normal. Mood and thought content normal.   Vitals reviewed.     Assessment/Plan     Diagnoses and all orders for this visit:    Type 2 diabetes mellitus without  complication, unspecified whether long term insulin use  -     Hemoglobin A1c; Future    Indigestion  -     pantoprazole (PROTONIX) 40 MG tablet; Take 1 tablet (40 mg total) by mouth once daily.    Gastroesophageal reflux disease, unspecified whether esophagitis present  -     pantoprazole (PROTONIX) 40 MG tablet; Take 1 tablet (40 mg total) by mouth once daily.          Future Appointments   Date Time Provider Department Center   6/15/2022  1:15 PM Carie Preciado [place] [facility]   7/7/2022  8:20 AM Poncho Atkinson MD [facility]       Poncho Atkinson MD  Family Medicine  [facility]

## 2022-05-12 ENCOUNTER — LAB VISIT (OUTPATIENT)
Dept: LAB | Facility: HOSPITAL | Age: 32
End: 2022-05-12
Attending: FAMILY MEDICINE
Payer: COMMERCIAL

## 2022-05-12 DIAGNOSIS — E11.9 TYPE 2 DIABETES MELLITUS WITHOUT COMPLICATION, UNSPECIFIED WHETHER LONG TERM INSULIN USE: ICD-10-CM

## 2022-05-12 LAB
ESTIMATED AVG GLUCOSE: 151 MG/DL (ref 68–131)
HBA1C MFR BLD: 6.9 % (ref 4–5.6)

## 2022-05-12 PROCEDURE — 36415 COLL VENOUS BLD VENIPUNCTURE: CPT | Performed by: FAMILY MEDICINE

## 2022-05-12 PROCEDURE — 83036 HEMOGLOBIN GLYCOSYLATED A1C: CPT | Performed by: FAMILY MEDICINE

## 2022-05-13 RX ORDER — HEPARIN 100 UNIT/ML
500 SYRINGE INTRAVENOUS
Status: CANCELLED | OUTPATIENT
Start: 2022-05-13

## 2022-05-13 RX ORDER — SODIUM CHLORIDE 0.9 % (FLUSH) 0.9 %
10 SYRINGE (ML) INJECTION
Status: CANCELLED | OUTPATIENT
Start: 2022-05-13

## 2022-05-13 NOTE — TELEPHONE ENCOUNTER
I sent a message to Gretta Aldana and Han Gil to see what other things needs to be done to get pt scheduled for fluids.

## 2022-05-16 ENCOUNTER — INFUSION (OUTPATIENT)
Dept: INFUSION THERAPY | Facility: HOSPITAL | Age: 32
End: 2022-05-16
Payer: COMMERCIAL

## 2022-05-16 VITALS
SYSTOLIC BLOOD PRESSURE: 107 MMHG | WEIGHT: 175.25 LBS | BODY MASS INDEX: 32.25 KG/M2 | HEIGHT: 62 IN | RESPIRATION RATE: 18 BRPM | DIASTOLIC BLOOD PRESSURE: 68 MMHG | HEART RATE: 64 BPM | OXYGEN SATURATION: 97 % | TEMPERATURE: 98 F

## 2022-05-16 DIAGNOSIS — K86.1 CHRONIC BILIARY PANCREATITIS: ICD-10-CM

## 2022-05-16 DIAGNOSIS — K86.1 IDIOPATHIC CHRONIC PANCREATITIS: Primary | ICD-10-CM

## 2022-05-16 PROCEDURE — 63600175 PHARM REV CODE 636 W HCPCS: Performed by: FAMILY MEDICINE

## 2022-05-16 PROCEDURE — 96360 HYDRATION IV INFUSION INIT: CPT

## 2022-05-16 RX ORDER — HEPARIN 100 UNIT/ML
500 SYRINGE INTRAVENOUS
Status: CANCELLED | OUTPATIENT
Start: 2022-05-16

## 2022-05-16 RX ORDER — SODIUM CHLORIDE 0.9 % (FLUSH) 0.9 %
10 SYRINGE (ML) INJECTION
Status: CANCELLED | OUTPATIENT
Start: 2022-05-16

## 2022-05-16 RX ORDER — HEPARIN 100 UNIT/ML
500 SYRINGE INTRAVENOUS
Status: DISCONTINUED | OUTPATIENT
Start: 2022-05-16 | End: 2022-05-16 | Stop reason: HOSPADM

## 2022-05-16 RX ORDER — SODIUM CHLORIDE 0.9 % (FLUSH) 0.9 %
10 SYRINGE (ML) INJECTION
Status: DISCONTINUED | OUTPATIENT
Start: 2022-05-16 | End: 2022-05-16 | Stop reason: HOSPADM

## 2022-05-16 RX ADMIN — SODIUM CHLORIDE, SODIUM LACTATE, POTASSIUM CHLORIDE, AND CALCIUM CHLORIDE 1000 ML: .6; .31; .03; .02 INJECTION, SOLUTION INTRAVENOUS at 10:05

## 2022-05-16 NOTE — PLAN OF CARE
Pleasant alert and oriented patient ambulated independently to clinic for PRN IVF for genetic pancreatitis - pt tolerated well - discharged home with no concerns - pt to RTC prn.

## 2022-05-18 ENCOUNTER — DOCUMENTATION ONLY (OUTPATIENT)
Dept: GASTROENTEROLOGY | Facility: CLINIC | Age: 32
End: 2022-05-18
Payer: COMMERCIAL

## 2022-05-25 ENCOUNTER — DOCUMENTATION ONLY (OUTPATIENT)
Dept: GASTROENTEROLOGY | Facility: CLINIC | Age: 32
End: 2022-05-25
Payer: COMMERCIAL

## 2022-05-25 NOTE — PROGRESS NOTES
Imaging disc received from [facility]     Scan: CT- abd and pelvis with contrast, DOS: 5-     CD sent to [facility]- to be uploaded into PACS.

## 2022-06-15 ENCOUNTER — OFFICE VISIT (OUTPATIENT)
Dept: OBSTETRICS AND GYNECOLOGY | Facility: CLINIC | Age: 32
End: 2022-06-15
Payer: COMMERCIAL

## 2022-06-15 VITALS
WEIGHT: 187 LBS | BODY MASS INDEX: 33.13 KG/M2 | HEIGHT: 63 IN | DIASTOLIC BLOOD PRESSURE: 84 MMHG | SYSTOLIC BLOOD PRESSURE: 124 MMHG

## 2022-06-15 DIAGNOSIS — E28.319 PREMATURE MENOPAUSE ON HRT: ICD-10-CM

## 2022-06-15 DIAGNOSIS — Z01.419 ENCOUNTER FOR ANNUAL ROUTINE GYNECOLOGICAL EXAMINATION: Primary | ICD-10-CM

## 2022-06-15 DIAGNOSIS — Z79.890 PREMATURE MENOPAUSE ON HRT: ICD-10-CM

## 2022-06-15 DIAGNOSIS — E28.39 PREMATURE OVARIAN FAILURE: ICD-10-CM

## 2022-06-15 PROCEDURE — 3008F BODY MASS INDEX DOCD: CPT | Mod: S$GLB,,, | Performed by: OBSTETRICS & GYNECOLOGY

## 2022-06-15 PROCEDURE — 3044F HG A1C LEVEL LT 7.0%: CPT | Mod: S$GLB,,, | Performed by: OBSTETRICS & GYNECOLOGY

## 2022-06-15 PROCEDURE — 1160F RVW MEDS BY RX/DR IN RCRD: CPT | Mod: S$GLB,,, | Performed by: OBSTETRICS & GYNECOLOGY

## 2022-06-15 PROCEDURE — 99395 PR PREVENTIVE VISIT,EST,18-39: ICD-10-PCS | Mod: S$GLB,,, | Performed by: OBSTETRICS & GYNECOLOGY

## 2022-06-15 PROCEDURE — 1159F PR MEDICATION LIST DOCUMENTED IN MEDICAL RECORD: ICD-10-PCS | Mod: S$GLB,,, | Performed by: OBSTETRICS & GYNECOLOGY

## 2022-06-15 PROCEDURE — 3008F PR BODY MASS INDEX (BMI) DOCUMENTED: ICD-10-PCS | Mod: S$GLB,,, | Performed by: OBSTETRICS & GYNECOLOGY

## 2022-06-15 PROCEDURE — 3074F SYST BP LT 130 MM HG: CPT | Mod: S$GLB,,, | Performed by: OBSTETRICS & GYNECOLOGY

## 2022-06-15 PROCEDURE — 99395 PREV VISIT EST AGE 18-39: CPT | Mod: S$GLB,,, | Performed by: OBSTETRICS & GYNECOLOGY

## 2022-06-15 PROCEDURE — 3079F DIAST BP 80-89 MM HG: CPT | Mod: S$GLB,,, | Performed by: OBSTETRICS & GYNECOLOGY

## 2022-06-15 PROCEDURE — 1159F MED LIST DOCD IN RCRD: CPT | Mod: S$GLB,,, | Performed by: OBSTETRICS & GYNECOLOGY

## 2022-06-15 PROCEDURE — 3074F PR MOST RECENT SYSTOLIC BLOOD PRESSURE < 130 MM HG: ICD-10-PCS | Mod: S$GLB,,, | Performed by: OBSTETRICS & GYNECOLOGY

## 2022-06-15 PROCEDURE — 3044F PR MOST RECENT HEMOGLOBIN A1C LEVEL <7.0%: ICD-10-PCS | Mod: S$GLB,,, | Performed by: OBSTETRICS & GYNECOLOGY

## 2022-06-15 PROCEDURE — 1160F PR REVIEW ALL MEDS BY PRESCRIBER/CLIN PHARMACIST DOCUMENTED: ICD-10-PCS | Mod: S$GLB,,, | Performed by: OBSTETRICS & GYNECOLOGY

## 2022-06-15 PROCEDURE — 3079F PR MOST RECENT DIASTOLIC BLOOD PRESSURE 80-89 MM HG: ICD-10-PCS | Mod: S$GLB,,, | Performed by: OBSTETRICS & GYNECOLOGY

## 2022-06-15 RX ORDER — ESTRADIOL 1 MG/1
1 TABLET ORAL DAILY
Qty: 90 TABLET | Refills: 3 | Status: SHIPPED | OUTPATIENT
Start: 2022-06-15 | End: 2023-08-14 | Stop reason: SDUPTHER

## 2022-06-15 RX ORDER — PROGESTERONE 100 MG/1
100 CAPSULE ORAL DAILY
Qty: 90 CAPSULE | Refills: 3 | Status: SHIPPED | OUTPATIENT
Start: 2022-06-15 | End: 2023-06-12

## 2022-06-15 NOTE — PROGRESS NOTES
Annual gyn exam    HPI:  Bob Jean Baptiste is a 32 y.o. female  presents for a well woman exam.  LMP: No LMP recorded. (Menstrual status: Other)..    She went through menopause at the age of 27 due to premature ovarian failure, and she has been on HRT since.  She is currently on 2 mg of estradiol +200 mg of Prometrium daily.  No bleeding on this regimen    Past Medical History:   Diagnosis Date    Abdominal pain, epigastric     Chronic pancreatitis     genetic    Diabetes mellitus     type 3C    GERD (gastroesophageal reflux disease)     Nausea alone     Pancreatitis     Pancreatitis, acute     Premature ovarian insufficiency 2018     Past Surgical History:   Procedure Laterality Date    CHOLECYSTECTOMY      ESOPHAGOGASTRODUODENOSCOPY      EYE SURGERY       Social History     Socioeconomic History    Marital status:    Tobacco Use    Smoking status: Never Smoker    Smokeless tobacco: Never Used   Substance and Sexual Activity    Alcohol use: No     Comment: rare    Drug use: No    Sexual activity: Yes     Partners: Male     Birth control/protection: Post-menopausal     Social Determinants of Health     Financial Resource Strain: Low Risk     Difficulty of Paying Living Expenses: Not hard at all   Food Insecurity: No Food Insecurity    Worried About Running Out of Food in the Last Year: Never true    Ran Out of Food in the Last Year: Never true   Transportation Needs: No Transportation Needs    Lack of Transportation (Medical): No    Lack of Transportation (Non-Medical): No   Physical Activity: Inactive    Days of Exercise per Week: 0 days    Minutes of Exercise per Session: 0 min   Stress: Stress Concern Present    Feeling of Stress : Rather much   Social Connections: Unknown    Frequency of Communication with Friends and Family: More than three times a week    Frequency of Social Gatherings with Friends and Family: More than three times a week    Active Member of Clubs or  "Organizations: Yes    Attends Club or Organization Meetings: More than 4 times per year    Marital Status:    Housing Stability: Low Risk     Unable to Pay for Housing in the Last Year: No    Number of Places Lived in the Last Year: 1    Unstable Housing in the Last Year: No     Family History   Problem Relation Age of Onset    Diabetes Maternal Grandmother     No Known Problems Mother     No Known Problems Father     Asthma Sister     Melanoma Neg Hx     Psoriasis Neg Hx     Lupus Neg Hx     Eczema Neg Hx     Breast cancer Neg Hx     Colon cancer Neg Hx     Ovarian cancer Neg Hx     Uterine cancer Neg Hx      OB History        0    Para   0    Term   0       0    AB   0    Living   0       SAB   0    IAB   0    Ectopic   0    Multiple   0    Live Births   0                 /84 (BP Location: Right arm, Patient Position: Sitting)   Ht 5' 3" (1.6 m)   Wt 84.8 kg (187 lb)   BMI 33.13 kg/m²     ROS:  GENERAL: Denies weight gain or weight loss. Feeling well overall.   SKIN: Denies rash or lesions.   HEAD: Denies head injury or headache.   NODES: Denies enlarged lymph nodes.   CHEST: Denies chest pain or shortness of breath.   CARDIOVASCULAR: Denies palpitations or left sided chest pain.   ABDOMEN: No abdominal pain, constipation, diarrhea, nausea, vomiting or rectal bleeding.   URINARY: No frequency, dysuria, hematuria, or burning on urination.  REPRODUCTIVE: See HPI.   BREASTS: The patient performs breast self-examination and denies pain, lumps, or nipple discharge.   HEMATOLOGIC: No easy bruisability or excessive bleeding.   MUSCULOSKELETAL: Denies joint pain or swelling.   NEUROLOGIC: Denies syncope or weakness.   PSYCHIATRIC: Denies depression, anxiety or mood swings.    PHYSICAL EXAM:    APPEARANCE: Well nourished, well developed, in no acute distress.  AFFECT: WNL, alert and oriented x 3  SKIN: No acne or hirsutism  NECK: Neck symmetric without masses or " thyromegaly  NODES: No inguinal, cervical, axillary, or femoral lymph node enlargement  CHEST: Good respiratory effect  ABDOMEN: Soft.  No tenderness or masses.  No hepatosplenomegaly.  No hernias.  BREASTS: Symmetrical, no skin changes or visible lesions.  No palpable masses, nipple discharge bilaterally.  PELVIC: Normal external genitalia without lesions.  Normal hair distribution.  Adequate perineal body, normal urethral meatus.  Vagina moist and well rugated without lesions or discharge.  Cervix pink, without lesions, discharge or tenderness.  No significant cystocele or rectocele.  Bimanual exam shows uterus to be normal size, regular, mobile and nontender.  Adnexa without masses or tenderness.    RECTAL: Rectovaginal exam confirms above with normal sphincter tone, no masses.  EXTREMITIES: No edema.      ICD-10-CM ICD-9-CM    1. Encounter for annual routine gynecological examination  Z01.419 V72.31    2. Premature ovarian failure  E28.39 256.8    3. Premature menopause on HRT  E28.319 256.31     Z79.890       Assessment and plan:  1. Next Pap smear due in 2020 for  2. Normal gyn exam  3. Premature ovarian failure on HRT  Long discussion with the patient as to how and when to reduced HRT dosages, and the relationship of long-term use of HRT and increased risk of breast cancer in a postmenopausal woman.  Having gone through menopause so young, she will need HRT for longer period of time.  We will tried a reduced the dose this year down to 1 mg of estradiol +100 mg of Prometrium daily    Return 1 year or as needed  Answers for HPI/ROS submitted by the patient on 6/9/2022  genital itching: No  genital lesions: No  genital odor: No  genital rash: No  missed menses: Yes  pelvic pain: No  vaginal bleeding: No  vaginal discharge: No  Chronicity: chronic  Onset: more than 1 year ago  Frequency: constantly  Progression since onset: unchanged  Pain severity: no pain  Affected side: both  Pregnant now?: No  abdominal pain:  No  anorexia: No  back pain: No  chills: No  constipation: No  diarrhea: No  discolored urine: No  dysuria: No  fever: No  flank pain: No  frequency: Yes  headaches: No  hematuria: No  nausea: Yes  painful intercourse: No  rash: No  urgency: Yes  vomiting: No  Please select the characteristics of your discharge: : white  Vaginal bleeding: no bleeding  Passing clots?: No  Passing tissue?: No  Aggravated by: intercourse  Sexual activity: sexually active  Partner with STD symptoms: no  Birth control: nothing  Menstrual history: postmenopausal  STD: No  abdominal surgery: No   section: No  Ectopic pregnancy: No  Endometriosis: No  herpes simplex: No  gynecological surgery: No  menorrhagia: No  metrorrhagia: No  miscarriage: No  ovarian cysts: No  perineal abscess: No  PID: No  terminated pregnancy: No  vaginosis: No

## 2022-08-05 ENCOUNTER — PATIENT MESSAGE (OUTPATIENT)
Dept: FAMILY MEDICINE | Facility: CLINIC | Age: 32
End: 2022-08-05
Payer: COMMERCIAL

## 2022-08-08 ENCOUNTER — PATIENT MESSAGE (OUTPATIENT)
Dept: FAMILY MEDICINE | Facility: CLINIC | Age: 32
End: 2022-08-08
Payer: COMMERCIAL

## 2022-08-09 ENCOUNTER — INFUSION (OUTPATIENT)
Dept: INFUSION THERAPY | Facility: HOSPITAL | Age: 32
End: 2022-08-09
Attending: FAMILY MEDICINE
Payer: COMMERCIAL

## 2022-08-09 ENCOUNTER — LAB VISIT (OUTPATIENT)
Dept: LAB | Facility: HOSPITAL | Age: 32
End: 2022-08-09
Attending: FAMILY MEDICINE
Payer: COMMERCIAL

## 2022-08-09 ENCOUNTER — OFFICE VISIT (OUTPATIENT)
Dept: FAMILY MEDICINE | Facility: CLINIC | Age: 32
End: 2022-08-09
Payer: COMMERCIAL

## 2022-08-09 VITALS
RESPIRATION RATE: 18 BRPM | WEIGHT: 183 LBS | HEIGHT: 63 IN | BODY MASS INDEX: 32.43 KG/M2 | OXYGEN SATURATION: 98 % | HEART RATE: 71 BPM | TEMPERATURE: 98 F | SYSTOLIC BLOOD PRESSURE: 112 MMHG | DIASTOLIC BLOOD PRESSURE: 77 MMHG

## 2022-08-09 VITALS
HEART RATE: 78 BPM | WEIGHT: 183 LBS | DIASTOLIC BLOOD PRESSURE: 80 MMHG | BODY MASS INDEX: 32.43 KG/M2 | HEIGHT: 63 IN | SYSTOLIC BLOOD PRESSURE: 122 MMHG | OXYGEN SATURATION: 98 % | RESPIRATION RATE: 16 BRPM

## 2022-08-09 DIAGNOSIS — K30 INDIGESTION: ICD-10-CM

## 2022-08-09 DIAGNOSIS — K86.1 IDIOPATHIC CHRONIC PANCREATITIS: ICD-10-CM

## 2022-08-09 DIAGNOSIS — E11.9 TYPE 2 DIABETES MELLITUS WITHOUT COMPLICATION, UNSPECIFIED WHETHER LONG TERM INSULIN USE: ICD-10-CM

## 2022-08-09 DIAGNOSIS — E11.9 TYPE 2 DIABETES MELLITUS WITHOUT COMPLICATION, UNSPECIFIED WHETHER LONG TERM INSULIN USE: Primary | ICD-10-CM

## 2022-08-09 DIAGNOSIS — K86.1 IDIOPATHIC CHRONIC PANCREATITIS: Primary | ICD-10-CM

## 2022-08-09 DIAGNOSIS — K86.1 CHRONIC BILIARY PANCREATITIS: ICD-10-CM

## 2022-08-09 DIAGNOSIS — K21.9 GASTROESOPHAGEAL REFLUX DISEASE, UNSPECIFIED WHETHER ESOPHAGITIS PRESENT: ICD-10-CM

## 2022-08-09 LAB
ALBUMIN SERPL BCP-MCNC: 4 G/DL (ref 3.5–5.2)
ALP SERPL-CCNC: 66 U/L (ref 55–135)
ALT SERPL W/O P-5'-P-CCNC: 28 U/L (ref 10–44)
ANION GAP SERPL CALC-SCNC: 14 MMOL/L (ref 8–16)
AST SERPL-CCNC: 28 U/L (ref 10–40)
BASOPHILS # BLD AUTO: 0.05 K/UL (ref 0–0.2)
BASOPHILS NFR BLD: 0.6 % (ref 0–1.9)
BILIRUB SERPL-MCNC: 0.3 MG/DL (ref 0.1–1)
BUN SERPL-MCNC: 6 MG/DL (ref 6–20)
CALCIUM SERPL-MCNC: 9.5 MG/DL (ref 8.7–10.5)
CHLORIDE SERPL-SCNC: 103 MMOL/L (ref 95–110)
CO2 SERPL-SCNC: 24 MMOL/L (ref 23–29)
CREAT SERPL-MCNC: 0.8 MG/DL (ref 0.5–1.4)
DIFFERENTIAL METHOD: NORMAL
EOSINOPHIL # BLD AUTO: 0.4 K/UL (ref 0–0.5)
EOSINOPHIL NFR BLD: 4.6 % (ref 0–8)
ERYTHROCYTE [DISTWIDTH] IN BLOOD BY AUTOMATED COUNT: 12.3 % (ref 11.5–14.5)
EST. GFR  (NO RACE VARIABLE): >60 ML/MIN/1.73 M^2
ESTIMATED AVG GLUCOSE: 154 MG/DL (ref 68–131)
GLUCOSE SERPL-MCNC: 202 MG/DL (ref 70–110)
HBA1C MFR BLD: 7 % (ref 4–5.6)
HCT VFR BLD AUTO: 41 % (ref 37–48.5)
HGB BLD-MCNC: 13.3 G/DL (ref 12–16)
IMM GRANULOCYTES # BLD AUTO: 0.02 K/UL (ref 0–0.04)
IMM GRANULOCYTES NFR BLD AUTO: 0.2 % (ref 0–0.5)
LYMPHOCYTES # BLD AUTO: 3.1 K/UL (ref 1–4.8)
LYMPHOCYTES NFR BLD: 34.1 % (ref 18–48)
MCH RBC QN AUTO: 27.3 PG (ref 27–31)
MCHC RBC AUTO-ENTMCNC: 32.4 G/DL (ref 32–36)
MCV RBC AUTO: 84 FL (ref 82–98)
MONOCYTES # BLD AUTO: 0.5 K/UL (ref 0.3–1)
MONOCYTES NFR BLD: 5.8 % (ref 4–15)
NEUTROPHILS # BLD AUTO: 4.9 K/UL (ref 1.8–7.7)
NEUTROPHILS NFR BLD: 54.7 % (ref 38–73)
NRBC BLD-RTO: 0 /100 WBC
PLATELET # BLD AUTO: 334 K/UL (ref 150–450)
PMV BLD AUTO: 10.1 FL (ref 9.2–12.9)
POTASSIUM SERPL-SCNC: 4.6 MMOL/L (ref 3.5–5.1)
PROT SERPL-MCNC: 7.6 G/DL (ref 6–8.4)
RBC # BLD AUTO: 4.88 M/UL (ref 4–5.4)
SODIUM SERPL-SCNC: 141 MMOL/L (ref 136–145)
WBC # BLD AUTO: 8.97 K/UL (ref 3.9–12.7)

## 2022-08-09 PROCEDURE — 3074F SYST BP LT 130 MM HG: CPT | Mod: S$GLB,,, | Performed by: FAMILY MEDICINE

## 2022-08-09 PROCEDURE — 83036 HEMOGLOBIN GLYCOSYLATED A1C: CPT | Performed by: FAMILY MEDICINE

## 2022-08-09 PROCEDURE — 3079F PR MOST RECENT DIASTOLIC BLOOD PRESSURE 80-89 MM HG: ICD-10-PCS | Mod: S$GLB,,, | Performed by: FAMILY MEDICINE

## 2022-08-09 PROCEDURE — 3079F DIAST BP 80-89 MM HG: CPT | Mod: S$GLB,,, | Performed by: FAMILY MEDICINE

## 2022-08-09 PROCEDURE — 3008F BODY MASS INDEX DOCD: CPT | Mod: S$GLB,,, | Performed by: FAMILY MEDICINE

## 2022-08-09 PROCEDURE — 85025 COMPLETE CBC W/AUTO DIFF WBC: CPT | Performed by: FAMILY MEDICINE

## 2022-08-09 PROCEDURE — 3074F PR MOST RECENT SYSTOLIC BLOOD PRESSURE < 130 MM HG: ICD-10-PCS | Mod: S$GLB,,, | Performed by: FAMILY MEDICINE

## 2022-08-09 PROCEDURE — 63600175 PHARM REV CODE 636 W HCPCS: Performed by: FAMILY MEDICINE

## 2022-08-09 PROCEDURE — 99214 PR OFFICE/OUTPT VISIT, EST, LEVL IV, 30-39 MIN: ICD-10-PCS | Mod: S$GLB,,, | Performed by: FAMILY MEDICINE

## 2022-08-09 PROCEDURE — 3008F PR BODY MASS INDEX (BMI) DOCUMENTED: ICD-10-PCS | Mod: S$GLB,,, | Performed by: FAMILY MEDICINE

## 2022-08-09 PROCEDURE — 99214 OFFICE O/P EST MOD 30 MIN: CPT | Mod: S$GLB,,, | Performed by: FAMILY MEDICINE

## 2022-08-09 PROCEDURE — 80053 COMPREHEN METABOLIC PANEL: CPT | Performed by: FAMILY MEDICINE

## 2022-08-09 PROCEDURE — 96360 HYDRATION IV INFUSION INIT: CPT

## 2022-08-09 PROCEDURE — 99999 PR PBB SHADOW E&M-EST. PATIENT-LVL III: ICD-10-PCS | Mod: PBBFAC,,, | Performed by: FAMILY MEDICINE

## 2022-08-09 PROCEDURE — 99999 PR PBB SHADOW E&M-EST. PATIENT-LVL III: CPT | Mod: PBBFAC,,, | Performed by: FAMILY MEDICINE

## 2022-08-09 PROCEDURE — 3051F PR MOST RECENT HEMOGLOBIN A1C LEVEL 7.0 - < 8.0%: ICD-10-PCS | Mod: S$GLB,,, | Performed by: FAMILY MEDICINE

## 2022-08-09 PROCEDURE — 36415 COLL VENOUS BLD VENIPUNCTURE: CPT | Performed by: FAMILY MEDICINE

## 2022-08-09 PROCEDURE — 3051F HG A1C>EQUAL 7.0%<8.0%: CPT | Mod: S$GLB,,, | Performed by: FAMILY MEDICINE

## 2022-08-09 RX ORDER — PANTOPRAZOLE SODIUM 40 MG/1
40 TABLET, DELAYED RELEASE ORAL DAILY
Qty: 30 TABLET | Refills: 11 | Status: SHIPPED | OUTPATIENT
Start: 2022-08-09 | End: 2023-02-16

## 2022-08-09 RX ORDER — HEPARIN 100 UNIT/ML
500 SYRINGE INTRAVENOUS
Status: CANCELLED | OUTPATIENT
Start: 2022-08-09

## 2022-08-09 RX ORDER — SODIUM CHLORIDE 0.9 % (FLUSH) 0.9 %
10 SYRINGE (ML) INJECTION
Status: DISCONTINUED | OUTPATIENT
Start: 2022-08-09 | End: 2022-08-09 | Stop reason: HOSPADM

## 2022-08-09 RX ORDER — SODIUM CHLORIDE 0.9 % (FLUSH) 0.9 %
10 SYRINGE (ML) INJECTION
Status: CANCELLED | OUTPATIENT
Start: 2022-08-09

## 2022-08-09 RX ADMIN — SODIUM CHLORIDE, SODIUM LACTATE, POTASSIUM CHLORIDE, AND CALCIUM CHLORIDE 1000 ML: .6; .31; .03; .02 INJECTION, SOLUTION INTRAVENOUS at 01:08

## 2022-08-09 NOTE — PROGRESS NOTES
[facility] - Clinic Note    Subjective      Ms. Jean Baptiste is a 32 y.o. female who presents to clinic for a follow up of chronic conditions.     DM:   Currently on amaryl  Tolerating much better than metformin. Due for A1C soon    Idiopathic pancreatitis: no recent flares since starting on protonix which is also controlling her GERD.   Has tramadol and valium prn for flares if needed.       PMH Bob has a past medical history of Abdominal pain, epigastric, Chronic pancreatitis, Diabetes mellitus, GERD (gastroesophageal reflux disease), Nausea alone, Pancreatitis, Pancreatitis, acute, and Premature ovarian insufficiency (08/2018).   PSXH Bob has a past surgical history that includes Esophagogastroduodenoscopy; Cholecystectomy; and Eye surgery.    Bob's family history includes Asthma in her sister; Diabetes in her maternal grandmother; No Known Problems in her father and mother.    Bob reports that she has never smoked. She has never used smokeless tobacco. She reports that she does not drink alcohol and does not use drugs.   ALG Bob is allergic to ceftin [cefuroxime axetil] and pcn [penicillins].   MED Bob has a current medication list which includes the following prescription(s): creon, diazepam, estradiol, glimepiride, hydrocodone-acetaminophen, ondansetron, progesterone, spironolactone, tramadol, acyclovir, and pantoprazole, and the following Facility-Administered Medications: sodium chloride 0.9%.     Review of Systems   Constitutional: Negative for activity change, appetite change, chills, fatigue and fever.   Eyes: Negative for visual disturbance.   Respiratory: Negative for cough and shortness of breath.    Cardiovascular: Negative for chest pain, palpitations and leg swelling.   Gastrointestinal: Negative for abdominal pain, nausea and vomiting.   Skin: Negative for wound.   Neurological: Negative for dizziness and headaches.   Psychiatric/Behavioral: Negative for confusion.     Objective  "    /80 (BP Location: Left arm, Patient Position: Sitting, BP Method: Medium (Manual))   Pulse 78   Resp 16   Ht 5' 3" (1.6 m)   Wt 83 kg (183 lb)   SpO2 98%   BMI 32.42 kg/m²     Physical Exam   Constitutional: She appears well-developed, well-nourished and obese.  Non-toxic appearance. She does not appear ill. No distress.   HENT:   Head: Normocephalic and atraumatic.   Eyes: Right eye exhibits no discharge. Left eye exhibits no discharge.   Cardiovascular: Normal rate, regular rhythm, normal heart sounds and normal pulses. Exam reveals no gallop and no friction rub.   No murmur heard.Pulmonary:      Effort: Pulmonary effort is normal. No respiratory distress.      Breath sounds: Normal breath sounds. No wheezing, rhonchi or rales.     Abdominal: Normal appearance.   Musculoskeletal:      Cervical back: Neck supple.   Lymphadenopathy:     She has no cervical adenopathy.   Neurological: She is alert.   Skin: Skin is warm and dry. Capillary refill takes less than 2 seconds. She is not diaphoretic.   Psychiatric: Her behavior is normal. Mood, judgment and thought content normal.   Vitals reviewed.     Assessment/Plan     Bob was seen today for follow-up.    Diagnoses and all orders for this visit:    Type 2 diabetes mellitus without complication, unspecified whether long term insulin use  -     MICROALBUMIN / CREATININE RATIO URINE; Future  -     Hemoglobin A1c; Future  -     CBC auto differential; Future  -     Comprehensive metabolic panel; Future    Gastroesophageal reflux disease, unspecified whether esophagitis present  -     pantoprazole (PROTONIX) 40 MG tablet; Take 1 tablet (40 mg total) by mouth once daily.    Idiopathic chronic pancreatitis    Indigestion  -     pantoprazole (PROTONIX) 40 MG tablet; Take 1 tablet (40 mg total) by mouth once daily.      -chronic conditions stable. Continue current regimen.    Follow up in about 6 months (around 2/9/2023), or if symptoms worsen or fail to " improve.    Future Appointments   Date Time Provider Department Center   2/9/2023  8:40 AM Poncho Atkinson MD [facility]       Poncho Atkinson MD  Family Medicine  [facility]

## 2022-08-09 NOTE — PLAN OF CARE
"Problem: Adult Inpatient Plan of Care  Goal: Plan of Care Review  Outcome: Ongoing, Progressing  Flowsheets (Taken 8/9/2022 2504)  Plan of Care Reviewed With: patient  /77 (BP Location: Right arm, Patient Position: Sitting)   Pulse 71   Temp 98.1 °F (36.7 °C) (Oral)   Resp 18   Ht 5' 3" (1.6 m)   Wt 83 kg (183 lb)   SpO2 98%   BMI 32.42 kg/m² Pleasant alert and oriented patient ambulated independently to clinic for PRN IVF for genetic pancreatitis - pt tolerated well - discharged home with no concerns - pt to RTC prn.       "

## 2022-09-24 ENCOUNTER — HEALTH MAINTENANCE LETTER (OUTPATIENT)
Age: 32
End: 2022-09-24

## 2022-10-10 LAB
LEFT EYE DM RETINOPATHY: NEGATIVE
RIGHT EYE DM RETINOPATHY: NEGATIVE

## 2022-10-28 ENCOUNTER — PATIENT MESSAGE (OUTPATIENT)
Dept: FAMILY MEDICINE | Facility: CLINIC | Age: 32
End: 2022-10-28
Payer: COMMERCIAL

## 2022-11-11 ENCOUNTER — PATIENT MESSAGE (OUTPATIENT)
Dept: FAMILY MEDICINE | Facility: CLINIC | Age: 32
End: 2022-11-11
Payer: COMMERCIAL

## 2022-11-11 DIAGNOSIS — R11.0 NAUSEA: ICD-10-CM

## 2022-11-11 RX ORDER — ONDANSETRON 4 MG/1
4 TABLET, ORALLY DISINTEGRATING ORAL EVERY 8 HOURS PRN
Qty: 30 TABLET | Refills: 1 | Status: SHIPPED | OUTPATIENT
Start: 2022-11-11 | End: 2023-05-05 | Stop reason: SDUPTHER

## 2022-12-15 NOTE — TELEPHONE ENCOUNTER
Plan from OV with Dr. Perez -2/15/21    1) Increase Creon to 72 K with each meal   2) Your provider is recommending a fecal elastase test. This requires a stool sample. You can  a kit any [facility] lab location, take the kit home and return a sample to the lab.   3) Schedule for a consult with our Dietitian, Ling SLOAN  4) Have lab work for nutritional assessment   4) Increase OP infusion to three times a week, use Lactated Ringers instead of Normal Saline, and Zofran  5) Genetic counseling - try for Lexi Sam   6) Follow-up with Dr Perez after above 6 weeks  7) eventually, ERCP, Full TPIAT eval at [facility]  8) Also check Hgb A1c level    Went over plan with Pt. Pt would like more RFs of Creon. Pt would like orders and infusion protocol faxed to her at [phone] and also to her PCP. Discussed she should hear soon from the other departments about scheduling appts (genetic counseling and after TPIAT meeting).    Jewel Moore LPN    
No

## 2022-12-21 DIAGNOSIS — E11.9 TYPE 2 DIABETES MELLITUS WITHOUT COMPLICATION, UNSPECIFIED WHETHER LONG TERM INSULIN USE: ICD-10-CM

## 2023-01-10 ENCOUNTER — PATIENT MESSAGE (OUTPATIENT)
Dept: FAMILY MEDICINE | Facility: CLINIC | Age: 33
End: 2023-01-10
Payer: COMMERCIAL

## 2023-01-11 RX ORDER — FLUCONAZOLE 150 MG/1
150 TABLET ORAL
Qty: 3 TABLET | Refills: 0 | Status: SHIPPED | OUTPATIENT
Start: 2023-01-11 | End: 2023-01-18

## 2023-01-18 ENCOUNTER — PATIENT MESSAGE (OUTPATIENT)
Dept: FAMILY MEDICINE | Facility: CLINIC | Age: 33
End: 2023-01-18
Payer: COMMERCIAL

## 2023-01-21 ENCOUNTER — OFFICE VISIT (OUTPATIENT)
Dept: URGENT CARE | Facility: CLINIC | Age: 33
End: 2023-01-21
Payer: COMMERCIAL

## 2023-01-21 VITALS
BODY MASS INDEX: 31.01 KG/M2 | HEIGHT: 63 IN | HEART RATE: 91 BPM | OXYGEN SATURATION: 97 % | WEIGHT: 175 LBS | SYSTOLIC BLOOD PRESSURE: 116 MMHG | TEMPERATURE: 98 F | DIASTOLIC BLOOD PRESSURE: 78 MMHG

## 2023-01-21 DIAGNOSIS — J06.9 UPPER RESPIRATORY TRACT INFECTION, UNSPECIFIED TYPE: Primary | ICD-10-CM

## 2023-01-21 PROCEDURE — 3008F PR BODY MASS INDEX (BMI) DOCUMENTED: ICD-10-PCS | Mod: S$GLB,,, | Performed by: NURSE PRACTITIONER

## 2023-01-21 PROCEDURE — 3078F PR MOST RECENT DIASTOLIC BLOOD PRESSURE < 80 MM HG: ICD-10-PCS | Mod: S$GLB,,, | Performed by: NURSE PRACTITIONER

## 2023-01-21 PROCEDURE — 1160F PR REVIEW ALL MEDS BY PRESCRIBER/CLIN PHARMACIST DOCUMENTED: ICD-10-PCS | Mod: S$GLB,,, | Performed by: NURSE PRACTITIONER

## 2023-01-21 PROCEDURE — 3074F SYST BP LT 130 MM HG: CPT | Mod: S$GLB,,, | Performed by: NURSE PRACTITIONER

## 2023-01-21 PROCEDURE — 3078F DIAST BP <80 MM HG: CPT | Mod: S$GLB,,, | Performed by: NURSE PRACTITIONER

## 2023-01-21 PROCEDURE — 1159F MED LIST DOCD IN RCRD: CPT | Mod: S$GLB,,, | Performed by: NURSE PRACTITIONER

## 2023-01-21 PROCEDURE — 1160F RVW MEDS BY RX/DR IN RCRD: CPT | Mod: S$GLB,,, | Performed by: NURSE PRACTITIONER

## 2023-01-21 PROCEDURE — 3074F PR MOST RECENT SYSTOLIC BLOOD PRESSURE < 130 MM HG: ICD-10-PCS | Mod: S$GLB,,, | Performed by: NURSE PRACTITIONER

## 2023-01-21 PROCEDURE — 99203 PR OFFICE/OUTPT VISIT, NEW, LEVL III, 30-44 MIN: ICD-10-PCS | Mod: S$GLB,,, | Performed by: NURSE PRACTITIONER

## 2023-01-21 PROCEDURE — 3008F BODY MASS INDEX DOCD: CPT | Mod: S$GLB,,, | Performed by: NURSE PRACTITIONER

## 2023-01-21 PROCEDURE — 1159F PR MEDICATION LIST DOCUMENTED IN MEDICAL RECORD: ICD-10-PCS | Mod: S$GLB,,, | Performed by: NURSE PRACTITIONER

## 2023-01-21 PROCEDURE — 99203 OFFICE O/P NEW LOW 30 MIN: CPT | Mod: S$GLB,,, | Performed by: NURSE PRACTITIONER

## 2023-01-21 RX ORDER — PROMETHAZINE HYDROCHLORIDE AND DEXTROMETHORPHAN HYDROBROMIDE 6.25; 15 MG/5ML; MG/5ML
5 SYRUP ORAL EVERY 4 HOURS PRN
Qty: 120 ML | Refills: 0 | Status: SHIPPED | OUTPATIENT
Start: 2023-01-21 | End: 2023-01-31

## 2023-01-21 RX ORDER — AZITHROMYCIN 250 MG/1
TABLET, FILM COATED ORAL
Qty: 6 TABLET | Refills: 0 | Status: SHIPPED | OUTPATIENT
Start: 2023-01-21 | End: 2023-02-09

## 2023-01-21 NOTE — PROGRESS NOTES
"Subjective:       Patient ID: Bob Jean Baptiste is a 32 y.o. female.    Vitals:  height is 5' 3" (1.6 m) and weight is 79.4 kg (175 lb). Her oral temperature is 98.2 °F (36.8 °C). Her blood pressure is 116/78 and her pulse is 91. Her oxygen saturation is 97%.     Chief Complaint: Cough    This is a 32 y.o. female who presents today with a chief complaint of  Patient presents with:  Cough x 3 days. Patient stated that she isn't having any other symptoms other than a hoarse voice and the cough.     Cough  This is a new problem. The current episode started in the past 7 days. The problem has been gradually worsening. The cough is Productive of sputum. Associated symptoms include shortness of breath.     Respiratory:  Positive for cough and shortness of breath.          Objective:      Physical Exam   Constitutional: She is oriented to person, place, and time. She appears well-developed. She is cooperative.  Non-toxic appearance. She does not appear ill. No distress.   HENT:   Head: Normocephalic and atraumatic.   Ears:   Right Ear: Hearing, external ear and ear canal normal. Tympanic membrane is injected and erythematous.   Left Ear: Hearing, external ear and ear canal normal. Tympanic membrane is injected and erythematous.   Nose: Rhinorrhea present. No mucosal edema or nasal deformity. No epistaxis. Right sinus exhibits no maxillary sinus tenderness and no frontal sinus tenderness. Left sinus exhibits no maxillary sinus tenderness and no frontal sinus tenderness.   Mouth/Throat: Uvula is midline and mucous membranes are normal. No trismus in the jaw. Normal dentition. No uvula swelling. Posterior oropharyngeal erythema present. No oropharyngeal exudate or posterior oropharyngeal edema.   Eyes: Conjunctivae and lids are normal. No scleral icterus.   Neck: Trachea normal and phonation normal. Neck supple. No edema present. No erythema present. No neck rigidity present.   Cardiovascular: Normal rate, regular rhythm, " normal heart sounds and normal pulses.   Pulmonary/Chest: Effort normal and breath sounds normal. No respiratory distress. She has no decreased breath sounds. She has no rhonchi.   Abdominal: Normal appearance.   Musculoskeletal: Normal range of motion.         General: No deformity. Normal range of motion.   Neurological: She is alert and oriented to person, place, and time. She exhibits normal muscle tone. Coordination normal.   Skin: Skin is warm, dry, intact, not diaphoretic and not pale.   Psychiatric: Her speech is normal and behavior is normal. Judgment and thought content normal.   Nursing note and vitals reviewed.      Past medical history and current medications reviewed.       Assessment:           1. Upper respiratory tract infection, unspecified type              Plan:         Upper respiratory tract infection, unspecified type  -     azithromycin (Z-MATHEW) 250 MG tablet; Take 2 tablets by mouth on day 1; Take 1 tablet by mouth on days 2-5  Dispense: 6 tablet; Refill: 0  -     promethazine-dextromethorphan (PROMETHAZINE-DM) 6.25-15 mg/5 mL Syrp; Take 5 mLs by mouth every 4 (four) hours as needed.  Dispense: 120 mL; Refill: 0             Patient Instructions     You must understand that you've received an Urgent Care treatment only and that you may be released before all your medical problems are known or treated. You, the patient, will arrange for follow up care as instructed.  Follow up with your PCP or specialty clinic as directed in the next 1-2 weeks if not improved or as needed.  You can call [phone] to schedule an appointment with the appropriate provider.  If your condition worsens we recommend that you receive another evaluation at the emergency room immediately or contact your primary medical clinics after hours call service to discuss your concerns.  Please return here or go to the Emergency Department for any concerns or worsening of condition.    If you were prescribed a narcotic or  controlled medication, do not drive or operate heavy equipment or machinery while taking these medications.    Please drink plenty of fluids.  Please get plenty of rest.  Please return here or go to the Emergency Department for any concerns or worsening of condition.  If you were prescribed antibiotics, please take them to completion.  If you were given wait & see antibiotics, please wait 5-7 days before taking them, and only take them if your symptoms have worsened or not improved.  If you do begin taking the antibiotics, please take them to completion.  If you were prescribed a narcotic medication, do not drive or operate heavy equipment or machinery while taking these medications.  If you were given a steroid shot in the clinic and have also been given a prescription for a steroid such as Prednisone or a Medrol Dose Pack, please begin taking them tomorrow.  If you do not have Hypertension or any history of palpitations, it is ok to take over the counter Sudafed or Mucinex D or Allegra-D or Claritin-D or Zyrtec-D.  If you do take one of the above, it is ok to combine that with plain over the counter Mucinex or Allegra or Claritin or Zyrtec.  If for example you are taking Zyrtec -D, you can combine that with Mucinex, but not Mucinex-D.  If you are taking Mucinex-D, you can combine that with plain Allegra or Claritin or Zyrtec.   If you do have Hypertension or palpitations, it is safe to take Coricidin HBP for relief of sinus symptoms.  If not allergic, please take over the counter Tylenol (Acetaminophen) and/or Motrin (Ibuprofen) as directed for control of pain and/or fever.  Please follow up with your primary care doctor or specialist as needed.    If you  smoke, please stop smoking.

## 2023-01-21 NOTE — PATIENT INSTRUCTIONS
You must understand that you've received an Urgent Care treatment only and that you may be released before all your medical problems are known or treated. You, the patient, will arrange for follow up care as instructed.  Follow up with your PCP or specialty clinic as directed in the next 1-2 weeks if not improved or as needed.  You can call [phone] to schedule an appointment with the appropriate provider.  If your condition worsens we recommend that you receive another evaluation at the emergency room immediately or contact your primary medical clinics after hours call service to discuss your concerns.  Please return here or go to the Emergency Department for any concerns or worsening of condition.    If you were prescribed a narcotic or controlled medication, do not drive or operate heavy equipment or machinery while taking these medications.    Please drink plenty of fluids.  Please get plenty of rest.  Please return here or go to the Emergency Department for any concerns or worsening of condition.  If you were prescribed antibiotics, please take them to completion.  If you were given wait & see antibiotics, please wait 5-7 days before taking them, and only take them if your symptoms have worsened or not improved.  If you do begin taking the antibiotics, please take them to completion.  If you were prescribed a narcotic medication, do not drive or operate heavy equipment or machinery while taking these medications.  If you were given a steroid shot in the clinic and have also been given a prescription for a steroid such as Prednisone or a Medrol Dose Pack, please begin taking them tomorrow.  If you do not have Hypertension or any history of palpitations, it is ok to take over the counter Sudafed or Mucinex D or Allegra-D or Claritin-D or Zyrtec-D.  If you do take one of the above, it is ok to combine that with plain over the counter Mucinex or Allegra or Claritin or Zyrtec.  If for example you are taking  Zyrtec -D, you can combine that with Mucinex, but not Mucinex-D.  If you are taking Mucinex-D, you can combine that with plain Allegra or Claritin or Zyrtec.   If you do have Hypertension or palpitations, it is safe to take Coricidin HBP for relief of sinus symptoms.  If not allergic, please take over the counter Tylenol (Acetaminophen) and/or Motrin (Ibuprofen) as directed for control of pain and/or fever.  Please follow up with your primary care doctor or specialist as needed.    If you  smoke, please stop smoking.

## 2023-01-22 ENCOUNTER — OFFICE VISIT (OUTPATIENT)
Dept: URGENT CARE | Facility: CLINIC | Age: 33
End: 2023-01-22
Payer: COMMERCIAL

## 2023-01-22 VITALS
DIASTOLIC BLOOD PRESSURE: 72 MMHG | WEIGHT: 175 LBS | TEMPERATURE: 99 F | BODY MASS INDEX: 31.01 KG/M2 | OXYGEN SATURATION: 95 % | SYSTOLIC BLOOD PRESSURE: 102 MMHG | HEIGHT: 63 IN | HEART RATE: 112 BPM

## 2023-01-22 DIAGNOSIS — J06.9 UPPER RESPIRATORY TRACT INFECTION, UNSPECIFIED TYPE: Primary | ICD-10-CM

## 2023-01-22 DIAGNOSIS — R50.9 FEVER, UNSPECIFIED FEVER CAUSE: ICD-10-CM

## 2023-01-22 LAB
CTP QC/QA: YES
CTP QC/QA: YES
POC MOLECULAR INFLUENZA A AGN: NEGATIVE
POC MOLECULAR INFLUENZA B AGN: NEGATIVE
SARS-COV-2 AG RESP QL IA.RAPID: NEGATIVE

## 2023-01-22 PROCEDURE — 87502 INFLUENZA DNA AMP PROBE: CPT | Mod: QW,S$GLB,, | Performed by: NURSE PRACTITIONER

## 2023-01-22 PROCEDURE — 3008F PR BODY MASS INDEX (BMI) DOCUMENTED: ICD-10-PCS | Mod: S$GLB,,, | Performed by: NURSE PRACTITIONER

## 2023-01-22 PROCEDURE — 3074F SYST BP LT 130 MM HG: CPT | Mod: S$GLB,,, | Performed by: NURSE PRACTITIONER

## 2023-01-22 PROCEDURE — 3074F PR MOST RECENT SYSTOLIC BLOOD PRESSURE < 130 MM HG: ICD-10-PCS | Mod: S$GLB,,, | Performed by: NURSE PRACTITIONER

## 2023-01-22 PROCEDURE — 87811 SARS CORONAVIRUS 2 ANTIGEN POCT, MANUAL READ: ICD-10-PCS | Mod: QW,S$GLB,, | Performed by: NURSE PRACTITIONER

## 2023-01-22 PROCEDURE — 3078F PR MOST RECENT DIASTOLIC BLOOD PRESSURE < 80 MM HG: ICD-10-PCS | Mod: S$GLB,,, | Performed by: NURSE PRACTITIONER

## 2023-01-22 PROCEDURE — 1159F MED LIST DOCD IN RCRD: CPT | Mod: S$GLB,,, | Performed by: NURSE PRACTITIONER

## 2023-01-22 PROCEDURE — 1160F RVW MEDS BY RX/DR IN RCRD: CPT | Mod: S$GLB,,, | Performed by: NURSE PRACTITIONER

## 2023-01-22 PROCEDURE — 87502 POCT INFLUENZA A/B MOLECULAR: ICD-10-PCS | Mod: QW,S$GLB,, | Performed by: NURSE PRACTITIONER

## 2023-01-22 PROCEDURE — 1159F PR MEDICATION LIST DOCUMENTED IN MEDICAL RECORD: ICD-10-PCS | Mod: S$GLB,,, | Performed by: NURSE PRACTITIONER

## 2023-01-22 PROCEDURE — 87811 SARS-COV-2 COVID19 W/OPTIC: CPT | Mod: QW,S$GLB,, | Performed by: NURSE PRACTITIONER

## 2023-01-22 PROCEDURE — 99212 OFFICE O/P EST SF 10 MIN: CPT | Mod: S$GLB,,, | Performed by: NURSE PRACTITIONER

## 2023-01-22 PROCEDURE — 3008F BODY MASS INDEX DOCD: CPT | Mod: S$GLB,,, | Performed by: NURSE PRACTITIONER

## 2023-01-22 PROCEDURE — 1160F PR REVIEW ALL MEDS BY PRESCRIBER/CLIN PHARMACIST DOCUMENTED: ICD-10-PCS | Mod: S$GLB,,, | Performed by: NURSE PRACTITIONER

## 2023-01-22 PROCEDURE — 99212 PR OFFICE/OUTPT VISIT, EST, LEVL II, 10-19 MIN: ICD-10-PCS | Mod: S$GLB,,, | Performed by: NURSE PRACTITIONER

## 2023-01-22 PROCEDURE — 3078F DIAST BP <80 MM HG: CPT | Mod: S$GLB,,, | Performed by: NURSE PRACTITIONER

## 2023-01-22 NOTE — PROGRESS NOTES
"Subjective:       Patient ID: Bob Jean Baptiste is a 32 y.o. female.    Vitals:  height is 5' 3" (1.6 m) and weight is 79.4 kg (175 lb). Her oral temperature is 99 °F (37.2 °C). Her blood pressure is 102/72 and her pulse is 112 (abnormal). Her oxygen saturation is 95%.     Chief Complaint: Follow-up    This is a 32 y.o. female who presents today with a chief complaint of: Patient was seen yesterday and treated for a URI, started running fever with body aches last night. She explains that she is here because her work requested that she receive a flu and covid test.    Follow-up  This is a new problem. The current episode started in the past 7 days. The problem has been gradually worsening. Associated symptoms include chills, congestion, coughing and a fever. Nothing aggravates the symptoms.     Constitution: Positive for chills and fever.   HENT:  Positive for congestion.    Respiratory:  Positive for cough.          Objective:      Physical Exam   Constitutional: She is cooperative. obesityawake  HENT:   Head: Normocephalic and atraumatic.   Ears:   Right Ear: Tympanic membrane, external ear and ear canal normal.   Left Ear: Tympanic membrane, external ear and ear canal normal.   Nose: Nose normal.   Mouth/Throat: Uvula is midline, oropharynx is clear and moist and mucous membranes are normal. Mucous membranes are moist. Oropharynx is clear.   Eyes: Conjunctivae are normal. Pupils are equal, round, and reactive to light. Extraocular movement intact   Neck: Neck supple.   Cardiovascular: Normal rate, regular rhythm, normal heart sounds and normal pulses.   Pulmonary/Chest: Effort normal and breath sounds normal.   Abdominal: Normal appearance.   Musculoskeletal: Normal range of motion.         General: Normal range of motion.   Neurological: She is alert.   Skin: Skin is warm, dry and intact.   Psychiatric: Her behavior is normal. Mood normal.   Vitals reviewed.      Results for orders placed or performed in visit on " 01/22/23   POCT Influenza A/B MOLECULAR   Result Value Ref Range    POC Molecular Influenza A Ag Negative Negative, Not Reported    POC Molecular Influenza B Ag Negative Negative, Not Reported     Acceptable Yes    SARS Coronavirus 2 Antigen, POCT Manual Read   Result Value Ref Range    SARS Coronavirus 2 Antigen Negative Negative     Acceptable Yes     No results found.     Past medical history and current medications reviewed.       Assessment:           1. Upper respiratory tract infection, unspecified type    2. Fever, unspecified fever cause              Plan:     Continue current treatment regimen.    Upper respiratory tract infection, unspecified type    Fever, unspecified fever cause  -     POCT Influenza A/B MOLECULAR  -     SARS Coronavirus 2 Antigen, POCT Manual Read             There are no Patient Instructions on file for this visit.

## 2023-02-02 ENCOUNTER — INFUSION (OUTPATIENT)
Dept: INFUSION THERAPY | Facility: HOSPITAL | Age: 33
End: 2023-02-02
Attending: FAMILY MEDICINE
Payer: COMMERCIAL

## 2023-02-02 VITALS
WEIGHT: 175 LBS | HEART RATE: 72 BPM | TEMPERATURE: 98 F | RESPIRATION RATE: 18 BRPM | DIASTOLIC BLOOD PRESSURE: 75 MMHG | SYSTOLIC BLOOD PRESSURE: 120 MMHG | HEIGHT: 63 IN | BODY MASS INDEX: 31.01 KG/M2 | OXYGEN SATURATION: 97 %

## 2023-02-02 DIAGNOSIS — K86.1 CHRONIC BILIARY PANCREATITIS: ICD-10-CM

## 2023-02-02 DIAGNOSIS — K86.1 IDIOPATHIC CHRONIC PANCREATITIS: Primary | ICD-10-CM

## 2023-02-02 PROCEDURE — 96360 HYDRATION IV INFUSION INIT: CPT

## 2023-02-02 PROCEDURE — 63600175 PHARM REV CODE 636 W HCPCS: Performed by: FAMILY MEDICINE

## 2023-02-02 RX ORDER — HEPARIN 100 UNIT/ML
500 SYRINGE INTRAVENOUS
Status: CANCELLED | OUTPATIENT
Start: 2023-02-02

## 2023-02-02 RX ORDER — SODIUM CHLORIDE 0.9 % (FLUSH) 0.9 %
10 SYRINGE (ML) INJECTION
Status: DISCONTINUED | OUTPATIENT
Start: 2023-02-02 | End: 2023-02-02 | Stop reason: HOSPADM

## 2023-02-02 RX ORDER — SODIUM CHLORIDE 0.9 % (FLUSH) 0.9 %
10 SYRINGE (ML) INJECTION
Status: CANCELLED | OUTPATIENT
Start: 2023-02-02

## 2023-02-02 RX ORDER — HEPARIN 100 UNIT/ML
500 SYRINGE INTRAVENOUS
Status: DISCONTINUED | OUTPATIENT
Start: 2023-02-02 | End: 2023-02-02 | Stop reason: HOSPADM

## 2023-02-02 RX ADMIN — SODIUM CHLORIDE, POTASSIUM CHLORIDE, SODIUM LACTATE AND CALCIUM CHLORIDE 1000 ML: 600; 310; 30; 20 INJECTION, SOLUTION INTRAVENOUS at 11:02

## 2023-02-02 NOTE — PLAN OF CARE
"Problem: Adult Inpatient Plan of Care  Goal: Plan of Care Review  Outcome: Ongoing, Progressing  Flowsheets (Taken 2/2/2023 1131)  Plan of Care Reviewed With: patient  /66 (BP Location: Left arm, Patient Position: Sitting)   Pulse 74   Temp 97.9 °F (36.6 °C) (Oral)   Resp 18   Ht 5' 3" (1.6 m)   Wt 79.4 kg (175 lb)   SpO2 97%   BMI 31.00 kg/m²  Pleasant alert and oriented patient ambulated independently to clinic for PRN IVF for genetic pancreatitis - pt tolerated well - discharged home with no concerns - pt to RTC prn.       "

## 2023-02-02 NOTE — PROGRESS NOTES
LR fluids ordered to be done in OPT as patient contacted clinic with having a pancreatitis flare.

## 2023-02-09 ENCOUNTER — LAB VISIT (OUTPATIENT)
Dept: LAB | Facility: HOSPITAL | Age: 33
End: 2023-02-09
Attending: FAMILY MEDICINE
Payer: COMMERCIAL

## 2023-02-09 ENCOUNTER — OFFICE VISIT (OUTPATIENT)
Dept: FAMILY MEDICINE | Facility: CLINIC | Age: 33
End: 2023-02-09
Payer: COMMERCIAL

## 2023-02-09 VITALS
RESPIRATION RATE: 15 BRPM | HEIGHT: 63 IN | HEART RATE: 81 BPM | DIASTOLIC BLOOD PRESSURE: 80 MMHG | BODY MASS INDEX: 32.27 KG/M2 | SYSTOLIC BLOOD PRESSURE: 110 MMHG | OXYGEN SATURATION: 98 % | WEIGHT: 182.13 LBS

## 2023-02-09 DIAGNOSIS — E11.9 TYPE 2 DIABETES MELLITUS WITHOUT COMPLICATION, UNSPECIFIED WHETHER LONG TERM INSULIN USE: Primary | ICD-10-CM

## 2023-02-09 DIAGNOSIS — K86.1 IDIOPATHIC CHRONIC PANCREATITIS: ICD-10-CM

## 2023-02-09 DIAGNOSIS — K21.9 GASTROESOPHAGEAL REFLUX DISEASE, UNSPECIFIED WHETHER ESOPHAGITIS PRESENT: ICD-10-CM

## 2023-02-09 DIAGNOSIS — E11.9 TYPE 2 DIABETES MELLITUS WITHOUT COMPLICATION, UNSPECIFIED WHETHER LONG TERM INSULIN USE: ICD-10-CM

## 2023-02-09 LAB
ALBUMIN SERPL BCP-MCNC: 3.7 G/DL (ref 3.5–5.2)
ALP SERPL-CCNC: 108 U/L (ref 55–135)
ALT SERPL W/O P-5'-P-CCNC: 37 U/L (ref 10–44)
ANION GAP SERPL CALC-SCNC: 10 MMOL/L (ref 8–16)
AST SERPL-CCNC: 36 U/L (ref 10–40)
BASOPHILS # BLD AUTO: 0.05 K/UL (ref 0–0.2)
BASOPHILS NFR BLD: 0.8 % (ref 0–1.9)
BILIRUB SERPL-MCNC: 0.2 MG/DL (ref 0.1–1)
BUN SERPL-MCNC: 8 MG/DL (ref 6–20)
CALCIUM SERPL-MCNC: 9.3 MG/DL (ref 8.7–10.5)
CHLORIDE SERPL-SCNC: 104 MMOL/L (ref 95–110)
CHOLEST SERPL-MCNC: 187 MG/DL (ref 120–199)
CHOLEST/HDLC SERPL: 3.9 {RATIO} (ref 2–5)
CO2 SERPL-SCNC: 24 MMOL/L (ref 23–29)
CREAT SERPL-MCNC: 0.8 MG/DL (ref 0.5–1.4)
DIFFERENTIAL METHOD: ABNORMAL
EOSINOPHIL # BLD AUTO: 0.5 K/UL (ref 0–0.5)
EOSINOPHIL NFR BLD: 7.8 % (ref 0–8)
ERYTHROCYTE [DISTWIDTH] IN BLOOD BY AUTOMATED COUNT: 12.5 % (ref 11.5–14.5)
EST. GFR  (NO RACE VARIABLE): >60 ML/MIN/1.73 M^2
ESTIMATED AVG GLUCOSE: 266 MG/DL (ref 68–131)
GLUCOSE SERPL-MCNC: 274 MG/DL (ref 70–110)
HBA1C MFR BLD: 10.9 % (ref 4–5.6)
HCT VFR BLD AUTO: 41.7 % (ref 37–48.5)
HDLC SERPL-MCNC: 48 MG/DL (ref 40–75)
HDLC SERPL: 25.7 % (ref 20–50)
HGB BLD-MCNC: 13.6 G/DL (ref 12–16)
IMM GRANULOCYTES # BLD AUTO: 0.01 K/UL (ref 0–0.04)
IMM GRANULOCYTES NFR BLD AUTO: 0.2 % (ref 0–0.5)
LDLC SERPL CALC-MCNC: 116.8 MG/DL (ref 63–159)
LYMPHOCYTES # BLD AUTO: 2.7 K/UL (ref 1–4.8)
LYMPHOCYTES NFR BLD: 41.2 % (ref 18–48)
MCH RBC QN AUTO: 26.9 PG (ref 27–31)
MCHC RBC AUTO-ENTMCNC: 32.6 G/DL (ref 32–36)
MCV RBC AUTO: 82 FL (ref 82–98)
MONOCYTES # BLD AUTO: 0.4 K/UL (ref 0.3–1)
MONOCYTES NFR BLD: 6.4 % (ref 4–15)
NEUTROPHILS # BLD AUTO: 2.9 K/UL (ref 1.8–7.7)
NEUTROPHILS NFR BLD: 43.6 % (ref 38–73)
NONHDLC SERPL-MCNC: 139 MG/DL
NRBC BLD-RTO: 0 /100 WBC
PLATELET # BLD AUTO: 313 K/UL (ref 150–450)
PMV BLD AUTO: 10.4 FL (ref 9.2–12.9)
POTASSIUM SERPL-SCNC: 4.3 MMOL/L (ref 3.5–5.1)
PROT SERPL-MCNC: 7.7 G/DL (ref 6–8.4)
RBC # BLD AUTO: 5.06 M/UL (ref 4–5.4)
SODIUM SERPL-SCNC: 138 MMOL/L (ref 136–145)
TRIGL SERPL-MCNC: 111 MG/DL (ref 30–150)
WBC # BLD AUTO: 6.53 K/UL (ref 3.9–12.7)

## 2023-02-09 PROCEDURE — 1159F PR MEDICATION LIST DOCUMENTED IN MEDICAL RECORD: ICD-10-PCS | Mod: S$GLB,,, | Performed by: FAMILY MEDICINE

## 2023-02-09 PROCEDURE — 83036 HEMOGLOBIN GLYCOSYLATED A1C: CPT | Performed by: FAMILY MEDICINE

## 2023-02-09 PROCEDURE — 99214 OFFICE O/P EST MOD 30 MIN: CPT | Mod: S$GLB,,, | Performed by: FAMILY MEDICINE

## 2023-02-09 PROCEDURE — 3079F DIAST BP 80-89 MM HG: CPT | Mod: S$GLB,,, | Performed by: FAMILY MEDICINE

## 2023-02-09 PROCEDURE — 80061 LIPID PANEL: CPT | Performed by: FAMILY MEDICINE

## 2023-02-09 PROCEDURE — 99999 PR PBB SHADOW E&M-EST. PATIENT-LVL IV: ICD-10-PCS | Mod: PBBFAC,,, | Performed by: FAMILY MEDICINE

## 2023-02-09 PROCEDURE — 3008F BODY MASS INDEX DOCD: CPT | Mod: S$GLB,,, | Performed by: FAMILY MEDICINE

## 2023-02-09 PROCEDURE — 3079F PR MOST RECENT DIASTOLIC BLOOD PRESSURE 80-89 MM HG: ICD-10-PCS | Mod: S$GLB,,, | Performed by: FAMILY MEDICINE

## 2023-02-09 PROCEDURE — 80053 COMPREHEN METABOLIC PANEL: CPT | Performed by: FAMILY MEDICINE

## 2023-02-09 PROCEDURE — 99214 PR OFFICE/OUTPT VISIT, EST, LEVL IV, 30-39 MIN: ICD-10-PCS | Mod: S$GLB,,, | Performed by: FAMILY MEDICINE

## 2023-02-09 PROCEDURE — 3074F SYST BP LT 130 MM HG: CPT | Mod: S$GLB,,, | Performed by: FAMILY MEDICINE

## 2023-02-09 PROCEDURE — 99999 PR PBB SHADOW E&M-EST. PATIENT-LVL IV: CPT | Mod: PBBFAC,,, | Performed by: FAMILY MEDICINE

## 2023-02-09 PROCEDURE — 85025 COMPLETE CBC W/AUTO DIFF WBC: CPT | Performed by: FAMILY MEDICINE

## 2023-02-09 PROCEDURE — 1159F MED LIST DOCD IN RCRD: CPT | Mod: S$GLB,,, | Performed by: FAMILY MEDICINE

## 2023-02-09 PROCEDURE — 3046F HEMOGLOBIN A1C LEVEL >9.0%: CPT | Mod: S$GLB,,, | Performed by: FAMILY MEDICINE

## 2023-02-09 PROCEDURE — 3008F PR BODY MASS INDEX (BMI) DOCUMENTED: ICD-10-PCS | Mod: S$GLB,,, | Performed by: FAMILY MEDICINE

## 2023-02-09 PROCEDURE — 3074F PR MOST RECENT SYSTOLIC BLOOD PRESSURE < 130 MM HG: ICD-10-PCS | Mod: S$GLB,,, | Performed by: FAMILY MEDICINE

## 2023-02-09 PROCEDURE — 36415 COLL VENOUS BLD VENIPUNCTURE: CPT | Performed by: FAMILY MEDICINE

## 2023-02-09 PROCEDURE — 3046F PR MOST RECENT HEMOGLOBIN A1C LEVEL > 9.0%: ICD-10-PCS | Mod: S$GLB,,, | Performed by: FAMILY MEDICINE

## 2023-02-09 NOTE — PROGRESS NOTES
[facility] - Clinic Note    Subjective      Ms. Jean Baptiste is a 32 y.o. female who presents to clinic for a follow up of chronic conditions.    DM:   Currently on amaryl  Tolerating much better than metformin  Last A1C 6 months ago was 7.0     Idiopathic pancreatitis: last flare was last week due to sick with bronchitis and stress.   Had LR fluids ordered and had them at outpatient [facility].   Has tramadol and valium prn for flares if needed.     GERD: takes protonix and pepcid       [facility] Bob has a past medical history of Abdominal pain, epigastric, Chronic pancreatitis, Diabetes mellitus, GERD (gastroesophageal reflux disease), Nausea alone, Pancreatitis, Pancreatitis, acute, and Premature ovarian insufficiency (08/2018).   PSXH Bob has a past surgical history that includes Esophagogastroduodenoscopy; Cholecystectomy; and Eye surgery.    Bob's family history includes Asthma in her sister; Diabetes in her maternal grandmother; No Known Problems in her father and mother.    Bob reports that she has never smoked. She has never used smokeless tobacco. She reports that she does not drink alcohol and does not use drugs.   ALG Bob is allergic to ceftin [cefuroxime axetil] and pcn [penicillins].   MED Bob has a current medication list which includes the following prescription(s): acyclovir, creon, diazepam, estradiol, glimepiride, hydrocodone-acetaminophen, ondansetron, pantoprazole, progesterone, and tramadol.     Review of Systems   Constitutional:  Negative for activity change, appetite change, chills, fatigue and fever.   Eyes:  Negative for visual disturbance.   Respiratory:  Negative for cough and shortness of breath.    Cardiovascular:  Negative for chest pain, palpitations and leg swelling.   Gastrointestinal:  Negative for abdominal pain, nausea and vomiting.   Skin:  Negative for wound.   Neurological:  Negative for dizziness and headaches.   Psychiatric/Behavioral:  Negative for confusion.   "  Objective     /80 (BP Location: Left arm, Patient Position: Sitting, BP Method: Medium (Manual))   Pulse 81   Resp 15   Ht 5' 3" (1.6 m)   Wt 82.6 kg (182 lb 2 oz)   SpO2 98%   BMI 32.26 kg/m²     Physical Exam   Constitutional: normal appearance. She appears well-developed, well-nourished and obese.  Non-toxic appearance. No distress. She does not appear ill.   HENT:   Head: Normocephalic and atraumatic.   Eyes: Right eye exhibits no discharge. Left eye exhibits no discharge.   Cardiovascular: Normal rate, regular rhythm, normal heart sounds and normal pulses. Exam reveals no gallop and no friction rub.   No murmur heard.Pulmonary:      Effort: Pulmonary effort is normal. No respiratory distress.      Breath sounds: Normal breath sounds. No wheezing, rhonchi or rales.     Abdominal: Normal appearance.   Musculoskeletal:      Cervical back: Neck supple.   Lymphadenopathy:     She has no cervical adenopathy.   Neurological: She is alert.   Skin: Skin is warm and dry. Capillary refill takes less than 2 seconds. She is not diaphoretic.   Psychiatric: Her behavior is normal. Mood, judgment and thought content normal.   Vitals reviewed.   Assessment/Plan     Bob was seen today for follow-up.    Diagnoses and all orders for this visit:    Type 2 diabetes mellitus without complication, unspecified whether long term insulin use  -     Hemoglobin A1C; Future  -     CBC auto differential; Future  -     Comprehensive metabolic panel; Future  -     Lipid panel; Future    Gastroesophageal reflux disease, unspecified whether esophagitis present    Idiopathic chronic pancreatitis      -chronic conditions stable. Continue current regimen.  -Obtain labs and adjust regimen as indicated      Follow up in about 6 months (around 8/9/2023), or if symptoms worsen or fail to improve.    Future Appointments   Date Time Provider Department Center   8/9/2023  9:20 AM Poncho Atkinson MD [facility] "       Poncho Atkinson MD  Family Medicine  [facility]

## 2023-02-13 ENCOUNTER — PATIENT OUTREACH (OUTPATIENT)
Dept: ADMINISTRATIVE | Facility: HOSPITAL | Age: 33
End: 2023-02-13
Payer: COMMERCIAL

## 2023-02-13 NOTE — PROGRESS NOTES
Population Health Outreach.  02/13/2023  EFAX SENT TO [facility] FOR MOST RECENT DM EYE EXAM RESULTS

## 2023-02-13 NOTE — LETTER
FAX      AUTHORIZATION FOR RELEASE OF   CONFIDENTIAL INFORMATION      PRECISION VISION       We are seeing Bob Jean Baptiste, date of birth 1990, in the clinic at [facility]. Poncho Atkinson MD is the patient's PCP. Bob Jean Baptiste has an outstanding lab/procedure at the time we reviewed their chart. In order to help keep their health information updated, Bob Jean Baptiste has authorized us to request the following medical record(s):                   ( X )  DIABETIC EYE EXAM WITH RETINOL SCREENING   (MOST RECENT WITHIN 48 MONTHS)              Please fax records to Poncho Atkinson MD at [facility]  [phone].     If you have any questions, please contact Teresa at [phone].    Teresa Li LPN  Performance Improvement Coordinator  [facility]  [address] [place] [address] [phone]

## 2023-02-14 ENCOUNTER — TELEPHONE (OUTPATIENT)
Dept: FAMILY MEDICINE | Facility: CLINIC | Age: 33
End: 2023-02-14
Payer: COMMERCIAL

## 2023-02-14 ENCOUNTER — PATIENT OUTREACH (OUTPATIENT)
Dept: ADMINISTRATIVE | Facility: HOSPITAL | Age: 33
End: 2023-02-14
Payer: COMMERCIAL

## 2023-02-14 DIAGNOSIS — E11.9 TYPE 2 DIABETES MELLITUS WITHOUT COMPLICATION, UNSPECIFIED WHETHER LONG TERM INSULIN USE: Primary | ICD-10-CM

## 2023-02-14 RX ORDER — INSULIN PUMP SYRINGE, 3 ML
EACH MISCELLANEOUS
Qty: 1 EACH | Refills: 0 | Status: SHIPPED | OUTPATIENT
Start: 2023-02-14 | End: 2023-02-16

## 2023-02-14 RX ORDER — GLIMEPIRIDE 4 MG/1
4 TABLET ORAL
Qty: 30 TABLET | Refills: 11 | Status: SHIPPED | OUTPATIENT
Start: 2023-02-14 | End: 2023-03-07

## 2023-02-14 RX ORDER — LANCETS
EACH MISCELLANEOUS
Qty: 100 EACH | Refills: 11 | Status: SHIPPED | OUTPATIENT
Start: 2023-02-14

## 2023-02-14 NOTE — PROGRESS NOTES
Records Received, hyper-linked into chart at this time. The following record(s)  below were uploaded for Health Maintenance .    10/10/2022  DM EYE EXAM

## 2023-02-14 NOTE — TELEPHONE ENCOUNTER
Spoke to patient about labs and A1C. Increase amaryl to 4mg. Change diet. Has not been adhering to a healthy diabetic diet.   Will start checking FBS BID and send update in 2 weeks via the portal  Refer to diabetic education.

## 2023-02-16 ENCOUNTER — PATIENT MESSAGE (OUTPATIENT)
Dept: FAMILY MEDICINE | Facility: CLINIC | Age: 33
End: 2023-02-16
Payer: COMMERCIAL

## 2023-02-16 DIAGNOSIS — E11.9 TYPE 2 DIABETES MELLITUS WITHOUT COMPLICATION, UNSPECIFIED WHETHER LONG TERM INSULIN USE: Primary | ICD-10-CM

## 2023-02-16 RX ORDER — INSULIN PUMP SYRINGE, 3 ML
1 EACH MISCELLANEOUS DAILY
Qty: 1 EACH | Status: CANCELLED | OUTPATIENT
Start: 2023-02-16

## 2023-02-16 RX ORDER — INSULIN PUMP SYRINGE, 3 ML
EACH MISCELLANEOUS
COMMUNITY
End: 2023-10-25 | Stop reason: SDUPTHER

## 2023-02-16 RX ORDER — BLOOD-GLUCOSE METER
EACH MISCELLANEOUS
Qty: 1 EACH | Refills: 0 | Status: SHIPPED | OUTPATIENT
Start: 2023-02-16 | End: 2024-02-16

## 2023-02-16 NOTE — TELEPHONE ENCOUNTER
you call in the Stratus5 glucometer in to [facility] in [place] ([id number])?  My normal pharmacy ordered the rest of the supplies but not the actual glucometer.

## 2023-02-24 ENCOUNTER — PATIENT MESSAGE (OUTPATIENT)
Dept: FAMILY MEDICINE | Facility: CLINIC | Age: 33
End: 2023-02-24
Payer: COMMERCIAL

## 2023-03-06 ENCOUNTER — PATIENT MESSAGE (OUTPATIENT)
Dept: FAMILY MEDICINE | Facility: CLINIC | Age: 33
End: 2023-03-06
Payer: COMMERCIAL

## 2023-03-06 DIAGNOSIS — E11.9 TYPE 2 DIABETES MELLITUS WITHOUT COMPLICATION, UNSPECIFIED WHETHER LONG TERM INSULIN USE: Primary | ICD-10-CM

## 2023-03-07 RX ORDER — GLIMEPIRIDE 4 MG/1
4 TABLET ORAL 2 TIMES DAILY
Qty: 180 TABLET | Refills: 3 | Status: SHIPPED | OUTPATIENT
Start: 2023-03-07 | End: 2023-06-21

## 2023-03-15 ENCOUNTER — PATIENT MESSAGE (OUTPATIENT)
Dept: DIABETES | Facility: CLINIC | Age: 33
End: 2023-03-15

## 2023-03-15 ENCOUNTER — CLINICAL SUPPORT (OUTPATIENT)
Dept: DIABETES | Facility: CLINIC | Age: 33
End: 2023-03-15
Payer: COMMERCIAL

## 2023-03-15 VITALS — WEIGHT: 178.63 LBS | BODY MASS INDEX: 31.65 KG/M2 | HEIGHT: 63 IN

## 2023-03-15 DIAGNOSIS — E11.9 TYPE 2 DIABETES MELLITUS WITHOUT COMPLICATION, UNSPECIFIED WHETHER LONG TERM INSULIN USE: ICD-10-CM

## 2023-03-15 PROCEDURE — G0108 PR DIAB MANAGE TRN  PER INDIV: ICD-10-PCS | Mod: S$GLB,,, | Performed by: DIETITIAN, REGISTERED

## 2023-03-15 PROCEDURE — G0108 DIAB MANAGE TRN  PER INDIV: HCPCS | Mod: S$GLB,,, | Performed by: DIETITIAN, REGISTERED

## 2023-03-15 NOTE — PROGRESS NOTES
Diabetes Care Specialist Progress Note  Author: Katherine Saavedra RD, CDE  Date: 3/15/2023    Program Intake  Reason for Diabetes Program Visit:: Initial Diabetes Assessment (has not previously had diabetes education when diagnosed)  Current diabetes risk level:: high  In the last 12 months, have you:: been admitted to a hospital  Was the ER or hospital admission related to diabetes?: No (chronic pancreatitis--last episode a few months ago)    Lab Results   Component Value Date    HGBA1C 10.9 (H) 2023     Clinical    Problem Review  Reviewed Problem List with Patient: yes  Active comorbidities affecting diabetes self-care.: yes  Comorbidities: Other (comment) (chronic pancreatitis)  Reviewed health maintenance: yes    Clinical Assessment  Current Diabetes Treatment: Oral Medication (glimepiride 4mg (1 tablet twice daily before meals)--was taking without meals)  Have you ever experienced hypoglycemia (low blood sugar)?: no    Medication Information  How do you obtain your medications?: Patient drives  How many days a week do you miss your medications?: Never  Do you sometimes have difficulty refilling your medications?: No  Medication adherence impacting ability to self-manage diabetes?: No    Nutritional Status  Diet: Low fat (Follows lower fat diet due to pancreatitis.  Has cleaned up her diet over pst month after finding out diabetes control worsened.  Gets meal prep or cooks at home.  Adopted  in 2022.  Avoids regular soda/fruit juices.)  Meal Plan 24 Hour Recall: Breakfast, Lunch, Dinner, Snack (Has been afraid to eat carbs and cut out most in diet. Eats 3 meals most days--snacks1-2 times a day.)  Meal Plan 24 Hour Recall - Breakfast: Atkins protein shake.  Drinks 1 cup coffee (adds small amount of milk, no sugar/sweetener)  Meal Plan 24 Hour Recall - Lunch: prepared/portioned meal: roast with broccoli, water  Meal Plan 24 Hour Recall - Dinner: leftover steamed pork dumplings, water.  May have  rare Coke Zero.  Meal Plan 24 Hour Recall - Snack: Berries, string cheese, hummus/celery  Change in appetite?: No  Dentation:: Intact  Recent Changes in Weight: Weight Loss  Was weight loss intentional or unintentional?: Unintentional (Has lost 15-12 lbs over past 6-9 months unintentionally. OK with weight loss and would like to continue to lose weight to personal goal of 155-160 lbs.)  Current nutritional status an area of need that is impacting patient's ability to self-manage diabetes?: No    Additional Social History    Support  Does anyone support you with your diabetes care?: yes  Who supports you?: self, spouse  Who takes you to your medical appointments?: self  Does the current support meet the patient's needs?: Yes  Is Support an area impacting ability to self-manage diabetes?: No    Access to Mass Media & Technology  Does the patient have access to any of the following devices or technologies?: Smart phone, Internet Access  Media or technology needs impacting ability to self-manage diabetes?: No    Cognitive/Behavioral Health  Alert and Oriented: Yes  Difficulty Thinking: No  Requires Prompting: No  Requires assistance for routine expression?: No  Cognitive or behavioral barriers impacting ability to self-manage diabetes?: No    Culture/Restorationist  Culture or Evangelical beliefs that may impact ability to access healthcare: No    Communication  Language preference: English  Hearing Problems: No  Vision Problems: No  Communication needs impacting ability to self-manage diabetes?: No    Health Literacy  Preferred Learning Method: Face to Face, Web Based, Reading Materials  How often do you need to have someone help you read instructions, pamphlets, or written material from your doctor or pharmacy?: Never  Health literacy needs impacting ability to self-manage diabetes?: No    Diabetes Self-Management Skills Assessment    Diabetes Disease Process/Treatment Options  Patient/caregiver able to state what happens  when someone has diabetes.: somewhat  Patient/caregiver knows what type of diabetes they have.: yes  Diabetes Type : Type II (Due to patient's chronic pancreatitis--she is told she has Type 3C)  Patient/caregiver able to identify at least three signs and symptoms of diabetes.: yes  Identified signs and symptoms:: frequent urination, increased thirst  Patient able to identify at least three risk factors for diabetes.: yes  Identified risk factors:: family history  Diabetes Disease Process/Treatment Options: Skills Assessment Completed: Yes  Assessment indicates:: Instruction Needed  Area of need?: Yes    Nutrition/Healthy Eating  Challenges to healthy eating:: other (see comments) (uncertain what to eat, fearful to eat carbohydrates.)  Method of carbohydrate measurement:: eyeballing/guessing  Patient can identify foods that impact blood sugar.: yes  Patient-identified foods:: starches (bread, pasta, rice, cereal), starchy vegetables (corn, peas, beans), sweets  Nutrition/Healthy Eating Skills Assessment Completed:: Yes  Assessment indicates:: Instruction Needed  Area of need?: Yes    Physical Activity/Exercise  Patient's daily activity level:: lightly active  Patient formally exercises outside of work.: yes  Exercise Type: walking  Intensity: Moderate  Frequency: twice a week  Duration: 30 min  Patient can identify forms of physical activity.: yes  Stated forms of physical activity:: recreational activities, moving to burn calories  Patient can identify reasons why exercise/physical activity is important in diabetes management.: yes  Identified reasons:: lowers blood glucose, blood pressure, and cholesterol  Physical Activity/Exercise Skills Assessment Completed: : Yes  Assessment indicates:: Adequate understanding  Area of need?: No (Willing to increase frequency of walking to 4-5 days a week)    Medications  Patient is able to describe current diabetes management routine.: yes  Diabetes management routine:: diet,  oral medications (Glimepiride 4mg (1 tablet twice daily))  Patient is able to identify current diabetes medications, dosages, and appropriate timing of medications.: no (Patient unaware to take glimepiride before meals--will start now.)  Patient understands the purpose of the medications taken for diabetes.: no (explained how glimepiride works to increase productin of insullin by pancreas and lower glucose levels)  Patient reports problems or concerns with current medication regimen.: no  Medication Skills Assessment Completed:: Yes  Assessment indicates:: Adequate understanding  Area of need?: No    Home Blood Glucose Monitoring  Patient states that blood sugar is checked at home daily.: yes  Monitoring Method:: home glucometer  Home glucometer meter type:: True Metrix--new glucometer, has adequate glucose testing supplies  How often do you check your blood sugar?: Twice a day  When do you check your blood sugar?: Before breakfast, Before bedtime, Other  When you check what is your typical blood sugar range? : Has been checking before/after meals or to see if glucose levels are high after specific foods--reviewed glucometer readings today and glucose levels ranging 149-213 mg/dL  Blood glucose logs:: yes (Glucometer brought to visit and scrolled through readings today)  Blood glucose logs reviewed today?: yes  Home Blood Glucose Monitoring Skills Assessment Completed: : Yes  Assessment indicates:: Instruction Needed  Area of need?: Yes    Acute Complications  Acute Complications Skills Assessment Completed: : No  Deferred due to:: Time    Chronic Complications  Chronic Complications Skills Assessment Completed: : No  Deferred due to:: Time    Psychosocial/Coping  Patient can identify ways of coping with chronic disease.: yes  Patient-stated ways of coping with chronic disease:: support from loved ones  Psychosocial/Coping Skills Assessment Completed: : Yes  Assessment indicates:: Adequate understanding  Area of  need?: No    Assessment Summary and Plan    Based on today's diabetes care assessment, the following areas of need were identified:      Social 3/15/2023   Support No   Access to Mass Media/Tech No   Cognitive/Behavioral Health No   Culture/Baptist No   Communication No   Health Literacy No      Clinical 3/15/2023   Medication Adherence No   Nutritional Status No      Diabetes Self-Management Skills 3/15/2023   Diabetes Disease Process/Treatment Options Yes--see care plan   Nutrition/Healthy Eating Yes--see care plan   Physical Activity/Exercise No--willing to increase frequency from 2 to 4-5 times a week   Medication No--compliant with glimepiride, was not taking before meals and will start doing so   Home Blood Glucose Monitoring Yes--see care plan   Psychosocial/Coping No      Today's interventions were provided through individual discussion, instruction, and written materials were provided.      Patient verbalized understanding of instruction and written materials.  Pt was able to return back demonstration of instructions today. Patient understood key points, needs reinforcement and further instruction.     Diabetes Self-Management Care Plan:    Today's Diabetes Self-Management Care Plan was developed with Bob's input. Bob has agreed to work toward the following goal(s) to improve his/her overall diabetes control.      Care Plan: Diabetes Management   Updates made since 2/13/2023 12:00 AM     Problem: Healthy Eating         Goal: Incorporate 30-45 grams of total carbs at each meal--include lean protein at all meals also. Increase non-starchy vegetables at lunch/dinner.  DO NOT ELIMINATE CARBS COMPLETELY FROM DIET.    Start Date: 3/15/2023   Expected End Date: 4/5/2023   Priority: High   Barriers: Knowledge deficit     Task: Reviewed the sources and role of Carbohydrate, Protein, and Fat and how each nutrient impacts blood sugar. Completed 3/15/2023        Task: Provided visual examples using dry measuring  cups, food models, and other familiar objects such as computer mouse, deck or cards, tennis ball etc. to help with visualization of portions. Completed 3/15/2023        Task: Discussed strategies for choosing healthier menu options when dining out. Completed 3/15/2023        Task: Recommended replacing beverages containing high sugar content with noncaloric/sugar free options and/or water. Completed 3/15/2023        Task: Review the importance of balancing carbohydrates with each meal using portion control techniques to count servings of carbohydrate and label reading to identify serving size and amount of total carbs per serving. Completed 3/15/2023        Problem: Blood Glucose Self-Monitoring      Goal: Patient agrees to check and record blood sugars 2 times per day--fasting in the morning and alternating before meals and bedtime.  Glucose log provided for patient to complete and bring to all visits for review.    Start Date: 3/15/2023   Expected End Date: 4/5/2023   Priority: Medium   Barriers: No Barriers Identified     Task: Reviewed the importance of self-monitoring blood glucose and keeping logs. Completed 3/15/2023     Task: Provided patient with blood glucose logs, reviewed appropriate timing and frequency to SMBG, education on parameters on when to notify provider and advised patient to bring logs to all appts with PCP/Endocrinologist/Diabetes Care Specialist. Completed 3/15/2023     Task: Discussed ways to minimize pain when monitoring blood glucose. Completed 3/15/2023     Problem: Disease Process      Goal: Patient agrees to take steps toward understanding the diabetes disease process and treatment options by monitoring home glucose and modifying diet.    Start Date: 3/15/2023   Expected End Date: 4/5/2023   Priority: Low   Barriers: No Barriers Identified     Task: Provided a basic introduction of the diabetes disease process, diagnosis, progression, and how diabetes can be successfully managed.  Completed 3/15/2023        Task: Reviewed the following common signs and symptoms of diabetes:  Increased Thirst, Frequent Urination, Fatigue, Sexual Dysfunction, Blurred Vision, Frequent and Slow healing of infections Completed 3/15/2023        Task: Reviewed different types of diabetes Type 1, Type 2, Gestational, Prediabetes, and other forms, and described how each type is managed and reviewed different myths and stereotypes commonly associated with diabetes. Completed 3/15/2023        Task: Emphasized on the importance of controlling diabetes to prevent complications and reviewed both the short-term and long-term effects of uncontrolled diabetes. Completed 3/15/2023        Follow Up Plan     Follow up in 3 weeks (on 4/5/2023) for continued DSMES.  Assess acute/chronic complications of DM. Check glucose 2 times daily (fasting and alternating before meals and bedtime) and bring completed glucose log to follow up for review.  Patient compliant with glimepiride 4mg BID--was not taking before meals and will start doing this. Next Hgb A1c not due until after 5/9/23 and has PCP follow up 4/17/23.  If glucose levels not improving with diet/exercise and oral DM meds--may consider checking C-peptide and antibodies due to history of chronic pancreatitis.      Patient has eliminated most carbs from diet over past month in fear of eating due to increased Hgb A1c at last lab visit--OK to include moderate amount of carbs at each meal along with lean protein and non-starchy vegetables. Willing to increase walking frequency from 2 times a week to 4-5 times a week for 30 minutes.    Today's care plan and follow up schedule was discussed with patient.  Bob verbalized understanding of the care plan, goals, and agrees to follow up plan.        The patient was encouraged to communicate with his/her health care provider/physician and care team regarding his/her condition(s) and treatment.  I provided the patient with my contact  information today and encouraged to contact me via phone or [facility] Patient Portal as needed.     Length of Visit   Total Time: 60 Minutes

## 2023-03-16 DIAGNOSIS — F41.9 ANXIETY: ICD-10-CM

## 2023-03-16 DIAGNOSIS — K86.1 IDIOPATHIC CHRONIC PANCREATITIS: ICD-10-CM

## 2023-03-16 RX ORDER — DIAZEPAM 5 MG/1
TABLET ORAL
Qty: 30 TABLET | Refills: 0 | Status: SHIPPED | OUTPATIENT
Start: 2023-03-16 | End: 2023-09-20 | Stop reason: SDUPTHER

## 2023-03-17 RX ORDER — HEPARIN SODIUM (PORCINE) LOCK FLUSH IV SOLN 100 UNIT/ML 100 UNIT/ML
5 SOLUTION INTRAVENOUS
Status: CANCELLED | OUTPATIENT
Start: 2023-03-17

## 2023-03-17 RX ORDER — HEPARIN SODIUM,PORCINE 10 UNIT/ML
5 VIAL (ML) INTRAVENOUS
Status: CANCELLED | OUTPATIENT
Start: 2023-03-17

## 2023-04-05 ENCOUNTER — PATIENT MESSAGE (OUTPATIENT)
Dept: DIABETES | Facility: CLINIC | Age: 33
End: 2023-04-05

## 2023-04-05 ENCOUNTER — NUTRITION (OUTPATIENT)
Dept: DIABETES | Facility: CLINIC | Age: 33
End: 2023-04-05
Payer: COMMERCIAL

## 2023-04-05 VITALS — HEIGHT: 63 IN | BODY MASS INDEX: 30.97 KG/M2 | WEIGHT: 174.81 LBS

## 2023-04-05 DIAGNOSIS — E11.9 TYPE 2 DIABETES MELLITUS WITHOUT COMPLICATION, UNSPECIFIED WHETHER LONG TERM INSULIN USE: Primary | ICD-10-CM

## 2023-04-05 DIAGNOSIS — E11.9 TYPE 2 DIABETES MELLITUS WITHOUT COMPLICATION, WITHOUT LONG-TERM CURRENT USE OF INSULIN: Primary | ICD-10-CM

## 2023-04-05 PROCEDURE — G0108 DIAB MANAGE TRN  PER INDIV: HCPCS | Mod: S$GLB,,, | Performed by: DIETITIAN, REGISTERED

## 2023-04-05 PROCEDURE — G0108 PR DIAB MANAGE TRN  PER INDIV: ICD-10-PCS | Mod: S$GLB,,, | Performed by: DIETITIAN, REGISTERED

## 2023-04-05 RX ORDER — METFORMIN HYDROCHLORIDE 500 MG/1
500 TABLET, EXTENDED RELEASE ORAL 2 TIMES DAILY WITH MEALS
Qty: 60 TABLET | Refills: 1 | Status: SHIPPED | OUTPATIENT
Start: 2023-04-05 | End: 2023-05-24 | Stop reason: DRUGHIGH

## 2023-04-05 NOTE — PROGRESS NOTES
Diabetes Care Specialist Progress Note  Author: Katherine Saavedra RD, CDE  Date: 4/5/2023    Program Intake  Reason for Diabetes Program Visit:: Intervention  Type of Intervention:: Individual  Individual: Education  Education: Pattern Management  Current diabetes risk level:: high    Lab Results   Component Value Date    HGBA1C 10.9 (H) 02/09/2023     Clinical    Clinical Assessment  Current Diabetes Treatment: Oral Medication (Glimepiride 4mg (1 tablet twice daily--now taking with meals).  Previously on metformin but stopped due to diarhea (was taking on empty stomach))  Have you ever experienced hypoglycemia (low blood sugar)?: no  Have you ever experienced hyperglycemia (high blood sugar)?: yes  In the last month, how often have you experienced high blood sugar?: other (see comments) (Not recently--glucose levels improved and nearing goal of  mg/dL before meals)  Are you able to tell when your blood sugar is high?: Yes  What are your symptoms?: fatigue    Medication Information  How many days a week do you miss your medications?: Never  Do you sometimes have difficulty refilling your medications?: No  Medication adherence impacting ability to self-manage diabetes?: No    Nutritional Status  Recent Changes in Weight: Weight Loss  Was weight loss intentional or unintentional?: Intentional (Patient has lost 3-4 lbs over past 3 weeks by making changes to diet and lifestyle.  Would like continued gradual weight loss.)  Current nutritional status an area of need that is impacting patient's ability to self-manage diabetes?: No    Additional Social History    Support  Does anyone support you with your diabetes care?: yes  Who supports you?: self, spouse  Who takes you to your medical appointments?: self  Does the current support meet the patient's needs?: Yes  Is Support an area impacting ability to self-manage diabetes?: No    Access to [employer] Technology  Does the patient have access to any of the following  devices or technologies?: Smart phone, Internet Access  Media or technology needs impacting ability to self-manage diabetes?: No    Cognitive/Behavioral Health  Alert and Oriented: Yes  Difficulty Thinking: No  Requires Prompting: No  Requires assistance for routine expression?: No  Cognitive or behavioral barriers impacting ability to self-manage diabetes?: No    Culture/Yarsanism  Culture or Anabaptism beliefs that may impact ability to access healthcare: No    Communication  Language preference: English  Hearing Problems: No  Vision Problems: No  Communication needs impacting ability to self-manage diabetes?: No    Health Literacy  Preferred Learning Method: Face to Face  How often do you need to have someone help you read instructions, pamphlets, or written material from your doctor or pharmacy?: Never  Health literacy needs impacting ability to self-manage diabetes?: No    Diabetes Self-Management Skills Assessment    Physical Activity/Exercise  Patient's daily activity level:: lightly active  Patient formally exercises outside of work.: yes  Exercise Type: walking  Intensity: Moderate  Frequency: three times a week  Duration: 30 min  Patient can identify forms of physical activity.: yes  Stated forms of physical activity:: moving to burn calories, recreational activities  Patient can identify reasons why exercise/physical activity is important in diabetes management.: yes  Identified reasons:: lowers blood glucose, blood pressure, and cholesterol  Physical Activity/Exercise Skills Assessment Completed: : Yes  Assessment indicates:: Adequate understanding  Area of need?: Yes    Medications  Patient is able to describe current diabetes management routine.: yes  Diabetes management routine:: diet, oral medications  Patient is able to identify current diabetes medications, dosages, and appropriate timing of medications.: yes  Patient understands the purpose of the medications taken for diabetes.: yes  Patient  reports problems or concerns with current medication regimen.: yes  Medication regimen problems/concerns:: does not feel like regimen is working  Medication Skills Assessment Completed:: Yes  Assessment indicates:: Instruction Needed  Area of need?: Yes    Home Blood Glucose Monitoring  Patient states that blood sugar is checked at home daily.: yes  Monitoring Method:: home glucometer  How often do you check your blood sugar?: Twice a day  When do you check your blood sugar?: Before breakfast, Before bedtime, Before dinner  When you check what is your typical blood sugar range? : See glucose log below--glucose levels improving but not all at goal. Does see higher readings fasting in the morning.  Blood glucose logs:: yes, encouraged to bring logs to provider visits, encouraged to keep logs  Blood glucose logs reviewed today?: yes        Home Blood Glucose Monitoring Skills Assessment Completed: : Yes  Assessment indicates:: Adequate understanding  Area of need?: No    Acute Complications  Patient is able to identify types of acute complications: Yes  Patient Identified:: Hypoglycemia, Hyperglycemia  Patient is able to state the basic meaning of hypoglycemia?: Yes  Patient can identify general symptoms of hypoglycemia: yes  Patient identified:: sweating, shakiness, dizziness  Able to state proper treatment of hypoglycemia?: yes  Patient identified:: 4 glucose tablets, 5-6 pieces of hard candy, 1/2 can soda/fruit juice  Patient is able to state the basic meaning of hyperglycemia?: Yes  Patient able to state proper treatment of hyperglycemia?: yes  Patient identified:: contact provider, monitor blood sugar, take medication as recommended  Acute Complications Skills Assessment Completed: : Yes  Assessment indicates:: Adequate understanding  Area of need?: No    Chronic Complications  Patient can identify major chronic complications of diabetes.: yes  Stated chronic complications:: heart disease/heart attack  Patient can  identify ways to prevent or delay diabetes complications.: yes  Stated ways to prevent complications:: healthy eating and regular activity, controlling blood sugar  Patient is aware that having diabetes increases risk of heart disease?: Yes  Patient is aware that heart disease is the leading cause of death and disability in people with diabetes?: Yes  Patient able to state risk factors for heart disease?: Yes  Patient stated risk factors for heart disease:: Having diabetes, Diet  Chronic Complications Skills Assessment Completed: : Yes  Assessment indicates:: Adequate understanding  Area of need?: No    Assessment Summary and Plan    Based on today's diabetes care assessment, the following areas of need were identified:      Social 4/5/2023   Support No   Access to Mass Media/Tech No   Cognitive/Behavioral Health No   Culture/Hoahaoism No   Communication No   Health Literacy No      Clinical 4/5/2023   Medication Adherence No   Nutritional Status No      Diabetes Self-Management Skills 4/5/2023   Medication Yes--see care plan, primary care to add metformin XR slowly to see if tolerated   Home Blood Glucose Monitoring No   Acute Complications No   Chronic Complications No   Physical Activity Yes--see care plan, will increase frequency/duration      Today's interventions were provided through individual discussion, instruction, and written materials were provided.      Patient verbalized understanding of instruction and written materials.  Pt was able to return back demonstration of instructions today. Patient understood key points, needs reinforcement and further instruction.     Diabetes Self-Management Care Plan:    Today's Diabetes Self-Management Care Plan was developed with Bob's input. Karinbekah has agreed to work toward the following goal(s) to improve his/her overall diabetes control.      Care Plan: Diabetes Management   Updates made since 3/6/2023 12:00 AM     Problem: Healthy Eating      Goal: Incorporate  30-45 grams of total carbs at each meal--include lean protein at all meals also. Increase non-starchy vegetables at lunch/dinner.  DO NOT ELIMINATE CARBS COMPLETELY FROM DIET.    Start Date: 3/15/2023   Expected End Date: 5/24/2023   This Visit's Progress: On track   Priority: High   Barriers: Knowledge deficit   Note:    4/5/23:  Doing better with meals and incorporating carbs at meals.  Still having protein shake only at breakfast due to convenience but agreeable to add fruit with protein shake.  Feels keeping a food diary would be helpful to better determine which foods affect glucose the most.          Problem: Blood Glucose Self-Monitoring         Goal: Patient agrees to check and record blood sugars 2 times per day--fasting in the morning and alternating before meals and bedtime.  Glucose log provided for patient to complete and bring to all visits for review. Completed 4/5/2023   Start Date: 3/15/2023   Expected End Date: 4/5/2023   This Visit's Progress: Met   Priority: Medium   Barriers: No Barriers Identified   Note:    4/5/23:  Patient brings glucose logs into visit for review.  Glucose levels improving but not yet at goal of  mg/dL.  DM medications will be adjusted per PCP and patient to continue to check glucose 2 times daily until next Hgb A1c drawn in mid May 2023.       Problem: Disease Process      Goal: Patient agrees to take steps toward understanding the diabetes disease process and treatment options by monitoring home glucose and modifying diet. Completed 4/5/2023   Start Date: 3/15/2023   Expected End Date: 4/5/2023   This Visit's Progress: Met   Priority: Low   Barriers: No Barriers Identified   Note:    4/5/23:  Patient has made changes to diet and lifestyle and glucose levels are improving.  Now taking DM medications as prescribed and monitoring glucose daily.         Problem: Medications      Goal: Patient to continue glimepiride 4mg twice daily with meals.  Discussed with primary  care and patient would like to retry metformin XR--will start slow and titrate up if tolerated.    Start Date: 4/5/2023   Expected End Date: 5/24/2023   Priority: Medium   Barriers: No Barriers Identified   Note:    Previously on metformin XR and patient with diarrhea--however was taking metformin without eating and does not remember if started on low dose and titrated up slowly.         Task: Instructed patient on how to self-administer metformin--to be taken with meal and to initiate 500 mg tablet with dinner X 1-2 weeks and if toelrated then add another 500 mg tablet with breakfast. Completed 4/5/2023        Task: Discussed guidelines for preventing, detecting and treating hypoglycemia and hyperglycemia and reviewed the importance of meal and medication timing with diabetes mediations for prevention of hypoglycemia and maximum drug benefit. Completed 4/5/2023        Problem: Physical Activity and Exercise         Goal: Patient agrees to increase physical activity to a goal of 5-6 times per week for 30 minutes. Patient is walking 20 minutes when at work most days.  Will add walking in evening as well.    Start Date: 4/5/2023   Expected End Date: 5/24/2023   Priority: Low   Barriers: No Barriers Identified        Task: Discussed role of physical activity on reducing insulin resistance and improvement in overall glycemic control. Completed 4/5/2023        Task: Discussed role of physical activity as it relates to weight loss Completed 4/5/2023        Task: Offered suggestions on how patient could increase their regular physical activity Completed 4/5/2023          Follow Up Plan     Follow up in 7 weeks (on 5/24/2023) for after next Hgb A1c drawn on 5/17/23 for post program. Scheduled to see PCP for follow up on 4/17/23.  Patient will continue monitoring glucose 2 times daily until next Hgb A1c drawn.  Continue glimepiride 4mg (1 tablet BID).  Patient to retry metformin XR--previously taken and had GI issues but  feels she took on empty stomach and did not titrate dose up slowly.  Per PCP--will initiate metformin  mg with dinner for 1-2 weeks and if tolerated, will then add 500 mg tablet with breakfast.  Continue including carbs at all meals in moderate amounts combining with lean protein and non-starchy vegetables.    Patient has lost 3-4 lbs over past 3 weeks with diet/exercise--continue gradual weight loss of additional 10-15 lbs.  Patient now walking 20 minutes 3-4 times a week, will increase frequency and duration of exercise.     Today's care plan and follow up schedule was discussed with patient.  Bob verbalized understanding of the care plan, goals, and agrees to follow up plan.        The patient was encouraged to communicate with his/her health care provider/physician and care team regarding his/her condition(s) and treatment.  I provided the patient with my contact information today and encouraged to contact me via phone or [facility]'s Patient Portal as needed.     Length of Visit   Total Time: 30 Minutes

## 2023-04-10 ENCOUNTER — PATIENT MESSAGE (OUTPATIENT)
Dept: DIABETES | Facility: CLINIC | Age: 33
End: 2023-04-10
Payer: COMMERCIAL

## 2023-04-13 ENCOUNTER — PATIENT MESSAGE (OUTPATIENT)
Dept: FAMILY MEDICINE | Facility: CLINIC | Age: 33
End: 2023-04-13
Payer: COMMERCIAL

## 2023-04-16 ENCOUNTER — PATIENT MESSAGE (OUTPATIENT)
Dept: DIABETES | Facility: CLINIC | Age: 33
End: 2023-04-16
Payer: COMMERCIAL

## 2023-04-22 ENCOUNTER — PATIENT MESSAGE (OUTPATIENT)
Dept: FAMILY MEDICINE | Facility: CLINIC | Age: 33
End: 2023-04-22
Payer: COMMERCIAL

## 2023-04-25 ENCOUNTER — PATIENT MESSAGE (OUTPATIENT)
Dept: FAMILY MEDICINE | Facility: CLINIC | Age: 33
End: 2023-04-25
Payer: COMMERCIAL

## 2023-04-26 ENCOUNTER — INFUSION (OUTPATIENT)
Dept: INFUSION THERAPY | Facility: HOSPITAL | Age: 33
End: 2023-04-26
Attending: FAMILY MEDICINE
Payer: COMMERCIAL

## 2023-04-26 VITALS
HEIGHT: 63 IN | WEIGHT: 174.81 LBS | OXYGEN SATURATION: 98 % | TEMPERATURE: 98 F | HEART RATE: 74 BPM | SYSTOLIC BLOOD PRESSURE: 112 MMHG | RESPIRATION RATE: 18 BRPM | DIASTOLIC BLOOD PRESSURE: 71 MMHG | BODY MASS INDEX: 30.97 KG/M2

## 2023-04-26 DIAGNOSIS — K86.1 IDIOPATHIC CHRONIC PANCREATITIS: Primary | ICD-10-CM

## 2023-04-26 DIAGNOSIS — K86.1 CHRONIC BILIARY PANCREATITIS: ICD-10-CM

## 2023-04-26 PROCEDURE — 96360 HYDRATION IV INFUSION INIT: CPT

## 2023-04-26 RX ORDER — HEPARIN 100 UNIT/ML
500 SYRINGE INTRAVENOUS
Status: DISCONTINUED | OUTPATIENT
Start: 2023-04-26 | End: 2023-04-26 | Stop reason: HOSPADM

## 2023-04-26 RX ORDER — SODIUM CHLORIDE 0.9 % (FLUSH) 0.9 %
10 SYRINGE (ML) INJECTION
Status: DISCONTINUED | OUTPATIENT
Start: 2023-04-26 | End: 2023-04-26 | Stop reason: HOSPADM

## 2023-04-26 RX ORDER — SODIUM CHLORIDE 0.9 % (FLUSH) 0.9 %
10 SYRINGE (ML) INJECTION
Status: CANCELLED | OUTPATIENT
Start: 2023-04-26

## 2023-04-26 RX ORDER — HEPARIN 100 UNIT/ML
500 SYRINGE INTRAVENOUS
Status: CANCELLED | OUTPATIENT
Start: 2023-04-26

## 2023-04-26 NOTE — PLAN OF CARE
Pleasant alert and oriented patient ambulated to clinic for IVF - pt tolerated well - discharged home with no concerns - pt to RTC PRN

## 2023-04-26 NOTE — NURSING
PT tolerated LR infusion well. No adverse reaction noted. Pt education reinforced on LR side effects, what to expect and when to call Dr. Poncho Atkinson. Pt verbalized understanding. PPIV d/c per protocol, pt tolerated well.

## 2023-05-05 DIAGNOSIS — R11.0 NAUSEA: ICD-10-CM

## 2023-05-07 RX ORDER — ONDANSETRON 4 MG/1
4 TABLET, ORALLY DISINTEGRATING ORAL EVERY 8 HOURS PRN
Qty: 30 TABLET | Refills: 1 | Status: SHIPPED | OUTPATIENT
Start: 2023-05-07 | End: 2023-09-05 | Stop reason: SDUPTHER

## 2023-05-17 ENCOUNTER — LAB VISIT (OUTPATIENT)
Dept: LAB | Facility: HOSPITAL | Age: 33
End: 2023-05-17
Attending: FAMILY MEDICINE
Payer: COMMERCIAL

## 2023-05-17 ENCOUNTER — PATIENT MESSAGE (OUTPATIENT)
Dept: DIABETES | Facility: CLINIC | Age: 33
End: 2023-05-17
Payer: COMMERCIAL

## 2023-05-17 DIAGNOSIS — E11.9 TYPE 2 DIABETES MELLITUS WITHOUT COMPLICATION, WITHOUT LONG-TERM CURRENT USE OF INSULIN: ICD-10-CM

## 2023-05-17 LAB
ESTIMATED AVG GLUCOSE: 171 MG/DL (ref 68–131)
HBA1C MFR BLD: 7.6 % (ref 4–5.6)

## 2023-05-17 PROCEDURE — 83036 HEMOGLOBIN GLYCOSYLATED A1C: CPT | Performed by: FAMILY MEDICINE

## 2023-05-17 PROCEDURE — 36415 COLL VENOUS BLD VENIPUNCTURE: CPT | Performed by: FAMILY MEDICINE

## 2023-05-24 ENCOUNTER — NUTRITION (OUTPATIENT)
Dept: DIABETES | Facility: CLINIC | Age: 33
End: 2023-05-24
Payer: COMMERCIAL

## 2023-05-24 VITALS — WEIGHT: 172.38 LBS | HEIGHT: 63 IN | BODY MASS INDEX: 30.54 KG/M2

## 2023-05-24 DIAGNOSIS — E11.9 TYPE 2 DIABETES MELLITUS WITHOUT COMPLICATION, UNSPECIFIED WHETHER LONG TERM INSULIN USE: ICD-10-CM

## 2023-05-24 DIAGNOSIS — E11.9 TYPE 2 DIABETES MELLITUS WITHOUT COMPLICATION, WITHOUT LONG-TERM CURRENT USE OF INSULIN: Primary | ICD-10-CM

## 2023-05-24 PROCEDURE — G0108 PR DIAB MANAGE TRN  PER INDIV: ICD-10-PCS | Mod: S$GLB,,, | Performed by: DIETITIAN, REGISTERED

## 2023-05-24 PROCEDURE — G0108 DIAB MANAGE TRN  PER INDIV: HCPCS | Mod: S$GLB,,, | Performed by: DIETITIAN, REGISTERED

## 2023-05-24 NOTE — PROGRESS NOTES
Diabetes Care Specialist Follow-up Note  Author: Katherine Saavedra RD, CDE  Date: 5/24/2023    Program Intake  Reason for Diabetes Program Visit:: Post Program Follow-Up  Type of Intervention:: Individual  Type of Follow-Up: 3 month  Current diabetes risk level:: low  In the last 12 months, have you:: used emergency room services (fluids for possible pancreatitis)  Was the ER or hospital admission related to diabetes?: No    Lab Results   Component Value Date    HGBA1C 7.6 (H) 05/17/2023     A1c Pre Diabetes Care Specialist Intervention:  10.9%    Clinical    Clinical Assessment  Current Diabetes Treatment: Oral Medication, Diet, Exercise (glimepiride 4mg (1 tablet twice daily with meals), metformin 500 mg (1 tablet twice daily with meals))  Have you ever experienced hypoglycemia (low blood sugar)?: no    Medication Information  How do you obtain your medications?: Patient drives  How many days a week do you miss your medications?: Never  Do you sometimes have difficulty refilling your medications?: No  Medication adherence impacting ability to self-manage diabetes?: No    Labs  Do you have regular lab work to monitor your medications?: Yes  Type of Regular Lab Work: A1c  Where do you get your labs drawn?: [facility]  Lab Compliance Barriers: No    Nutritional Status  Recent Changes in Weight: Weight Loss  Was weight loss intentional or unintentional?: Intentional (Additional 2 lb weight loss over past 4-6 weeks, patient would like to continue to lose weight gradually.)  Current nutritional status an area of need that is impacting patient's ability to self-manage diabetes?: No    Additional Social History    Support  Does anyone support you with your diabetes care?: yes  Who supports you?: self, spouse  Who takes you to your medical appointments?: self  Does the current support meet the patient's needs?: Yes  Is Support an area impacting ability to self-manage diabetes?: No    Access to [employer] & Technology  Does  the patient have access to any of the following devices or technologies?: Smart phone, Internet Access  Media or technology needs impacting ability to self-manage diabetes?: No    Cognitive/Behavioral Health  Alert and Oriented: Yes  Difficulty Thinking: No  Requires Prompting: No  Requires assistance for routine expression?: No  Cognitive or behavioral barriers impacting ability to self-manage diabetes?: No    Culture/Zoroastrianism  Culture or Mandaeism beliefs that may impact ability to access healthcare: No    Communication  Language preference: English  Hearing Problems: No  Vision Problems: No  Communication needs impacting ability to self-manage diabetes?: No    Health Literacy  Preferred Learning Method: Face to Face  How often do you need to have someone help you read instructions, pamphlets, or written material from your doctor or pharmacy?: Never  Health literacy needs impacting ability to self-manage diabetes?: No      Diabetes Self-Management Skills Assessment     Medications  Patient is able to describe current diabetes management routine.: yes  Diabetes management routine:: diet, exercise, oral medications  Patient is able to identify current diabetes medications, dosages, and appropriate timing of medications.: yes  Patient understands the purpose of the medications taken for diabetes.: yes  Patient reports problems or concerns with current medication regimen.: no  Medication Skills Assessment Completed:: Yes  Assessment indicates:: Adequate understanding  Area of need?: No    Home Blood Glucose Monitoring  Patient states that blood sugar is checked at home daily.: yes  Monitoring Method:: home glucometer  How often do you check your blood sugar?: Twice a day  When do you check your blood sugar?: Before breakfast, Before dinner, Before bedtime  When you check what is your typical blood sugar range? : see glucose logs below--fasting glucose improved since initiating metformin, now ranging  mg/dL.  Pre  dinner glucose readings ranging  mg/dL (one episode of glucose of 77 mg/dL)  Blood glucose logs:: yes, encouraged to bring logs to provider visits, encouraged to keep logs  Blood glucose logs reviewed today?: yes        Home Blood Glucose Monitoring Skills Assessment Completed: : Yes  Assessment indicates:: Adequate understanding  Area of need?: No    During today's follow-up visit,  the following areas required further assessment and content was provided/reviewed.    Based on today's diabetes care assessment, the following areas of need were identified:      Social 5/24/2023   Support No   Access to Mass Media/Tech No   Cognitive/Behavioral Health No   Culture/Orthodox No   Communication No   Health Literacy No      Clinical 5/24/2023   Medication Adherence No   Lab Compliance No   Nutritional Status No      Diabetes Self-Management Skills 5/24/2023   Medication No   Home Blood Glucose Monitoring No      Today's interventions were provided through individual discussion, instruction, and written materials were provided.    Patient verbalized understanding of instruction and written materials.  Pt was able to return back demonstration of instructions today. Patient understood key points, needs reinforcement and further instruction.     Diabetes Self-Management Care Plan Review:  Diabetes Self-Management Care Plan Review and Evaluation of Progress:    During today's follow-up Bob's Diabetes Self-Management Care Plan progress was reviewed and progress was evaluated including his/her input. Bob has agreed to continue his/her journey to improve/maintain overall diabetes control by continuing to set health goals. See care plan progress below.      Care Plan: Diabetes Management   Updates made since 4/24/2023 12:00 AM        Problem: Healthy Eating         Goal: Incorporate 30-45 grams of total carbs at each meal--include lean protein at all meals also. Increase non-starchy vegetables at lunch/dinner.  DO NOT  ELIMINATE CARBS COMPLETELY FROM DIET. Completed 5/24/2023   Start Date: 3/15/2023   Expected End Date: 5/24/2023   This Visit's Progress: Met   Recent Progress: On track   Priority: High   Barriers: Knowledge deficit   Note:    4/5/23:  Doing better with meals and incorporating carbs at meals.  Still having protein shake only at breakfast due to convenience but agreeable to add fruit with protein shake.  Feels keeping a food diary would be helpful to better determine which foods affect glucose the most.       5/24/23:  Patient continues to do well with meal planning.  Struggles when dining out with coworkers as choices higher in carb and/or high fat (fried).  Balancing meals with carb + protein sources--aware of which food categories. Reinforced balanced approach to avoid burnout with following diabetic plan.         Problem: Medications         Goal: Patient to continue glimepiride 4mg twice daily with meals.  Discussed with primary care and patient would like to retry metformin XR--will start slow and titrate up if tolerated. Completed 5/24/2023   Start Date: 4/5/2023   Expected End Date: 5/24/2023   This Visit's Progress: Met   Priority: Medium   Barriers: No Barriers Identified   Note:    Previously on metformin XR and patient with diarrhea--however was taking metformin without eating and does not remember if started on low dose and titrated up slowly.      5/24/23:  Patient able to tolerate metformin  mg BID and glucose readings significantly improved with addition of this medication.  Discussed with PCP and will try to continue to increase metformin to max dose of 1000 mg BID--will start with 2 tablets 500 mg metformin with dinner X 1 week and then if tolerating without GI issues, increase to 2 tablets 500 mg at both breakfast and dinner.  If hypoglycemia occurs, PCP may consider decreasing/eliminating glimepiride.         Problem: Physical Activity and Exercise         Goal: Patient agrees to increase  physical activity to a goal of 5-6 times per week for 30 minutes. Patient is walking 20 minutes when at work most days.  Will add walking in evening as well. Completed 5/24/2023   Start Date: 4/5/2023   Expected End Date: 5/24/2023   This Visit's Progress: Met   Priority: Low   Barriers: No Barriers Identified   Note:    5/24/23:  Patient reports walking 30 minutes every day with family.         Follow Up Plan     Follow up if symptoms worsen or fail to improve, for for continued DSMES or if Hgb A1c worsens.  PCP follow up 6/21/23. Patient to continue glimepiride 4mg BID and will trial increasing metformin XR to max dose (500 mg--2 tablets BID with meals).  If unable to tolerate due to GI issues, decrease to highest dose tolerated.  If hypoglycemia occurs, patient to notify me or PCP and could consider decreasing/discontinuing glimepiride. Continue gradual weight loss and exercise program.      Today's care plan and follow up schedule was discussed with patient.  Bob verbalized understanding of the care plan, goals, and agrees to follow up plan.        The patient was encouraged to communicate with his/her health care provider/physician and care team regarding his/her condition(s) and treatment.  I provided the patient with my contact information today and encouraged to contact me via phone or [facility]'s Patient Portal as needed.

## 2023-05-24 NOTE — LETTER
May 24, 2023    Poncho Atkinson MD  [address] [facility] [place] [address]       Patient: Bob Jean Baptiste   MR Number: [id number]   YOB: 1990   Date of Visit: 5/24/2023     Dear Dr. Atkinson:    Thank you for referring Bob for diabetes self-management education and support services. She has completed all components of our Diabetes Management Program. Below is a summary of her clinical outcomes and goal progress.    Patient Outcomes:    A1c Status:   Lab Results   Component Value Date    HGBA1C 7.6 (H) 05/17/2023    HGBA1C 10.9 (H) 02/09/2023       Care Plan: Diabetes Management   Updates made since 4/24/2023 12:00 AM     Problem: Healthy Eating         Goal: Incorporate 30-45 grams of total carbs at each meal--include lean protein at all meals also. Increase non-starchy vegetables at lunch/dinner.  DO NOT ELIMINATE CARBS COMPLETELY FROM DIET. Completed 5/24/2023   Start Date: 3/15/2023   Expected End Date: 5/24/2023   This Visit's Progress: Met   Note:    5/24/23:  Patient continues to do well with meal planning.  Struggles when dining out with coworkers as choices higher in carb and/or high fat (fried).  Balancing meals with carb + protein sources--aware of which food categories. Reinforced balanced approach to avoid burnout with following diabetic plan.         Problem: Medications         Goal: Patient to continue glimepiride 4mg twice daily with meals.  Discussed with primary care and patient would like to retry metformin XR--will start slow and titrate up if tolerated. Completed 5/24/2023   Start Date: 4/5/2023   Expected End Date: 5/24/2023   This Visit's Progress: Met   Note:    5/24/23:  Patient able to tolerate metformin  mg BID and glucose readings significantly improved with addition of this medication.  Discussed with PCP and will try to continue to increase metformin to max dose of 1000 mg BID--will start with 2 tablets 500 mg metformin with dinner X 1 week and then if  tolerating without GI issues, increase to 2 tablets 500 mg at both breakfast and dinner.  If hypoglycemia occurs, PCP may consider decreasing/eliminating glimepiride.         Problem: Physical Activity and Exercise         Goal: Patient agrees to increase physical activity to a goal of 5-6 times per week for 30 minutes. Patient is walking 20 minutes when at work most days.  Will add walking in evening as well. Completed 5/24/2023   Start Date: 4/5/2023   Expected End Date: 5/24/2023   This Visit's Progress: Met   Note:    5/24/23:  Patient reports walking 30 minutes every day with family.         Follow up:   · Bob to attend medical appointments as scheduled  · Karini to update you on her DM education progress as needed      If you have questions, please do not hesitate to call me. I look forward to providing additional education and support as needed.    Sincerely,    Katherine Saavedra RD, CDE  Diabetes Care and

## 2023-05-25 RX ORDER — METFORMIN HYDROCHLORIDE 500 MG/1
1000 TABLET, EXTENDED RELEASE ORAL 2 TIMES DAILY WITH MEALS
Qty: 120 TABLET | Refills: 1 | Status: SHIPPED | OUTPATIENT
Start: 2023-05-25 | End: 2023-06-21 | Stop reason: SDUPTHER

## 2023-05-26 ENCOUNTER — PATIENT MESSAGE (OUTPATIENT)
Dept: DIABETES | Facility: CLINIC | Age: 33
End: 2023-05-26
Payer: COMMERCIAL

## 2023-05-30 DIAGNOSIS — E28.319 PREMATURE MENOPAUSE ON HRT: ICD-10-CM

## 2023-05-30 DIAGNOSIS — Z79.890 PREMATURE MENOPAUSE ON HRT: ICD-10-CM

## 2023-05-30 RX ORDER — ESTRADIOL 1 MG/1
TABLET ORAL
Qty: 90 TABLET | Refills: 3 | OUTPATIENT
Start: 2023-05-30

## 2023-06-05 DIAGNOSIS — Z79.890 PREMATURE MENOPAUSE ON HRT: ICD-10-CM

## 2023-06-05 DIAGNOSIS — E28.319 PREMATURE MENOPAUSE ON HRT: ICD-10-CM

## 2023-06-12 RX ORDER — PROGESTERONE 100 MG/1
100 CAPSULE ORAL DAILY
Qty: 30 CAPSULE | Refills: 0 | Status: SHIPPED | OUTPATIENT
Start: 2023-06-12 | End: 2023-08-14 | Stop reason: SDUPTHER

## 2023-06-19 ENCOUNTER — PATIENT MESSAGE (OUTPATIENT)
Dept: DIABETES | Facility: CLINIC | Age: 33
End: 2023-06-19
Payer: COMMERCIAL

## 2023-06-21 ENCOUNTER — OFFICE VISIT (OUTPATIENT)
Dept: FAMILY MEDICINE | Facility: CLINIC | Age: 33
End: 2023-06-21
Payer: COMMERCIAL

## 2023-06-21 VITALS
DIASTOLIC BLOOD PRESSURE: 68 MMHG | SYSTOLIC BLOOD PRESSURE: 117 MMHG | HEART RATE: 91 BPM | RESPIRATION RATE: 15 BRPM | WEIGHT: 171.25 LBS | OXYGEN SATURATION: 98 % | HEIGHT: 63 IN | BODY MASS INDEX: 30.34 KG/M2

## 2023-06-21 DIAGNOSIS — E11.9 TYPE 2 DIABETES MELLITUS WITHOUT COMPLICATION, UNSPECIFIED WHETHER LONG TERM INSULIN USE: Primary | ICD-10-CM

## 2023-06-21 DIAGNOSIS — K86.1 IDIOPATHIC CHRONIC PANCREATITIS: ICD-10-CM

## 2023-06-21 DIAGNOSIS — J02.0 STREP PHARYNGITIS: ICD-10-CM

## 2023-06-21 DIAGNOSIS — K21.9 GASTROESOPHAGEAL REFLUX DISEASE, UNSPECIFIED WHETHER ESOPHAGITIS PRESENT: ICD-10-CM

## 2023-06-21 PROCEDURE — 3074F PR MOST RECENT SYSTOLIC BLOOD PRESSURE < 130 MM HG: ICD-10-PCS | Mod: S$GLB,,, | Performed by: FAMILY MEDICINE

## 2023-06-21 PROCEDURE — 1159F MED LIST DOCD IN RCRD: CPT | Mod: S$GLB,,, | Performed by: FAMILY MEDICINE

## 2023-06-21 PROCEDURE — 99999 PR PBB SHADOW E&M-EST. PATIENT-LVL IV: CPT | Mod: PBBFAC,,, | Performed by: FAMILY MEDICINE

## 2023-06-21 PROCEDURE — 99999 PR PBB SHADOW E&M-EST. PATIENT-LVL IV: ICD-10-PCS | Mod: PBBFAC,,, | Performed by: FAMILY MEDICINE

## 2023-06-21 PROCEDURE — 3078F DIAST BP <80 MM HG: CPT | Mod: S$GLB,,, | Performed by: FAMILY MEDICINE

## 2023-06-21 PROCEDURE — 3008F BODY MASS INDEX DOCD: CPT | Mod: S$GLB,,, | Performed by: FAMILY MEDICINE

## 2023-06-21 PROCEDURE — 1159F PR MEDICATION LIST DOCUMENTED IN MEDICAL RECORD: ICD-10-PCS | Mod: S$GLB,,, | Performed by: FAMILY MEDICINE

## 2023-06-21 PROCEDURE — 3074F SYST BP LT 130 MM HG: CPT | Mod: S$GLB,,, | Performed by: FAMILY MEDICINE

## 2023-06-21 PROCEDURE — 3078F PR MOST RECENT DIASTOLIC BLOOD PRESSURE < 80 MM HG: ICD-10-PCS | Mod: S$GLB,,, | Performed by: FAMILY MEDICINE

## 2023-06-21 PROCEDURE — 3051F PR MOST RECENT HEMOGLOBIN A1C LEVEL 7.0 - < 8.0%: ICD-10-PCS | Mod: S$GLB,,, | Performed by: FAMILY MEDICINE

## 2023-06-21 PROCEDURE — 3051F HG A1C>EQUAL 7.0%<8.0%: CPT | Mod: S$GLB,,, | Performed by: FAMILY MEDICINE

## 2023-06-21 PROCEDURE — 99214 PR OFFICE/OUTPT VISIT, EST, LEVL IV, 30-39 MIN: ICD-10-PCS | Mod: S$GLB,,, | Performed by: FAMILY MEDICINE

## 2023-06-21 PROCEDURE — 99214 OFFICE O/P EST MOD 30 MIN: CPT | Mod: S$GLB,,, | Performed by: FAMILY MEDICINE

## 2023-06-21 PROCEDURE — 3008F PR BODY MASS INDEX (BMI) DOCUMENTED: ICD-10-PCS | Mod: S$GLB,,, | Performed by: FAMILY MEDICINE

## 2023-06-21 RX ORDER — AZITHROMYCIN 250 MG/1
TABLET, FILM COATED ORAL
Qty: 6 TABLET | Refills: 0 | Status: SHIPPED | OUTPATIENT
Start: 2023-06-21 | End: 2023-06-26

## 2023-06-21 RX ORDER — METFORMIN HYDROCHLORIDE 500 MG/1
1000 TABLET, EXTENDED RELEASE ORAL 2 TIMES DAILY WITH MEALS
Qty: 120 TABLET | Refills: 11 | Status: SHIPPED | OUTPATIENT
Start: 2023-06-21 | End: 2023-10-25

## 2023-06-21 NOTE — PROGRESS NOTES
[facility] - Clinic Note    Subjective      Ms. Jean Baptiste is a 33 y.o. female who presents to clinic for a follow up of chronic conditions.     DM:   Currently on metformin 1g BID  Tolerating well.   FBS well controlled at home.  Last A1C 1 month ago was 7.6     Idiopathic pancreatitis: no recent flares.  Has tramadol and valium prn for flares if needed.   Takes creon     GERD: takes protonix     Reports that she had strep about 2 weeks ago unsure which abx she was given as she is allergic to PCN. Reports symptoms improved but now back for the past 3 days. Admits to a sore throat on the left.    [facility] Bob has a past medical history of Abdominal pain, epigastric, Chronic pancreatitis, Diabetes mellitus, GERD (gastroesophageal reflux disease), Nausea alone, Pancreatitis, Pancreatitis, acute, and Premature ovarian insufficiency (08/2018).   PSXH Bob has a past surgical history that includes Esophagogastroduodenoscopy; Cholecystectomy; and Eye surgery.    Bob's family history includes Asthma in her sister; Diabetes in her maternal grandmother; No Known Problems in her father and mother.    Bob reports that she has never smoked. She has never used smokeless tobacco. She reports that she does not drink alcohol and does not use drugs.   ALG Bob is allergic to ceftin [cefuroxime axetil] and pcn [penicillins].   MED Bob has a current medication list which includes the following prescription(s): blood sugar diagnostic, blood-glucose meter, creon, diazepam, hydrocodone-acetaminophen, lancets, ondansetron, pantoprazole, progesterone, tramadol, true metrix glucose meter, acyclovir, azithromycin, estradiol, and metformin.     Review of Systems   Constitutional:  Negative for activity change, appetite change, chills, fatigue and fever.   HENT:  Positive for sore throat. Negative for congestion, ear discharge, ear pain, postnasal drip, rhinorrhea, sinus pressure, sinus pain and sneezing.    Eyes:  Negative for  "visual disturbance.   Respiratory:  Negative for cough and shortness of breath.    Cardiovascular:  Negative for chest pain, palpitations and leg swelling.   Gastrointestinal:  Negative for abdominal pain, nausea and vomiting.   Skin:  Negative for wound.   Neurological:  Negative for dizziness and headaches.   Psychiatric/Behavioral:  Negative for confusion.    Objective     /68 (BP Location: Left arm, Patient Position: Sitting, BP Method: Medium (Manual))   Pulse 91   Resp 15   Ht 5' 3" (1.6 m)   Wt 77.7 kg (171 lb 4 oz)   SpO2 98%   BMI 30.34 kg/m²     Physical Exam   Constitutional: normal appearance. She appears well-developed, well-nourished and obese.  Non-toxic appearance. No distress. She does not appear ill.   HENT:   Head: Normocephalic and atraumatic.   Eyes: Right eye exhibits no discharge. Left eye exhibits no discharge.   Cardiovascular: Normal rate, regular rhythm, normal heart sounds and normal pulses. Exam reveals no gallop and no friction rub.   No murmur heard.  Pulses:       Dorsalis pedis pulses are 2+ on the right side and 2+ on the left side. Pulmonary:      Effort: Pulmonary effort is normal. No respiratory distress.      Breath sounds: Normal breath sounds. No wheezing, rhonchi or rales.     Abdominal: Normal appearance.   Musculoskeletal:      Cervical back: Neck supple.      Right foot: Normal range of motion. No deformity, bunion, Charcot foot or foot drop.      Left foot: Normal range of motion. No deformity, bunion, Charcot foot or foot drop.   Feet:   Right Foot:   Protective Sensation: 7 sites tested.  7 sites sensed.   Left Foot:   Protective Sensation: 7 sites tested.  7 sites sensed.   Lymphadenopathy:     She has no cervical adenopathy.   Neurological: She is alert.   Skin: Skin is warm and dry. Capillary refill takes less than 2 seconds. She is not diaphoretic.   Psychiatric: Her behavior is normal. Mood, judgment and thought content normal.   Vitals reviewed. "   Assessment/Plan     Bob was seen today for follow-up.    Diagnoses and all orders for this visit:    Type 2 diabetes mellitus without complication, unspecified whether long term insulin use  -     Hemoglobin A1c; Future  -     Microalbumin/Creatinine Ratio, Urine; Future  -     Basic metabolic panel; Future  -     metFORMIN (GLUCOPHAGE-XR) 500 MG ER 24hr tablet; Take 2 tablets (1,000 mg total) by mouth 2 (two) times daily with meals.    Gastroesophageal reflux disease, unspecified whether esophagitis present    Idiopathic chronic pancreatitis    Strep pharyngitis  -     azithromycin (Z-MATHEW) 250 MG tablet; Take 2 tablets by mouth on day 1; Take 1 tablet by mouth on days 2-5      -labs in 3 months.     Follow up in about 6 months (around 12/21/2023), or if symptoms worsen or fail to improve.    Future Appointments   Date Time Provider Department Center   8/23/2023  9:30 AM [facility], LABORATORY [facility] LAB [facility] [place]   8/23/2023 10:00 AM [facility], LABORATORY [facility] LAB [facility] [place]   12/21/2023  7:00 AM Poncho Atkinson MD [facility]       Poncho Atkinson MD  Family Medicine  [facility]

## 2023-07-26 ENCOUNTER — PATIENT MESSAGE (OUTPATIENT)
Dept: DIABETES | Facility: CLINIC | Age: 33
End: 2023-07-26
Payer: COMMERCIAL

## 2023-07-26 DIAGNOSIS — K30 INDIGESTION: ICD-10-CM

## 2023-07-26 DIAGNOSIS — K21.9 GASTROESOPHAGEAL REFLUX DISEASE, UNSPECIFIED WHETHER ESOPHAGITIS PRESENT: ICD-10-CM

## 2023-07-26 RX ORDER — PANTOPRAZOLE SODIUM 40 MG/1
40 TABLET, DELAYED RELEASE ORAL DAILY
Qty: 30 TABLET | Refills: 11 | Status: SHIPPED | OUTPATIENT
Start: 2023-07-26

## 2023-07-27 DIAGNOSIS — Z86.19 HX OF COLD SORES: ICD-10-CM

## 2023-07-28 ENCOUNTER — PATIENT MESSAGE (OUTPATIENT)
Dept: DIABETES | Facility: CLINIC | Age: 33
End: 2023-07-28
Payer: COMMERCIAL

## 2023-07-30 ENCOUNTER — PATIENT MESSAGE (OUTPATIENT)
Dept: FAMILY MEDICINE | Facility: CLINIC | Age: 33
End: 2023-07-30
Payer: COMMERCIAL

## 2023-07-30 RX ORDER — ACYCLOVIR 400 MG/1
400 TABLET ORAL 3 TIMES DAILY
Qty: 15 TABLET | Refills: 5 | Status: SHIPPED | OUTPATIENT
Start: 2023-07-30 | End: 2023-09-20

## 2023-08-05 ENCOUNTER — HEALTH MAINTENANCE LETTER (OUTPATIENT)
Age: 33
End: 2023-08-05

## 2023-08-14 ENCOUNTER — OFFICE VISIT (OUTPATIENT)
Dept: OBSTETRICS AND GYNECOLOGY | Facility: CLINIC | Age: 33
End: 2023-08-14
Payer: COMMERCIAL

## 2023-08-14 VITALS
BODY MASS INDEX: 30.12 KG/M2 | SYSTOLIC BLOOD PRESSURE: 114 MMHG | WEIGHT: 170 LBS | HEIGHT: 63 IN | DIASTOLIC BLOOD PRESSURE: 74 MMHG

## 2023-08-14 DIAGNOSIS — E28.39 PREMATURE OVARIAN FAILURE: Primary | ICD-10-CM

## 2023-08-14 DIAGNOSIS — Z01.419 ENCOUNTER FOR ANNUAL ROUTINE GYNECOLOGICAL EXAMINATION: ICD-10-CM

## 2023-08-14 DIAGNOSIS — Z79.890 PREMATURE MENOPAUSE ON HRT: ICD-10-CM

## 2023-08-14 DIAGNOSIS — Z79.890 HORMONE REPLACEMENT THERAPY (HRT): ICD-10-CM

## 2023-08-14 DIAGNOSIS — E28.319 PREMATURE MENOPAUSE ON HRT: ICD-10-CM

## 2023-08-14 PROCEDURE — 99402 PREV MED CNSL INDIV APPRX 30: CPT | Mod: S$GLB,,, | Performed by: OBSTETRICS & GYNECOLOGY

## 2023-08-14 PROCEDURE — 1160F RVW MEDS BY RX/DR IN RCRD: CPT | Mod: S$GLB,,, | Performed by: OBSTETRICS & GYNECOLOGY

## 2023-08-14 PROCEDURE — 3051F PR MOST RECENT HEMOGLOBIN A1C LEVEL 7.0 - < 8.0%: ICD-10-PCS | Mod: S$GLB,,, | Performed by: OBSTETRICS & GYNECOLOGY

## 2023-08-14 PROCEDURE — 3008F PR BODY MASS INDEX (BMI) DOCUMENTED: ICD-10-PCS | Mod: S$GLB,,, | Performed by: OBSTETRICS & GYNECOLOGY

## 2023-08-14 PROCEDURE — 1159F MED LIST DOCD IN RCRD: CPT | Mod: S$GLB,,, | Performed by: OBSTETRICS & GYNECOLOGY

## 2023-08-14 PROCEDURE — 1160F PR REVIEW ALL MEDS BY PRESCRIBER/CLIN PHARMACIST DOCUMENTED: ICD-10-PCS | Mod: S$GLB,,, | Performed by: OBSTETRICS & GYNECOLOGY

## 2023-08-14 PROCEDURE — 3074F PR MOST RECENT SYSTOLIC BLOOD PRESSURE < 130 MM HG: ICD-10-PCS | Mod: S$GLB,,, | Performed by: OBSTETRICS & GYNECOLOGY

## 2023-08-14 PROCEDURE — 3078F PR MOST RECENT DIASTOLIC BLOOD PRESSURE < 80 MM HG: ICD-10-PCS | Mod: S$GLB,,, | Performed by: OBSTETRICS & GYNECOLOGY

## 2023-08-14 PROCEDURE — 1159F PR MEDICATION LIST DOCUMENTED IN MEDICAL RECORD: ICD-10-PCS | Mod: S$GLB,,, | Performed by: OBSTETRICS & GYNECOLOGY

## 2023-08-14 PROCEDURE — 99402 PR PREVENT COUNSEL,INDIV,30 MIN: ICD-10-PCS | Mod: S$GLB,,, | Performed by: OBSTETRICS & GYNECOLOGY

## 2023-08-14 PROCEDURE — 3074F SYST BP LT 130 MM HG: CPT | Mod: S$GLB,,, | Performed by: OBSTETRICS & GYNECOLOGY

## 2023-08-14 PROCEDURE — 3078F DIAST BP <80 MM HG: CPT | Mod: S$GLB,,, | Performed by: OBSTETRICS & GYNECOLOGY

## 2023-08-14 PROCEDURE — 3051F HG A1C>EQUAL 7.0%<8.0%: CPT | Mod: S$GLB,,, | Performed by: OBSTETRICS & GYNECOLOGY

## 2023-08-14 PROCEDURE — 3008F BODY MASS INDEX DOCD: CPT | Mod: S$GLB,,, | Performed by: OBSTETRICS & GYNECOLOGY

## 2023-08-14 RX ORDER — PROGESTERONE 100 MG/1
100 CAPSULE ORAL DAILY
Qty: 90 CAPSULE | Refills: 3 | Status: SHIPPED | OUTPATIENT
Start: 2023-08-14 | End: 2024-01-11 | Stop reason: SDUPTHER

## 2023-08-14 RX ORDER — ESTRADIOL 1 MG/1
1 TABLET ORAL DAILY
Qty: 90 TABLET | Refills: 3 | Status: SHIPPED | OUTPATIENT
Start: 2023-08-14 | End: 2024-08-13

## 2023-08-14 NOTE — PROGRESS NOTES
"Subjective:       Bob Jean Baptiste is a 33 y.o. female here for a routine exam, and refills on her HRT.  She had premature ovarian failure at the age of 27, and has been on HRT ever since.  Last year we decreased the dose of her estradiol to 1 mg and her Prometrium to 100 mg daily.  She has been taking both and doing well, no bleeding, and no vasomotor symptoms  Gynecologic History  No LMP recorded. (Menstrual status: Other).  Contraception:  Premature ovarian failure  Last Pap: . Results were: normal  Last mammogram: none. Results were: none    Obstetric History  OB History    Para Term  AB Living   0 0 0 0 0 0   SAB IAB Ectopic Multiple Live Births   0 0 0 0 0         The following portions of the patient's history were reviewed and updated as appropriate: allergies, current medications, past family history, past medical history, past social history, past surgical history, and problem list.    Review of Systems   ROS: (except as stated in HPI)  GENERAL: Denies weight gain or weight loss. Feeling well overall.   SKIN: Denies rash or lesions.   HEAD: Denies head injury or headache.   NODES: Denies enlarged lymph nodes.   CHEST: Denies chest pain or shortness of breath.   CARDIOVASCULAR: Denies palpitations or left sided chest pain.   ABDOMEN: No abdominal pain, constipation, diarrhea, nausea, vomiting or rectal bleeding.   URINARY: No frequency, dysuria, hematuria, or burning on urination.  REPRODUCTIVE: See HPI.   BREASTS: The patient performs breast self-examination and denies pain, lumps, or nipple discharge.   HEMATOLOGIC: No easy bruisability or excessive bleeding.   MUSCULOSKELETAL: Denies joint pain or swelling.   NEUROLOGIC: Denies syncope or weakness.   PSYCHIATRIC: Denies depression, anxiety or mood swings.           Objective:        /74 (BP Location: Right arm, Patient Position: Sitting)   Ht 5' 3" (1.6 m)   Wt 77.1 kg (170 lb)   BMI 30.11 kg/m²     General Appearance:    Alert, " cooperative, no distress, appears stated age   Head:    Normocephalic, without obvious abnormality, atraumatic   Eyes:    PERRL, conjunctiva/corneas clear, EOM's intact, fundi     benign, both eyes   Ears:    Normal TM's and external ear canals, both ears   Nose:   Nares normal, septum midline, mucosa normal, no drainage    or sinus tenderness   Throat:   Lips, mucosa, and tongue normal; teeth and gums normal   Neck:   Supple, symmetrical, trachea midline, no adenopathy;     thyroid:  no enlargement/tenderness/nodules; no carotid    bruit or JVD   Back:     Symmetric, no curvature, ROM normal, no CVA tenderness   Lungs:     Clear to auscultation bilaterally, respirations unlabored   Chest Wall:    No tenderness or deformity    Heart:    Regular rate and rhythm, S1 and S2 normal, no murmur, rub   or gallop   Breast Exam:    No tenderness, masses, or nipple abnormality   Abdomen:     Soft, non-tender, bowel sounds active all four quadrants,     no masses, no organomegaly   Genitalia:    Normal female without lesion, discharge or tenderness      Pelvic  Vv normal mucosa, without discharge or lesions  Cervix clear  Uterus anteverted normal size  Adnexa normal, without masses     Rectal:     Not done   Extremities:   Extremities normal, atraumatic, no cyanosis or edema   Pulses:   2+ and symmetric all extremities   Skin:   Skin color, texture, turgor normal, no rashes or lesions   Lymph nodes:   Cervical, supraclavicular, and axillary nodes normal   Neurologic:   CNII-XII intact, normal strength, sensation and reflexes     throughout         Assessment:      Healthy female exam.      Plan:      Negative gyn exam  Next Pap is due in 2024  Refill HRT for treatment of premature ovarian failure  Return 1 year or as needed

## 2023-08-16 ENCOUNTER — PATIENT MESSAGE (OUTPATIENT)
Dept: FAMILY MEDICINE | Facility: CLINIC | Age: 33
End: 2023-08-16
Payer: COMMERCIAL

## 2023-08-23 ENCOUNTER — PATIENT MESSAGE (OUTPATIENT)
Dept: DIABETES | Facility: CLINIC | Age: 33
End: 2023-08-23
Payer: COMMERCIAL

## 2023-08-23 ENCOUNTER — LAB VISIT (OUTPATIENT)
Dept: LAB | Facility: HOSPITAL | Age: 33
End: 2023-08-23
Attending: FAMILY MEDICINE
Payer: COMMERCIAL

## 2023-08-23 DIAGNOSIS — E11.9 TYPE 2 DIABETES MELLITUS WITHOUT COMPLICATION, UNSPECIFIED WHETHER LONG TERM INSULIN USE: ICD-10-CM

## 2023-08-23 LAB
ANION GAP SERPL CALC-SCNC: 12 MMOL/L (ref 8–16)
BUN SERPL-MCNC: 7 MG/DL (ref 6–20)
CALCIUM SERPL-MCNC: 9.8 MG/DL (ref 8.7–10.5)
CHLORIDE SERPL-SCNC: 104 MMOL/L (ref 95–110)
CO2 SERPL-SCNC: 26 MMOL/L (ref 23–29)
CREAT SERPL-MCNC: 0.7 MG/DL (ref 0.5–1.4)
EST. GFR  (NO RACE VARIABLE): >60 ML/MIN/1.73 M^2
ESTIMATED AVG GLUCOSE: 146 MG/DL (ref 68–131)
GLUCOSE SERPL-MCNC: 156 MG/DL (ref 70–110)
HBA1C MFR BLD: 6.7 % (ref 4–5.6)
POTASSIUM SERPL-SCNC: 4.8 MMOL/L (ref 3.5–5.1)
SODIUM SERPL-SCNC: 142 MMOL/L (ref 136–145)

## 2023-08-23 PROCEDURE — 83036 HEMOGLOBIN GLYCOSYLATED A1C: CPT | Performed by: FAMILY MEDICINE

## 2023-08-23 PROCEDURE — 36415 COLL VENOUS BLD VENIPUNCTURE: CPT | Performed by: FAMILY MEDICINE

## 2023-08-23 PROCEDURE — 80048 BASIC METABOLIC PNL TOTAL CA: CPT | Performed by: FAMILY MEDICINE

## 2023-09-04 ENCOUNTER — PATIENT MESSAGE (OUTPATIENT)
Dept: FAMILY MEDICINE | Facility: CLINIC | Age: 33
End: 2023-09-04
Payer: COMMERCIAL

## 2023-09-04 DIAGNOSIS — R11.0 NAUSEA: ICD-10-CM

## 2023-09-05 RX ORDER — ONDANSETRON 4 MG/1
4 TABLET, ORALLY DISINTEGRATING ORAL EVERY 6 HOURS PRN
Qty: 30 TABLET | Refills: 1 | Status: SHIPPED | OUTPATIENT
Start: 2023-09-05 | End: 2023-09-20 | Stop reason: SDUPTHER

## 2023-09-07 ENCOUNTER — INFUSION (OUTPATIENT)
Dept: INFUSION THERAPY | Facility: HOSPITAL | Age: 33
End: 2023-09-07
Attending: FAMILY MEDICINE
Payer: COMMERCIAL

## 2023-09-07 VITALS
RESPIRATION RATE: 18 BRPM | DIASTOLIC BLOOD PRESSURE: 85 MMHG | HEART RATE: 84 BPM | SYSTOLIC BLOOD PRESSURE: 118 MMHG | OXYGEN SATURATION: 98 % | TEMPERATURE: 98 F

## 2023-09-07 DIAGNOSIS — K86.1 IDIOPATHIC CHRONIC PANCREATITIS: Primary | ICD-10-CM

## 2023-09-07 DIAGNOSIS — K86.1 CHRONIC BILIARY PANCREATITIS: ICD-10-CM

## 2023-09-07 PROCEDURE — 96360 HYDRATION IV INFUSION INIT: CPT

## 2023-09-07 PROCEDURE — 63600175 PHARM REV CODE 636 W HCPCS: Performed by: FAMILY MEDICINE

## 2023-09-07 RX ORDER — HEPARIN 100 UNIT/ML
500 SYRINGE INTRAVENOUS
Status: DISCONTINUED | OUTPATIENT
Start: 2023-09-07 | End: 2023-09-07 | Stop reason: HOSPADM

## 2023-09-07 RX ORDER — SODIUM CHLORIDE 0.9 % (FLUSH) 0.9 %
10 SYRINGE (ML) INJECTION
Status: DISCONTINUED | OUTPATIENT
Start: 2023-09-07 | End: 2023-09-07 | Stop reason: HOSPADM

## 2023-09-07 RX ORDER — HEPARIN 100 UNIT/ML
500 SYRINGE INTRAVENOUS
OUTPATIENT
Start: 2023-09-07

## 2023-09-07 RX ORDER — SODIUM CHLORIDE 0.9 % (FLUSH) 0.9 %
10 SYRINGE (ML) INJECTION
OUTPATIENT
Start: 2023-09-07

## 2023-09-07 RX ADMIN — SODIUM CHLORIDE, SODIUM LACTATE, POTASSIUM CHLORIDE, AND CALCIUM CHLORIDE 1000 ML: .6; .31; .03; .02 INJECTION, SOLUTION INTRAVENOUS at 12:09

## 2023-09-07 NOTE — PLAN OF CARE
Problem: Adult Inpatient Plan of Care  Goal: Optimal Comfort and Wellbeing  Outcome: Ongoing, Progressing  Intervention: Provide Person-Centered Care  Flowsheets (Taken 9/7/2023 1214)  Trust Relationship/Rapport:   care explained   choices provided   emotional support provided   questions answered   questions encouraged   empathic listening provided   reassurance provided   thoughts/feelings acknowledged

## 2023-09-20 ENCOUNTER — OFFICE VISIT (OUTPATIENT)
Dept: FAMILY MEDICINE | Facility: CLINIC | Age: 33
End: 2023-09-20
Payer: COMMERCIAL

## 2023-09-20 VITALS
DIASTOLIC BLOOD PRESSURE: 80 MMHG | WEIGHT: 168.38 LBS | HEIGHT: 63 IN | HEART RATE: 89 BPM | OXYGEN SATURATION: 99 % | SYSTOLIC BLOOD PRESSURE: 112 MMHG | BODY MASS INDEX: 29.84 KG/M2

## 2023-09-20 DIAGNOSIS — R11.0 NAUSEA: ICD-10-CM

## 2023-09-20 DIAGNOSIS — F41.9 ANXIETY: ICD-10-CM

## 2023-09-20 DIAGNOSIS — K86.1 CHRONIC PANCREATITIS, UNSPECIFIED PANCREATITIS TYPE: ICD-10-CM

## 2023-09-20 DIAGNOSIS — K21.9 GASTROESOPHAGEAL REFLUX DISEASE, UNSPECIFIED WHETHER ESOPHAGITIS PRESENT: ICD-10-CM

## 2023-09-20 DIAGNOSIS — K86.1 IDIOPATHIC CHRONIC PANCREATITIS: Primary | ICD-10-CM

## 2023-09-20 DIAGNOSIS — E11.9 TYPE 2 DIABETES MELLITUS WITHOUT COMPLICATION, UNSPECIFIED WHETHER LONG TERM INSULIN USE: ICD-10-CM

## 2023-09-20 PROCEDURE — 3061F PR NEG MICROALBUMINURIA RESULT DOCUMENTED/REVIEW: ICD-10-PCS | Mod: S$GLB,,, | Performed by: FAMILY MEDICINE

## 2023-09-20 PROCEDURE — 3066F NEPHROPATHY DOC TX: CPT | Mod: S$GLB,,, | Performed by: FAMILY MEDICINE

## 2023-09-20 PROCEDURE — 3044F PR MOST RECENT HEMOGLOBIN A1C LEVEL <7.0%: ICD-10-PCS | Mod: S$GLB,,, | Performed by: FAMILY MEDICINE

## 2023-09-20 PROCEDURE — 99214 OFFICE O/P EST MOD 30 MIN: CPT | Mod: S$GLB,,, | Performed by: FAMILY MEDICINE

## 2023-09-20 PROCEDURE — 3061F NEG MICROALBUMINURIA REV: CPT | Mod: S$GLB,,, | Performed by: FAMILY MEDICINE

## 2023-09-20 PROCEDURE — 99999 PR PBB SHADOW E&M-EST. PATIENT-LVL III: ICD-10-PCS | Mod: PBBFAC,,, | Performed by: FAMILY MEDICINE

## 2023-09-20 PROCEDURE — 3044F HG A1C LEVEL LT 7.0%: CPT | Mod: S$GLB,,, | Performed by: FAMILY MEDICINE

## 2023-09-20 PROCEDURE — 99214 PR OFFICE/OUTPT VISIT, EST, LEVL IV, 30-39 MIN: ICD-10-PCS | Mod: S$GLB,,, | Performed by: FAMILY MEDICINE

## 2023-09-20 PROCEDURE — 3079F DIAST BP 80-89 MM HG: CPT | Mod: S$GLB,,, | Performed by: FAMILY MEDICINE

## 2023-09-20 PROCEDURE — 3008F PR BODY MASS INDEX (BMI) DOCUMENTED: ICD-10-PCS | Mod: S$GLB,,, | Performed by: FAMILY MEDICINE

## 2023-09-20 PROCEDURE — 3079F PR MOST RECENT DIASTOLIC BLOOD PRESSURE 80-89 MM HG: ICD-10-PCS | Mod: S$GLB,,, | Performed by: FAMILY MEDICINE

## 2023-09-20 PROCEDURE — 99999 PR PBB SHADOW E&M-EST. PATIENT-LVL III: CPT | Mod: PBBFAC,,, | Performed by: FAMILY MEDICINE

## 2023-09-20 PROCEDURE — 3066F PR DOCUMENTATION OF TREATMENT FOR NEPHROPATHY: ICD-10-PCS | Mod: S$GLB,,, | Performed by: FAMILY MEDICINE

## 2023-09-20 PROCEDURE — 3074F PR MOST RECENT SYSTOLIC BLOOD PRESSURE < 130 MM HG: ICD-10-PCS | Mod: S$GLB,,, | Performed by: FAMILY MEDICINE

## 2023-09-20 PROCEDURE — 3074F SYST BP LT 130 MM HG: CPT | Mod: S$GLB,,, | Performed by: FAMILY MEDICINE

## 2023-09-20 PROCEDURE — 3008F BODY MASS INDEX DOCD: CPT | Mod: S$GLB,,, | Performed by: FAMILY MEDICINE

## 2023-09-20 RX ORDER — ONDANSETRON 4 MG/1
4 TABLET, ORALLY DISINTEGRATING ORAL EVERY 6 HOURS PRN
Qty: 30 TABLET | Refills: 11 | Status: SHIPPED | OUTPATIENT
Start: 2023-09-20

## 2023-09-20 RX ORDER — HYDROCODONE BITARTRATE AND ACETAMINOPHEN 10; 325 MG/1; MG/1
1 TABLET ORAL EVERY 8 HOURS PRN
Qty: 21 TABLET | Refills: 0 | Status: SHIPPED | OUTPATIENT
Start: 2023-09-20

## 2023-09-20 RX ORDER — TRAMADOL HYDROCHLORIDE 50 MG/1
TABLET ORAL
Qty: 30 TABLET | Refills: 0 | Status: SHIPPED | OUTPATIENT
Start: 2023-09-20

## 2023-09-20 RX ORDER — DIAZEPAM 5 MG/1
TABLET ORAL
Qty: 30 TABLET | Refills: 2 | Status: SHIPPED | OUTPATIENT
Start: 2023-09-20

## 2023-09-20 NOTE — PROGRESS NOTES
[facility] - Clinic Note    Subjective      Ms. Jean Baptiste is a 33 y.o. female who presents to clinic for a follow up.    DM:   Currently on metformin 1g BID  Tolerating well.   FBS well controlled at home.  Last A1C6.7     Idiopathic pancreatitis: last flare was 9/7/2023  Has tramadol and valium prn for flares if needed. norco prn if flare is very bad.   Takes creon  When patient has a flare she messages and LR fluids are ordered for her to be done in outpatient therapeutics.      GERD: takes protonix     PMH Bob has a past medical history of Abdominal pain, epigastric, Chronic pancreatitis, Diabetes mellitus, GERD (gastroesophageal reflux disease), Nausea alone, Pancreatitis, Pancreatitis, acute, and Premature ovarian insufficiency (08/2018).   PSXH Bob has a past surgical history that includes Esophagogastroduodenoscopy; Cholecystectomy; and Eye surgery.   AARON Rojas's family history includes Asthma in her sister; Diabetes in her maternal grandmother; No Known Problems in her father and mother.    Bob reports that she has never smoked. She has never used smokeless tobacco. She reports that she does not drink alcohol and does not use drugs.   PILLO Rojas is allergic to ceftin [cefuroxime axetil] and pcn [penicillins].   MED Bob has a current medication list which includes the following prescription(s): acyclovir, blood sugar diagnostic, creon, estradiol, lancets, metformin, pantoprazole, progesterone, true metrix glucose meter, blood-glucose meter, diazepam, hydrocodone-acetaminophen, ondansetron, and tramadol.     Review of Systems   Constitutional:  Negative for activity change, appetite change, chills, fatigue and fever.   Eyes:  Negative for visual disturbance.   Respiratory:  Negative for cough and shortness of breath.    Cardiovascular:  Negative for chest pain, palpitations and leg swelling.   Gastrointestinal:  Negative for abdominal pain, nausea and vomiting.   Skin:  Negative for wound.  "  Neurological:  Negative for dizziness and headaches.   Psychiatric/Behavioral:  Negative for confusion.      Objective     /80 (BP Location: Left arm, Patient Position: Sitting, BP Method: Medium (Manual))   Pulse 89   Ht 5' 3" (1.6 m)   Wt 76.4 kg (168 lb 6.4 oz)   SpO2 99%   BMI 29.83 kg/m²     Physical Exam   Constitutional: normal appearance. She appears well-developed and well-nourished.  Non-toxic appearance. No distress. She does not appear ill.   HENT:   Head: Normocephalic and atraumatic.   Eyes: Right eye exhibits no discharge. Left eye exhibits no discharge.   Cardiovascular: Normal rate, regular rhythm, normal heart sounds and normal pulses. Exam reveals no gallop and no friction rub.   No murmur heard.Pulmonary:      Effort: Pulmonary effort is normal. No respiratory distress.      Breath sounds: Normal breath sounds. No wheezing, rhonchi or rales.     Abdominal: Normal appearance.   Musculoskeletal:      Cervical back: Neck supple.   Lymphadenopathy:     She has no cervical adenopathy.   Neurological: She is alert.   Skin: Skin is warm and dry. Capillary refill takes less than 2 seconds. She is not diaphoretic.   Psychiatric: Her behavior is normal. Mood, judgment and thought content normal.   Vitals reviewed.     Assessment/Plan     Bob was seen today for follow-up.    Diagnoses and all orders for this visit:    Idiopathic chronic pancreatitis  -     diazePAM (VALIUM) 5 MG tablet; TAKE 1 TABLET BY MOUTH EVERY 12 HOURS AS NEEDED FOR ABDOMINAL SPASMS  -     traMADoL (ULTRAM) 50 mg tablet; TAKE 1 TO 2 TABLETS BY MOUTH EVERY 6 HOURS AS NEEDED FOR ABDOMINAL PAIN    Chronic pancreatitis, unspecified pancreatitis type  -     HYDROcodone-acetaminophen (NORCO)  mg per tablet; Take 1 tablet by mouth every 8 (eight) hours as needed for Pain.    Anxiety  -     diazePAM (VALIUM) 5 MG tablet; TAKE 1 TABLET BY MOUTH EVERY 12 HOURS AS NEEDED FOR ABDOMINAL SPASMS    Type 2 diabetes mellitus " without complication, unspecified whether long term insulin use    Gastroesophageal reflux disease, unspecified whether esophagitis present    Nausea  -     ondansetron (ZOFRAN-ODT) 4 MG TbDL; Take 1 tablet (4 mg total) by mouth every 6 (six) hours as needed (nausea or vomiting).      --chronic conditions stable. Continue current regimen.    Follow up in about 3 months (around 12/20/2023), or if symptoms worsen or fail to improve.    Future Appointments   Date Time Provider Department Center   10/11/2023  8:40 AM Dottie Demarco MD [facility]   12/21/2023  7:00 AM Poncho Atkinson MD [facility]       Poncho Atkinson MD  Family Medicine  [facility]

## 2023-10-02 LAB
LEFT EYE DM RETINOPATHY: NEGATIVE
RIGHT EYE DM RETINOPATHY: NEGATIVE

## 2023-10-04 ENCOUNTER — PATIENT MESSAGE (OUTPATIENT)
Dept: FAMILY MEDICINE | Facility: CLINIC | Age: 33
End: 2023-10-04
Payer: COMMERCIAL

## 2023-10-25 ENCOUNTER — OFFICE VISIT (OUTPATIENT)
Dept: FAMILY MEDICINE | Facility: CLINIC | Age: 33
End: 2023-10-25
Payer: COMMERCIAL

## 2023-10-25 VITALS
HEART RATE: 81 BPM | HEIGHT: 63 IN | OXYGEN SATURATION: 97 % | WEIGHT: 166.88 LBS | BODY MASS INDEX: 29.57 KG/M2 | DIASTOLIC BLOOD PRESSURE: 80 MMHG | SYSTOLIC BLOOD PRESSURE: 110 MMHG | RESPIRATION RATE: 16 BRPM

## 2023-10-25 DIAGNOSIS — J31.0 CHRONIC RHINITIS: ICD-10-CM

## 2023-10-25 DIAGNOSIS — K86.89 DIABETES MELLITUS SECONDARY TO PANCREATIC INSUFFICIENCY: ICD-10-CM

## 2023-10-25 DIAGNOSIS — E28.39 PREMATURE OVARIAN FAILURE: ICD-10-CM

## 2023-10-25 DIAGNOSIS — E11.9 TYPE 2 DIABETES MELLITUS WITHOUT COMPLICATION, UNSPECIFIED WHETHER LONG TERM INSULIN USE: ICD-10-CM

## 2023-10-25 DIAGNOSIS — E08.9 DIABETES MELLITUS SECONDARY TO PANCREATIC INSUFFICIENCY: ICD-10-CM

## 2023-10-25 DIAGNOSIS — Z79.899 LONG-TERM USE OF HIGH-RISK MEDICATION: ICD-10-CM

## 2023-10-25 DIAGNOSIS — K86.1 IDIOPATHIC CHRONIC PANCREATITIS: Primary | ICD-10-CM

## 2023-10-25 DIAGNOSIS — B00.1 FEVER BLISTER: ICD-10-CM

## 2023-10-25 DIAGNOSIS — K21.9 GASTROESOPHAGEAL REFLUX DISEASE WITHOUT ESOPHAGITIS: ICD-10-CM

## 2023-10-25 DIAGNOSIS — F41.1 GENERALIZED ANXIETY DISORDER: ICD-10-CM

## 2023-10-25 DIAGNOSIS — E66.09 CLASS 1 OBESITY DUE TO EXCESS CALORIES WITH SERIOUS COMORBIDITY AND BODY MASS INDEX (BMI) OF 30.0 TO 30.9 IN ADULT: ICD-10-CM

## 2023-10-25 PROBLEM — J32.9 RHINOSINUSITIS: Status: RESOLVED | Noted: 2020-01-29 | Resolved: 2023-10-25

## 2023-10-25 PROBLEM — U07.1 PNEUMONIA DUE TO COVID-19 VIRUS: Status: RESOLVED | Noted: 2021-08-11 | Resolved: 2023-10-25

## 2023-10-25 PROBLEM — J12.82 PNEUMONIA DUE TO COVID-19 VIRUS: Status: RESOLVED | Noted: 2021-08-11 | Resolved: 2023-10-25

## 2023-10-25 PROBLEM — J32.9 RECURRENT SINUSITIS: Status: RESOLVED | Noted: 2020-03-10 | Resolved: 2023-10-25

## 2023-10-25 PROBLEM — M67.431 GANGLION CYST OF WRIST, RIGHT: Status: RESOLVED | Noted: 2019-12-12 | Resolved: 2023-10-25

## 2023-10-25 LAB
AMPHET+METHAMPHET UR QL: NEGATIVE
BARBITURATES UR QL SCN>200 NG/ML: NEGATIVE
BENZODIAZ UR QL SCN>200 NG/ML: ABNORMAL
BZE UR QL SCN: NEGATIVE
CANNABINOIDS UR QL SCN: NEGATIVE
CREAT UR-MCNC: 219.9 MG/DL (ref 15–325)
METHADONE UR QL SCN>300 NG/ML: NEGATIVE
OPIATES UR QL SCN: NEGATIVE
PCP UR QL SCN>25 NG/ML: NEGATIVE
TOXICOLOGY INFORMATION: ABNORMAL

## 2023-10-25 PROCEDURE — 3008F BODY MASS INDEX DOCD: CPT | Mod: S$GLB,,, | Performed by: STUDENT IN AN ORGANIZED HEALTH CARE EDUCATION/TRAINING PROGRAM

## 2023-10-25 PROCEDURE — 99999 PR PBB SHADOW E&M-EST. PATIENT-LVL V: ICD-10-PCS | Mod: PBBFAC,,, | Performed by: STUDENT IN AN ORGANIZED HEALTH CARE EDUCATION/TRAINING PROGRAM

## 2023-10-25 PROCEDURE — 3044F PR MOST RECENT HEMOGLOBIN A1C LEVEL <7.0%: ICD-10-PCS | Mod: S$GLB,,, | Performed by: STUDENT IN AN ORGANIZED HEALTH CARE EDUCATION/TRAINING PROGRAM

## 2023-10-25 PROCEDURE — 3079F PR MOST RECENT DIASTOLIC BLOOD PRESSURE 80-89 MM HG: ICD-10-PCS | Mod: S$GLB,,, | Performed by: STUDENT IN AN ORGANIZED HEALTH CARE EDUCATION/TRAINING PROGRAM

## 2023-10-25 PROCEDURE — 80307 DRUG TEST PRSMV CHEM ANLYZR: CPT | Performed by: STUDENT IN AN ORGANIZED HEALTH CARE EDUCATION/TRAINING PROGRAM

## 2023-10-25 PROCEDURE — 3074F SYST BP LT 130 MM HG: CPT | Mod: S$GLB,,, | Performed by: STUDENT IN AN ORGANIZED HEALTH CARE EDUCATION/TRAINING PROGRAM

## 2023-10-25 PROCEDURE — 3044F HG A1C LEVEL LT 7.0%: CPT | Mod: S$GLB,,, | Performed by: STUDENT IN AN ORGANIZED HEALTH CARE EDUCATION/TRAINING PROGRAM

## 2023-10-25 PROCEDURE — 1159F PR MEDICATION LIST DOCUMENTED IN MEDICAL RECORD: ICD-10-PCS | Mod: S$GLB,,, | Performed by: STUDENT IN AN ORGANIZED HEALTH CARE EDUCATION/TRAINING PROGRAM

## 2023-10-25 PROCEDURE — 3061F NEG MICROALBUMINURIA REV: CPT | Mod: S$GLB,,, | Performed by: STUDENT IN AN ORGANIZED HEALTH CARE EDUCATION/TRAINING PROGRAM

## 2023-10-25 PROCEDURE — 3079F DIAST BP 80-89 MM HG: CPT | Mod: S$GLB,,, | Performed by: STUDENT IN AN ORGANIZED HEALTH CARE EDUCATION/TRAINING PROGRAM

## 2023-10-25 PROCEDURE — 3066F PR DOCUMENTATION OF TREATMENT FOR NEPHROPATHY: ICD-10-PCS | Mod: S$GLB,,, | Performed by: STUDENT IN AN ORGANIZED HEALTH CARE EDUCATION/TRAINING PROGRAM

## 2023-10-25 PROCEDURE — 3074F PR MOST RECENT SYSTOLIC BLOOD PRESSURE < 130 MM HG: ICD-10-PCS | Mod: S$GLB,,, | Performed by: STUDENT IN AN ORGANIZED HEALTH CARE EDUCATION/TRAINING PROGRAM

## 2023-10-25 PROCEDURE — 1160F PR REVIEW ALL MEDS BY PRESCRIBER/CLIN PHARMACIST DOCUMENTED: ICD-10-PCS | Mod: S$GLB,,, | Performed by: STUDENT IN AN ORGANIZED HEALTH CARE EDUCATION/TRAINING PROGRAM

## 2023-10-25 PROCEDURE — 3066F NEPHROPATHY DOC TX: CPT | Mod: S$GLB,,, | Performed by: STUDENT IN AN ORGANIZED HEALTH CARE EDUCATION/TRAINING PROGRAM

## 2023-10-25 PROCEDURE — 99999 PR PBB SHADOW E&M-EST. PATIENT-LVL V: CPT | Mod: PBBFAC,,, | Performed by: STUDENT IN AN ORGANIZED HEALTH CARE EDUCATION/TRAINING PROGRAM

## 2023-10-25 PROCEDURE — 1160F RVW MEDS BY RX/DR IN RCRD: CPT | Mod: S$GLB,,, | Performed by: STUDENT IN AN ORGANIZED HEALTH CARE EDUCATION/TRAINING PROGRAM

## 2023-10-25 PROCEDURE — 1159F MED LIST DOCD IN RCRD: CPT | Mod: S$GLB,,, | Performed by: STUDENT IN AN ORGANIZED HEALTH CARE EDUCATION/TRAINING PROGRAM

## 2023-10-25 PROCEDURE — 99214 PR OFFICE/OUTPT VISIT, EST, LEVL IV, 30-39 MIN: ICD-10-PCS | Mod: S$GLB,,, | Performed by: STUDENT IN AN ORGANIZED HEALTH CARE EDUCATION/TRAINING PROGRAM

## 2023-10-25 PROCEDURE — 3061F PR NEG MICROALBUMINURIA RESULT DOCUMENTED/REVIEW: ICD-10-PCS | Mod: S$GLB,,, | Performed by: STUDENT IN AN ORGANIZED HEALTH CARE EDUCATION/TRAINING PROGRAM

## 2023-10-25 PROCEDURE — 99214 OFFICE O/P EST MOD 30 MIN: CPT | Mod: S$GLB,,, | Performed by: STUDENT IN AN ORGANIZED HEALTH CARE EDUCATION/TRAINING PROGRAM

## 2023-10-25 PROCEDURE — 3008F PR BODY MASS INDEX (BMI) DOCUMENTED: ICD-10-PCS | Mod: S$GLB,,, | Performed by: STUDENT IN AN ORGANIZED HEALTH CARE EDUCATION/TRAINING PROGRAM

## 2023-10-25 RX ORDER — GLIMEPIRIDE 4 MG/1
4 TABLET ORAL
COMMUNITY

## 2023-10-25 RX ORDER — METFORMIN HYDROCHLORIDE 500 MG/1
1000 TABLET, EXTENDED RELEASE ORAL 2 TIMES DAILY WITH MEALS
Qty: 120 TABLET | Refills: 11
Start: 2023-10-25 | End: 2024-01-11 | Stop reason: SDUPTHER

## 2023-10-25 NOTE — ASSESSMENT & PLAN NOTE
Wt Readings from Last 6 Encounters:   10/25/23 75.7 kg (166 lb 14.4 oz)   09/20/23 76.4 kg (168 lb 6.4 oz)   08/14/23 77.1 kg (170 lb)   06/21/23 77.7 kg (171 lb 4 oz)   05/24/23 78.2 kg (172 lb 6.4 oz)   04/26/23 79.3 kg (174 lb 13.2 oz)     She has been working on diet and exercise  And saw nutrition

## 2023-10-25 NOTE — ASSESSMENT & PLAN NOTE
Lab Results   Component Value Date    HGBA1C 6.7 (H) 08/23/2023     Diabetes Medications             glimepiride (AMARYL) 4 MG tablet Take 4 mg by mouth before breakfast. If needed for elevated glucose    metFORMIN (GLUCOPHAGE-XR) 500 MG ER 24hr tablet Take 2 tablets (1,000 mg total) by mouth 2 (two) times daily with meals.

## 2023-10-25 NOTE — ASSESSMENT & PLAN NOTE
She has an ongoing issue with chronic pancreatitis. Now hereditary gene.  She has seen multiple specialists.  She sometimes gets IVF at [facility] center and gets some pain medication and valium periodically   reviewed  She has been on creon but has never had it adjusted or monitored by GI  GI referral

## 2023-10-25 NOTE — PROGRESS NOTES
Subjective:       Patient ID: Bob Jean Baptiste is a 33 y.o. female.    Chief Complaint: Establish Care    Patient Active Problem List:     Chronic biliary pancreatitis     Gastroesophageal reflux disease without esophagitis     Class 1 obesity due to excess calories with serious comorbidity and body mass index (BMI) of 30.0 to 30.9 in adult     Idiopathic chronic pancreatitis     Premature ovarian failure     Pancreatic duct stones     Diabetes mellitus secondary to pancreatic insufficiency     Chronic rhinitis     Fever blister     Generalized anxiety disorder    Current Outpatient Medications:  blood sugar diagnostic Strp, To check BG two times daily, to use with insurance preferred meter  CREON 24,000-76,000 -120,000 unit capsule, Take 2 capsules by mouth 3 (three) times daily with meals.  diazePAM (VALIUM) 5 MG tablet, TAKE 1 TABLET BY MOUTH EVERY 12 HOURS AS NEEDED FOR ABDOMINAL SPASMS  estradioL (ESTRACE) 1 MG tablet, Take 1 tablet (1 mg total) by mouth once daily.  glimepiride (AMARYL) 4 MG tablet, Take 4 mg by mouth before breakfast. If needed for elevated glucose  HYDROcodone-acetaminophen (NORCO)  mg per tablet, Take 1 tablet by mouth every 8 (eight) hours as needed for Pain.  lancets Misc, To check BG two times daily, to use with insurance preferred meter  ondansetron (ZOFRAN-ODT) 4 MG TbDL, Take 1 tablet (4 mg total) by mouth every 6 (six) hours as needed (nausea or vomiting).  pantoprazole (PROTONIX) 40 MG tablet, TAKE 1 TABLET (40 MG TOTAL) BY MOUTH ONCE DAILY.  progesterone (PROMETRIUM) 100 MG capsule, Take 1 capsule (100 mg total) by mouth once daily. Appointment required for future refills  traMADoL (ULTRAM) 50 mg tablet, TAKE 1 TO 2 TABLETS BY MOUTH EVERY 6 HOURS AS NEEDED FOR ABDOMINAL PAIN  TRUE METRIX GLUCOSE METER kit, Use as instructed  acyclovir (ZOVIRAX) 400 MG tablet, Take 1 tablet (400 mg total) by mouth 3 (three) times daily. for 5 days  metFORMIN (GLUCOPHAGE-XR) 500 MG ER 24hr  tablet, Take 2 tablets (1,000 mg total) by mouth 2 (two) times daily with meals.    No current facility-administered medications for this visit.          Review of Systems   Constitutional:  Negative for activity change and appetite change.   Respiratory:  Negative for shortness of breath.    Cardiovascular:  Negative for chest pain.   Gastrointestinal:  Negative for abdominal pain and anal bleeding.   Genitourinary:  Negative for dysuria.   Integumentary:  Negative for rash.   Psychiatric/Behavioral:  Negative for dysphoric mood and sleep disturbance. The patient is not nervous/anxious.          Objective:      Physical Exam  Constitutional:       General: She is not in acute distress.     Appearance: Normal appearance. She is not ill-appearing.   Eyes:      Conjunctiva/sclera: Conjunctivae normal.   Cardiovascular:      Rate and Rhythm: Normal rate and regular rhythm.      Heart sounds: Normal heart sounds. No murmur heard.  Pulmonary:      Effort: Pulmonary effort is normal. No respiratory distress.      Breath sounds: Normal breath sounds.   Musculoskeletal:      Right lower leg: No edema.      Left lower leg: No edema.   Skin:     General: Skin is warm and dry.   Neurological:      Mental Status: She is alert. Mental status is at baseline.      Gait: Gait normal.   Psychiatric:         Mood and Affect: Mood normal.         Behavior: Behavior normal.         Thought Content: Thought content normal.         Judgment: Judgment normal.         Assessment:       1. Idiopathic chronic pancreatitis    2. Chronic rhinitis    3. Type 2 diabetes mellitus without complication, unspecified whether long term insulin use    4. Diabetes mellitus secondary to pancreatic insufficiency    5. Class 1 obesity due to excess calories with serious comorbidity and body mass index (BMI) of 30.0 to 30.9 in adult    6. Premature ovarian failure    7. Fever blister    8. Gastroesophageal reflux disease without esophagitis    9.  Generalized anxiety disorder    10. Long-term use of high-risk medication        Plan:       Problem List Items Addressed This Visit          Psychiatric    Generalized anxiety disorder     Not on ssri therapy  She has valium prn as needed.            ENT    Chronic rhinitis     Stable. Continue current medications and regular followup.              Renal/    Premature ovarian failure     Sees obgyn for her HRT            ID    Fever blister     Stable with prn acyclovir            Endocrine    Class 1 obesity due to excess calories with serious comorbidity and body mass index (BMI) of 30.0 to 30.9 in adult     Wt Readings from Last 6 Encounters:   10/25/23 75.7 kg (166 lb 14.4 oz)   09/20/23 76.4 kg (168 lb 6.4 oz)   08/14/23 77.1 kg (170 lb)   06/21/23 77.7 kg (171 lb 4 oz)   05/24/23 78.2 kg (172 lb 6.4 oz)   04/26/23 79.3 kg (174 lb 13.2 oz)   She has been working on diet and exercise  And saw nutrition         Diabetes mellitus secondary to pancreatic insufficiency     Lab Results   Component Value Date    HGBA1C 6.7 (H) 08/23/2023     Diabetes Medications               glimepiride (AMARYL) 4 MG tablet Take 4 mg by mouth before breakfast. If needed for elevated glucose    metFORMIN (GLUCOPHAGE-XR) 500 MG ER 24hr tablet Take 2 tablets (1,000 mg total) by mouth 2 (two) times daily with meals.            Relevant Medications    metFORMIN (GLUCOPHAGE-XR) 500 MG ER 24hr tablet    glimepiride (AMARYL) 4 MG tablet    Other Relevant Orders    Ambulatory referral/consult to Endocrinology    CBC Auto Differential    Comprehensive Metabolic Panel    Lipid Panel    Microalbumin/Creatinine Ratio, Urine    Hemoglobin A1C    TSH       GI    Gastroesophageal reflux disease without esophagitis     Stable. Continue current medications and regular followup.           Idiopathic chronic pancreatitis - Primary     She has an ongoing issue with chronic pancreatitis. Now hereditary gene.  She has seen multiple specialists.  She  sometimes gets IVF at infusion center and gets some pain medication and valium periodically   reviewed  She has been on creon but has never had it adjusted or monitored by GI  GI referral         Relevant Orders    Ambulatory referral/consult to Endocrinology    Ambulatory referral/consult to Gastroenterology     Other Visit Diagnoses       Type 2 diabetes mellitus without complication, unspecified whether long term insulin use        Relevant Medications    metFORMIN (GLUCOPHAGE-XR) 500 MG ER 24hr tablet    glimepiride (AMARYL) 4 MG tablet    Long-term use of high-risk medication        Relevant Orders    Drug screen panel, in-house               reviewed and last valium refill on 9/20  Last norco is 9/20  Last ultram is 9/20  Previous before that was in 2022  She usually flushes after it expires

## 2023-10-25 NOTE — PATIENT INSTRUCTIONS

## 2023-10-28 ENCOUNTER — PATIENT MESSAGE (OUTPATIENT)
Dept: FAMILY MEDICINE | Facility: CLINIC | Age: 33
End: 2023-10-28
Payer: COMMERCIAL

## 2023-10-28 DIAGNOSIS — F41.1 GENERALIZED ANXIETY DISORDER: Primary | ICD-10-CM

## 2023-11-03 RX ORDER — SERTRALINE HYDROCHLORIDE 25 MG/1
TABLET, FILM COATED ORAL
Qty: 162 TABLET | Refills: 0 | Status: SHIPPED | OUTPATIENT
Start: 2023-11-03 | End: 2024-01-11

## 2023-11-06 ENCOUNTER — LAB VISIT (OUTPATIENT)
Dept: LAB | Facility: HOSPITAL | Age: 33
End: 2023-11-06
Attending: STUDENT IN AN ORGANIZED HEALTH CARE EDUCATION/TRAINING PROGRAM
Payer: COMMERCIAL

## 2023-11-06 ENCOUNTER — PATIENT MESSAGE (OUTPATIENT)
Dept: FAMILY MEDICINE | Facility: CLINIC | Age: 33
End: 2023-11-06
Payer: COMMERCIAL

## 2023-11-06 DIAGNOSIS — E08.9 DIABETES MELLITUS SECONDARY TO PANCREATIC INSUFFICIENCY: ICD-10-CM

## 2023-11-06 DIAGNOSIS — K86.89 DIABETES MELLITUS SECONDARY TO PANCREATIC INSUFFICIENCY: ICD-10-CM

## 2023-11-06 LAB
ALBUMIN SERPL BCP-MCNC: 3.9 G/DL (ref 3.5–5.2)
ALP SERPL-CCNC: 74 U/L (ref 55–135)
ALT SERPL W/O P-5'-P-CCNC: 30 U/L (ref 10–44)
ANION GAP SERPL CALC-SCNC: 10 MMOL/L (ref 8–16)
AST SERPL-CCNC: 26 U/L (ref 10–40)
BASOPHILS # BLD AUTO: 0.04 K/UL (ref 0–0.2)
BASOPHILS NFR BLD: 0.5 % (ref 0–1.9)
BILIRUB SERPL-MCNC: 0.4 MG/DL (ref 0.1–1)
BUN SERPL-MCNC: 9 MG/DL (ref 6–20)
CALCIUM SERPL-MCNC: 9.1 MG/DL (ref 8.7–10.5)
CHLORIDE SERPL-SCNC: 105 MMOL/L (ref 95–110)
CHOLEST SERPL-MCNC: 210 MG/DL (ref 120–199)
CHOLEST/HDLC SERPL: 3.9 {RATIO} (ref 2–5)
CO2 SERPL-SCNC: 25 MMOL/L (ref 23–29)
CREAT SERPL-MCNC: 0.8 MG/DL (ref 0.5–1.4)
DIFFERENTIAL METHOD: ABNORMAL
EOSINOPHIL # BLD AUTO: 0.5 K/UL (ref 0–0.5)
EOSINOPHIL NFR BLD: 6.4 % (ref 0–8)
ERYTHROCYTE [DISTWIDTH] IN BLOOD BY AUTOMATED COUNT: 12.5 % (ref 11.5–14.5)
EST. GFR  (NO RACE VARIABLE): >60 ML/MIN/1.73 M^2
ESTIMATED AVG GLUCOSE: 163 MG/DL (ref 68–131)
GLUCOSE SERPL-MCNC: 151 MG/DL (ref 70–110)
HBA1C MFR BLD: 7.3 % (ref 4–5.6)
HCT VFR BLD AUTO: 41 % (ref 37–48.5)
HDLC SERPL-MCNC: 54 MG/DL (ref 40–75)
HDLC SERPL: 25.7 % (ref 20–50)
HGB BLD-MCNC: 13.1 G/DL (ref 12–16)
IMM GRANULOCYTES # BLD AUTO: 0.02 K/UL (ref 0–0.04)
IMM GRANULOCYTES NFR BLD AUTO: 0.3 % (ref 0–0.5)
LDLC SERPL CALC-MCNC: 129.2 MG/DL (ref 63–159)
LYMPHOCYTES # BLD AUTO: 3.1 K/UL (ref 1–4.8)
LYMPHOCYTES NFR BLD: 40.6 % (ref 18–48)
MCH RBC QN AUTO: 26.9 PG (ref 27–31)
MCHC RBC AUTO-ENTMCNC: 32 G/DL (ref 32–36)
MCV RBC AUTO: 84 FL (ref 82–98)
MONOCYTES # BLD AUTO: 0.4 K/UL (ref 0.3–1)
MONOCYTES NFR BLD: 4.7 % (ref 4–15)
NEUTROPHILS # BLD AUTO: 3.6 K/UL (ref 1.8–7.7)
NEUTROPHILS NFR BLD: 47.5 % (ref 38–73)
NONHDLC SERPL-MCNC: 156 MG/DL
NRBC BLD-RTO: 0 /100 WBC
PLATELET # BLD AUTO: 323 K/UL (ref 150–450)
PMV BLD AUTO: 10.1 FL (ref 9.2–12.9)
POTASSIUM SERPL-SCNC: 4.6 MMOL/L (ref 3.5–5.1)
PROT SERPL-MCNC: 7.2 G/DL (ref 6–8.4)
RBC # BLD AUTO: 4.87 M/UL (ref 4–5.4)
SODIUM SERPL-SCNC: 140 MMOL/L (ref 136–145)
TRIGL SERPL-MCNC: 134 MG/DL (ref 30–150)
TSH SERPL DL<=0.005 MIU/L-ACNC: 1.46 UIU/ML (ref 0.4–4)
WBC # BLD AUTO: 7.64 K/UL (ref 3.9–12.7)

## 2023-11-06 PROCEDURE — 80061 LIPID PANEL: CPT | Performed by: STUDENT IN AN ORGANIZED HEALTH CARE EDUCATION/TRAINING PROGRAM

## 2023-11-06 PROCEDURE — 83036 HEMOGLOBIN GLYCOSYLATED A1C: CPT | Performed by: STUDENT IN AN ORGANIZED HEALTH CARE EDUCATION/TRAINING PROGRAM

## 2023-11-06 PROCEDURE — 36415 COLL VENOUS BLD VENIPUNCTURE: CPT | Performed by: STUDENT IN AN ORGANIZED HEALTH CARE EDUCATION/TRAINING PROGRAM

## 2023-11-06 PROCEDURE — 80053 COMPREHEN METABOLIC PANEL: CPT | Performed by: STUDENT IN AN ORGANIZED HEALTH CARE EDUCATION/TRAINING PROGRAM

## 2023-11-06 PROCEDURE — 85025 COMPLETE CBC W/AUTO DIFF WBC: CPT | Performed by: STUDENT IN AN ORGANIZED HEALTH CARE EDUCATION/TRAINING PROGRAM

## 2023-11-06 PROCEDURE — 84443 ASSAY THYROID STIM HORMONE: CPT | Performed by: STUDENT IN AN ORGANIZED HEALTH CARE EDUCATION/TRAINING PROGRAM

## 2023-11-10 ENCOUNTER — PATIENT MESSAGE (OUTPATIENT)
Dept: FAMILY MEDICINE | Facility: CLINIC | Age: 33
End: 2023-11-10
Payer: COMMERCIAL

## 2023-11-13 ENCOUNTER — INFUSION (OUTPATIENT)
Dept: INFUSION THERAPY | Facility: HOSPITAL | Age: 33
End: 2023-11-13
Attending: FAMILY MEDICINE
Payer: COMMERCIAL

## 2023-11-13 VITALS
HEART RATE: 65 BPM | HEIGHT: 63 IN | RESPIRATION RATE: 18 BRPM | DIASTOLIC BLOOD PRESSURE: 72 MMHG | SYSTOLIC BLOOD PRESSURE: 107 MMHG | WEIGHT: 166.88 LBS | BODY MASS INDEX: 29.57 KG/M2 | OXYGEN SATURATION: 96 % | TEMPERATURE: 98 F

## 2023-11-13 DIAGNOSIS — K86.1 IDIOPATHIC CHRONIC PANCREATITIS: ICD-10-CM

## 2023-11-13 DIAGNOSIS — K86.1 CHRONIC BILIARY PANCREATITIS: Primary | ICD-10-CM

## 2023-11-13 PROCEDURE — 96360 HYDRATION IV INFUSION INIT: CPT

## 2023-11-13 RX ORDER — HEPARIN 100 UNIT/ML
500 SYRINGE INTRAVENOUS
OUTPATIENT
Start: 2023-11-13

## 2023-11-13 RX ORDER — SODIUM CHLORIDE 0.9 % (FLUSH) 0.9 %
10 SYRINGE (ML) INJECTION
OUTPATIENT
Start: 2023-11-13

## 2023-11-13 NOTE — PLAN OF CARE
Problem: Adult Inpatient Plan of Care  Goal: Optimal Comfort and Wellbeing  Outcome: Ongoing, Progressing  Intervention: Provide Person-Centered Care  Flowsheets (Taken 11/13/2023 5219)  Trust Relationship/Rapport:   care explained   choices provided   emotional support provided   empathic listening provided   questions answered   questions encouraged   reassurance provided   thoughts/feelings acknowledged

## 2023-11-13 NOTE — NURSING
PT tolerated LR infusion well. No adverse reaction noted. Pt education reinforced on LR side effects, what to expect and when to call Dr. Tong. Pt verbalized understanding. PIV d/c per protocol, pt tolerated well.  amr

## 2023-11-29 ENCOUNTER — PATIENT MESSAGE (OUTPATIENT)
Dept: FAMILY MEDICINE | Facility: CLINIC | Age: 33
End: 2023-11-29
Payer: COMMERCIAL

## 2023-12-05 ENCOUNTER — E-VISIT (OUTPATIENT)
Dept: FAMILY MEDICINE | Facility: CLINIC | Age: 33
End: 2023-12-05
Payer: COMMERCIAL

## 2023-12-05 ENCOUNTER — PATIENT MESSAGE (OUTPATIENT)
Dept: FAMILY MEDICINE | Facility: CLINIC | Age: 33
End: 2023-12-05

## 2023-12-05 DIAGNOSIS — R30.0 DYSURIA: Primary | ICD-10-CM

## 2023-12-05 PROCEDURE — 99421 PR E&M, ONLINE DIGIT, EST, < 7 DAYS, 5-10 MINS: ICD-10-PCS | Mod: ,,, | Performed by: STUDENT IN AN ORGANIZED HEALTH CARE EDUCATION/TRAINING PROGRAM

## 2023-12-05 PROCEDURE — 99421 OL DIG E/M SVC 5-10 MIN: CPT | Mod: ,,, | Performed by: STUDENT IN AN ORGANIZED HEALTH CARE EDUCATION/TRAINING PROGRAM

## 2023-12-05 NOTE — PROGRESS NOTES
Patient ID: Bob Jean Baptiste is a 33 y.o. female.    Chief Complaint: Urinary Tract Infection (Entered automatically based on patient selection in Patient Portal.)    The patient initiated a request through [facility] on 12/5/2023 for evaluation and management with a chief complaint of Urinary Tract Infection (Entered automatically based on patient selection in Patient Portal.)     I evaluated the questionnaire submission on 12/5/23.    [facility] Peq Evisit Uti Questionnaire    12/5/2023 11:13 AM CST - Filed by Patient   Do you agree to participate in an E-Visit? Yes   If you have any of the following problems, please present to your local ER or call 911:  I acknowledge   What is the main issue that you would like for your doctor to address today? Possible yeast infection   Are you able to take your vital signs? No   Are you currently pregnant, could you be pregnant, or are you breast feeding? None of the above   What symptoms do you currently have? Pain while passing urine   When did your symptoms first appear? 12/2/2023   List what you have done or taken to help your symptoms. Fluconazole   Please indicate whether you have had the following symptoms during the past 24 hours     Urgent urination (a sudden and uncontrollable urge to urinate) None   Frequent urination of small amounts of urine (going to the toilet very often) None   Burning pain when urinating Mild   Incomplete bladder emptying (still feel like you need to urinate again after urination) None   Pain not associated with urination in the lower abdomen below the belly button) None   What does your urine look like? I am not sure   Blood seen in the urine None   Flank pain (pain in one or both sides of the lower back) None   Abnormal Vaginal Discharge (abnormal amount, color and/or odor) None   Discharge from the urethra (urinary opening) without urination None   High body temperature/fever? None-<99.5   Please rate how much discomfort you have experience because  of the symptoms in the past 24 hours: Moderate   Please indicate how the symptoms have interfered with your every day activities/work in the past 24 hours: Moderate   Please indicate how these symptoms have interfered with your social activities (visiting people, meeting with friends, etc.) in the past 24 hours? None   Are you a diabetic? Yes   Please indicate whether you have the following at the time of completion of this questionnaire: None of the above   Provide any information you feel is important to your history not asked above    Please attach any relevant images or files (if you have performed a home test for UTI, please submit a photo of results)          Recent Labs Obtained:  No visits with results within 7 Day(s) from this visit.   Latest known visit with results is:   Lab Visit on 11/06/2023   Component Date Value Ref Range Status    Microalbumin, Urine 11/06/2023 10.0  ug/mL Final    Creatinine, Urine 11/06/2023 152.0  15.0 - 325.0 mg/dL Final    Microalb/Creat Ratio 11/06/2023 6.6  0.0 - 30.0 ug/mg Final       No diagnosis found.     No orders of the defined types were placed in this encounter.       Clarifying symptoms as more uti vs more yeast infection    No follow-ups on file.      E-Visit Time Tracking:    Day 1 Time (in minutes): 5     Total Time (in minutes): 5

## 2024-01-10 DIAGNOSIS — E11.9 TYPE 2 DIABETES MELLITUS WITHOUT COMPLICATION, UNSPECIFIED WHETHER LONG TERM INSULIN USE: ICD-10-CM

## 2024-01-11 ENCOUNTER — OFFICE VISIT (OUTPATIENT)
Dept: ENDOCRINOLOGY | Facility: CLINIC | Age: 34
End: 2024-01-11
Payer: COMMERCIAL

## 2024-01-11 ENCOUNTER — PATIENT MESSAGE (OUTPATIENT)
Dept: FAMILY MEDICINE | Facility: CLINIC | Age: 34
End: 2024-01-11
Payer: COMMERCIAL

## 2024-01-11 ENCOUNTER — LAB VISIT (OUTPATIENT)
Dept: LAB | Facility: HOSPITAL | Age: 34
End: 2024-01-11
Attending: PHYSICIAN ASSISTANT
Payer: COMMERCIAL

## 2024-01-11 VITALS
DIASTOLIC BLOOD PRESSURE: 82 MMHG | WEIGHT: 169.31 LBS | TEMPERATURE: 98 F | SYSTOLIC BLOOD PRESSURE: 120 MMHG | HEART RATE: 92 BPM | HEIGHT: 63 IN | BODY MASS INDEX: 30 KG/M2 | OXYGEN SATURATION: 98 %

## 2024-01-11 DIAGNOSIS — E28.319 PREMATURE MENOPAUSE ON HRT: ICD-10-CM

## 2024-01-11 DIAGNOSIS — Z79.890 PREMATURE MENOPAUSE ON HRT: ICD-10-CM

## 2024-01-11 DIAGNOSIS — E11.9 TYPE 2 DIABETES MELLITUS WITHOUT COMPLICATION, UNSPECIFIED WHETHER LONG TERM INSULIN USE: ICD-10-CM

## 2024-01-11 DIAGNOSIS — E78.5 HYPERLIPIDEMIA, UNSPECIFIED HYPERLIPIDEMIA TYPE: ICD-10-CM

## 2024-01-11 DIAGNOSIS — E11.65 TYPE 2 DIABETES MELLITUS WITH HYPERGLYCEMIA, WITHOUT LONG-TERM CURRENT USE OF INSULIN: Primary | ICD-10-CM

## 2024-01-11 DIAGNOSIS — K86.1 IDIOPATHIC CHRONIC PANCREATITIS: ICD-10-CM

## 2024-01-11 DIAGNOSIS — E11.65 TYPE 2 DIABETES MELLITUS WITH HYPERGLYCEMIA, WITHOUT LONG-TERM CURRENT USE OF INSULIN: ICD-10-CM

## 2024-01-11 PROCEDURE — 99999 PR PBB SHADOW E&M-EST. PATIENT-LVL IV: CPT | Mod: PBBFAC,,, | Performed by: PHYSICIAN ASSISTANT

## 2024-01-11 PROCEDURE — 1159F MED LIST DOCD IN RCRD: CPT | Mod: CPTII,S$GLB,, | Performed by: PHYSICIAN ASSISTANT

## 2024-01-11 PROCEDURE — 3008F BODY MASS INDEX DOCD: CPT | Mod: CPTII,S$GLB,, | Performed by: PHYSICIAN ASSISTANT

## 2024-01-11 PROCEDURE — 86341 ISLET CELL ANTIBODY: CPT | Mod: 91 | Performed by: PHYSICIAN ASSISTANT

## 2024-01-11 PROCEDURE — 99204 OFFICE O/P NEW MOD 45 MIN: CPT | Mod: S$GLB,,, | Performed by: PHYSICIAN ASSISTANT

## 2024-01-11 PROCEDURE — 1160F RVW MEDS BY RX/DR IN RCRD: CPT | Mod: CPTII,S$GLB,, | Performed by: PHYSICIAN ASSISTANT

## 2024-01-11 PROCEDURE — 3074F SYST BP LT 130 MM HG: CPT | Mod: CPTII,S$GLB,, | Performed by: PHYSICIAN ASSISTANT

## 2024-01-11 PROCEDURE — 83036 HEMOGLOBIN GLYCOSYLATED A1C: CPT | Performed by: PHYSICIAN ASSISTANT

## 2024-01-11 PROCEDURE — 36415 COLL VENOUS BLD VENIPUNCTURE: CPT | Mod: PO | Performed by: PHYSICIAN ASSISTANT

## 2024-01-11 PROCEDURE — 3079F DIAST BP 80-89 MM HG: CPT | Mod: CPTII,S$GLB,, | Performed by: PHYSICIAN ASSISTANT

## 2024-01-11 RX ORDER — PROGESTERONE 100 MG/1
100 CAPSULE ORAL DAILY
Qty: 90 CAPSULE | Refills: 3 | Status: SHIPPED | OUTPATIENT
Start: 2024-01-11 | End: 2025-01-10

## 2024-01-11 RX ORDER — METFORMIN HYDROCHLORIDE 500 MG/1
TABLET, EXTENDED RELEASE ORAL
Qty: 450 TABLET | Refills: 3 | Status: SHIPPED | OUTPATIENT
Start: 2024-01-11

## 2024-01-11 NOTE — PROGRESS NOTES
CC: This 33 y.o. female presents for management of T2DM  and chronic conditions pending review including HTN, HLP    HPI: was diagnosed with T2DM in  after multiple episodes of pancreatitis. 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 New to endocrine.   Has never been hospitalized r/t DM.  Family hx of DM: gm  Fhx of thyroid disease: none  Denies missing doses of DM medication.   hypoglycemia at home: none recently  monitoring BG at home:  Fastin-160s  After meals: 200    Diet:   BF-coffee  LH-grilled cheese  DN-hibachi-veg, rice   Snacks on string cheese. Avoids sugary beverages.     Exercise: Beach body 5x weekly    CURRENT DM MEDS: Metformin 1000 mg bid    Previous meds:  Glimepiride    Standards of Care:  Eye exam: 10/23 [facility] -under media  Podiatry exam: PCP  DE:  DOUG Saavedra    PMHx, PSHx: reviewed in epic.  Social Hx: no E/T use.    Wt Readings from Last 10 Encounters:   24 76.8 kg (169 lb 5 oz)   23 75.7 kg (166 lb 14.2 oz)   10/25/23 75.7 kg (166 lb 14.4 oz)   23 76.4 kg (168 lb 6.4 oz)   23 77.1 kg (170 lb)   23 77.7 kg (171 lb 4 oz)   23 78.2 kg (172 lb 6.4 oz)   23 79.3 kg (174 lb 13.2 oz)   23 79.3 kg (174 lb 12.8 oz)   03/15/23 81 kg (178 lb 9.6 oz)      ROS:   Gen: Appetite good, no weight gain or loss, denies fatigue and weakness.  Skin: Skin is intact and heals well, no rashes, no hair changes  Eyes: Denies visual disturbances  Resp: no SOB or MASCORRO, no cough  Cardiac: No palpitations, chest pain, no edema   GI: No nausea or vomiting, diarrhea, constipation, or abdominal pain.  /GYN: No nocturia, burning or pain.   MS/Neuro: Denies numbness/ tingling in BLE; Gait steady, speech clear  Psych: Denies drug/ETOH abuse, no hx of depression.  Other systems: negative.    /82 (BP Location: Right arm, Patient Position: Sitting, BP Method: Small (Manual))   Pulse 92   Temp 98.2 °F (36.8 °C) (Oral)   Ht 5'  "3" (1.6 m)   Wt 76.8 kg (169 lb 5 oz)   SpO2 98%   BMI 29.99 kg/m²      PE:  GENERAL: Well developed, well nourished.  PSYCH: AAOx3, appropriate mood and affect, pleasant expression, conversant, appears relaxed, well groomed.   EYES: Conjunctiva, corneas clear  NECK: Supple, trachea midline,no thyromegaly or nodules  CHEST: Resp even and unlabored, CTA bilateral.  CARDIAC: RRR, S1, S2 heard, no murmurs  VASCULAR: DP pulses +2/4 bilaterally, no edema.  NEURO: Gait steady  SKIN: Skin warm and dry no acanthosis nigracans.  1/24  Foot Exam: no sores or macerations noted.     Protective Sensation (w/ 10 gram monofilament):  Right: Intact  Left: Intact    Visual Inspection:  Normal -  Bilateral and Nails Intact - without Evidence of Foot Deformity- Bilateral    Pedal Pulses:   Right: Present  Left: Present    Posterior Tibialis Pulses:   Right:Present  Left: Present     Vibratory Sensation  Right:Positive  Left:Positive     Personally reviewed labs below:    No visits with results within 1 Month(s) from this visit.   Latest known visit with results is:   Lab Visit on 11/06/2023   Component Date Value Ref Range Status    Microalbumin, Urine 11/06/2023 10.0  ug/mL Final    Creatinine, Urine 11/06/2023 152.0  15.0 - 325.0 mg/dL Final    Microalb/Creat Ratio 11/06/2023 6.6  0.0 - 30.0 ug/mg Final       ASSESSMENT and PLAN:    1. 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 Type 2 diabetes mellitus without complication, unspecified whether long term insulin use        3. 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  Increase metformin to 1000 mg w/ bf and 1500 mg w/ dn.     HLD  Elevated-recheck next time    Chronic pancreatitis  Stable-avoid GLP-1s and DPP-IVs    Follow-Up   A1c today  F/u in " 4mths w/ labs prior -A1c, cmp, lp

## 2024-01-12 LAB
ESTIMATED AVG GLUCOSE: 157 MG/DL (ref 68–131)
HBA1C MFR BLD: 7.1 % (ref 4–5.6)

## 2024-01-19 LAB
GAD65 AB SER-SCNC: 0 NMOL/L
IMMUNOLOGIST REVIEW: NORMAL
INSULIN AB SER-SCNC: 0 NMOL/L (ref 0–0.02)
ISLET CELL512 AB SER-SCNC: 0 NMOL/L
ZNT8 AB SERPL IA-ACNC: <15 U/ML

## 2024-02-19 ENCOUNTER — PATIENT MESSAGE (OUTPATIENT)
Dept: ADMINISTRATIVE | Facility: HOSPITAL | Age: 34
End: 2024-02-19
Payer: COMMERCIAL

## 2024-02-20 ENCOUNTER — PATIENT OUTREACH (OUTPATIENT)
Dept: ADMINISTRATIVE | Facility: HOSPITAL | Age: 34
End: 2024-02-20
Payer: COMMERCIAL

## 2024-02-20 ENCOUNTER — PATIENT MESSAGE (OUTPATIENT)
Dept: ADMINISTRATIVE | Facility: HOSPITAL | Age: 34
End: 2024-02-20
Payer: COMMERCIAL

## 2024-02-20 NOTE — LETTER
"       AUTHORIZATION FOR RELEASE OF   CONFIDENTIAL INFORMATION    Dear [facility],    We are seeing Bob Jean Baptiste, date of birth 1990, in the clinic at [facility]. Dottie Demarco MD is the patient's PCP. Bob Jean Baptiste has an outstanding lab/procedure at the time we reviewed her chart. In order to help keep her health information updated, she has authorized us to request the following medical record(s):                                               ( X )  EYE EXAM                Please fax records to [facility], [place], Christie H., MD, [phone].     Thank you  Priscilla Donovan JHONY  Clinical Care Coordinator  [facility]           Patient Name: Bob Jean Baptiste  : 1990  Patient Phone #: [phone]                  A. Consent for Examination and Treatment: I hereby authorize the providers and employees of [facility] ("[facility] to provide medical treatment/services which includes, but is not limited to, performing and administering tests and diagnostic procedures that are deemed necessary, Including, but not limited to, imaging examinations, blood tests and other laboratory procedures as may be required by the hospital, clinic, or may be ordered by my physician(s) or persons working under the general and/or special instructions of my physician(s).                   1.      I understand and agree that this consent covers all authorized persons, including but not limited to physicians, residents, nurse practitioners, physicians' assistants, specialists, consultants, student nurses, and independently contracted physicians, who are called upon by the physician in charge, to carry out the diagnostic procedures and medical or surgical treatment.  2.      I hereby authorize [facility] to retain or dispose of any specimens or tissue, should there be such remaining from any test or procedure.  3.      I hereby authorize and give consent for [facility] providers and " employees to take photographs, images or videotapes of such diagnostic, surgical or treatment procedures of Patient as may be required by [facility] or as may be ordered by a physician.  I further acknowledge and agree that [facility] may use cameras or other devices for patient monitoring.  4.      I am aware that the practice of medicine is not an exact science, and I acknowledge that no guarantees have been made to me as to the outcome of any tests, procedures or treatment.                   B. Authorization for Release of Information:  I understand that my insurance company and/or their agents may need information necessary to make determinations about payment/reimbursement.  I hereby provide authorization to release to all insurance companies, their successors, assignees, other parties with whom they may have contracted, or others acting on their behalf, that are involved with payment for any hospital and/or clinic charges incurred by the patient, any information that they request and deem necessary for payment/reimbursement, and/or quality review.  I further authorize the release of my health information to physicians or other health care practitioners on staff who are involved in my health care now and in the future, and to other health care providers, entities, or institutions for the purpose of my continued care and treatment, including referrals.     C. Medicare Patient's Certification and Authorization to Release Information and Payment Request:  I certify that the information given by me in applying for payment under Title XVIII of the Social Security Act is correct.  I authorize any schultz of medical or other information about me to release to the Social Security Administration, or its intermediaries or carriers, any information needed for this or a related Medicare claim.  I request that payment of authorized benefits be made on my behalf.     D. Assignment of Insurance Benefits:  I hereby authorize any and  all insurance companies, health plans, defined benefit plans, health insurers or any entity that is or may be responsible for payment of my medical expenses to pay all hospital and medical benefits now due, and to become due and payable to me under any hospital benefits, sick benefits, injury benefits or any other benefit for services rendered to me, including Major Medical Benefits, direct to [facility] and all independently contracted physicians.  I assign any and all rights that I may have against any and all insurance companies, health plans, defined benefit plans, health insurers or any entity that is or may be responsible for payment of my medical expenses, including, but not limited to any right to appeal a denial of a claim, any right to bring any action, lawsuit, administrative proceeding, or other cause of action on my behalf.  I specifically assign my right to pursue litigation against any and all insurance companies, health plans, defined benefit plans, health insurers or any entity that is or may be responsible for payment of medical expenses based upon a refusal to pay charges.              REGISTRATION  AUTHORIZATION                    E. Valuables:  It is understood and agreed that [facility] is not liable for the damage to or loss of any money, jewelry, documents, dentures, eye glasses, hearing aids, prosthetics, or other property of value.     F. Computer Equipment:  I understand and agree that should I choose to use computer equipment owned by [facility] or if I choose to access the Internet via [facility]'s network, I do so at my own risk.  [facility] is not responsible for any damage to my computer equipment or to any damages of any type that might arise from my loss of equipment or data.                   G. Acceptance of Financial Responsibility:  I agree that in consideration of the services and supplies that have been or will be furnished to the patient,  I am hereby obligated to pay all charges made for  or on the account of the patient according to the standard rates (in effect at the time the services and supplies are delivered) established by [facility], including its Patient Financial Assistance Policy to the extent it is applicable.  I understand that I am responsible for all charges, or portions thereof, not covered by insurance or other sources.  Patient refunds will be distributed only after balances at all [facility] facilities are paid.                   H. Communication Authorization:  I hereby authorize [facility] and its representatives, along with any billing service or  who may work on their behalf, to contact me on my cell phone and/or home phone using prerecorded messages, artificial voice messages, automatic telephone dialing devices or other computer assisted technology, or by electronic mail, text messaging, or by any other form of electronic communication.  This includes, but is not limited to appointment reminders, yearly physical exam reminders, preventive care reminders, patient campaigns, welcome calls, and calls about account balances on my account or any account on which I am listed as a guarantor.  I understand I have the right to opt out of these communications at any time.     I.  Relationship Between Facility and Physician:  I understand that some, but not all, providers furnishing services to the patient are not employees or agents of [facility].  The patient is under the care and supervision of his/her attending physician, and it is the responsibility of the facility and its nursing staff to carry out the instructions of such physicians.  It is the responsibility of the patient's physician/designee to obtain the patient's informed consent, when required, for medical or surgical treatment, special diagnostic or therapeutic procedures, or hospital services rendered for the patient under the special instructions of the physician/designee.     J. Notice of Private Practices:  I  acknowledge I have received a copy of [facility]'s Notice of Privacy Practices.     K. Facility Directory:  I have discussed with the organization my desire to be either included or excluded in the facility directory.  I understand that if my choice is to opt-out of being identified in the facility directory that the facility will not provide any information about me such as my condition (e.g. Fair, stable, etc.) or my location in the facility (e.g. Room number, department).     L. LINKS:  For [place] Residents:  [facility] is a LINKS ([place] [facility]) participating facility.  LINKS is a [facility]-sponsored confidential computer system that helps you and your doctor keep track of you and your child's immunization history.  I acknowledge that I am allowing [facility] to share this information with [facility].  For [place] Residents:  [facility] is a MIIX ([place] Immunization Information eXchange) participant.  M[place]opego is a [place] Department of Health-sponsored confidential computer system that helps you and your doctor keep track of you and your child's immunization history.  I acknowledge that I am allowing [facility] to share information with [employer].     M. Term:  This authorization is valid for this and subsequent care/treatment I receive at [facility] and will remain valid unless/until revoked in writing by me.      ACKNOWLEDGMENT OF POTENTIAL RISKS OF COVID-19 VACCINE  It is important that you, as the patient or patients legally authorized representative(s), understand and acknowledge the following, with regard to administration of the COVID-19 vaccine offered by [facility]:  The SARS-CoV-2 virus (COVID-19) has caused an unprecedented modern global pandemic that has mobilized scientists and drug manufacturers to work to create safe and effective vaccines to get the crisis under control.  No vaccine is released in the [place] without undergoing rigorous, multi-layered testing and  approval by the Food and Drug Administration.  During a public health emergency, however, vaccines can be released for patient administration by the FDA prior to completion of multi-phase clinical trials and approval. This is done by the FDAs granting of Emergency Use Authorization (EUA) when the vaccine meets reasonable thresholds for safety and effectiveness and people are in urgent need of care. Under an EUA, the FDA has found that known and potential benefits outweigh its known and potential risks.  The vaccine for which you are presenting to [facility] has been released under an EUA, which [facility] is honoring in its distribution of the vaccine to the public. While the FDAs authorization indicates its belief that usage is recommended over possible risks, there is still the possibility that unknown risks of the vaccine could exist.  By signing this document, you acknowledge and assume these risks. Further, you waive any and all claims of liability against and hold harmless any [facility] entity or provider for any harm caused to you by said possible unknown risks of the vaccine.        N. [facility]:  As used in this document, [facility] means all [facility] affiliated entities including all health centers, surgery centers, clinics, and hospitals.  It includes more specifically, the following entities: [facility], a not for profit [place] [employer], and its subsidiaries and affiliates, including [facility], [facility], [facility], L[facility]aliaC., [facility], [facility], [facility], L.LThaliaC., [facility], L.L.C., [facility] L[facility].d/b/a [facility], L.LThaliaC.d/b/a [facility] [employer], [employer] As manager of [employer] [facility], [facility], L.LBRITTNEY [facility]  [employer], L.L.C.d/b/a [facility], [facility], L[facility]C., [facility], L.L.C., and [facility] as manager of [facility].                                                                Patient/Legal Guardian Signature                                                    This signature was collected at 02/02/2023      /                                                                            Printed Name/Relationship to Patient                                                [facility] complies with applicable Federal civil rights laws and does not discriminate on the basis of race, color, national origin, age, disability, or sex.          ATENCIÓN: si habla español, tiene a mccollum disposición servicios gratuitos de asistencia lingüística. Jayy pagan [phone].         CHÚ Ý: N?u b?n nói Ti?ng Vi?t, có các d?ch v? h? tr? ngôn ng? mi?n phí dành cho b?n. G?i s? [phone].

## 2024-02-20 NOTE — PROGRESS NOTES
Population Health Chart Review & Patient Outreach Details    Outreach Performed: YES Portal    Additional [facility] Health Notes:           Updates Requested / Reviewed:      Updated Care Coordination Note, Care Everywhere, and Immunizations Reconciliation Completed or Queried: [place]         Health Maintenance Topics Overdue:    Health Maintenance Due   Topic Date Due    COVID-19 Vaccine (1) Never done    Pneumococcal Vaccines (Age 0-64) (2 of 2 - PCV) 07/23/2020    Eye Exam  10/10/2023    Cervical Cancer Screening  12/21/2023         Health Maintenance Topic(s) Outreach Outcomes & Actions Taken:    Cervical Cancer Screening - Outreach Outcomes & Actions Taken  : Pap Smear/HPV Scheduled in Primary Care or OBGYN    Eye Exam - Outreach Outcomes & Actions Taken  : External Records Requested & Care Team Updated if Applicable

## 2024-02-21 ENCOUNTER — PATIENT MESSAGE (OUTPATIENT)
Dept: FAMILY MEDICINE | Facility: CLINIC | Age: 34
End: 2024-02-21
Payer: COMMERCIAL

## 2024-02-21 ENCOUNTER — PATIENT OUTREACH (OUTPATIENT)
Dept: ADMINISTRATIVE | Facility: HOSPITAL | Age: 34
End: 2024-02-21
Payer: COMMERCIAL

## 2024-02-21 NOTE — PROGRESS NOTES
Population Health Chart Review & Patient Outreach Details    Outreach Performed: NO    Additional Pop Health Notes:           Updates Requested / Reviewed:      Updated Care Coordination Note, , and Care Team Updated         Health Maintenance Topics Overdue:    Health Maintenance Due   Topic Date Due    COVID-19 Vaccine (1) Never done    Pneumococcal Vaccines (Age 0-64) (2 of 2 - PCV) 07/23/2020    Eye Exam  10/10/2023    Cervical Cancer Screening  12/21/2023         Health Maintenance Topic(s) Outreach Outcomes & Actions Taken:     EYE EXAM HYPERLINKED.

## 2024-02-23 ENCOUNTER — OFFICE VISIT (OUTPATIENT)
Dept: FAMILY MEDICINE | Facility: CLINIC | Age: 34
End: 2024-02-23
Payer: COMMERCIAL

## 2024-02-23 VITALS
WEIGHT: 169.5 LBS | DIASTOLIC BLOOD PRESSURE: 72 MMHG | SYSTOLIC BLOOD PRESSURE: 111 MMHG | BODY MASS INDEX: 30.03 KG/M2 | OXYGEN SATURATION: 98 % | HEIGHT: 63 IN | HEART RATE: 87 BPM | RESPIRATION RATE: 16 BRPM

## 2024-02-23 DIAGNOSIS — Z79.899 LONG-TERM USE OF HIGH-RISK MEDICATION: ICD-10-CM

## 2024-02-23 DIAGNOSIS — K86.1 IDIOPATHIC CHRONIC PANCREATITIS: ICD-10-CM

## 2024-02-23 DIAGNOSIS — K86.89 DIABETES MELLITUS SECONDARY TO PANCREATIC INSUFFICIENCY: ICD-10-CM

## 2024-02-23 DIAGNOSIS — E55.9 VITAMIN D DEFICIENCY: ICD-10-CM

## 2024-02-23 DIAGNOSIS — J31.0 CHRONIC RHINITIS: ICD-10-CM

## 2024-02-23 DIAGNOSIS — K86.1 CHRONIC BILIARY PANCREATITIS: ICD-10-CM

## 2024-02-23 DIAGNOSIS — F41.1 GENERALIZED ANXIETY DISORDER: Primary | ICD-10-CM

## 2024-02-23 DIAGNOSIS — E08.9 DIABETES MELLITUS SECONDARY TO PANCREATIC INSUFFICIENCY: ICD-10-CM

## 2024-02-23 DIAGNOSIS — K21.9 GASTROESOPHAGEAL REFLUX DISEASE WITHOUT ESOPHAGITIS: ICD-10-CM

## 2024-02-23 DIAGNOSIS — E66.09 CLASS 1 OBESITY DUE TO EXCESS CALORIES WITH SERIOUS COMORBIDITY AND BODY MASS INDEX (BMI) OF 30.0 TO 30.9 IN ADULT: ICD-10-CM

## 2024-02-23 PROCEDURE — 3051F HG A1C>EQUAL 7.0%<8.0%: CPT | Mod: S$GLB,,, | Performed by: STUDENT IN AN ORGANIZED HEALTH CARE EDUCATION/TRAINING PROGRAM

## 2024-02-23 PROCEDURE — 99999 PR PBB SHADOW E&M-EST. PATIENT-LVL IV: CPT | Mod: PBBFAC,,, | Performed by: STUDENT IN AN ORGANIZED HEALTH CARE EDUCATION/TRAINING PROGRAM

## 2024-02-23 PROCEDURE — 80307 DRUG TEST PRSMV CHEM ANLYZR: CPT | Performed by: STUDENT IN AN ORGANIZED HEALTH CARE EDUCATION/TRAINING PROGRAM

## 2024-02-23 PROCEDURE — 3078F DIAST BP <80 MM HG: CPT | Mod: S$GLB,,, | Performed by: STUDENT IN AN ORGANIZED HEALTH CARE EDUCATION/TRAINING PROGRAM

## 2024-02-23 PROCEDURE — 3074F SYST BP LT 130 MM HG: CPT | Mod: S$GLB,,, | Performed by: STUDENT IN AN ORGANIZED HEALTH CARE EDUCATION/TRAINING PROGRAM

## 2024-02-23 PROCEDURE — 99214 OFFICE O/P EST MOD 30 MIN: CPT | Mod: S$GLB,,, | Performed by: STUDENT IN AN ORGANIZED HEALTH CARE EDUCATION/TRAINING PROGRAM

## 2024-02-23 PROCEDURE — 3008F BODY MASS INDEX DOCD: CPT | Mod: S$GLB,,, | Performed by: STUDENT IN AN ORGANIZED HEALTH CARE EDUCATION/TRAINING PROGRAM

## 2024-02-23 PROCEDURE — 1159F MED LIST DOCD IN RCRD: CPT | Mod: S$GLB,,, | Performed by: STUDENT IN AN ORGANIZED HEALTH CARE EDUCATION/TRAINING PROGRAM

## 2024-02-23 NOTE — ASSESSMENT & PLAN NOTE
Seeing endocrinology  Lab Results   Component Value Date    HGBA1C 7.1 (H) 01/11/2024     Diabetes Medications               glimepiride (AMARYL) 4 MG tablet Take 4 mg by mouth before breakfast. If needed for elevated glucose    metFORMIN (GLUCOPHAGE-XR) 500 MG ER 24hr tablet Take 2 tablets with breakfast and 3 tablets with dinner.

## 2024-02-23 NOTE — ASSESSMENT & PLAN NOTE
Wt Readings from Last 6 Encounters:   02/23/24 76.9 kg (169 lb 8 oz)   01/11/24 76.8 kg (169 lb 5 oz)   11/13/23 75.7 kg (166 lb 14.2 oz)   10/25/23 75.7 kg (166 lb 14.4 oz)   09/20/23 76.4 kg (168 lb 6.4 oz)   08/14/23 77.1 kg (170 lb)     Working on diet

## 2024-02-23 NOTE — ASSESSMENT & PLAN NOTE
Stable and doing well.   She did have a flair and got some fluids over mardigras- at a [facility]  She was a bit hypotensive and felt better after the fluids.

## 2024-02-23 NOTE — ASSESSMENT & PLAN NOTE
She has an ongoing issue with chronic pancreatitis. Now hereditary gene.  She has seen multiple specialists.  She sometimes gets IVF at [facility] center and gets some pain medication and valium periodically   reviewed  She has been on creon but has never had it adjusted or monitored by GI  GI referral

## 2024-02-23 NOTE — PROGRESS NOTES
Subjective:       Patient ID: Bob Jean Baptiste is a 33 y.o. female.    Chief Complaint: Follow-up (diabetes)    Patient Active Problem List:     Chronic biliary pancreatitis     Gastroesophageal reflux disease without esophagitis     Class 1 obesity due to excess calories with serious comorbidity and body mass index (BMI) of 30.0 to 30.9 in adult     Idiopathic chronic pancreatitis     Premature ovarian failure     Pancreatic duct stones     Diabetes mellitus secondary to pancreatic insufficiency     Chronic rhinitis     Fever blister     Generalized anxiety disorder     Vitamin D deficiency    Current Outpatient Medications:  blood sugar diagnostic Strp, To check BG two times daily, to use with insurance preferred meter  CREON 24,000-76,000 -120,000 unit capsule, Take 2 capsules by mouth 3 (three) times daily with meals.  diazePAM (VALIUM) 5 MG tablet, TAKE 1 TABLET BY MOUTH EVERY 12 HOURS AS NEEDED FOR ABDOMINAL SPASMS  estradioL (ESTRACE) 1 MG tablet, Take 1 tablet (1 mg total) by mouth once daily.  glimepiride (AMARYL) 4 MG tablet, Take 4 mg by mouth before breakfast. If needed for elevated glucose  HYDROcodone-acetaminophen (NORCO)  mg per tablet, Take 1 tablet by mouth every 8 (eight) hours as needed for Pain.  lancets Misc, To check BG two times daily, to use with insurance preferred meter  metFORMIN (GLUCOPHAGE-XR) 500 MG ER 24hr tablet, Take 2 tablets with breakfast and 3 tablets with dinner.  ondansetron (ZOFRAN-ODT) 4 MG TbDL, Take 1 tablet (4 mg total) by mouth every 6 (six) hours as needed (nausea or vomiting).  pantoprazole (PROTONIX) 40 MG tablet, TAKE 1 TABLET (40 MG TOTAL) BY MOUTH ONCE DAILY.  progesterone (PROMETRIUM) 100 MG capsule, Take 1 capsule (100 mg total) by mouth once daily. Appointment required for future refills  traMADoL (ULTRAM) 50 mg tablet, TAKE 1 TO 2 TABLETS BY MOUTH EVERY 6 HOURS AS NEEDED FOR ABDOMINAL PAIN  acyclovir (ZOVIRAX) 400 MG tablet, Take 1 tablet (400 mg total) by  mouth 3 (three) times daily. for 5 days  TRUE METRIX GLUCOSE METER kit, Use as instructed    No current facility-administered medications for this visit.          Review of Systems   Constitutional:  Negative for activity change and appetite change.   Respiratory:  Negative for shortness of breath.    Cardiovascular:  Negative for chest pain.   Gastrointestinal:  Negative for abdominal pain and anal bleeding.   Genitourinary:  Negative for dysuria.   Integumentary:  Negative for rash.   Psychiatric/Behavioral:  Negative for dysphoric mood and sleep disturbance. The patient is not nervous/anxious.          Objective:      Physical Exam  Constitutional:       General: She is not in acute distress.     Appearance: Normal appearance. She is not ill-appearing.   Eyes:      Conjunctiva/sclera: Conjunctivae normal.   Cardiovascular:      Rate and Rhythm: Normal rate and regular rhythm.      Heart sounds: Normal heart sounds. No murmur heard.  Pulmonary:      Effort: Pulmonary effort is normal. No respiratory distress.      Breath sounds: Normal breath sounds.   Musculoskeletal:      Right lower leg: No edema.      Left lower leg: No edema.   Skin:     General: Skin is warm and dry.   Neurological:      Mental Status: She is alert. Mental status is at baseline.      Gait: Gait normal.   Psychiatric:         Mood and Affect: Mood normal.         Behavior: Behavior normal.         Thought Content: Thought content normal.         Judgment: Judgment normal.         Assessment:       1. Generalized anxiety disorder    2. Chronic rhinitis    3. Class 1 obesity due to excess calories with serious comorbidity and body mass index (BMI) of 30.0 to 30.9 in adult    4. Diabetes mellitus secondary to pancreatic insufficiency    5. Vitamin D deficiency    6. Chronic biliary pancreatitis    7. Gastroesophageal reflux disease without esophagitis    8. Idiopathic chronic pancreatitis    9. Long-term use of high-risk medication        Plan:        Problem List Items Addressed This Visit          Psychiatric    Generalized anxiety disorder - Primary     Stable. Continue current medications and regular followup.              ENT    Chronic rhinitis     Stable. Continue current medications and regular followup.              Endocrine    Class 1 obesity due to excess calories with serious comorbidity and body mass index (BMI) of 30.0 to 30.9 in adult     Wt Readings from Last 6 Encounters:   02/23/24 76.9 kg (169 lb 8 oz)   01/11/24 76.8 kg (169 lb 5 oz)   11/13/23 75.7 kg (166 lb 14.2 oz)   10/25/23 75.7 kg (166 lb 14.4 oz)   09/20/23 76.4 kg (168 lb 6.4 oz)   08/14/23 77.1 kg (170 lb)   Working on diet         Diabetes mellitus secondary to pancreatic insufficiency     Seeing endocrinology  Lab Results   Component Value Date    HGBA1C 7.1 (H) 01/11/2024     Diabetes Medications               glimepiride (AMARYL) 4 MG tablet Take 4 mg by mouth before breakfast. If needed for elevated glucose    metFORMIN (GLUCOPHAGE-XR) 500 MG ER 24hr tablet Take 2 tablets with breakfast and 3 tablets with dinner.                 Vitamin D deficiency     Stable. Continue current medications and regular followup.              GI    Chronic biliary pancreatitis     Stable and doing well.   She did have a flair and got some fluids over [facility]igras- [facility]  She was a bit hypotensive and felt better after the fluids.         Gastroesophageal reflux disease without esophagitis     Stable. Continue current medications and regular followup.  She had been on protonix and had some nasuea              Idiopathic chronic pancreatitis     She has an ongoing issue with chronic pancreatitis. Now hereditary gene.  She has seen multiple specialists.  She sometimes gets IVF at [facility] center and gets some pain medication and valium periodically   reviewed  She has been on creon but has never had it adjusted or monitored by GI  GI referral          Other Visit Diagnoses        Long-term use of high-risk medication        Relevant Orders    Drug screen panel, in-house               reviewed and last fills in september

## 2024-02-26 LAB
AMPHET+METHAMPHET UR QL: NEGATIVE
BARBITURATES UR QL SCN>200 NG/ML: NEGATIVE
BENZODIAZ UR QL SCN>200 NG/ML: ABNORMAL
BZE UR QL SCN: NEGATIVE
CANNABINOIDS UR QL SCN: NEGATIVE
CREAT UR-MCNC: 200.8 MG/DL (ref 15–325)
METHADONE UR QL SCN>300 NG/ML: NEGATIVE
OPIATES UR QL SCN: NEGATIVE
PCP UR QL SCN>25 NG/ML: NEGATIVE
TOXICOLOGY INFORMATION: ABNORMAL

## 2024-02-29 ENCOUNTER — OFFICE VISIT (OUTPATIENT)
Dept: GASTROENTEROLOGY | Facility: CLINIC | Age: 34
End: 2024-02-29
Payer: COMMERCIAL

## 2024-02-29 ENCOUNTER — PATIENT MESSAGE (OUTPATIENT)
Dept: GASTROENTEROLOGY | Facility: CLINIC | Age: 34
End: 2024-02-29

## 2024-02-29 VITALS
HEIGHT: 63 IN | DIASTOLIC BLOOD PRESSURE: 78 MMHG | HEART RATE: 99 BPM | WEIGHT: 169.56 LBS | BODY MASS INDEX: 30.04 KG/M2 | SYSTOLIC BLOOD PRESSURE: 116 MMHG

## 2024-02-29 DIAGNOSIS — K86.1 IDIOPATHIC CHRONIC PANCREATITIS: Primary | ICD-10-CM

## 2024-02-29 DIAGNOSIS — F41.9 ANXIETY: ICD-10-CM

## 2024-02-29 PROCEDURE — 3051F HG A1C>EQUAL 7.0%<8.0%: CPT | Mod: CPTII,S$GLB,, | Performed by: INTERNAL MEDICINE

## 2024-02-29 PROCEDURE — 3078F DIAST BP <80 MM HG: CPT | Mod: CPTII,S$GLB,, | Performed by: INTERNAL MEDICINE

## 2024-02-29 PROCEDURE — 99214 OFFICE O/P EST MOD 30 MIN: CPT | Mod: S$GLB,,, | Performed by: INTERNAL MEDICINE

## 2024-02-29 PROCEDURE — 99999 PR PBB SHADOW E&M-EST. PATIENT-LVL IV: CPT | Mod: PBBFAC,,, | Performed by: INTERNAL MEDICINE

## 2024-02-29 PROCEDURE — 3074F SYST BP LT 130 MM HG: CPT | Mod: CPTII,S$GLB,, | Performed by: INTERNAL MEDICINE

## 2024-02-29 PROCEDURE — 1160F RVW MEDS BY RX/DR IN RCRD: CPT | Mod: CPTII,S$GLB,, | Performed by: INTERNAL MEDICINE

## 2024-02-29 PROCEDURE — 1159F MED LIST DOCD IN RCRD: CPT | Mod: CPTII,S$GLB,, | Performed by: INTERNAL MEDICINE

## 2024-02-29 PROCEDURE — 3008F BODY MASS INDEX DOCD: CPT | Mod: CPTII,S$GLB,, | Performed by: INTERNAL MEDICINE

## 2024-02-29 RX ORDER — DAPAGLIFLOZIN 5 MG/1
10 TABLET, FILM COATED ORAL DAILY
COMMUNITY
Start: 2024-02-26

## 2024-02-29 NOTE — PROGRESS NOTES
SUBJECTIVE:         Chief Complaint:   Chief Complaint   Patient presents with    Pancreatic duct stones    Pancreatic cyst       History of Present Illness:  Patient is a 33 y.o. female presents with chronic pancreatitis.  She was diagnosed with chronic pancreatitis approximately 9 years ago.  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  She has had numerous ERCP in the past she had been to the [facility] for discussion of a pancreatectomy cell transplant.  She decided against this.  Over the course of her disease process she is slowly improved.  She currently takes Creon it is having some slight loose stools.  This appears to be the same time.  Has modifications of her insulin and diabetes medication.  Her abdominal pain has improved.  She had had acute constant exacerbation of her pain.  Currently her pain is more intermittent and she knows very well managed.  Typically takes tramadol is a benzodiazepine to manage her pain if becomes worsened.  Her diabetes has become a little bit troublesome.  Her hemoglobin A1c had been in the 11 and now more recently it was around the 7 range.    OBJECTIVE:     Vital Signs (Most Recent)  Pulse: 99 (02/29/24 1430)  BP: 116/78 (02/29/24 1430)    General: well developed, well nourished    Diagnostic Results:  CT: Reviewed      ASSESSMENT/PLAN:     We had a very good discussion regarding the natural history of chronic pancreatitis in her current trajectory for her disease process.  We will make no adjustments to her Creon as currently her diabetes medication adjustments are causing alterations in her bowel habits.  Her pain is well managed with her current regimen her primary care doctor will be the primary person for managing this as her medications will require prescriptions.  She lives in [place] and getting paper prescriptions for benzodiazepines and pain medications for [place] would be problematic.  It discussed that given her  worsening diabetes she likely is no longer a candidate for pancreatic resection in a cell transplantation.  We discussed the natural history of chronic pancreatitis and the risk factor it is for pancreatic cancer.  There is no good screening or surveillance process for pancreatic cancer in patients with chronic pancreatitis but in each age changes or alterations in her weight and diabetes might trigger such imaging.  Given her recent changes in her blood sugars we decided it would be prudent to perform a CT scan of the abdomen today.  She will follow up yearly at this point in reach out should she have any issues that arise.

## 2024-03-02 ENCOUNTER — HOSPITAL ENCOUNTER (OUTPATIENT)
Dept: RADIOLOGY | Facility: HOSPITAL | Age: 34
Discharge: HOME | End: 2024-03-02
Attending: INTERNAL MEDICINE
Payer: COMMERCIAL

## 2024-03-02 DIAGNOSIS — K86.1 IDIOPATHIC CHRONIC PANCREATITIS: ICD-10-CM

## 2024-03-02 PROCEDURE — 74176 CT ABD & PELVIS W/O CONTRAST: CPT | Mod: TC

## 2024-03-02 PROCEDURE — 74176 CT ABD & PELVIS W/O CONTRAST: CPT | Mod: 26,,, | Performed by: RADIOLOGY

## 2024-03-04 ENCOUNTER — PATIENT MESSAGE (OUTPATIENT)
Dept: GASTROENTEROLOGY | Facility: CLINIC | Age: 34
End: 2024-03-04
Payer: COMMERCIAL

## 2024-03-08 ENCOUNTER — PATIENT MESSAGE (OUTPATIENT)
Dept: FAMILY MEDICINE | Facility: CLINIC | Age: 34
End: 2024-03-08
Payer: COMMERCIAL

## 2024-03-08 DIAGNOSIS — B37.31 YEAST VAGINITIS: Primary | ICD-10-CM

## 2024-03-11 RX ORDER — FLUCONAZOLE 150 MG/1
150 TABLET ORAL DAILY
Qty: 2 TABLET | Refills: 0 | Status: SHIPPED | OUTPATIENT
Start: 2024-03-11

## 2024-03-23 ENCOUNTER — TELEPHONE (OUTPATIENT)
Dept: ENDOSCOPY | Facility: HOSPITAL | Age: 34
End: 2024-03-23
Payer: COMMERCIAL

## 2024-03-23 NOTE — TELEPHONE ENCOUNTER
pt request to schedule AES f/u ezequiel for 2025. LVM 2025 AES clinic schedule not currently available. Reminder set to f/u

## 2024-03-30 DIAGNOSIS — E11.65 TYPE 2 DIABETES MELLITUS WITH HYPERGLYCEMIA, WITHOUT LONG-TERM CURRENT USE OF INSULIN: ICD-10-CM

## 2024-04-01 RX ORDER — METFORMIN HYDROCHLORIDE 500 MG/1
TABLET, EXTENDED RELEASE ORAL
Qty: 450 TABLET | Refills: 3 | OUTPATIENT
Start: 2024-04-01

## 2024-04-05 ENCOUNTER — PATIENT MESSAGE (OUTPATIENT)
Dept: FAMILY MEDICINE | Facility: CLINIC | Age: 34
End: 2024-04-05
Payer: COMMERCIAL

## 2024-04-07 RX ORDER — HEPARIN SODIUM,PORCINE 10 UNIT/ML
5 VIAL (ML) INTRAVENOUS
OUTPATIENT
Start: 2024-04-07

## 2024-04-07 RX ORDER — HEPARIN SODIUM (PORCINE) LOCK FLUSH IV SOLN 100 UNIT/ML 100 UNIT/ML
5 SOLUTION INTRAVENOUS
OUTPATIENT
Start: 2024-04-07

## 2024-04-16 DIAGNOSIS — E11.9 TYPE 2 DIABETES MELLITUS WITHOUT COMPLICATION, UNSPECIFIED WHETHER LONG TERM INSULIN USE: ICD-10-CM

## 2024-04-16 RX ORDER — GLUCOSAM/CHON-MSM1/C/MANG/BOSW 500-416.6
TABLET ORAL
Qty: 200 EACH | Refills: 3 | Status: SHIPPED | OUTPATIENT
Start: 2024-04-16

## 2024-04-16 NOTE — TELEPHONE ENCOUNTER
No care due was identified.  [facility] Embedded Care Due Messages. Reference number: [id number].   4/16/2024 9:41:22 AM CDT

## 2024-04-16 NOTE — TELEPHONE ENCOUNTER
Refill Routing Note   Medication(s) are not appropriate for processing by [facility] for the following reason(s):        No active prescription written by provider: previously prescribed by Poncho Atkinson MD       [facility] action(s):  Defer               Appointments  past 12m or future 3m with PCP    Date Provider   Last Visit   2/23/2024 Dottie Demarco MD   Next Visit   5/8/2024 Dottie Demarco MD   ED visits in past 90 days: 0        Note composed:1:00 PM 04/16/2024

## 2024-04-26 ENCOUNTER — PATIENT MESSAGE (OUTPATIENT)
Dept: FAMILY MEDICINE | Facility: CLINIC | Age: 34
End: 2024-04-26
Payer: COMMERCIAL

## 2024-05-01 ENCOUNTER — LAB VISIT (OUTPATIENT)
Dept: LAB | Facility: HOSPITAL | Age: 34
End: 2024-05-01
Attending: STUDENT IN AN ORGANIZED HEALTH CARE EDUCATION/TRAINING PROGRAM
Payer: COMMERCIAL

## 2024-05-01 DIAGNOSIS — E08.9 DIABETES MELLITUS SECONDARY TO PANCREATIC INSUFFICIENCY: ICD-10-CM

## 2024-05-01 DIAGNOSIS — K86.89 DIABETES MELLITUS SECONDARY TO PANCREATIC INSUFFICIENCY: ICD-10-CM

## 2024-05-01 LAB
ALBUMIN SERPL BCP-MCNC: 4.1 G/DL (ref 3.5–5.2)
ALP SERPL-CCNC: 65 U/L (ref 55–135)
ALT SERPL W/O P-5'-P-CCNC: 34 U/L (ref 10–44)
ANION GAP SERPL CALC-SCNC: 11 MMOL/L (ref 8–16)
AST SERPL-CCNC: 29 U/L (ref 10–40)
BILIRUB SERPL-MCNC: 0.3 MG/DL (ref 0.1–1)
BUN SERPL-MCNC: 11 MG/DL (ref 6–20)
CALCIUM SERPL-MCNC: 9.7 MG/DL (ref 8.7–10.5)
CHLORIDE SERPL-SCNC: 105 MMOL/L (ref 95–110)
CHOLEST SERPL-MCNC: 217 MG/DL (ref 120–199)
CHOLEST/HDLC SERPL: 4 {RATIO} (ref 2–5)
CO2 SERPL-SCNC: 24 MMOL/L (ref 23–29)
CREAT SERPL-MCNC: 0.7 MG/DL (ref 0.5–1.4)
EST. GFR  (NO RACE VARIABLE): >60 ML/MIN/1.73 M^2
ESTIMATED AVG GLUCOSE: 166 MG/DL (ref 68–131)
GLUCOSE SERPL-MCNC: 130 MG/DL (ref 70–110)
HBA1C MFR BLD: 7.4 % (ref 4–5.6)
HDLC SERPL-MCNC: 54 MG/DL (ref 40–75)
HDLC SERPL: 24.9 % (ref 20–50)
LDLC SERPL CALC-MCNC: 144 MG/DL (ref 63–159)
NONHDLC SERPL-MCNC: 163 MG/DL
POTASSIUM SERPL-SCNC: 4.3 MMOL/L (ref 3.5–5.1)
PROT SERPL-MCNC: 7.7 G/DL (ref 6–8.4)
SODIUM SERPL-SCNC: 140 MMOL/L (ref 136–145)
TRIGL SERPL-MCNC: 95 MG/DL (ref 30–150)

## 2024-05-01 PROCEDURE — 80061 LIPID PANEL: CPT | Performed by: STUDENT IN AN ORGANIZED HEALTH CARE EDUCATION/TRAINING PROGRAM

## 2024-05-01 PROCEDURE — 83036 HEMOGLOBIN GLYCOSYLATED A1C: CPT | Performed by: STUDENT IN AN ORGANIZED HEALTH CARE EDUCATION/TRAINING PROGRAM

## 2024-05-01 PROCEDURE — 36415 COLL VENOUS BLD VENIPUNCTURE: CPT | Performed by: STUDENT IN AN ORGANIZED HEALTH CARE EDUCATION/TRAINING PROGRAM

## 2024-05-01 PROCEDURE — 80053 COMPREHEN METABOLIC PANEL: CPT | Performed by: STUDENT IN AN ORGANIZED HEALTH CARE EDUCATION/TRAINING PROGRAM

## 2024-05-08 ENCOUNTER — OFFICE VISIT (OUTPATIENT)
Dept: FAMILY MEDICINE | Facility: CLINIC | Age: 34
End: 2024-05-08
Payer: COMMERCIAL

## 2024-05-08 VITALS
WEIGHT: 165 LBS | BODY MASS INDEX: 29.23 KG/M2 | HEIGHT: 63 IN | RESPIRATION RATE: 16 BRPM | OXYGEN SATURATION: 97 % | SYSTOLIC BLOOD PRESSURE: 112 MMHG | DIASTOLIC BLOOD PRESSURE: 80 MMHG | HEART RATE: 85 BPM

## 2024-05-08 DIAGNOSIS — F41.1 GENERALIZED ANXIETY DISORDER: ICD-10-CM

## 2024-05-08 DIAGNOSIS — J31.0 CHRONIC RHINITIS: ICD-10-CM

## 2024-05-08 DIAGNOSIS — K86.1 IDIOPATHIC CHRONIC PANCREATITIS: ICD-10-CM

## 2024-05-08 DIAGNOSIS — Z79.899 LONG-TERM USE OF HIGH-RISK MEDICATION: ICD-10-CM

## 2024-05-08 DIAGNOSIS — E28.39 PREMATURE OVARIAN FAILURE: ICD-10-CM

## 2024-05-08 DIAGNOSIS — E08.9 DIABETES MELLITUS SECONDARY TO PANCREATIC INSUFFICIENCY: Primary | ICD-10-CM

## 2024-05-08 DIAGNOSIS — K86.89 DIABETES MELLITUS SECONDARY TO PANCREATIC INSUFFICIENCY: Primary | ICD-10-CM

## 2024-05-08 DIAGNOSIS — K21.9 GASTROESOPHAGEAL REFLUX DISEASE WITHOUT ESOPHAGITIS: ICD-10-CM

## 2024-05-08 DIAGNOSIS — E11.9 DIABETES MELLITUS WITH NO COMPLICATION: ICD-10-CM

## 2024-05-08 LAB
AMP AMPHETAMINE 1000 NM/ML POC: NEGATIVE
BAR BARBITURATES 300 NG/ML POC: NEGATIVE
BUP BUPRENORPHINE 10 NG/ML POC: NEGATIVE
BZO BENZODIAZEPINES 300 NG/ML POC: ABNORMAL
COC COCAINE 300 NG/ML POC: NEGATIVE
CREATININE (CR) POC: 100
CTP QC/QA: YES
MET METHAMPHETAMINE 1000 NG/ML POC: NEGATIVE
MOP/OPI300 MORPHINE 300 NG/ML POC: NEGATIVE
MTD METHADONE 300 NG/ML POC: NEGATIVE
OXIDANT (OX) POC: NEGATIVE
OXY OXYCODONE 100 NG/ML POC: NEGATIVE
SPECIFIC GRAVITY (SG) POC: 1.01
TEMPERATURE (°F) POC: 94
THC MARIJUANA 50 NG/ML POC: NEGATIVE

## 2024-05-08 PROCEDURE — 3051F HG A1C>EQUAL 7.0%<8.0%: CPT | Mod: S$GLB,,, | Performed by: STUDENT IN AN ORGANIZED HEALTH CARE EDUCATION/TRAINING PROGRAM

## 2024-05-08 PROCEDURE — 3008F BODY MASS INDEX DOCD: CPT | Mod: S$GLB,,, | Performed by: STUDENT IN AN ORGANIZED HEALTH CARE EDUCATION/TRAINING PROGRAM

## 2024-05-08 PROCEDURE — 3079F DIAST BP 80-89 MM HG: CPT | Mod: S$GLB,,, | Performed by: STUDENT IN AN ORGANIZED HEALTH CARE EDUCATION/TRAINING PROGRAM

## 2024-05-08 PROCEDURE — 99214 OFFICE O/P EST MOD 30 MIN: CPT | Mod: S$GLB,,, | Performed by: STUDENT IN AN ORGANIZED HEALTH CARE EDUCATION/TRAINING PROGRAM

## 2024-05-08 PROCEDURE — 99999 PR PBB SHADOW E&M-EST. PATIENT-LVL IV: CPT | Mod: PBBFAC,,, | Performed by: STUDENT IN AN ORGANIZED HEALTH CARE EDUCATION/TRAINING PROGRAM

## 2024-05-08 PROCEDURE — 80305 DRUG TEST PRSMV DIR OPT OBS: CPT | Mod: QW,S$GLB,, | Performed by: STUDENT IN AN ORGANIZED HEALTH CARE EDUCATION/TRAINING PROGRAM

## 2024-05-08 PROCEDURE — 3074F SYST BP LT 130 MM HG: CPT | Mod: S$GLB,,, | Performed by: STUDENT IN AN ORGANIZED HEALTH CARE EDUCATION/TRAINING PROGRAM

## 2024-05-08 PROCEDURE — 1159F MED LIST DOCD IN RCRD: CPT | Mod: S$GLB,,, | Performed by: STUDENT IN AN ORGANIZED HEALTH CARE EDUCATION/TRAINING PROGRAM

## 2024-05-08 NOTE — ASSESSMENT & PLAN NOTE
Sees endocrinology  Doing well  Added additional farxiga  Lab Results   Component Value Date    HGBA1C 7.4 (H) 05/01/2024

## 2024-05-08 NOTE — PROGRESS NOTES
Subjective:       Patient ID: Bob Jean Baptiste is a 33 y.o. female.    Chief Complaint: Follow-up (3 month f/u for diabetes. )    Overall doing well  She would like to stop her protonix if she can  She has leg cramps when she lies flat at night.  She is compliant with medications  Follows with endocrinology and GI  She is becoming more active recently.         .  Patient Active Problem List   Diagnosis    Chronic biliary pancreatitis    Gastroesophageal reflux disease without esophagitis    Class 1 obesity due to excess calories with serious comorbidity and body mass index (BMI) of 30.0 to 30.9 in adult    Idiopathic chronic pancreatitis    Premature ovarian failure    Pancreatic duct stones    Diabetes mellitus secondary to pancreatic insufficiency    Chronic rhinitis    Fever blister    Generalized anxiety disorder    Vitamin D deficiency     Bob has a current medication list which includes the following prescription(s): blood sugar diagnostic, creon, diazepam, estradiol, farxiga, fluconazole, glimepiride, hydrocodone-acetaminophen, metformin, ondansetron, pantoprazole, progesterone, tramadol, trueplus lancets, acyclovir, and true metrix glucose meter.    Review of Systems   Constitutional:  Negative for activity change and appetite change.   Respiratory:  Negative for shortness of breath.    Cardiovascular:  Negative for chest pain.   Gastrointestinal:  Negative for abdominal pain.   Genitourinary:  Negative for dysuria.   Integumentary:  Negative for rash.   Psychiatric/Behavioral:  Negative for dysphoric mood and sleep disturbance. The patient is not nervous/anxious.          Health Maintenance Due   Topic Date Due    Pneumococcal Vaccines (Age 0-64) (2 of 2 - PCV) 07/23/2020    COVID-19 Vaccine (1 - 2023-24 season) Never done    Cervical Cancer Screening  12/21/2023      Health Maintenance reviewed and discussed- patient asked to schedule her pap smear.   Objective:      Physical Exam  Constitutional:        General: She is not in acute distress.     Appearance: Normal appearance. She is not ill-appearing.   Eyes:      Conjunctiva/sclera: Conjunctivae normal.   Cardiovascular:      Rate and Rhythm: Normal rate and regular rhythm.      Heart sounds: Normal heart sounds. No murmur heard.  Pulmonary:      Effort: Pulmonary effort is normal. No respiratory distress.      Breath sounds: Normal breath sounds.   Musculoskeletal:      Right lower leg: No edema.      Left lower leg: No edema.   Skin:     General: Skin is warm and dry.   Neurological:      Mental Status: She is alert. Mental status is at baseline.      Gait: Gait normal.   Psychiatric:         Mood and Affect: Mood normal.         Behavior: Behavior normal.         Thought Content: Thought content normal.         Judgment: Judgment normal.         Assessment:       1. 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 Long-term use of high-risk medication    3. Diabetes mellitus with no complication    4. Generalized anxiety disorder    5. Chronic rhinitis    6. Premature ovarian failure    7. Idiopathic chronic pancreatitis    8. Gastroesophageal reflux disease without esophagitis        Plan:       1. Diabetes mellitus secondary to pancreatic insufficiency  Assessment & Plan:  Sees endocrinology  Doing well  Added additional farxiga  Lab Results   Component Value Date    HGBA1C 7.4 (H) 05/01/2024         Orders:  -     C-peptide; Future; Expected date: 05/08/2024    2. Long-term use of high-risk medication  Comments:  update uds for her prn medications  Orders:  -     POCT Urine Drug Screen (With BUP)    3. Diabetes mellitus with no complication  -     Lipid panel; Future; Expected date: 05/09/2024  -     Hemoglobin A1c; Future; Expected date: 05/09/2024  -     Comprehensive metabolic panel; Future; Expected date: 05/09/2024    4. Generalized anxiety disorder  Assessment & Plan:  Stable. Continue current medications and regular followup.        5.  Chronic rhinitis  Assessment & Plan:  Stable. Continue current medications and regular followup.        6. Premature ovarian failure  Assessment & Plan:  On her hormones and seeing obgyn to check her hormones.       7. Idiopathic chronic pancreatitis  Assessment & Plan:  Doing well and saw gastroenterology  Overall doing very well. On creon      8. Gastroesophageal reflux disease without esophagitis  Assessment & Plan:  Would like to get off of protonix

## 2024-05-13 ENCOUNTER — PATIENT MESSAGE (OUTPATIENT)
Dept: FAMILY MEDICINE | Facility: CLINIC | Age: 34
End: 2024-05-13
Payer: COMMERCIAL

## 2024-08-20 ENCOUNTER — OFFICE VISIT (OUTPATIENT)
Dept: OBSTETRICS AND GYNECOLOGY | Facility: CLINIC | Age: 34
End: 2024-08-20
Payer: COMMERCIAL

## 2024-08-20 ENCOUNTER — LAB VISIT (OUTPATIENT)
Dept: LAB | Facility: HOSPITAL | Age: 34
End: 2024-08-20
Attending: OBSTETRICS & GYNECOLOGY
Payer: COMMERCIAL

## 2024-08-20 VITALS
DIASTOLIC BLOOD PRESSURE: 70 MMHG | WEIGHT: 154 LBS | HEIGHT: 63 IN | SYSTOLIC BLOOD PRESSURE: 112 MMHG | BODY MASS INDEX: 27.29 KG/M2

## 2024-08-20 DIAGNOSIS — Z79.890 PREMATURE MENOPAUSE ON HRT: ICD-10-CM

## 2024-08-20 DIAGNOSIS — Z01.419 WOMEN'S ANNUAL ROUTINE GYNECOLOGICAL EXAMINATION: Primary | ICD-10-CM

## 2024-08-20 DIAGNOSIS — Z13.220 LIPID SCREENING: ICD-10-CM

## 2024-08-20 DIAGNOSIS — E08.9 DIABETES MELLITUS SECONDARY TO PANCREATIC INSUFFICIENCY: ICD-10-CM

## 2024-08-20 DIAGNOSIS — K86.89 DIABETES MELLITUS SECONDARY TO PANCREATIC INSUFFICIENCY: ICD-10-CM

## 2024-08-20 DIAGNOSIS — Z79.890 HORMONE REPLACEMENT THERAPY (HRT): ICD-10-CM

## 2024-08-20 DIAGNOSIS — E28.319 PREMATURE MENOPAUSE ON HRT: ICD-10-CM

## 2024-08-20 DIAGNOSIS — Z12.4 CERVICAL CANCER SCREENING: ICD-10-CM

## 2024-08-20 DIAGNOSIS — E28.39 PREMATURE OVARIAN FAILURE: ICD-10-CM

## 2024-08-20 DIAGNOSIS — Z01.419 WOMEN'S ANNUAL ROUTINE GYNECOLOGICAL EXAMINATION: ICD-10-CM

## 2024-08-20 LAB
ANION GAP SERPL CALC-SCNC: 14 MMOL/L (ref 8–16)
BUN SERPL-MCNC: 13 MG/DL (ref 6–20)
CALCIUM SERPL-MCNC: 10.4 MG/DL (ref 8.7–10.5)
CHLORIDE SERPL-SCNC: 103 MMOL/L (ref 95–110)
CHOLEST SERPL-MCNC: 198 MG/DL (ref 120–199)
CHOLEST/HDLC SERPL: 3.5 {RATIO} (ref 2–5)
CO2 SERPL-SCNC: 23 MMOL/L (ref 23–29)
CREAT SERPL-MCNC: 0.8 MG/DL (ref 0.5–1.4)
EST. GFR  (NO RACE VARIABLE): >60 ML/MIN/1.73 M^2
ESTIMATED AVG GLUCOSE: 140 MG/DL (ref 68–131)
GLUCOSE SERPL-MCNC: 131 MG/DL (ref 70–110)
HBA1C MFR BLD: 6.5 % (ref 4–5.6)
HDLC SERPL-MCNC: 57 MG/DL (ref 40–75)
HDLC SERPL: 28.8 % (ref 20–50)
LDLC SERPL CALC-MCNC: 101.6 MG/DL (ref 63–159)
NONHDLC SERPL-MCNC: 141 MG/DL
POTASSIUM SERPL-SCNC: 4.2 MMOL/L (ref 3.5–5.1)
SODIUM SERPL-SCNC: 140 MMOL/L (ref 136–145)
TRIGL SERPL-MCNC: 197 MG/DL (ref 30–150)

## 2024-08-20 PROCEDURE — 81514 NFCT DS BV&VAGINITIS DNA ALG: CPT | Performed by: OBSTETRICS & GYNECOLOGY

## 2024-08-20 PROCEDURE — 80048 BASIC METABOLIC PNL TOTAL CA: CPT | Performed by: OBSTETRICS & GYNECOLOGY

## 2024-08-20 PROCEDURE — 36415 COLL VENOUS BLD VENIPUNCTURE: CPT | Performed by: OBSTETRICS & GYNECOLOGY

## 2024-08-20 PROCEDURE — 83036 HEMOGLOBIN GLYCOSYLATED A1C: CPT | Performed by: OBSTETRICS & GYNECOLOGY

## 2024-08-20 PROCEDURE — 80061 LIPID PANEL: CPT | Performed by: OBSTETRICS & GYNECOLOGY

## 2024-08-20 PROCEDURE — 99999 PR PBB SHADOW E&M-EST. PATIENT-LVL III: CPT | Mod: PBBFAC,,, | Performed by: OBSTETRICS & GYNECOLOGY

## 2024-08-20 RX ORDER — ESTRADIOL 1 MG/1
1 TABLET ORAL DAILY
Qty: 90 TABLET | Refills: 4 | Status: SHIPPED | OUTPATIENT
Start: 2024-08-20 | End: 2025-08-20

## 2024-08-20 RX ORDER — PROGESTERONE 100 MG/1
100 CAPSULE ORAL NIGHTLY
Qty: 90 CAPSULE | Refills: 4 | Status: SHIPPED | OUTPATIENT
Start: 2024-08-20 | End: 2025-08-20

## 2024-08-20 NOTE — PROGRESS NOTES
Annual Well Woman's Exam  History & Physical      SUBJECTIVE:     History of Present Illness:  Patient is a 34 y.o. female with a h/o chronic pancreatitis presents for her annual exam. She reports a recent yeast infection.  She would like to be tested today to ensure that this has resolved.  She also desires a refill of her HRT.  She has undergone premature ovarian failure.    Chief Complaint   Patient presents with    Well Woman       Review of patient's allergies indicates:   Allergen Reactions    Ceftin [cefuroxime axetil]     Pcn [penicillins]        Current Outpatient Medications   Medication Sig Dispense Refill    blood sugar diagnostic (TRUE METRIX GLUCOSE TEST STRIP) Strp USE TO CHECK BLOOD GLUCOSE TWICE A  strip 3    CREON 24,000-76,000 -120,000 unit capsule Take 2 capsules by mouth 3 (three) times daily with meals.      diazePAM (VALIUM) 5 MG tablet TAKE 1 TABLET BY MOUTH EVERY 12 HOURS AS NEEDED FOR ABDOMINAL SPASMS 30 tablet 2    FARXIGA 5 mg Tab tablet Take 10 mg by mouth once daily.      HYDROcodone-acetaminophen (NORCO)  mg per tablet Take 1 tablet by mouth every 8 (eight) hours as needed for Pain. 21 tablet 0    metFORMIN (GLUCOPHAGE-XR) 500 MG ER 24hr tablet Take 2 tablets with breakfast and 3 tablets with dinner. 450 tablet 3    ondansetron (ZOFRAN-ODT) 4 MG TbDL Take 1 tablet (4 mg total) by mouth every 6 (six) hours as needed (nausea or vomiting). 30 tablet 11    pantoprazole (PROTONIX) 40 MG tablet TAKE 1 TABLET (40 MG TOTAL) BY MOUTH ONCE DAILY. 30 tablet 11    progesterone (PROMETRIUM) 100 MG capsule Take 1 capsule (100 mg total) by mouth once daily. Appointment required for future refills 90 capsule 3    traMADoL (ULTRAM) 50 mg tablet TAKE 1 TO 2 TABLETS BY MOUTH EVERY 6 HOURS AS NEEDED FOR ABDOMINAL PAIN 30 tablet 0    TRUEPLUS LANCETS 30 gauge Misc USE TO CHECK BLOOD GLUCOSE TWICE A  each 3    acyclovir (ZOVIRAX) 400 MG tablet Take 1 tablet (400 mg total) by mouth 3  "(three) times daily. for 5 days 15 tablet 5    estradioL (ESTRACE) 1 MG tablet Take 1 tablet (1 mg total) by mouth once daily. 90 tablet 3    fluconazole (DIFLUCAN) 150 MG Tab Take 1 tablet (150 mg total) by mouth once daily. May repeat in 2 days if symptoms persist 2 tablet 0    glimepiride (AMARYL) 4 MG tablet Take 4 mg by mouth before breakfast. If needed for elevated glucose      TRUE METRIX GLUCOSE METER kit Use as instructed 1 each 0     No current facility-administered medications for this visit.     OB History          0    Para   0    Term   0       0    AB   0    Living   0         SAB   0    IAB   0    Ectopic   0    Multiple   0    Live Births   0             No LMP recorded. (Menstrual status: Other).      Past Medical History:   Diagnosis Date    Abdominal pain, epigastric     Chronic biliary pancreatitis 2015    Chronic pancreatitis     genetic    Diabetes mellitus     type 3C    Ganglion cyst of wrist, right 2019    GERD (gastroesophageal reflux disease)     Nausea alone     Pancreatitis     Pancreatitis, acute     Pneumonia due to COVID-19 virus 2021    Premature ovarian insufficiency 2018     Past Surgical History:   Procedure Laterality Date    CHOLECYSTECTOMY      ESOPHAGOGASTRODUODENOSCOPY      EYE SURGERY       Family History   Problem Relation Name Age of Onset    No Known Problems Mother      No Known Problems Father      Asthma Sister Yohannes     Diabetes Maternal Grandmother Jaylene     Melanoma Neg Hx      Psoriasis Neg Hx      Lupus Neg Hx      Eczema Neg Hx      Breast cancer Neg Hx      Colon cancer Neg Hx      Ovarian cancer Neg Hx      Uterine cancer Neg Hx      Esophageal cancer Neg Hx       Social History     Tobacco Use    Smoking status: Never    Smokeless tobacco: Never   Substance Use Topics    Alcohol use: No     Comment: rare    Drug use: No        OBJECTIVE:     Vital Signs (Most Recent)  BP: 112/70 (24 1354)  5' 3" (1.6 m)  69.9 kg (154 " lb)     Physical Exam:  Physical Exam  Vitals reviewed.   Constitutional:       Appearance: Normal appearance.   HENT:      Head: Normocephalic.   Neck:      Thyroid: No thyroid mass, thyromegaly or thyroid tenderness.   Pulmonary:      Effort: Pulmonary effort is normal.   Chest:   Breasts:     Right: Normal.      Left: Normal.   Abdominal:      General: Abdomen is flat.      Palpations: Abdomen is soft.   Genitourinary:     General: Normal vulva.      Vagina: Normal.      Cervix: Normal.      Uterus: Normal.       Adnexa: Right adnexa normal and left adnexa normal.   Lymphadenopathy:      Upper Body:      Right upper body: No axillary adenopathy.      Left upper body: No axillary adenopathy.   Skin:     General: Skin is warm and dry.   Neurological:      General: No focal deficit present.      Mental Status: She is alert and oriented to person, place, and time.   Psychiatric:         Mood and Affect: Mood normal.         Behavior: Behavior normal.         ASSESSMENT/PLAN:       ICD-10-CM ICD-9-CM   1. Women's annual routine gynecological examination  Z01.419 V72.31   2. Lipid screening  Z13.220 V77.91   3. Diabetes mellitus secondary to pancreatic insufficiency  K86.89 249.00    E08.9 577.8   4. Premature ovarian failure  E28.39 256.8   5. Hormone replacement therapy (HRT)  Z79.890 V07.4   6. Cervical cancer screening  Z12.4 V76.2       PLAN:    Pap with HPV co-testing today  Annual labs ordered  Vaginosis panel ordered  HRT refills ordered  Follow up in 1 year for annual exam or sooner as needed      Rowan Solano MD, FACOG   Gynecology    [address] [place] [id number] [phone]

## 2024-08-23 LAB
BACTERIAL VAGINOSIS DNA: NEGATIVE
CANDIDA GLABRATA DNA: NEGATIVE
CANDIDA KRUSEI DNA: NEGATIVE
CANDIDA RRNA VAG QL PROBE: NEGATIVE
T VAGINALIS RRNA GENITAL QL PROBE: NEGATIVE

## 2024-08-27 ENCOUNTER — PATIENT MESSAGE (OUTPATIENT)
Dept: ENDOSCOPY | Facility: HOSPITAL | Age: 34
End: 2024-08-27
Payer: COMMERCIAL

## 2024-09-22 ENCOUNTER — HEALTH MAINTENANCE LETTER (OUTPATIENT)
Age: 34
End: 2024-09-22

## 2024-09-30 ENCOUNTER — PATIENT MESSAGE (OUTPATIENT)
Dept: OBSTETRICS AND GYNECOLOGY | Facility: CLINIC | Age: 34
End: 2024-09-30
Payer: COMMERCIAL

## 2024-10-02 DIAGNOSIS — N95.2 VAGINAL ATROPHY: Primary | ICD-10-CM

## 2024-10-02 RX ORDER — ESTRADIOL 0.1 MG/G
1 CREAM VAGINAL NIGHTLY
Qty: 42.5 G | Refills: 3 | Status: SHIPPED | OUTPATIENT
Start: 2024-10-02 | End: 2025-10-02

## 2024-10-18 LAB
LEFT EYE DM RETINOPATHY: NEGATIVE
RIGHT EYE DM RETINOPATHY: NEGATIVE

## 2024-10-23 LAB
CHOL/HDLC RATIO: 4
CHOLESTEROL (LIPID PANEL): 192
HDLC SERPL-MCNC: 51 MG/DL
LDL CHOLESTEROL DIRECT: 130 MG/DL
LDL/HDL RATIO: 3
TRIGLYCERIDE (LIPID PAN): 153

## 2024-10-30 NOTE — PROGRESS NOTES
"Genetic Counseling Consultation  This note is a summary of Andriy Yoon s virtual visit to [facility] on April 14, 2021. She was seen today for discussion of pancreatitis genetic testing as part of her evaluation for consideration of surgery due to a history of acute and chronic pancreatitis. Genetic testing and a genetic counseling consultation was recommended to aid in better understanding the cause the pancreatitis, as well as provide additional information helpful in medical management and genetic risks for family members. She was in [place] during our virtual visit.    Summary of visit:  1. Genetics of pancreatitis were discussed, including known involved genes, inheritance patterns, and impact on family members.    2. Genetic testing options were discussed, and PancreasDx panel testing through [employer] was ordered through [employer] and [employer] portal. Andriy can initiate the testing process through logging in to the portal. Once insurance is verified and testing commences, results will be available in approximately 4-6 weeks. I will call her at that time.  3. Contact information was given for future questions or concerns that arise.  4. Genetic testing would be available related to her history of primary ovarian failure locally or here if/when she returns to [place]. I am happy to assist with this if helpful.    Personal Medical History:  Andriy has a history of acute and chronic calcific pancreatitis, diagnosed in 2015 when she was ~25 years old. She notes many hospitalizations with elevated lipases/amylases, as well as many episodes managed at home. She does feel that clinical attacks of pain has accelerated in the recent months. She has undergone a substantial work-up including multiple CTs, and endoscopic ultrasound which reported \"pancreatic calcifications but findings of moderate chronic pancreatitis\". CT scans have shown fairly extensive calcification. Per GI notes, she has also had ERCP " Order placed per protocol.    procedures in 2015, and then again recently for clearance of pancreatic ductal stones. She is on pancreatic enzymes. She did undergo a cholecystectomy ~2016 for suspected biliary component and she felt that this reduced the frequency of her clinical attacks. 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 See GI notes for full pancreatitis history.    Andriy has no smoking history. Excessive use of alcohol or other triggering factors is denied and work-up for pancreatitis appears to be otherwise negative for known causative factors. She has not had pancreatitis genetic testing to date.     Additional history includes idiopathic primary ovarian failure (symptoms starting in 2015, diagnosed in 2018), a couple of sinus infections, and congestion. She is 5'4''. Andriy did believe she had normal Fragile X testing previously, but no other genetic testing related to her primary ovarian failure; results were not available for review today. She has not seen a genetic counselor previously.    Family History:  A three generation pedigree was obtained today and sent to be scanned into the EMR. The family history was significant for the following:    Andriy has no children.    Andriy has one full sister, who is 29 years old and healthy. She is about 5'8'' or 5'9''. Andriy also has a paternal half brother, who is 35 years old and healthy. He is above 6'. Andriy's nieces/nephews from both siblings have no major health concerns.    Andriy's mother has no major health concerns. She is 5'8'' or 5'9''. She has three brothers, who are healthy. One of Andriy's first cousins has a history of lupus and Hashimoto's. Another one of her cousins (female) has a son with muscular dystrophy. Full details were unknown, but we reviewed that there are many kinds of muscular dystrophy that can be due to underlying genetic changes. For example, for duchenne type muscular dystrophy, in many cases, an affected male inherits the  "mutation from his mother who carries one altered copy of the associated gene. Therefore, based on this family history, other family members may have an increased chance to have or be a carrier for a muscular dystrophy. If more information is discovered about this individual's diagnosis, recurrence information for other family members and targeted testing may be available. Another cousin has a history of 3 miscarriages (full details unknown) and 2 healthy children.    Andriy's maternal grandmother passed at 70 years and had a history of type II diabetes. Andriy's maternal grandfather passed at 52 years from a heart attack.    Andriy's father has no major health concerns. He is 6'2''. He has three sisters, who are healthy, and 2 brothers. One brother has a history of heart issues/CHF. The other brother has a history of type II diabetes. After our visit, Andriy updated the team that she has since heard that a paternal first cousin has been diagnosed with pancreatitis (unknown if chronic or not, or if genetic testing has been completed or not). This cousin (female, 35 yrs) also has a history of obesity, MORE, and \"gallbladder inflammation with stones\". Paternal first cousins are otherwise healthy.    Andriy's paternal grandmother passed at 52 years from lung cancer. Andriy's paternal grandfather passed at 52 years from a heart attack.     The family history was negative for known individuals with diagnosed pancreatic cancer, known genetic conditions, Fragile X, ID/DD, birth defects, and cystic fibrosis/CF symptoms. Andriy is of  ancestry on her maternal and paternal side. Consanguinity was denied.    General Genetic Background Information  Genes are the instruction code for our body, and tell our body how to grow and function. Our genes are in every cell of our body, and are packaged in our chromosomes.  Genes and chromosomes come in pairs. We inherit one copy out of each pair from our mother and one copy of each pair " from our father. No one has control over which copy they pass on to their child since it is a random process.   Every person has two copies of each gene.     Genetics of Pancreatitis  There are many known factors for pancreatitis, including genetic factors. Knowns and unknowns about these causes of pancreatitis, focusing on hereditary pancreatitis, were discussed today. Hereditary pancreatitis can be defined based on family history criteria or on genetic findings in an individual. One of the genes that is causative and associated with high risk for pancreatitis is PRSS1. This gene can also cause an increased risk for pancreatic cancer.  Many other genes, such as SPINK1, CTRC, CPA1, CaSR, and others are thought to be moderate risk genes and have an association with pancreatitis. Additionally idiopathic pancreatitis has been found to be associated with changes in the gene for cystic fibrosis (CFTR) as well. The variability in the inheritance of genes associated with hereditary pancreatitis was discussed.      Typical hereditary pancreatitis is a disease that is primarily inherited in an autosomal dominant manner, meaning that a change in one copy of a gene, like PRSS1, is needed to develop the condition. One other gene known to be associated with hereditary pancreatitis, CFTR, typically causes disease when inherited in an autosomal recessive manner, meaning that an individual must inherit a change in the CFTR gene from both their mother and father. The risk factor genes are also inherited in an autosomal dominant fashion, but with these genes most individuals that carry a change do not get pancreatitis. We discussed the complexity of genetic testing for hereditary pancreatitis and that further discussion would be needed when we have the results.    If a known disease causing change was found, this would carry up to a 50% potential risk for future children and siblings to inherit the genetic change, and a 50% chance of  not inheriting it. Not all individuals with genetic changes in these genes go on to develop pancreatitis, and each gene is associated with a different risk for the development of pancreatitis. If a change is found in the CFTR gene, this result would have additional implications (such as chance for cystic fibrosis in an individual and/or their children) and they will be discussed further if applicable. Brief information about cystic fibrosis, its associated symptoms (lung infections and complications, pancreatitis, sinus disease, and/or male infertility), and its variability were discussed today. If a specific genetic variant in a family can be identified, more information about risk for other family members and familial testing would be available.    Genetic Testing  There are genetic saliva or blood tests available that can help determine if an individual has changes in genes associated with pancreatitis (including PRSS1, SPINK1, CTRC, and CFTR genes). As the cause of Tatis pancreatitis is unclear and a genetic factor suspected, comprehensive genetic testing will give useful information to aid in directing medical management, reproductive risks, and family member risks. Specifically, if a underlying explanation for Andriy's pancreatis can be found, it may give more information about how to appropriate manage her or give information about what to expect. In addition, it is possible an underlying genetic cause may predispose Andriy to other health risks. Knowing about these additional health risks can help us stay ahead of her  healthcare to more appropriately screen for and potentially prevent other complications. Lastly, discovering an underlying reason may help predict the chance for other family members to have pancreatitis and/or other genetic conditions, such as cystic fibrosis. Given Andriy's young age of onset for her pancreatitis and family history of pancreatitis, genetic testing assessing hereditary factors  is especially beneficial and medically necessary for Andriy.    Limitations and risks of genetic testing were also discussed, including that our genetic testing in 2021 is only as good as our current knowledge of genetics and genes. Therefore, a negative test result does not rule out all genetic changes and causes of pancreatitis. Other follow up may be recommended in the future.    [employer] is a genetic testing company that specializes in causes of pancreatitis. We discussed benefits and limitations of testing both through [facility] and another commercial laboratory today. This included information about testing coverage/genes tested, cost, and testing protocol. Of note, [facility] includes testing for additional genetic risk factors for pancreatitis not included elsewhere.    Andriy elected to proceed with testing through [facility], pending coverage. Orders will be placed through their online secure portal, and Andriy will then log in and enter insurance information and consent for testing. Once Andriy initiates this testing process, insurance information will be obtained by the lab and shared with her, and she will be sent a saliva collection kit. Information about [facility] coverage process and maximum OOP cost was discussed briefly today and will be discussed in detail with Andriy by [facility]. Once Andriy mails this sample kit back to [facility], testing will be initiated. Results from there take 4-6 weeks, at which time I would call her. Follow up for Andriy and her family would depend on results.    Result Outcomes  Once results are available, we discussed the following possibilities:  1. One disease causing mutation found: Individuals with typical hereditary pancreatitis typically have one disease causing change detected.  Depending on the change found and other factors, individuals are at risk for development of pancreatitis, and other family members would be at risk.  2. One  or two possibly contributory mutation found:  This result has various implications depending the specific change found.  The change(s) may contribute to disease.   3. Two mutations in the gene for CF:  Individuals with cystic fibrosis or cystic fibrosis-related disorder usually have two mutations in this gene, one inherited from their mother and one from their father. Depending on the changes in the gene and other factors, some people may have respiratory, GI symptoms, and/or infertility issues.   4. No mutations in these genes: No genetic evidence to support hereditary pancreatitis at this time. It is possible we will learn more about the genetics of pancreatitis in the future and be able to offer more comprehensive genetic testing.   5. The finding of an unknown change: This happens when the laboratory detects a change but they do not know if it is found only in individuals with the condition, or if the change is found in individuals without pancreatitis as well. If you have this type of result, we will discuss it further at that time.    Premature Ovarian Failure Genetic Testing  Primary ovarian insufficiency (POI), also known as premature ovarian failure, happens when a woman's ovaries stop working normally before 40 years. Etiology for POI is variable, and in many cases the exact cause of POI is unknown. A percentage of POI is thought to be due to an underlying genetic etiology. This includes Fragile X syndrome, Manriquez syndrome, and mutation(s) in a handful of other syndromic and non-syndromic genes. Andriy reportedly has had genetic testing for Fragile X syndrome that was normal, but additional genetic testing would be available related to this history.     We discussed options today of ordering additional genetic testing related to POI (from another lab) vs focusing only on pancreatitis testing today, with POI genetic testing being discussed at a return visit to [place] or locally. Dominicsanty elected to only proceed  with pancreatitis genetic testing today, and genetic testing options related to POI were therefore not fully discussed today. She was aware this could be further discussed upon return visit to [place] or locally. [facility] and the [facility] have genetics services from my understanding. Andriy could call them directly and/or have a provider place a genetics referral to be seen there. Virtual genetic counselors can also be found at [email or web address] I am happy to assist with these options if helpful.      It was a pleasure to meet with Andriy virtually today. She had no additional questions. My contact information was shared via [employer] for future questions or concerns that arise.    Plan  1. Consent was obtained and orders were placed for PancreasDx testing through [employer]. Testing process will be initiated by the patient through the [facility] Portal. From there, [facility] will send Andriy a saliva sample, which she will mail back directly to the lab.  2. Results will be returned to us 4-6 weeks after testing starts and I will notify Andriy of the results as soon as we receive them. This test can detect many changes in the known genes associated with hereditary pancreatitis; however, there remain genetic contributions that are not well understood.   3. Additional medical management recommendations or family member recommendations will be discussed in the future based on results.  4. Genetic testing would be available related to her history of primary ovarian failure locally or here if/when she returns to [place]. I am happy to assist with this if helpful.     Lexi Sam MS, [facility]  Genetic Counseling  Genetics and Metabolism Division  [facility]   Phone: [phone]  Pager: [phone]     CC:  PCP, patient, Dr. Perez      Video-Visit Details  Type of service:  Video Visit  Video End Time: 50  minutes  Originating Location (pt. Location): [facility] in [place]  Distant Location (provider location):  [facility]   Platform used for Video Visit: Samuel

## 2024-11-11 DIAGNOSIS — E11.9 TYPE 2 DIABETES MELLITUS WITHOUT COMPLICATION: ICD-10-CM

## 2024-11-13 ENCOUNTER — HOSPITAL ENCOUNTER (OUTPATIENT)
Dept: RADIOLOGY | Facility: HOSPITAL | Age: 34
Discharge: HOME | End: 2024-11-13
Attending: OBSTETRICS & GYNECOLOGY
Payer: COMMERCIAL

## 2024-11-13 DIAGNOSIS — Z13.820 SCREENING FOR OSTEOPOROSIS: ICD-10-CM

## 2024-11-13 DIAGNOSIS — E28.39 PREMATURE OVARIAN FAILURE: ICD-10-CM

## 2024-11-13 DIAGNOSIS — Z12.31 SCREENING MAMMOGRAM, ENCOUNTER FOR: ICD-10-CM

## 2024-11-13 PROCEDURE — 77067 SCR MAMMO BI INCL CAD: CPT | Mod: 26,,, | Performed by: RADIOLOGY

## 2024-11-13 PROCEDURE — 77063 BREAST TOMOSYNTHESIS BI: CPT | Mod: 26,,, | Performed by: RADIOLOGY

## 2024-11-13 PROCEDURE — 77092 TBS I&R FX RSK QHP: CPT | Mod: ,,, | Performed by: RADIOLOGY

## 2024-11-13 PROCEDURE — 77063 BREAST TOMOSYNTHESIS BI: CPT | Mod: TC

## 2024-11-13 PROCEDURE — 77091 TBS TECHL CALCULATION ONLY: CPT

## 2024-11-13 PROCEDURE — 77080 DXA BONE DENSITY AXIAL: CPT | Mod: 26,,, | Performed by: RADIOLOGY

## 2025-01-13 ENCOUNTER — PATIENT MESSAGE (OUTPATIENT)
Dept: GASTROENTEROLOGY | Facility: CLINIC | Age: 35
End: 2025-01-13
Payer: COMMERCIAL

## 2025-01-27 LAB — HBA1C MFR BLD: 6.7 %

## 2025-01-30 ENCOUNTER — PATIENT MESSAGE (OUTPATIENT)
Dept: FAMILY MEDICINE | Facility: CLINIC | Age: 35
End: 2025-01-30

## 2025-01-30 ENCOUNTER — OFFICE VISIT (OUTPATIENT)
Dept: FAMILY MEDICINE | Facility: CLINIC | Age: 35
End: 2025-01-30
Payer: COMMERCIAL

## 2025-01-30 ENCOUNTER — PATIENT OUTREACH (OUTPATIENT)
Dept: ADMINISTRATIVE | Facility: HOSPITAL | Age: 35
End: 2025-01-30
Payer: COMMERCIAL

## 2025-01-30 VITALS
WEIGHT: 155 LBS | HEART RATE: 73 BPM | BODY MASS INDEX: 27.46 KG/M2 | OXYGEN SATURATION: 99 % | HEIGHT: 63 IN | DIASTOLIC BLOOD PRESSURE: 68 MMHG | SYSTOLIC BLOOD PRESSURE: 110 MMHG

## 2025-01-30 DIAGNOSIS — E55.9 VITAMIN D DEFICIENCY: ICD-10-CM

## 2025-01-30 DIAGNOSIS — K86.89 DIABETES MELLITUS SECONDARY TO PANCREATIC INSUFFICIENCY: Primary | ICD-10-CM

## 2025-01-30 DIAGNOSIS — K86.1 CHRONIC BILIARY PANCREATITIS: ICD-10-CM

## 2025-01-30 DIAGNOSIS — E08.9 DIABETES MELLITUS SECONDARY TO PANCREATIC INSUFFICIENCY: Primary | ICD-10-CM

## 2025-01-30 DIAGNOSIS — E28.39 PREMATURE OVARIAN FAILURE: ICD-10-CM

## 2025-01-30 PROBLEM — E66.09 CLASS 1 OBESITY DUE TO EXCESS CALORIES WITH SERIOUS COMORBIDITY AND BODY MASS INDEX (BMI) OF 30.0 TO 30.9 IN ADULT: Status: RESOLVED | Noted: 2019-12-12 | Resolved: 2025-01-30

## 2025-01-30 PROBLEM — K21.9 GASTROESOPHAGEAL REFLUX DISEASE WITHOUT ESOPHAGITIS: Status: RESOLVED | Noted: 2018-06-28 | Resolved: 2025-01-30

## 2025-01-30 PROBLEM — E66.811 CLASS 1 OBESITY DUE TO EXCESS CALORIES WITH SERIOUS COMORBIDITY AND BODY MASS INDEX (BMI) OF 30.0 TO 30.9 IN ADULT: Status: RESOLVED | Noted: 2019-12-12 | Resolved: 2025-01-30

## 2025-01-30 RX ORDER — DAPAGLIFLOZIN 10 MG/1
10 TABLET, FILM COATED ORAL DAILY
COMMUNITY

## 2025-01-30 RX ORDER — METFORMIN HYDROCHLORIDE 500 MG/1
500 TABLET, EXTENDED RELEASE ORAL 2 TIMES DAILY WITH MEALS
COMMUNITY

## 2025-01-30 RX ORDER — DIAZEPAM 5 MG/1
5 TABLET ORAL EVERY 6 HOURS PRN
COMMUNITY

## 2025-01-30 NOTE — PATIENT INSTRUCTIONS
Kishore Rojas,     If you are due for any health screening(s) below please notify me so we can arrange them to be ordered and scheduled. Most healthy patients at your age complete them, but you are free to accept or refuse.     If you can't do it, I'll definitely understand. If you can, I'd certainly appreciate it!    Tests to Keep You Healthy    Eye Exam: DUE  Cervical Cancer Screening: Met on 8/20/2024  Last HbA1c < 8 (08/20/2024): Yes      Your diabetic retinal eye exam is due     Diabetes is the #1 cause of blindness in the [place] - early detection before signs or symptoms develop can prevent debilitating blindness.     Our records indicate that you may be overdue for your annual diabetic eye exam. Eye screening can help identify patients at risk for developing vision loss which is common in diabetes. This simple screening is an important step to keeping you healthy and preventing complications from diabetes.     This recommended diabetic eye exam should take place once per year and can prevent and treat diabetes complications in the eye before developing symptoms. This can be done with a special camera is used to take photographs of the back of your eye without having to dilate them, or you can see an eye doctor for a full dilated exam.     If you recently had your yearly diabetic eye exam performed outside of [facility], please let your Health care team know so that they can update your health record.

## 2025-01-30 NOTE — LETTER
"       AUTHORIZATION FOR RELEASE OF   CONFIDENTIAL INFORMATION    Dear [facility] [place],    We are seeing Bob Jean Baptiste, date of birth 1990, in the clinic at [facility]. Lucie Pedroza DO is the patient's PCP. Bob Jean Baptiste has an outstanding lab/procedure at the time we reviewed her chart. In order to help keep her health information updated, she has authorized us to request the following medical record(s):        (  )  MAMMOGRAM                                      (  )  COLONOSCOPY      (  )  PAP SMEAR                                          (  )  OUTSIDE LAB RESULTS     (  )  DEXA SCAN                                          (  )  EYE EXAM            (  )  FOOT EXAM                                          (  )  ENTIRE RECORD     (  )  OUTSIDE IMMUNIZATIONS                 ( X )  A1C/LABS         Please fax records to [facility], [place], Taylor, DO at [phone]    Thanks so much and have a great day!    Emily Castellanos LPN [facility]  [address] [place] [place] [address]  P- [phone]  F- [phone]           Patient Name: Bob Jean Baptiste  : 1990  Patient Phone #: [phone]          A. Consent for Examination and Treatment: I hereby authorize the providers and employees of [facility] to provide medical treatment/services which includes, but is not limited to, performing and administering tests and diagnostic procedures that are deemed necessary, Including, but not limited to, imaging examinations, blood tests and other laboratory procedures as may be required by the hospital, clinic, or may be ordered by my physician(s) or persons working under the general and/or special instructions of my physician(s).                   1.      I understand and agree that this consent covers all authorized persons, including but not limited to physicians, residents, nurse practitioners, physicians' assistants, specialists, consultants, student " nurses, and independently contracted physicians, who are called upon by the physician in charge, to carry out the diagnostic procedures and medical or surgical treatment.  2.      I hereby authorize [facility] to retain or dispose of any specimens or tissue, should there be such remaining from any test or procedure.  3.      I hereby authorize and give consent for [facility] providers and employees to take photographs, images or videotapes of such diagnostic, surgical or treatment procedures of Patient as may be required by [facility] or as may be ordered by a physician.  I further acknowledge and agree that [facility] may use cameras or other devices for patient monitoring.  4.      I am aware that the practice of medicine is not an exact science, and I acknowledge that no guarantees have been made to me as to the outcome of any tests, procedures or treatment.                   B. Authorization for Release of Information:  I understand that my insurance company and/or their agents may need information necessary to make determinations about payment/reimbursement.  I hereby provide authorization to release to all insurance companies, their successors, assignees, other parties with whom they may have contracted, or others acting on their behalf, that are involved with payment for any hospital and/or clinic charges incurred by the patient, any information that they request and deem necessary for payment/reimbursement, and/or quality review.  I further authorize the release of my health information to physicians or other health care practitioners on staff who are involved in my health care now and in the future, and to other health care providers, entities, or institutions for the purpose of my continued care and treatment, including referrals.     C. Medicare Patient's Certification and Authorization to Release Information and Payment Request:  I certify that the information given by me in applying for payment under Title XVIII of  the Social Security Act is correct.  I authorize any schultz of medical or other information about me to release to the Social Security Administration, or its intermediaries or carriers, any information needed for this or a related Medicare claim.  I request that payment of authorized benefits be made on my behalf.     D. Assignment of Insurance Benefits:  I hereby authorize any and all insurance companies, health plans, defined benefit plans, health insurers or any entity that is or may be responsible for payment of my medical expenses to pay all hospital and medical benefits now due, and to become due and payable to me under any hospital benefits, sick benefits, injury benefits or any other benefit for services rendered to me, including Major Medical Benefits, direct to [facility] and all independently contracted physicians.  I assign any and all rights that I may have against any and all insurance companies, health plans, defined benefit plans, health insurers or any entity that is or may be responsible for payment of my medical expenses, including, but not limited to any right to appeal a denial of a claim, any right to bring any action, lawsuit, administrative proceeding, or other cause of action on my behalf.  I specifically assign my right to pursue litigation against any and all insurance companies, health plans, defined benefit plans, health insurers or any entity that is or may be responsible for payment of medical expenses based upon a refusal to pay charges.              REGISTRATION  AUTHORIZATION                    E. Valuables:  It is understood and agreed that [facility] is not liable for the damage to or loss of any money, jewelry, documents, dentures, eye glasses, hearing aids, prosthetics, or other property of value.     F. Computer Equipment:  I understand and agree that should I choose to use computer equipment owned by [facility] or if I choose to access the Internet via [facility]'s network, I do so at my  own risk.  [facility] is not responsible for any damage to my computer equipment or to any damages of any type that might arise from my loss of equipment or data.                   G. Acceptance of Financial Responsibility:  I agree that in consideration of the services and supplies that have been or will be furnished to the patient,  I am hereby obligated to pay all charges made for or on the account of the patient according to the standard rates (in effect at the time the services and supplies are delivered) established by [facility], including its Patient Financial Assistance Policy to the extent it is applicable.  I understand that I am responsible for all charges, or portions thereof, not covered by insurance or other sources.  Patient refunds will be distributed only after balances at all [facility] facilities are paid.                   H. Communication Authorization:  I hereby authorize [facility] and its representatives, along with any billing service or  who may work on their behalf, to contact me on my cell phone and/or home phone using prerecorded messages, artificial voice messages, automatic telephone dialing devices or other computer assisted technology, or by electronic mail, text messaging, or by any other form of electronic communication.  This includes, but is not limited to appointment reminders, yearly physical exam reminders, preventive care reminders, patient campaigns, welcome calls, and calls about account balances on my account or any account on which I am listed as a guarantor.  I understand I have the right to opt out of these communications at any time.     I.  Relationship Between Facility and Physician:  I understand that some, but not all, providers furnishing services to the patient are not employees or agents of [facility].  The patient is under the care and supervision of his/her attending physician, and it is the responsibility of the facility and its nursing staff to carry out  the instructions of such physicians.  It is the responsibility of the patient's physician/designee to obtain the patient's informed consent, when required, for medical or surgical treatment, special diagnostic or therapeutic procedures, or hospital services rendered for the patient under the special instructions of the physician/designee.     J. Notice of Private Practices:  I acknowledge I have received a copy of [facility]'s Notice of Privacy Practices.     K. Facility Directory:  I have discussed with the organization my desire to be either included or excluded in the facility directory.  I understand that if my choice is to opt-out of being identified in the facility directory that the facility will not provide any information about me such as my condition (e.g. Fair, stable, etc.) or my location in the facility (e.g. Room number, department).     L. LINKS:  For [place] Residents:  [facility] is a LINKS ([place] [facility]) participating facility.  LINKS is a Pending sale to [facility]-sponsored confidential computer system that helps you and your doctor keep track of you and your child's immunization history.  I acknowledge that I am allowing [facility] to share this information with [facility].  For [place] Residents:  [facility] is a MIIX ([place] Immunization Information eXchange) participant.  MIIX is a [place] Department of Health-sponsored confidential computer system that helps you and your doctor keep track of you and your child's immunization history.  I acknowledge that I am allowing [facility] to share information with [employer].     M. Term:  This authorization is valid for this and subsequent care/treatment I receive at [facility] and will remain valid unless/until revoked in writing by me.      ACKNOWLEDGMENT OF POTENTIAL RISKS OF COVID-19 VACCINE  It is important that you, as the patient or patients legally authorized representative(s), understand and acknowledge the following, with regard to  administration of the COVID-19 vaccine offered by [facility]:  The SARS-CoV-2 virus (COVID-19) has caused an unprecedented modern global pandemic that has mobilized scientists and drug manufacturers to work to create safe and effective vaccines to get the crisis under control.  No vaccine is released in the [place] without undergoing rigorous, multi-layered testing and approval by the Food and Drug Administration.  During a public health emergency, however, vaccines can be released for patient administration by the FDA prior to completion of multi-phase clinical trials and approval. This is done by the FDAs granting of Emergency Use Authorization (EUA) when the vaccine meets reasonable thresholds for safety and effectiveness and people are in urgent need of care. Under an EUA, the FDA has found that known and potential benefits outweigh its known and potential risks.  The vaccine for which you are presenting to [facility] has been released under an EUA, which [facility] is honoring in its distribution of the vaccine to the public. While the FDAs authorization indicates its belief that usage is recommended over possible risks, there is still the possibility that unknown risks of the vaccine could exist.  By signing this document, you acknowledge and assume these risks. Further, you waive any and all claims of liability against and hold harmless any [facility] entity or provider for any harm caused to you by said possible unknown risks of the vaccine.        N. [facility]:  As used in this document, [facility] means all [facility] affiliated entities including all health centers, surgery centers, clinics, and hospitals.  It includes more specifically, the following entities: [facility], a not for profit [facility], and its subsidiaries and affiliates, including [facility], [facility]  [facility], [facility], [facility], [facility]., [facility]d/b/a [facility], L.L.C.d/b/a [facility], [employer], L.L[facility]. As manager of [facility], [facility], L.JOE, [facility], L.L.C.d/b/a [facility], [facility], JONY, [facility], [facility] and [facility] as manager of [facility].                                                                Patient/Legal Guardian Signature                                                    This signature was collected at 02/16/2024      Bob Jean Baptiste/Self                                                                            Printed Name/Relationship to Patient                                                [facility] complies with applicable Federal civil rights laws and does not discriminate on the basis of race, color, national origin, age, disability, or sex.          ATENCIÓN: si habla español, tiene a mccollum disposición servicios gratuitos de asistencia lingüística. Jayy pagan [phone].         CHÚ Ý: N?u b?n nói Ti?ng Vi?t, có các d?ch v? h? tr? ngôn ng? mi?n phí dành cho b?n. G?i s? [phone].              REGISTRATION  AUTHORIZATION

## 2025-01-30 NOTE — Clinical Note
Follows with Dr. Gonzalez at [facility] diabetes; had labs done on 1/27/25

## 2025-01-30 NOTE — PROGRESS NOTES
[facility]  Primary Care Clinic - [place], [place]    Family Medicine Office Visit    Subjective     Patient ID: Bob Jean Baptiste is a 34 y.o. female who presents to clinic today to establish care.     Medical history, surgical history, medications, allergies, and social history were reviewed and updated.     Chief Complaint: Establish Care    HPI    Establish care  She presents today to establish care.  We have reviewed medical history, surgical history, family history, medications, allergies, and social history. EMR was updated appropriately.     Chronic biliary pancreatitis and diabetes  Reports a history of chronic pancreatitis that started in 2015.  She reported that initially she was in the hospital several times due to this.  Reported that her flares have calmed down significantly and she is now having a proximally 3-4 per year.  She reported that usually when this occurs, she is able to stay out of the hospital but takes Valium, tramadol, and gets IV fluids for a flare.  She reported that she has not required further prescriptions of Valium or tramadol in over a year.  She reported that she was previously getting fluids at a medical spa, but reported that she has no longer been able to get them easily.  She wanted to see if there was a way to get fluids set up through [facility].    We discussed that I do not write regular Valium or tramadol, but as she is able to make 30 tablets of each last 6 months or longer, we can continue her current regimen.    She is currently followed by endocrinology through [facility] with Dr. Gonzalez.  Current medication regimen includes metformin 500 mg twice daily Farxiga 10 daily she reported that she has lost weight this has helped control her diabetes as well.  Reported that her last A1c was completed earlier this week and was 6.7.  We will work on obtaining these records.    Premature ovarian failure  She has a history of premature vein failure in his  followed by [facility].  Current medication regimen includes Estrace 2 mg daily and progesterone 200 mg nightly.  She reported this medication regimen is working well for her.    Vitamin D deficiency  She has a history of vitamin-D deficiency with current medication regimen of vitamin-D supplementation weekly.  No new or worsening symptoms reported.    Vitals:    01/30/25 0811   BP: 110/68   Pulse: 73      Wt Readings from Last 3 Encounters:   01/30/25 0811 70.3 kg (155 lb)   01/29/25 1056 70.8 kg (156 lb)   11/15/24 0856 70.3 kg (155 lb)      Review of Systems    Review of Systems otherwise negative unless specified above.        Objective     Physical Exam  Vitals reviewed.   Constitutional:       General: She is not in acute distress.     Appearance: Normal appearance.   HENT:      Head: Normocephalic and atraumatic.      Nose: Nose normal.      Mouth/Throat:      Mouth: Mucous membranes are moist.   Cardiovascular:      Rate and Rhythm: Normal rate and regular rhythm.      Heart sounds: No murmur heard.  Pulmonary:      Effort: Pulmonary effort is normal. No respiratory distress.      Breath sounds: Normal breath sounds.   Musculoskeletal:         General: Normal range of motion.   Skin:     General: Skin is warm and dry.   Neurological:      General: No focal deficit present.      Mental Status: She is alert and oriented to person, place, and time.   Psychiatric:         Mood and Affect: Mood normal.         Behavior: Behavior normal.            Assessment and Plan     Current Outpatient Medications   Medication Instructions    blood sugar diagnostic (TRUE METRIX GLUCOSE TEST STRIP) Strp USE TO CHECK BLOOD GLUCOSE TWICE A DAY    dapagliflozin propanediol (FARXIGA) 10 mg, Oral, Daily    diazePAM (VALIUM) 5 mg, Every 6 hours PRN    ergocalciferol (VITAMIN D2) 50,000 Units, Oral, Every 7 days    estradioL (ESTRACE) 2 mg, Oral, Daily    metFORMIN (GLUCOPHAGE-XR) 500 mg, Oral, 2 times daily with meals    progesterone  (PROMETRIUM) 200 mg, Oral, Nightly    TRUEPLUS LANCETS 30 gauge Misc USE TO CHECK BLOOD GLUCOSE TWICE A DAY        1. 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 Chronic biliary pancreatitis    3. Premature ovarian failure    4. Vitamin D deficiency    Other orders  -     lactated ringers bolus 1,000 mL        Recommend that she continue her current regimen for diabetes and ovarian failure.  I have set up IV fluids for her to completed [facility] as needed for pancreatitis flares.  Also continue vitamin-D supplementation.  Reports having a diabetic eye exam completed through [facility] in [place].  We will work on obtaining these records.  Also reports having foot exam completed through Endocrinology at [place].  We will work on obtaining this as well.  We will otherwise plan to have her return in 6 months or sooner if needed.    Visit today included increased complexity associated with the care of the episodic problem diabetes, chronic pancreatitis addressed and managing the longitudinal care of the patient due to the serious and/or complex managed problem(s) diabetes, chronic pancreatitis.         Follow up in about 6 months (around 7/30/2025) for Follow up with [facility].    Questions were invited and answered. No other acute concerns at this time. Will plan to follow up as above or sooner if needed.     Lucie Pedroza, DO  01/30/2025 9:42 AM       This dictation has been generated using Modal Fluency Dictation. Some phonetic errors may occur. Please contact author for clarification if needed.

## 2025-01-30 NOTE — LETTER
"       AUTHORIZATION FOR RELEASE OF   CONFIDENTIAL INFORMATION    Dear Dr Mata,    We are seeing Bob Jean Baptiste, date of birth 1990, in the clinic at [facility]. Lucie Pedroza DO is the patient's PCP. Bob Jean Baptiste has an outstanding lab/procedure at the time we reviewed her chart. In order to help keep her health information updated, she has authorized us to request the following medical record(s):        (  )  MAMMOGRAM                                      (  )  COLONOSCOPY      (  )  PAP SMEAR                                          (  )  OUTSIDE LAB RESULTS     (  )  DEXA SCAN                                          ( X )  EYE EXAM            (  )  FOOT EXAM                                          (  )  ENTIRE RECORD     (  )  OUTSIDE IMMUNIZATIONS                 (  )  _______________         Please fax records to [facility], [place], Taylor, DO at [phone]    Thanks so much and have a great day!    Emily Castellanos LPN [address] [address]  - [phone]   [phone]           Patient Name: Bob Jean Baptiste  : 1990  Patient Phone #: [phone]          A. Consent for Examination and Treatment: I hereby authorize the providers and employees of [facility] to provide medical treatment/services which includes, but is not limited to, performing and administering tests and diagnostic procedures that are deemed necessary, Including, but not limited to, imaging examinations, blood tests and other laboratory procedures as may be required by the hospital, clinic, or may be ordered by my physician(s) or persons working under the general and/or special instructions of my physician(s).                   1.      I understand and agree that this consent covers all authorized persons, including but not limited to physicians, residents, nurse practitioners, physicians' assistants, specialists, consultants, student " nurses, and independently contracted physicians, who are called upon by the physician in charge, to carry out the diagnostic procedures and medical or surgical treatment.  2.      I hereby authorize [facility] to retain or dispose of any specimens or tissue, should there be such remaining from any test or procedure.  3.      I hereby authorize and give consent for [facility] providers and employees to take photographs, images or videotapes of such diagnostic, surgical or treatment procedures of Patient as may be required by [facility] or as may be ordered by a physician.  I further acknowledge and agree that [facility] may use cameras or other devices for patient monitoring.  4.      I am aware that the practice of medicine is not an exact science, and I acknowledge that no guarantees have been made to me as to the outcome of any tests, procedures or treatment.                   B. Authorization for Release of Information:  I understand that my insurance company and/or their agents may need information necessary to make determinations about payment/reimbursement.  I hereby provide authorization to release to all insurance companies, their successors, assignees, other parties with whom they may have contracted, or others acting on their behalf, that are involved with payment for any hospital and/or clinic charges incurred by the patient, any information that they request and deem necessary for payment/reimbursement, and/or quality review.  I further authorize the release of my health information to physicians or other health care practitioners on staff who are involved in my health care now and in the future, and to other health care providers, entities, or institutions for the purpose of my continued care and treatment, including referrals.     C. Medicare Patient's Certification and Authorization to Release Information and Payment Request:  I certify that the information given by me in applying for payment under Title XVIII of  the Social Security Act is correct.  I authorize any schultz of medical or other information about me to release to the Social Security Administration, or its intermediaries or carriers, any information needed for this or a related Medicare claim.  I request that payment of authorized benefits be made on my behalf.     D. Assignment of Insurance Benefits:  I hereby authorize any and all insurance companies, health plans, defined benefit plans, health insurers or any entity that is or may be responsible for payment of my medical expenses to pay all hospital and medical benefits now due, and to become due and payable to me under any hospital benefits, sick benefits, injury benefits or any other benefit for services rendered to me, including Major Medical Benefits, direct to [facility] and all independently contracted physicians.  I assign any and all rights that I may have against any and all insurance companies, health plans, defined benefit plans, health insurers or any entity that is or may be responsible for payment of my medical expenses, including, but not limited to any right to appeal a denial of a claim, any right to bring any action, lawsuit, administrative proceeding, or other cause of action on my behalf.  I specifically assign my right to pursue litigation against any and all insurance companies, health plans, defined benefit plans, health insurers or any entity that is or may be responsible for payment of medical expenses based upon a refusal to pay charges.              REGISTRATION  AUTHORIZATION                    E. Valuables:  It is understood and agreed that [facility] is not liable for the damage to or loss of any money, jewelry, documents, dentures, eye glasses, hearing aids, prosthetics, or other property of value.     F. Computer Equipment:  I understand and agree that should I choose to use computer equipment owned by [facility] or if I choose to access the Internet via [facility]'s network, I do so at my  own risk.  [facility] is not responsible for any damage to my computer equipment or to any damages of any type that might arise from my loss of equipment or data.                   G. Acceptance of Financial Responsibility:  I agree that in consideration of the services and supplies that have been or will be furnished to the patient,  I am hereby obligated to pay all charges made for or on the account of the patient according to the standard rates (in effect at the time the services and supplies are delivered) established by [facility], including its Patient Financial Assistance Policy to the extent it is applicable.  I understand that I am responsible for all charges, or portions thereof, not covered by insurance or other sources.  Patient refunds will be distributed only after balances at all [facility] facilities are paid.                   H. Communication Authorization:  I hereby authorize [facility] and its representatives, along with any billing service or  who may work on their behalf, to contact me on my cell phone and/or home phone using prerecorded messages, artificial voice messages, automatic telephone dialing devices or other computer assisted technology, or by electronic mail, text messaging, or by any other form of electronic communication.  This includes, but is not limited to appointment reminders, yearly physical exam reminders, preventive care reminders, patient campaigns, welcome calls, and calls about account balances on my account or any account on which I am listed as a guarantor.  I understand I have the right to opt out of these communications at any time.     I.  Relationship Between Facility and Physician:  I understand that some, but not all, providers furnishing services to the patient are not employees or agents of [facility].  The patient is under the care and supervision of his/her attending physician, and it is the responsibility of the facility and its nursing staff to carry out  the instructions of such physicians.  It is the responsibility of the patient's physician/designee to obtain the patient's informed consent, when required, for medical or surgical treatment, special diagnostic or therapeutic procedures, or hospital services rendered for the patient under the special instructions of the physician/designee.     J. Notice of Private Practices:  I acknowledge I have received a copy of [facility]'s Notice of Privacy Practices.     K. Facility Directory:  I have discussed with the organization my desire to be either included or excluded in the facility directory.  I understand that if my choice is to opt-out of being identified in the facility directory that the facility will not provide any information about me such as my condition (e.g. Fair, stable, etc.) or my location in the facility (e.g. Room number, department).     L. LINKS:  For [place] Residents:  [facility] is a LINKS ([place] [facility]) participating facility.  LINKS is a [facility]-sponsored confidential computer system that helps you and your doctor keep track of you and your child's immunization history.  I acknowledge that I am allowing [facility] to share this information with [facility].  For [place] Residents:  [facility] is a MIIX ([place] Immunization Information eXchange) participant.  MIIX is a [place] Department of Health-sponsored confidential computer system that helps you and your doctor keep track of you and your child's immunization history.  I acknowledge that I am allowing [facility] to share information with [employer].     M. Term:  This authorization is valid for this and subsequent care/treatment I receive at [facility] and will remain valid unless/until revoked in writing by me.      ACKNOWLEDGMENT OF POTENTIAL RISKS OF COVID-19 VACCINE  It is important that you, as the patient or patients legally authorized representative(s), understand and acknowledge the following, with regard to  administration of the COVID-19 vaccine offered by [facility]:  The SARS-CoV-2 virus (COVID-19) has caused an unprecedented modern global pandemic that has mobilized scientists and drug manufacturers to work to create safe and effective vaccines to get the crisis under control.  No vaccine is released in the [place] without undergoing rigorous, multi-layered testing and approval by the Food and Drug Administration.  During a public health emergency, however, vaccines can be released for patient administration by the FDA prior to completion of multi-phase clinical trials and approval. This is done by the FDAs granting of Emergency Use Authorization (EUA) when the vaccine meets reasonable thresholds for safety and effectiveness and people are in urgent need of care. Under an EUA, the FDA has found that known and potential benefits outweigh its known and potential risks.  The vaccine for which you are presenting to [facility] has been released under an EUA, which [facility] is honoring in its distribution of the vaccine to the public. While the FDAs authorization indicates its belief that usage is recommended over possible risks, there is still the possibility that unknown risks of the vaccine could exist.  By signing this document, you acknowledge and assume these risks. Further, you waive any and all claims of liability against and hold harmless any [facility] entity or provider for any harm caused to you by said possible unknown risks of the vaccine.        N. [facility]:  As used in this document, [facility] means all [facility] affiliated entities including all health centers, surgery centers, clinics, and hospitals.  It includes more specifically, the following entities: [facility], a not for profit [facility], and its subsidiaries and affiliates, including [facility], [facility]  [facility], [facility], [facility]., [facility], [facility].d/b/a [facility], L.L.C.d/b/a [facility], [employer] [employer], L.[facility]. As manager of [facility], [facility], L.JOE, [facility], L.L.C.d/b/a [facility], [facility], JONY, [facility], [facility] and [facility] as manager of [facility].                                                                Patient/Legal Guardian Signature                                                    This signature was collected at 02/16/2024      Bob Jean Baptiste/Self                                                                            Printed Name/Relationship to Patient                                                [facility] complies with applicable Federal civil rights laws and does not discriminate on the basis of race, color, national origin, age, disability, or sex.          ATENCIÓN: si habla español, tiene a mccollum disposición servicios gratuitos de asistencia lingüística. Jayy pagan [phone].         CHÚ Ý: N?u b?n nói Ti?ng Vi?t, có các d?ch v? h? tr? ngôn ng? mi?n phí dành cho b?n. G?i s? [phone].              REGISTRATION  AUTHORIZATION

## 2025-01-30 NOTE — PROGRESS NOTES
Population Health Chart Review & Patient Outreach Details      Additional Pop Health Notes:               Updates Requested / Reviewed:      Updated Care Coordination Note         Health Maintenance Topics Overdue:      VB Score: 3     Urine Screening  Eye Exam  Foot Exam                       Health Maintenance Topic(s) Outreach Outcomes & Actions Taken:    Eye Exam - Outreach Outcomes & Actions Taken  : External Records Requested & Care Team Updated if Applicable    Lab(s) - Outreach Outcomes & Actions Taken  : External Records Requested & Care Team Updated if Applicable

## 2025-01-31 ENCOUNTER — TELEPHONE (OUTPATIENT)
Dept: FAMILY MEDICINE | Facility: CLINIC | Age: 35
End: 2025-01-31
Payer: COMMERCIAL

## 2025-01-31 NOTE — TELEPHONE ENCOUNTER
----- Message from Vashti sent at 1/30/2025 10:49 AM CST -----  Contact: Patient  Type:  Needs Medical Advice    Who Called:  Patient    Would the patient rather a call back or a response via [facility]?  Call back  Best Call Back Number:   [phone]    Additional Information:   States she would like to speak with someone about her orders - please call - thank you

## 2025-02-05 ENCOUNTER — INFUSION (OUTPATIENT)
Dept: INFUSION THERAPY | Facility: HOSPITAL | Age: 35
End: 2025-02-05
Attending: STUDENT IN AN ORGANIZED HEALTH CARE EDUCATION/TRAINING PROGRAM
Payer: COMMERCIAL

## 2025-02-05 ENCOUNTER — PATIENT OUTREACH (OUTPATIENT)
Dept: ADMINISTRATIVE | Facility: HOSPITAL | Age: 35
End: 2025-02-05
Payer: COMMERCIAL

## 2025-02-05 VITALS
TEMPERATURE: 98 F | SYSTOLIC BLOOD PRESSURE: 121 MMHG | HEIGHT: 63 IN | WEIGHT: 154.31 LBS | HEART RATE: 68 BPM | BODY MASS INDEX: 27.34 KG/M2 | OXYGEN SATURATION: 99 % | DIASTOLIC BLOOD PRESSURE: 78 MMHG

## 2025-02-05 DIAGNOSIS — K86.1 CHRONIC BILIARY PANCREATITIS: Primary | ICD-10-CM

## 2025-02-05 PROCEDURE — 63600175 PHARM REV CODE 636 W HCPCS: Performed by: STUDENT IN AN ORGANIZED HEALTH CARE EDUCATION/TRAINING PROGRAM

## 2025-02-05 PROCEDURE — 96361 HYDRATE IV INFUSION ADD-ON: CPT

## 2025-02-05 RX ADMIN — SODIUM CHLORIDE, POTASSIUM CHLORIDE, SODIUM LACTATE AND CALCIUM CHLORIDE 1000 ML: 600; 310; 30; 20 INJECTION, SOLUTION INTRAVENOUS at 01:02

## 2025-02-05 NOTE — PLAN OF CARE
Problem: Fatigue  Goal: Improved Activity Tolerance  2/5/2025 1431 by Pako Baker, RN  Outcome: Progressing  2/5/2025 1302 by Pako Baker, RN  Outcome: Progressing

## 2025-02-07 ENCOUNTER — PATIENT MESSAGE (OUTPATIENT)
Dept: FAMILY MEDICINE | Facility: CLINIC | Age: 35
End: 2025-02-07
Payer: COMMERCIAL

## 2025-02-07 DIAGNOSIS — B37.31 VAGINAL YEAST INFECTION: Primary | ICD-10-CM

## 2025-02-10 RX ORDER — FLUCONAZOLE 150 MG/1
150 TABLET ORAL DAILY
Qty: 1 TABLET | Refills: 0 | Status: SHIPPED | OUTPATIENT
Start: 2025-02-10 | End: 2025-02-10

## 2025-02-10 RX ORDER — FLUCONAZOLE 150 MG/1
150 TABLET ORAL DAILY
Qty: 1 TABLET | Refills: 0 | Status: SHIPPED | OUTPATIENT
Start: 2025-02-10 | End: 2025-02-11

## 2025-02-11 ENCOUNTER — PATIENT MESSAGE (OUTPATIENT)
Dept: FAMILY MEDICINE | Facility: CLINIC | Age: 35
End: 2025-02-11
Payer: COMMERCIAL

## 2025-02-11 DIAGNOSIS — K86.1 CHRONIC BILIARY PANCREATITIS: Primary | ICD-10-CM

## 2025-02-11 DIAGNOSIS — R11.0 NAUSEA: ICD-10-CM

## 2025-02-12 RX ORDER — DIAZEPAM 5 MG/1
5 TABLET ORAL EVERY 12 HOURS PRN
Qty: 15 TABLET | Refills: 0 | Status: SHIPPED | OUTPATIENT
Start: 2025-02-12

## 2025-02-12 RX ORDER — ONDANSETRON 4 MG/1
4 TABLET, ORALLY DISINTEGRATING ORAL EVERY 6 HOURS PRN
Qty: 30 TABLET | Refills: 2 | Status: SHIPPED | OUTPATIENT
Start: 2025-02-12

## 2025-02-12 NOTE — TELEPHONE ENCOUNTER
No care due was identified.  [facility] Embedded Care Due Messages. Reference number: [id number].   2/12/2025 9:43:30 AM CST

## 2025-02-18 ENCOUNTER — PATIENT MESSAGE (OUTPATIENT)
Dept: INTERNAL MEDICINE | Facility: CLINIC | Age: 35
End: 2025-02-18
Payer: COMMERCIAL

## 2025-02-18 ENCOUNTER — OFFICE VISIT (OUTPATIENT)
Dept: FAMILY MEDICINE | Facility: CLINIC | Age: 35
End: 2025-02-18
Payer: COMMERCIAL

## 2025-02-18 VITALS
SYSTOLIC BLOOD PRESSURE: 100 MMHG | HEIGHT: 63 IN | BODY MASS INDEX: 28.17 KG/M2 | HEART RATE: 63 BPM | OXYGEN SATURATION: 99 % | DIASTOLIC BLOOD PRESSURE: 70 MMHG | WEIGHT: 159 LBS

## 2025-02-18 DIAGNOSIS — K64.9 HEMORRHOIDS, UNSPECIFIED HEMORRHOID TYPE: Primary | ICD-10-CM

## 2025-02-18 DIAGNOSIS — E08.9 DIABETES MELLITUS SECONDARY TO PANCREATIC INSUFFICIENCY: ICD-10-CM

## 2025-02-18 DIAGNOSIS — K86.89 DIABETES MELLITUS SECONDARY TO PANCREATIC INSUFFICIENCY: ICD-10-CM

## 2025-02-18 NOTE — PATIENT INSTRUCTIONS
Kishore Rojas,     If you are due for any health screening(s) below please notify me so we can arrange them to be ordered and scheduled. Most healthy patients at your age complete them, but you are free to accept or refuse.     If you can't do it, I'll definitely understand. If you can, I'd certainly appreciate it!    All of your core healthy metrics are met.

## 2025-02-18 NOTE — PROGRESS NOTES
[facility]  Primary [facility] Clinic - [place], [place]    Family Medicine Office Visit    Subjective     Patient ID: Bob Jean Baptiste is a 34 y.o. female who presents to clinic today to follow up.     Medical history, surgical history, medications, allergies, and social history were reviewed and updated.     Chief Complaint: Hemorrhoids    HPI    Has a history of hemorrhoids.  She reported that she had symptoms after she had her baby and then things got better.  She reports that she had a flare over the weekend.  She has started preparation H suppositories with some improvement.  Reported that she is having issues with constipation in his having small hard stools.  She reported that she has previously using Dulcolax to improve this.  She reported that when she is taking more metformin she did not have issues, but now that she is taking a lower dose she is now having problems.  We discussed initiating stool softeners, which she was agreeable to.  We discussed MiraLax and milk of magnesia over-the-counter.  Also recommended increasing fluid intake and fiber.    She has a history of diabetes associated with pancreatic insufficiency.  She is currently managed through Endocrinology through [facility].  She reported they last completed diabetic foot exam and urine screen in July of 2024.  We will work on obtaining these records.  Current medication regimen includes Farxiga 10 mg daily and metformin 500 mg twice daily.    Vitals:    02/18/25 0838   BP: 100/70   Pulse: 63      Wt Readings from Last 3 Encounters:   02/18/25 0838 72.1 kg (159 lb)   02/17/25 1511 72.1 kg (159 lb)   02/05/25 1320 70 kg (154 lb 5.2 oz)      Review of Systems    Review of Systems otherwise negative unless specified above.        Objective     Physical Exam  Vitals reviewed.   Constitutional:       General: She is not in acute distress.     Appearance: Normal appearance.   HENT:      Head: Normocephalic and atraumatic.      Nose: Nose normal.       Mouth/Throat:      Mouth: Mucous membranes are moist.   Cardiovascular:      Rate and Rhythm: Normal rate and regular rhythm.      Heart sounds: No murmur heard.  Pulmonary:      Effort: Pulmonary effort is normal. No respiratory distress.      Breath sounds: Normal breath sounds.   Musculoskeletal:         General: Normal range of motion.   Skin:     General: Skin is warm and dry.   Neurological:      General: No focal deficit present.      Mental Status: She is alert and oriented to person, place, and time.   Psychiatric:         Mood and Affect: Mood normal.         Behavior: Behavior normal.            Assessment and Plan     Current Outpatient Medications   Medication Instructions    blood sugar diagnostic (TRUE METRIX GLUCOSE TEST STRIP) Strp USE TO CHECK BLOOD GLUCOSE TWICE A DAY    dapagliflozin propanediol (FARXIGA) 10 mg, Daily    diazePAM (VALIUM) 5 mg, Oral, Every 12 hours PRN    ergocalciferol (ERGOCALCIFEROL) 50,000 Units, Oral, Every 7 days    estradioL (ESTRACE) 2 mg, Oral, Daily    metFORMIN (GLUCOPHAGE-XR) 500 mg, 2 times daily with meals    ondansetron (ZOFRAN-ODT) 4 mg, Oral, Every 6 hours PRN    progesterone (PROMETRIUM) 200 mg, Oral, Nightly    terconazole (TERAZOL 3) 0.8 % vaginal cream 1 applicator, Vaginal, Nightly    TRUEPLUS LANCETS 30 gauge Misc USE TO CHECK BLOOD GLUCOSE TWICE A DAY        1. Hemorrhoids, unspecified hemorrhoid type    2. 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  Discussed that if she continues to have problems I am happy to place referral to GI or colorectal surgery if needed.  Recommend that she continue current medication regimen for her diabetes through Endocrinology at [facility].  We will otherwise plan to keep scheduled follow up in a few months.    Visit today included increased complexity associated with the care of the episodic problem hemorrhoids addressed  and managing the longitudinal care of the patient due to the serious and/or complex managed problem(s) hemorrhoids.         Follow up if symptoms worsen or fail to improve, for Keep scheduled follow up with Kasia.    Questions were invited and answered. No other acute concerns at this time. Will plan to follow up as above or sooner if needed.     Lucie Pedroza,   02/18/2025 8:56 AM       This dictation has been generated using Modal Fluency Dictation. Some phonetic errors may occur. Please contact author for clarification if needed.

## 2025-02-18 NOTE — Clinical Note
Had foot exam and diabetes urine screen completed at [facility] with Dr. Gonzalez in July 2024

## 2025-02-20 ENCOUNTER — PATIENT OUTREACH (OUTPATIENT)
Dept: ADMINISTRATIVE | Facility: HOSPITAL | Age: 35
End: 2025-02-20
Payer: COMMERCIAL

## 2025-02-20 NOTE — PROGRESS NOTES
Population Health Chart Review & Patient Outreach Details      Additional Pop Health Notes:               Updates Requested / Reviewed:      Updated Care Coordination Note         Health Maintenance Topics Overdue:      VB Score: 2     Urine Screening  Foot Exam                       Health Maintenance Topic(s) Outreach Outcomes & Actions Taken:    Lab(s) - Outreach Outcomes & Actions Taken  : External Records Requested & Care Team Updated if Applicable    Diabetic Foot Exam - Outreach Outcomes & Actions Taken  : External Records Requested & Care Team Updated if Applicable       Patent

## 2025-02-20 NOTE — LETTER
"       AUTHORIZATION FOR RELEASE OF   CONFIDENTIAL INFORMATION    Dear Dr Gonzalez,    We are seeing Bob Jean Baptiste, date of birth 1990, in the clinic at [facility]. Lucie Pedroza DO is the patient's PCP. Bob Jean Baptiste has an outstanding lab/procedure at the time we reviewed her chart. In order to help keep her health information updated, she has authorized us to request the following medical record(s):        (  )  MAMMOGRAM                                      (  )  COLONOSCOPY      (  )  PAP SMEAR                                          (  )  OUTSIDE LAB RESULTS     (  )  DEXA SCAN                                          (  )  EYE EXAM            ( X )  FOOT EXAM                                          (  )  ENTIRE RECORD     (  )  OUTSIDE IMMUNIZATIONS                 ( X )  MICROALBUMIN         Please fax records to [facility], Hairston, Taylor, DO at [phone]    Thanks so much and have a great day!    Emily Castellanos LPN [facility]  [address] [place] [address]  P- [phone]  F- [phone]           Patient Name: Bob Jean Baptiste  : 1990  Patient Phone #: [phone]          A. Consent for Examination and Treatment: I hereby authorize the providers and employees of [facility] to provide medical treatment/services which includes, but is not limited to, performing and administering tests and diagnostic procedures that are deemed necessary, Including, but not limited to, imaging examinations, blood tests and other laboratory procedures as may be required by the hospital, clinic, or may be ordered by my physician(s) or persons working under the general and/or special instructions of my physician(s).                   1.      I understand and agree that this consent covers all authorized persons, including but not limited to physicians, residents, nurse practitioners, physicians' assistants, specialists, consultants, student " nurses, and independently contracted physicians, who are called upon by the physician in charge, to carry out the diagnostic procedures and medical or surgical treatment.  2.      I hereby authorize [facility] to retain or dispose of any specimens or tissue, should there be such remaining from any test or procedure.  3.      I hereby authorize and give consent for [facility] providers and employees to take photographs, images or videotapes of such diagnostic, surgical or treatment procedures of Patient as may be required by [facility] or as may be ordered by a physician.  I further acknowledge and agree that [facility] may use cameras or other devices for patient monitoring.  4.      I am aware that the practice of medicine is not an exact science, and I acknowledge that no guarantees have been made to me as to the outcome of any tests, procedures or treatment.                   B. Authorization for Release of Information:  I understand that my insurance company and/or their agents may need information necessary to make determinations about payment/reimbursement.  I hereby provide authorization to release to all insurance companies, their successors, assignees, other parties with whom they may have contracted, or others acting on their behalf, that are involved with payment for any hospital and/or clinic charges incurred by the patient, any information that they request and deem necessary for payment/reimbursement, and/or quality review.  I further authorize the release of my health information to physicians or other health care practitioners on staff who are involved in my health care now and in the future, and to other health care providers, entities, or institutions for the purpose of my continued care and treatment, including referrals.     C. Medicare Patient's Certification and Authorization to Release Information and Payment Request:  I certify that the information given by me in applying for payment under Title XVIII of  the Social Security Act is correct.  I authorize any schultz of medical or other information about me to release to the Social Security Administration, or its intermediaries or carriers, any information needed for this or a related Medicare claim.  I request that payment of authorized benefits be made on my behalf.     D. Assignment of Insurance Benefits:  I hereby authorize any and all insurance companies, health plans, defined benefit plans, health insurers or any entity that is or may be responsible for payment of my medical expenses to pay all hospital and medical benefits now due, and to become due and payable to me under any hospital benefits, sick benefits, injury benefits or any other benefit for services rendered to me, including Major Medical Benefits, direct to [facility] and all independently contracted physicians.  I assign any and all rights that I may have against any and all insurance companies, health plans, defined benefit plans, health insurers or any entity that is or may be responsible for payment of my medical expenses, including, but not limited to any right to appeal a denial of a claim, any right to bring any action, lawsuit, administrative proceeding, or other cause of action on my behalf.  I specifically assign my right to pursue litigation against any and all insurance companies, health plans, defined benefit plans, health insurers or any entity that is or may be responsible for payment of medical expenses based upon a refusal to pay charges.              REGISTRATION  AUTHORIZATION                    E. Valuables:  It is understood and agreed that [facility] is not liable for the damage to or loss of any money, jewelry, documents, dentures, eye glasses, hearing aids, prosthetics, or other property of value.     F. Computer Equipment:  I understand and agree that should I choose to use computer equipment owned by [facility] or if I choose to access the Internet via [facility]'s network, I do so at my  own risk.  [facility] is not responsible for any damage to my computer equipment or to any damages of any type that might arise from my loss of equipment or data.                   G. Acceptance of Financial Responsibility:  I agree that in consideration of the services and supplies that have been or will be furnished to the patient,  I am hereby obligated to pay all charges made for or on the account of the patient according to the standard rates (in effect at the time the services and supplies are delivered) established by [facility], including its Patient Financial Assistance Policy to the extent it is applicable.  I understand that I am responsible for all charges, or portions thereof, not covered by insurance or other sources.  Patient refunds will be distributed only after balances at all [facility] facilities are paid.                   H. Communication Authorization:  I hereby authorize [facility] and its representatives, along with any billing service or  who may work on their behalf, to contact me on my cell phone and/or home phone using prerecorded messages, artificial voice messages, automatic telephone dialing devices or other computer assisted technology, or by electronic mail, text messaging, or by any other form of electronic communication.  This includes, but is not limited to appointment reminders, yearly physical exam reminders, preventive care reminders, patient campaigns, welcome calls, and calls about account balances on my account or any account on which I am listed as a guarantor.  I understand I have the right to opt out of these communications at any time.     I.  Relationship Between Facility and Physician:  I understand that some, but not all, providers furnishing services to the patient are not employees or agents of [facility].  The patient is under the care and supervision of his/her attending physician, and it is the responsibility of the facility and its nursing staff to carry out  the instructions of such physicians.  It is the responsibility of the patient's physician/designee to obtain the patient's informed consent, when required, for medical or surgical treatment, special diagnostic or therapeutic procedures, or hospital services rendered for the patient under the special instructions of the physician/designee.     J. Notice of Private Practices:  I acknowledge I have received a copy of [facility]'s Notice of Privacy Practices.     K. Facility Directory:  I have discussed with the organization my desire to be either included or excluded in the facility directory.  I understand that if my choice is to opt-out of being identified in the facility directory that the facility will not provide any information about me such as my condition (e.g. Fair, stable, etc.) or my location in the facility (e.g. Room number, department).     L. LINKS:  For [place] Residents:  [facility] is a LINKS ([place] [facility]) participating facility.  LINKS is a [facility]-sponsored confidential computer system that helps you and your doctor keep track of you and your child's immunization history.  I acknowledge that I am allowing [facility] to share this information with [facility].  For [place] Residents:  [facility] is a MIIX ([place] Immunization Information eXchange) participant.  MIIX is a [place] Department of Health-sponsored confidential computer system that helps you and your doctor keep track of you and your child's immunization history.  I acknowledge that I am allowing [facility] to share information with [facility].     M. Term:  This authorization is valid for this and subsequent care/treatment I receive at [facility] and will remain valid unless/until revoked in writing by me.      ACKNOWLEDGMENT OF POTENTIAL RISKS OF COVID-19 VACCINE  It is important that you, as the patient or patients legally authorized representative(s), understand and acknowledge the following, with regard to  administration of the COVID-19 vaccine offered by [facility]:  The SARS-CoV-2 virus (COVID-19) has caused an unprecedented modern global pandemic that has mobilized scientists and drug manufacturers to work to create safe and effective vaccines to get the crisis under control.  No vaccine is released in the [place] without undergoing rigorous, multi-layered testing and approval by the Food and Drug Administration.  During a public health emergency, however, vaccines can be released for patient administration by the FDA prior to completion of multi-phase clinical trials and approval. This is done by the FDAs granting of Emergency Use Authorization (EUA) when the vaccine meets reasonable thresholds for safety and effectiveness and people are in urgent need of care. Under an EUA, the FDA has found that known and potential benefits outweigh its known and potential risks.  The vaccine for which you are presenting to [facility] has been released under an EUA, which [facility] is honoring in its distribution of the vaccine to the public. While the FDAs authorization indicates its belief that usage is recommended over possible risks, there is still the possibility that unknown risks of the vaccine could exist.  By signing this document, you acknowledge and assume these risks. Further, you waive any and all claims of liability against and hold harmless any [facility] entity or provider for any harm caused to you by said possible unknown risks of the vaccine.        N. [facility]:  As used in this document, [facility] means all [facility] affiliated entities including all health centers, surgery centers, clinics, and hospitals.  It includes more specifically, the following entities: [facility], a not for profit [facility], and its subsidiaries and affiliates, including [facility], [facility]  [facility], [facility], [facility]d/b/a [facility], L.L.C.d/b/a [facility], [employer], L.L[facility]. As manager of [facility], [facility], L.JOE, [facility], L.L.C.d/b/a [facility], [facility], JONY, [facility], [facility] and [facility] as manager of [facility].                                                                Patient/Legal Guardian Signature                                                    This signature was collected at 02/16/2024      Bob Jean Baptiste/Self                                                                            Printed Name/Relationship to Patient                                                [facility] complies with applicable Federal civil rights laws and does not discriminate on the basis of race, color, national origin, age, disability, or sex.          ATENCIÓN: si habla español, tiene a mccollum disposición servicios gratuitos de asistencia lingüística. Jayy pagan [phone].         CHÚ Ý: N?u b?n nói Ti?ng Vi?t, có các d?ch v? h? tr? ngôn ng? mi?n phí dành cho b?n. G?i s? [phone].              REGISTRATION  AUTHORIZATION

## 2025-03-09 ENCOUNTER — PATIENT MESSAGE (OUTPATIENT)
Dept: INTERNAL MEDICINE | Facility: CLINIC | Age: 35
End: 2025-03-09
Payer: COMMERCIAL

## 2025-03-10 ENCOUNTER — PATIENT MESSAGE (OUTPATIENT)
Dept: GASTROENTEROLOGY | Facility: CLINIC | Age: 35
End: 2025-03-10
Payer: COMMERCIAL

## 2025-03-31 LAB
ALBUMIN/GLOB SERPL: NORMAL {RATIO}
ALBUMIN: NORMAL
ALP ISOS SERPL LEV INH-CCNC: NORMAL U/L
ALT SERPL-CCNC: NORMAL U/L
ANION GAP SERPL CALC-SCNC: 8 MMOL/L (ref ?–30)
AST: NORMAL
BILIRUB SERPL-MCNC: NORMAL MG/DL
BILIRUBIN DIRECT+TOT PNL SERPL-MCNC: NORMAL
BUN SERPL-MCNC: 9 MG/DL
BUN SERPL-MCNC: NORMAL MG/DL
BUN/CREAT SERPL: 14
CALCIUM SERPL-MCNC: 9.9 MG/DL
CHLORIDE SERPL-SCNC: 104 MMOL/L (ref 99–108)
CK-BB: NORMAL
CO2 SERPL-SCNC: 28 MMOL/L
CO2 SERPL-SCNC: NORMAL MMOL/L
CREAT SERPL-MCNC: 0.7 MG/DL
EGFR: 118
GLOBULIN SER-MCNC: NORMAL G/DL
GLUCOSE SERPL-MCNC: 119 MG/DL
IRON: NORMAL
MAGNESIUM SERPL-MCNC: NORMAL MG/DL
PHOSPHATE FLD-MCNC: NORMAL MG/DL
POTASSIUM SERPL-SCNC: 4 MMOL/L (ref 3.4–5.3)
PROTEIN TOTAL: NORMAL
SODIUM BLD-SCNC: 140 MMOL/L (ref 137–147)
URATE SERPL-MCNC: NORMAL MG/DL

## 2025-04-08 RX ORDER — HEPARIN SODIUM (PORCINE) LOCK FLUSH IV SOLN 100 UNIT/ML 100 UNIT/ML
5 SOLUTION INTRAVENOUS
OUTPATIENT
Start: 2025-04-08

## 2025-04-08 RX ORDER — HEPARIN SODIUM,PORCINE 10 UNIT/ML
5 VIAL (ML) INTRAVENOUS
OUTPATIENT
Start: 2025-04-08

## 2025-05-14 ENCOUNTER — PATIENT MESSAGE (OUTPATIENT)
Dept: FAMILY MEDICINE | Facility: CLINIC | Age: 35
End: 2025-05-14
Payer: COMMERCIAL

## 2025-05-20 DIAGNOSIS — E11.9 TYPE 2 DIABETES MELLITUS WITHOUT COMPLICATION, UNSPECIFIED WHETHER LONG TERM INSULIN USE: ICD-10-CM

## 2025-05-20 RX ORDER — BLOOD-GLUCOSE METER
EACH MISCELLANEOUS
Qty: 200 STRIP | Refills: 3 | Status: SHIPPED | OUTPATIENT
Start: 2025-05-20

## 2025-05-20 NOTE — TELEPHONE ENCOUNTER
Refill Routing Note   Medication(s) are not appropriate for processing by [facility] for the following reason(s):        No active prescription written by provider  Responsible provider unclear    [facility] action(s):  Defer             Appointments  past 12m or future 3m with PCP    Date Provider   Last Visit   2/18/2025 Lucie Pedroza, DO   Next Visit   6/10/2025 Lucie Pedroza, DO   ED visits in past 90 days: 0        Note composed:7:00 AM 05/20/2025

## 2025-05-20 NOTE — TELEPHONE ENCOUNTER
No care due was identified.  [facility] Embedded Care Due Messages. Reference number: [id number].   5/20/2025 6:29:53 AM CDT

## 2025-06-04 ENCOUNTER — PATIENT OUTREACH (OUTPATIENT)
Dept: ADMINISTRATIVE | Facility: HOSPITAL | Age: 35
End: 2025-06-04
Payer: COMMERCIAL

## 2025-06-26 ENCOUNTER — OFFICE VISIT (OUTPATIENT)
Dept: FAMILY MEDICINE | Facility: CLINIC | Age: 35
End: 2025-06-26
Payer: COMMERCIAL

## 2025-06-26 VITALS
RESPIRATION RATE: 18 BRPM | HEART RATE: 75 BPM | HEIGHT: 63 IN | DIASTOLIC BLOOD PRESSURE: 70 MMHG | OXYGEN SATURATION: 100 % | SYSTOLIC BLOOD PRESSURE: 108 MMHG | WEIGHT: 170.44 LBS | BODY MASS INDEX: 30.2 KG/M2

## 2025-06-26 DIAGNOSIS — F33.1 MODERATE EPISODE OF RECURRENT MAJOR DEPRESSIVE DISORDER: ICD-10-CM

## 2025-06-26 DIAGNOSIS — E08.9 DIABETES MELLITUS SECONDARY TO PANCREATIC INSUFFICIENCY: Primary | ICD-10-CM

## 2025-06-26 DIAGNOSIS — K86.89 DIABETES MELLITUS SECONDARY TO PANCREATIC INSUFFICIENCY: Primary | ICD-10-CM

## 2025-06-26 DIAGNOSIS — E66.811 CLASS 1 OBESITY WITH SERIOUS COMORBIDITY AND BODY MASS INDEX (BMI) OF 30.0 TO 30.9 IN ADULT, UNSPECIFIED OBESITY TYPE: ICD-10-CM

## 2025-06-26 PROCEDURE — 82570 ASSAY OF URINE CREATININE: CPT | Performed by: STUDENT IN AN ORGANIZED HEALTH CARE EDUCATION/TRAINING PROGRAM

## 2025-06-26 RX ORDER — GLIPIZIDE 2.5 MG/1
2.5 TABLET, EXTENDED RELEASE ORAL
COMMUNITY
Start: 2025-03-07 | End: 2025-06-27

## 2025-06-26 RX ORDER — BUPROPION HYDROCHLORIDE 150 MG/1
150 TABLET ORAL DAILY
Qty: 30 TABLET | Refills: 2 | Status: SHIPPED | OUTPATIENT
Start: 2025-06-26 | End: 2026-06-26

## 2025-06-26 NOTE — PROGRESS NOTES
[facility]  Primary Care Clinic - [place], [place]    Family Medicine Office Visit    Subjective     Patient ID: Bob Jean Baptiste is a 35 y.o. female who presents to clinic today to follow up.     Medical history, surgical history, medications, allergies, and social history were reviewed and updated.     Chief Complaint: Health Maintenance (Follow up weight gain)    HPI    She returns today to discuss concerns about weight gain.  She reported that she has put on around 10 lb in the last few months and is concerned about her weight gain.  She has a history of diabetes associated with pancreatic insufficiency.  Current medication regimen includes glipizide 2.5 mg daily and metformin 1000 mg twice daily.  She has previously taking Farxiga 10 mg, but was having increased UTI and this has discontinued by her endocrinologist at [facility], Dr. Gonzalez.     Discussed that I am not sure if that has much more we can add to improve her diabetes as she may not be a good candidate for a GLP 1 due to her history of pancreatic insufficiency.  Discussed that she may discuss this with her endocrinologist to see if there would be willing to try it as glipizide is not the best for kidney function either.  Discussed that this would improve her diabetes control and help with weight loss while she is able to tolerate it.  Discussed that she is eventually going to need to get on the insulin, but I am not sure when her endocrinologist is going to want her to switch over to this.  Discussed that we could also consider starting her on a low-dose of Farxiga to see if this has tolerated better and we will provide her weight loss benefit.    We also discussed consideration for Wellbutrin.  No history of seizures or eating disorder.  Discussed that we can consider this as an add on to see if this would further help with weight management while she is waiting to get back in with the endocrinologist.  She is agreeable with this  plan.    Vitals:    06/26/25 1333   BP: 108/70   Pulse: 75   Resp: 18      Wt Readings from Last 3 Encounters:   06/26/25 1333 77.3 kg (170 lb 6.7 oz)   04/28/25 1416 74.4 kg (164 lb)   03/26/25 0850 73 kg (161 lb)      Review of Systems    Review of Systems otherwise negative unless specified above.        Objective     Physical Exam  Vitals reviewed.   Constitutional:       General: She is not in acute distress.     Appearance: Normal appearance. She is obese.   HENT:      Head: Normocephalic and atraumatic.      Nose: Nose normal.      Mouth/Throat:      Mouth: Mucous membranes are moist.   Cardiovascular:      Rate and Rhythm: Normal rate and regular rhythm.      Heart sounds: No murmur heard.  Pulmonary:      Effort: Pulmonary effort is normal. No respiratory distress.      Breath sounds: Normal breath sounds.   Musculoskeletal:         General: Normal range of motion.   Skin:     General: Skin is warm and dry.   Neurological:      General: No focal deficit present.      Mental Status: She is alert and oriented to person, place, and time.   Psychiatric:         Mood and Affect: Mood normal.         Behavior: Behavior normal.            Assessment and Plan     Current Outpatient Medications   Medication Instructions    buPROPion (WELLBUTRIN XL) 150 mg, Oral, Daily    diazePAM (VALIUM) 5 mg, Oral, Every 12 hours PRN    ergocalciferol (VITAMIN D2) 50,000 Units, Oral, Every 7 days    estradioL (ESTRACE) 2 mg, Oral, Daily    glipiZIDE (GLUCOTROL) 2.5 mg    metFORMIN (GLUCOPHAGE-XR) 1,000 mg, Oral, 2 times daily with meals    ondansetron (ZOFRAN-ODT) 4 mg, Oral, Every 6 hours PRN    ONETOUCH VERIO TEST STRIPS Strp USE TO CHECK BLOOD GLUCOSE TWICE A DAY    progesterone (PROMETRIUM) 200 mg, Oral, Nightly    TRUEPLUS LANCETS 30 gauge Misc USE TO CHECK BLOOD GLUCOSE TWICE A DAY        1. 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 Class 1 obesity with  serious comorbidity and body mass index (BMI) of 30.0 to 30.9 in adult, unspecified obesity type    3. Moderate episode of recurrent major depressive disorder  -     buPROPion (WELLBUTRIN XL) 150 MG TB24 tablet; Take 1 tablet (150 mg total) by mouth once daily.  Dispense: 30 tablet; Refill: 2        Completing microalbumin creatinine ratio today.  Starting a trial of Wellbutrin see if this will help with depression and obesity.  Recommend that she touch base with the endocrinologist to see what changes that can make with her regimen to see if this would further augment weight loss.  We will otherwise plan to keep her follow up scheduled for next month.    Visit today included increased complexity associated with the care of the episodic problem diabetes addressed and managing the longitudinal care of the patient due to the serious and/or complex managed problem(s) diabetes.         Follow up if symptoms worsen or fail to improve.    Questions were invited and answered. No other acute concerns at this time. Will plan to follow up as above or sooner if needed.     Lucie Pedroza DO  06/26/2025 3:05 PM       This dictation has been generated using Modal Fluency Dictation. Some phonetic errors may occur. Please contact author for clarification if needed.

## 2025-06-27 ENCOUNTER — PATIENT OUTREACH (OUTPATIENT)
Dept: ADMINISTRATIVE | Facility: HOSPITAL | Age: 35
End: 2025-06-27
Payer: COMMERCIAL

## 2025-06-27 ENCOUNTER — PATIENT MESSAGE (OUTPATIENT)
Dept: FAMILY MEDICINE | Facility: CLINIC | Age: 35
End: 2025-06-27
Payer: COMMERCIAL

## 2025-06-27 LAB
ALBUMIN/CREAT UR: NORMAL
CREAT UR-MCNC: 17 MG/DL (ref 15–325)
MICROALBUMIN UR-MCNC: <5 UG/ML (ref ?–5000)

## 2025-06-27 RX ORDER — DAPAGLIFLOZIN 5 MG/1
5 TABLET, FILM COATED ORAL DAILY
COMMUNITY

## 2025-06-27 NOTE — LETTER
AUTHORIZATION FOR RELEASE OF   CONFIDENTIAL INFORMATION    Dear Dr Gonzalez,    We are seeing Bob Jean Baptiste, date of birth 1990, in the clinic at [facility]. Lucie Pedroza DO is the patient's PCP. Bob Jean Baptiste has an outstanding lab/procedure at the time we reviewed her chart. In order to help keep her health information updated, she has authorized us to request the following medical record(s):        (  )  MAMMOGRAM                                      (  )  COLONOSCOPY      (  )  PAP SMEAR                                          (  )  OUTSIDE LAB RESULTS     (  )  DEXA SCAN                                          (  )  EYE EXAM            ( X )  FOOT EXAM                                          (  )  ENTIRE RECORD     (  )  OUTSIDE IMMUNIZATIONS                 (  )  _______________         Please fax records to [facility], Hairston, Taylor, DO at [phone]    Thanks so much and have a great day!    Emily Castellanos LPN [facility]  [address] [place] [address]  P- [phone]  F- [phone]           Patient Name: Bob Jean Baptiste  : 1990  Patient Phone #: [phone]

## 2025-06-27 NOTE — PROGRESS NOTES
Population Health Chart Review & Patient Outreach Details      Additional Pop Health Notes:               Updates Requested / Reviewed:      Updated Care Coordination Note         Health Maintenance Topics Overdue:      [facility] Score: 1     Foot Exam                       Health Maintenance Topic(s) Outreach Outcomes & Actions Taken:    Eye Exam - Outreach Outcomes & Actions Taken  : External Records Requested & Care Team Updated if Applicable

## 2025-07-31 DIAGNOSIS — E11.9 TYPE 2 DIABETES MELLITUS WITHOUT COMPLICATION: ICD-10-CM

## 2025-08-03 ENCOUNTER — PATIENT MESSAGE (OUTPATIENT)
Dept: FAMILY MEDICINE | Facility: CLINIC | Age: 35
End: 2025-08-03
Payer: COMMERCIAL

## 2025-08-04 RX ORDER — ROSUVASTATIN CALCIUM 10 MG/1
10 TABLET, COATED ORAL DAILY
COMMUNITY

## 2025-08-23 ENCOUNTER — PATIENT MESSAGE (OUTPATIENT)
Dept: FAMILY MEDICINE | Facility: CLINIC | Age: 35
End: 2025-08-23
Payer: COMMERCIAL

## 2025-09-03 DIAGNOSIS — E11.9 TYPE 2 DIABETES MELLITUS WITHOUT COMPLICATION, UNSPECIFIED WHETHER LONG TERM INSULIN USE: ICD-10-CM

## 2025-09-03 RX ORDER — GLUCOSAM/CHON-MSM1/C/MANG/BOSW 500-416.6
TABLET ORAL
Qty: 200 EACH | Refills: 3 | Status: SHIPPED | OUTPATIENT
Start: 2025-09-03

## (undated) DEVICE — SUCTION MANIFOLD NEPTUNE 2 SYS 4 PORT 0702-020-000

## (undated) DEVICE — ENDO DEVICE LOCKING AND BIOPSY CAP M00545261

## (undated) DEVICE — ESU GROUND PAD ADULT W/CORD E7507

## (undated) DEVICE — KIT ENDO FIRST STEP DISINFECTANT 200ML W/POUCH EP-4

## (undated) DEVICE — ENDO ADPT CATH ROTATABLE SIDE ARM G22677

## (undated) DEVICE — SOL WATER IRRIG 1000ML BOTTLE 07139-09

## (undated) DEVICE — CATH RETRIEVAL BALLOON EXTRACTOR PRO RX-S INJ ABOVE 9-12MM

## (undated) DEVICE — ENDO BITE BLOCK ADULT OMNI-BLOC

## (undated) DEVICE — ENDO FUSION OMNI-TOME G31903

## (undated) DEVICE — Device

## (undated) DEVICE — ENDO FUSION OMNI-TOME 21 FS-OMNI-21 G48675

## (undated) DEVICE — GUIDEWIRE NOVAGOLD .018X260CM STR TIP M00552000

## (undated) DEVICE — CANNULA BILIARY CONTOUR ERCP .018"X210CM 5-4-3 TIP

## (undated) DEVICE — PACK ENDOSCOPY GI CUSTOM UMMC

## (undated) DEVICE — BIOPSY VALVE BIOSHIELD 00711135

## (undated) DEVICE — BALLOON EXTRACTION 15X1950MM 3.2MM TL B-V243Q-A

## (undated) DEVICE — ENDO TUBING CO2 SMARTCAP STERILE DISP 100145CO2EXT

## (undated) DEVICE — SOL WATER IRRIG 1000ML BOTTLE 2F7114

## (undated) DEVICE — KIT CONNECTOR FOR OLYMPUS ENDOSCOPES DEFENDO 100310

## (undated) DEVICE — TUBING SUCTION 10'X3/16" N510

## (undated) DEVICE — NDL BLUNT FILL 18GA 1 1/2" 305064

## (undated) DEVICE — WIRE GUIDE 0.025"X270CM STR VISIGLIDE G-240-2527S

## (undated) DEVICE — GUIDEWIRE NOVAGOLD .018X480CM STR TIP M00552010

## (undated) DEVICE — SYR 10ML LL W/O NDL 302995

## (undated) DEVICE — WIRE GUIDE TRACER METRO DIRECT .021"X260CM STR TIP G55705

## (undated) DEVICE — WIRE GUIDE 0.025"X270CM ANG VISIGLIDE G-240-2527A

## (undated) RX ORDER — FENTANYL CITRATE 50 UG/ML
INJECTION, SOLUTION INTRAMUSCULAR; INTRAVENOUS
Status: DISPENSED
Start: 2021-04-13

## (undated) RX ORDER — ONDANSETRON 2 MG/ML
INJECTION INTRAMUSCULAR; INTRAVENOUS
Status: DISPENSED
Start: 2021-12-06

## (undated) RX ORDER — FENTANYL CITRATE-0.9 % NACL/PF 10 MCG/ML
PLASTIC BAG, INJECTION (ML) INTRAVENOUS
Status: DISPENSED
Start: 2021-12-07

## (undated) RX ORDER — HYDROMORPHONE HYDROCHLORIDE 1 MG/ML
INJECTION, SOLUTION INTRAMUSCULAR; INTRAVENOUS; SUBCUTANEOUS
Status: DISPENSED
Start: 2021-04-13

## (undated) RX ORDER — LIDOCAINE HYDROCHLORIDE 20 MG/ML
INJECTION, SOLUTION EPIDURAL; INFILTRATION; INTRACAUDAL; PERINEURAL
Status: DISPENSED
Start: 2021-04-13

## (undated) RX ORDER — ACETAMINOPHEN 325 MG/1
TABLET ORAL
Status: DISPENSED
Start: 2021-12-07

## (undated) RX ORDER — SCOLOPAMINE TRANSDERMAL SYSTEM 1 MG/1
PATCH, EXTENDED RELEASE TRANSDERMAL
Status: DISPENSED
Start: 2021-12-07

## (undated) RX ORDER — ONDANSETRON 2 MG/ML
INJECTION INTRAMUSCULAR; INTRAVENOUS
Status: DISPENSED
Start: 2021-04-13

## (undated) RX ORDER — ALBUTEROL SULFATE 90 UG/1
AEROSOL, METERED RESPIRATORY (INHALATION)
Status: DISPENSED
Start: 2021-12-06

## (undated) RX ORDER — PROPOFOL 10 MG/ML
INJECTION, EMULSION INTRAVENOUS
Status: DISPENSED
Start: 2021-04-13

## (undated) RX ORDER — DEXAMETHASONE SODIUM PHOSPHATE 4 MG/ML
INJECTION, SOLUTION INTRA-ARTICULAR; INTRALESIONAL; INTRAMUSCULAR; INTRAVENOUS; SOFT TISSUE
Status: DISPENSED
Start: 2021-12-07

## (undated) RX ORDER — FENTANYL CITRATE 50 UG/ML
INJECTION, SOLUTION INTRAMUSCULAR; INTRAVENOUS
Status: DISPENSED
Start: 2021-12-06

## (undated) RX ORDER — DEXAMETHASONE SODIUM PHOSPHATE 4 MG/ML
INJECTION, SOLUTION INTRA-ARTICULAR; INTRALESIONAL; INTRAMUSCULAR; INTRAVENOUS; SOFT TISSUE
Status: DISPENSED
Start: 2021-04-13

## (undated) RX ORDER — EPHEDRINE SULFATE 50 MG/ML
INJECTION, SOLUTION INTRAMUSCULAR; INTRAVENOUS; SUBCUTANEOUS
Status: DISPENSED
Start: 2021-12-07

## (undated) RX ORDER — CEFAZOLIN SODIUM 1 G/3ML
INJECTION, POWDER, FOR SOLUTION INTRAMUSCULAR; INTRAVENOUS
Status: DISPENSED
Start: 2021-12-06

## (undated) RX ORDER — SODIUM CHLORIDE, SODIUM LACTATE, POTASSIUM CHLORIDE, CALCIUM CHLORIDE 600; 310; 30; 20 MG/100ML; MG/100ML; MG/100ML; MG/100ML
INJECTION, SOLUTION INTRAVENOUS
Status: DISPENSED
Start: 2021-12-07

## (undated) RX ORDER — FENTANYL CITRATE 50 UG/ML
INJECTION, SOLUTION INTRAMUSCULAR; INTRAVENOUS
Status: DISPENSED
Start: 2021-12-07

## (undated) RX ORDER — ONDANSETRON 2 MG/ML
INJECTION INTRAMUSCULAR; INTRAVENOUS
Status: DISPENSED
Start: 2021-12-07

## (undated) RX ORDER — LIDOCAINE HYDROCHLORIDE 20 MG/ML
INJECTION, SOLUTION EPIDURAL; INFILTRATION; INTRACAUDAL; PERINEURAL
Status: DISPENSED
Start: 2021-12-07

## (undated) RX ORDER — PROPOFOL 10 MG/ML
INJECTION, EMULSION INTRAVENOUS
Status: DISPENSED
Start: 2021-12-07